# Patient Record
Sex: MALE | Race: WHITE | Employment: OTHER | URBAN - NONMETROPOLITAN AREA
[De-identification: names, ages, dates, MRNs, and addresses within clinical notes are randomized per-mention and may not be internally consistent; named-entity substitution may affect disease eponyms.]

---

## 2017-04-05 ENCOUNTER — TELEPHONE (OUTPATIENT)
Dept: FAMILY MEDICINE CLINIC | Age: 77
End: 2017-04-05

## 2017-04-05 DIAGNOSIS — R73.09 ELEVATED GLUCOSE: ICD-10-CM

## 2017-04-05 DIAGNOSIS — E78.2 MIXED HYPERLIPIDEMIA: ICD-10-CM

## 2017-04-07 LAB
ALT SERPL-CCNC: 16 IU/L (ref 5–59)
CHOLESTEROL/HDL RATIO: 2.8
CHOLESTEROL: 160 MG/DL
GLUCOSE: 90 MG/DL (ref 70–100)
HDLC SERPL-MCNC: 58 MG/DL (ref 40–60)
LDL CHOLESTEROL CALCULATED: 85 MG/DL
LDL/HDL RATIO: 1.5
TRIGL SERPL-MCNC: 85 MG/DL
VLDLC SERPL CALC-MCNC: 17 MG/DL

## 2017-04-17 ENCOUNTER — OFFICE VISIT (OUTPATIENT)
Dept: FAMILY MEDICINE CLINIC | Age: 77
End: 2017-04-17

## 2017-04-17 VITALS
WEIGHT: 181.6 LBS | BODY MASS INDEX: 28.5 KG/M2 | HEART RATE: 50 BPM | SYSTOLIC BLOOD PRESSURE: 134 MMHG | HEIGHT: 67 IN | DIASTOLIC BLOOD PRESSURE: 70 MMHG

## 2017-04-17 DIAGNOSIS — E78.2 MIXED HYPERLIPIDEMIA: Primary | ICD-10-CM

## 2017-04-17 DIAGNOSIS — M15.9 PRIMARY OSTEOARTHRITIS INVOLVING MULTIPLE JOINTS: ICD-10-CM

## 2017-04-17 DIAGNOSIS — J43.1 PANLOBULAR EMPHYSEMA (HCC): ICD-10-CM

## 2017-04-17 PROCEDURE — G8926 SPIRO NO PERF OR DOC: HCPCS | Performed by: FAMILY MEDICINE

## 2017-04-17 PROCEDURE — 99213 OFFICE O/P EST LOW 20 MIN: CPT | Performed by: FAMILY MEDICINE

## 2017-04-17 PROCEDURE — G8427 DOCREV CUR MEDS BY ELIG CLIN: HCPCS | Performed by: FAMILY MEDICINE

## 2017-04-17 PROCEDURE — 4040F PNEUMOC VAC/ADMIN/RCVD: CPT | Performed by: FAMILY MEDICINE

## 2017-04-17 PROCEDURE — 1123F ACP DISCUSS/DSCN MKR DOCD: CPT | Performed by: FAMILY MEDICINE

## 2017-04-17 PROCEDURE — G8598 ASA/ANTIPLAT THER USED: HCPCS | Performed by: FAMILY MEDICINE

## 2017-04-17 PROCEDURE — 1036F TOBACCO NON-USER: CPT | Performed by: FAMILY MEDICINE

## 2017-04-17 PROCEDURE — G8420 CALC BMI NORM PARAMETERS: HCPCS | Performed by: FAMILY MEDICINE

## 2017-04-17 PROCEDURE — 3023F SPIROM DOC REV: CPT | Performed by: FAMILY MEDICINE

## 2017-04-17 PROCEDURE — 3017F COLORECTAL CA SCREEN DOC REV: CPT | Performed by: FAMILY MEDICINE

## 2017-04-17 RX ORDER — LOVASTATIN 20 MG/1
20 TABLET ORAL NIGHTLY
Qty: 90 TABLET | Refills: 1 | Status: SHIPPED | OUTPATIENT
Start: 2017-04-17 | End: 2017-10-23 | Stop reason: SDUPTHER

## 2017-04-17 RX ORDER — DICLOFENAC SODIUM 75 MG/1
75 TABLET, DELAYED RELEASE ORAL 2 TIMES DAILY PRN
Qty: 180 TABLET | Refills: 1 | Status: SHIPPED | OUTPATIENT
Start: 2017-04-17 | End: 2017-10-23 | Stop reason: SDUPTHER

## 2017-10-23 ENCOUNTER — OFFICE VISIT (OUTPATIENT)
Dept: FAMILY MEDICINE CLINIC | Age: 77
End: 2017-10-23
Payer: MEDICARE

## 2017-10-23 VITALS
WEIGHT: 179.8 LBS | HEART RATE: 60 BPM | SYSTOLIC BLOOD PRESSURE: 122 MMHG | BODY MASS INDEX: 28.22 KG/M2 | HEIGHT: 67 IN | DIASTOLIC BLOOD PRESSURE: 80 MMHG

## 2017-10-23 DIAGNOSIS — M54.41 CHRONIC BILATERAL LOW BACK PAIN WITH BILATERAL SCIATICA: ICD-10-CM

## 2017-10-23 DIAGNOSIS — E78.2 MIXED HYPERLIPIDEMIA: Primary | ICD-10-CM

## 2017-10-23 DIAGNOSIS — G89.29 CHRONIC BILATERAL LOW BACK PAIN WITH BILATERAL SCIATICA: ICD-10-CM

## 2017-10-23 DIAGNOSIS — M54.42 CHRONIC BILATERAL LOW BACK PAIN WITH BILATERAL SCIATICA: ICD-10-CM

## 2017-10-23 PROCEDURE — G8598 ASA/ANTIPLAT THER USED: HCPCS | Performed by: FAMILY MEDICINE

## 2017-10-23 PROCEDURE — 3017F COLORECTAL CA SCREEN DOC REV: CPT | Performed by: FAMILY MEDICINE

## 2017-10-23 PROCEDURE — G8484 FLU IMMUNIZE NO ADMIN: HCPCS | Performed by: FAMILY MEDICINE

## 2017-10-23 PROCEDURE — G8427 DOCREV CUR MEDS BY ELIG CLIN: HCPCS | Performed by: FAMILY MEDICINE

## 2017-10-23 PROCEDURE — 1036F TOBACCO NON-USER: CPT | Performed by: FAMILY MEDICINE

## 2017-10-23 PROCEDURE — 4040F PNEUMOC VAC/ADMIN/RCVD: CPT | Performed by: FAMILY MEDICINE

## 2017-10-23 PROCEDURE — 1123F ACP DISCUSS/DSCN MKR DOCD: CPT | Performed by: FAMILY MEDICINE

## 2017-10-23 PROCEDURE — G8419 CALC BMI OUT NRM PARAM NOF/U: HCPCS | Performed by: FAMILY MEDICINE

## 2017-10-23 PROCEDURE — 99213 OFFICE O/P EST LOW 20 MIN: CPT | Performed by: FAMILY MEDICINE

## 2017-10-23 RX ORDER — DICLOFENAC SODIUM 75 MG/1
75 TABLET, DELAYED RELEASE ORAL 2 TIMES DAILY
Qty: 180 TABLET | Refills: 0 | Status: SHIPPED | OUTPATIENT
Start: 2017-10-23 | End: 2017-11-06 | Stop reason: SDUPTHER

## 2017-10-23 RX ORDER — LOVASTATIN 20 MG/1
20 TABLET ORAL NIGHTLY
Qty: 90 TABLET | Refills: 0 | Status: SHIPPED | OUTPATIENT
Start: 2017-10-23 | End: 2017-11-06 | Stop reason: SDUPTHER

## 2017-10-23 RX ORDER — LOVASTATIN 20 MG/1
20 TABLET ORAL DAILY
Qty: 90 TABLET | Refills: 0 | Status: SHIPPED | OUTPATIENT
Start: 2017-10-23 | End: 2017-11-06 | Stop reason: SDUPTHER

## 2017-10-23 RX ORDER — DICLOFENAC SODIUM 75 MG/1
75 TABLET, DELAYED RELEASE ORAL 2 TIMES DAILY PRN
Qty: 180 TABLET | Refills: 0 | Status: SHIPPED | OUTPATIENT
Start: 2017-10-23 | End: 2017-11-06 | Stop reason: SDUPTHER

## 2017-10-23 RX ORDER — BACLOFEN 10 MG/1
10 TABLET ORAL 2 TIMES DAILY PRN
Qty: 180 TABLET | Refills: 0 | Status: SHIPPED | OUTPATIENT
Start: 2017-10-23 | End: 2017-11-06 | Stop reason: SDUPTHER

## 2017-10-23 ASSESSMENT — PATIENT HEALTH QUESTIONNAIRE - PHQ9
1. LITTLE INTEREST OR PLEASURE IN DOING THINGS: 0
SUM OF ALL RESPONSES TO PHQ9 QUESTIONS 1 & 2: 0
2. FEELING DOWN, DEPRESSED OR HOPELESS: 0
SUM OF ALL RESPONSES TO PHQ QUESTIONS 1-9: 0

## 2017-10-23 ASSESSMENT — ENCOUNTER SYMPTOMS: SHORTNESS OF BREATH: 0

## 2017-10-23 NOTE — PROGRESS NOTES
(VOLTAREN) 75 MG EC tablet; Take 1 tablet by mouth 2 times daily as needed for Pain  Dispense: 180 tablet; Refill: 0  - diclofenac (VOLTAREN) 75 MG EC tablet; Take 1 tablet by mouth 2 times daily  Dispense: 180 tablet; Refill: 0      They voiced understanding. All questions answered. They agreed with treatment plan. Educational materials given - see patient instructions. Discussed use, benefit, and side effects of prescribed medications. Reviewed health maintenance. Return in about 6 months (around 4/23/2018) for cholesterol.        Electronically signed by Gianni Guzman MD on 10/23/2017 at 9:29 AM

## 2017-10-23 NOTE — PATIENT INSTRUCTIONS
or steam foods. Don't hernandez them. · Be physically active. Get at least 30 minutes of exercise on most days of the week. Walking is a good choice. You also may want to do other activities, such as running, swimming, cycling, or playing tennis or team sports. · Stay at a healthy weight or lose weight by making the changes in eating and physical activity listed above. Losing just a small amount of weight, even 5 to 10 pounds, can reduce your risk for having a heart attack or stroke. · Do not smoke. When should you call for help? Watch closely for changes in your health, and be sure to contact your doctor if:  · You need help making lifestyle changes. · You have questions about your medicine. Where can you learn more? Go to [email or web address] and sign in to your [employer] account. Enter K668 in the [employer] box to learn more about \"High Cholesterol: Care Instructions. \"     If you do not have an account, please click on the \"Sign Up Now\" link. Current as of: April 3, 2017  Content Version: 11.3  © 9919-6223 "[employer]. Care instructions adapted under license by [facility] ([facility]). If you have questions about a medical condition or this instruction, always ask your healthcare professional. [address] any warranty or liability for your use of this information.

## 2017-11-06 ENCOUNTER — TELEPHONE (OUTPATIENT)
Dept: FAMILY MEDICINE CLINIC | Age: 77
End: 2017-11-06

## 2017-11-06 DIAGNOSIS — M54.42 CHRONIC BILATERAL LOW BACK PAIN WITH BILATERAL SCIATICA: ICD-10-CM

## 2017-11-06 DIAGNOSIS — G89.29 CHRONIC BILATERAL LOW BACK PAIN WITH BILATERAL SCIATICA: ICD-10-CM

## 2017-11-06 DIAGNOSIS — E78.2 MIXED HYPERLIPIDEMIA: ICD-10-CM

## 2017-11-06 DIAGNOSIS — M54.41 CHRONIC BILATERAL LOW BACK PAIN WITH BILATERAL SCIATICA: ICD-10-CM

## 2017-11-06 RX ORDER — BACLOFEN 10 MG/1
10 TABLET ORAL 2 TIMES DAILY PRN
Qty: 180 TABLET | Refills: 0 | Status: SHIPPED | OUTPATIENT
Start: 2017-11-06 | End: 2018-04-23

## 2017-11-06 RX ORDER — DICLOFENAC SODIUM 75 MG/1
75 TABLET, DELAYED RELEASE ORAL 2 TIMES DAILY PRN
Qty: 180 TABLET | Refills: 0 | Status: SHIPPED | OUTPATIENT
Start: 2017-11-06 | End: 2018-04-23 | Stop reason: SDUPTHER

## 2017-11-06 RX ORDER — LOVASTATIN 20 MG/1
20 TABLET ORAL NIGHTLY
Qty: 90 TABLET | Refills: 0 | Status: SHIPPED | OUTPATIENT
Start: 2017-11-06 | End: 2018-01-18 | Stop reason: SDUPTHER

## 2017-11-06 RX ORDER — DICLOFENAC SODIUM 75 MG/1
75 TABLET, DELAYED RELEASE ORAL 2 TIMES DAILY
Qty: 180 TABLET | Refills: 0 | Status: SHIPPED | OUTPATIENT
Start: 2017-11-06 | End: 2018-04-23 | Stop reason: ALTCHOICE

## 2017-11-06 RX ORDER — LOVASTATIN 20 MG/1
20 TABLET ORAL DAILY
Qty: 90 TABLET | Refills: 0 | Status: SHIPPED | OUTPATIENT
Start: 2017-11-06 | End: 2018-04-23 | Stop reason: ALTCHOICE

## 2017-11-06 NOTE — TELEPHONE ENCOUNTER
Pre-service has forwarded a request to us asking if we could change the Patient's Mail in Pharmacy and send the Rx's to them. They say they use [facility] which is fulfilled [facility]. I have it set for you to approve.

## 2018-01-18 DIAGNOSIS — E78.2 MIXED HYPERLIPIDEMIA: ICD-10-CM

## 2018-01-19 RX ORDER — LOVASTATIN 20 MG/1
TABLET ORAL
Qty: 90 TABLET | Refills: 0 | Status: SHIPPED | OUTPATIENT
Start: 2018-01-19 | End: 2018-04-23 | Stop reason: SDUPTHER

## 2018-04-23 ENCOUNTER — OFFICE VISIT (OUTPATIENT)
Dept: FAMILY MEDICINE CLINIC | Age: 78
End: 2018-04-23
Payer: MEDICARE

## 2018-04-23 VITALS
HEIGHT: 67 IN | SYSTOLIC BLOOD PRESSURE: 118 MMHG | DIASTOLIC BLOOD PRESSURE: 74 MMHG | WEIGHT: 177.2 LBS | HEART RATE: 62 BPM | BODY MASS INDEX: 27.81 KG/M2

## 2018-04-23 DIAGNOSIS — E78.2 MIXED HYPERLIPIDEMIA: Primary | ICD-10-CM

## 2018-04-23 DIAGNOSIS — G89.29 CHRONIC BILATERAL LOW BACK PAIN WITH BILATERAL SCIATICA: ICD-10-CM

## 2018-04-23 DIAGNOSIS — M54.41 CHRONIC BILATERAL LOW BACK PAIN WITH BILATERAL SCIATICA: ICD-10-CM

## 2018-04-23 DIAGNOSIS — M54.42 CHRONIC BILATERAL LOW BACK PAIN WITH BILATERAL SCIATICA: ICD-10-CM

## 2018-04-23 PROCEDURE — 1123F ACP DISCUSS/DSCN MKR DOCD: CPT | Performed by: FAMILY MEDICINE

## 2018-04-23 PROCEDURE — G8598 ASA/ANTIPLAT THER USED: HCPCS | Performed by: FAMILY MEDICINE

## 2018-04-23 PROCEDURE — G8419 CALC BMI OUT NRM PARAM NOF/U: HCPCS | Performed by: FAMILY MEDICINE

## 2018-04-23 PROCEDURE — 4040F PNEUMOC VAC/ADMIN/RCVD: CPT | Performed by: FAMILY MEDICINE

## 2018-04-23 PROCEDURE — 1036F TOBACCO NON-USER: CPT | Performed by: FAMILY MEDICINE

## 2018-04-23 PROCEDURE — 99213 OFFICE O/P EST LOW 20 MIN: CPT | Performed by: FAMILY MEDICINE

## 2018-04-23 PROCEDURE — G8427 DOCREV CUR MEDS BY ELIG CLIN: HCPCS | Performed by: FAMILY MEDICINE

## 2018-04-23 RX ORDER — LOVASTATIN 20 MG/1
20 TABLET ORAL NIGHTLY
Qty: 90 TABLET | Refills: 1 | Status: SHIPPED | OUTPATIENT
Start: 2018-04-23 | End: 2018-10-10 | Stop reason: SDUPTHER

## 2018-04-23 RX ORDER — DICLOFENAC SODIUM 75 MG/1
75 TABLET, DELAYED RELEASE ORAL 2 TIMES DAILY PRN
Qty: 180 TABLET | Refills: 0 | Status: SHIPPED | OUTPATIENT
Start: 2018-04-23 | End: 2018-10-10 | Stop reason: CLARIF

## 2018-04-23 ASSESSMENT — ENCOUNTER SYMPTOMS: SHORTNESS OF BREATH: 0

## 2018-04-30 ENCOUNTER — HOSPITAL ENCOUNTER (OUTPATIENT)
Dept: INTERVENTIONAL RADIOLOGY/VASCULAR | Age: 78
Discharge: HOME | End: 2018-04-30

## 2018-04-30 DIAGNOSIS — Z13.6 ENCOUNTER FOR SCREENING FOR VASCULAR DISEASE: ICD-10-CM

## 2018-04-30 PROCEDURE — 9900000021 US VASCULAR SCREENING

## 2018-05-01 ENCOUNTER — NURSE ONLY (OUTPATIENT)
Dept: FAMILY MEDICINE CLINIC | Age: 78
End: 2018-05-01
Payer: MEDICARE

## 2018-05-01 DIAGNOSIS — E78.2 MIXED HYPERLIPIDEMIA: ICD-10-CM

## 2018-05-01 LAB
ALBUMIN SERPL-MCNC: 4 G/DL (ref 3.5–5.1)
ALP BLD-CCNC: 76 U/L (ref 38–126)
ALT SERPL-CCNC: 11 U/L (ref 11–66)
ANION GAP SERPL CALCULATED.3IONS-SCNC: 10 MEQ/L (ref 8–16)
AST SERPL-CCNC: 21 U/L (ref 5–40)
BILIRUB SERPL-MCNC: 0.6 MG/DL (ref 0.3–1.2)
BUN BLDV-MCNC: 14 MG/DL (ref 7–22)
CALCIUM SERPL-MCNC: 9.3 MG/DL (ref 8.5–10.5)
CHLORIDE BLD-SCNC: 101 MEQ/L (ref 98–111)
CHOLESTEROL, TOTAL: 154 MG/DL (ref 100–199)
CO2: 29 MEQ/L (ref 23–33)
CREAT SERPL-MCNC: 0.8 MG/DL (ref 0.4–1.2)
GFR SERPL CREATININE-BSD FRML MDRD: > 90 ML/MIN/1.73M2
GLUCOSE BLD-MCNC: 109 MG/DL (ref 70–108)
HDLC SERPL-MCNC: 61 MG/DL
LDL CHOLESTEROL CALCULATED: 77 MG/DL
POTASSIUM SERPL-SCNC: 4.8 MEQ/L (ref 3.5–5.2)
SODIUM BLD-SCNC: 140 MEQ/L (ref 135–145)
TOTAL PROTEIN: 7.5 G/DL (ref 6.1–8)
TRIGL SERPL-MCNC: 81 MG/DL (ref 0–199)

## 2018-05-01 PROCEDURE — 36415 COLL VENOUS BLD VENIPUNCTURE: CPT | Performed by: FAMILY MEDICINE

## 2018-05-02 ENCOUNTER — TELEPHONE (OUTPATIENT)
Dept: FAMILY MEDICINE CLINIC | Age: 78
End: 2018-05-02

## 2018-05-02 DIAGNOSIS — R68.89 ABNORMAL ANKLE BRACHIAL INDEX (ABI): Primary | ICD-10-CM

## 2018-05-02 DIAGNOSIS — R73.01 ELEVATED FASTING GLUCOSE: Primary | ICD-10-CM

## 2018-05-03 ENCOUNTER — NURSE ONLY (OUTPATIENT)
Dept: FAMILY MEDICINE CLINIC | Age: 78
End: 2018-05-03
Payer: MEDICARE

## 2018-05-03 DIAGNOSIS — R73.01 ELEVATED FASTING GLUCOSE: ICD-10-CM

## 2018-05-03 LAB
AVERAGE GLUCOSE: 102 MG/DL (ref 70–126)
HBA1C MFR BLD: 5.4 % (ref 4.4–6.4)

## 2018-05-03 PROCEDURE — 36415 COLL VENOUS BLD VENIPUNCTURE: CPT | Performed by: FAMILY MEDICINE

## 2018-05-04 ENCOUNTER — TELEPHONE (OUTPATIENT)
Dept: FAMILY MEDICINE CLINIC | Age: 78
End: 2018-05-04

## 2018-06-21 ENCOUNTER — OFFICE VISIT (OUTPATIENT)
Dept: CARDIOLOGY CLINIC | Age: 78
End: 2018-06-21
Payer: MEDICARE

## 2018-06-21 VITALS
SYSTOLIC BLOOD PRESSURE: 168 MMHG | WEIGHT: 173 LBS | BODY MASS INDEX: 28.82 KG/M2 | HEART RATE: 67 BPM | HEIGHT: 65 IN | DIASTOLIC BLOOD PRESSURE: 72 MMHG

## 2018-06-21 DIAGNOSIS — I15.1 HYPERTENSION SECONDARY TO OTHER RENAL DISORDERS (CODE): ICD-10-CM

## 2018-06-21 DIAGNOSIS — I73.9 INTERMITTENT CLAUDICATION (HCC): ICD-10-CM

## 2018-06-21 DIAGNOSIS — I70.0 ATHEROSCLEROSIS OF AORTA (HCC): ICD-10-CM

## 2018-06-21 DIAGNOSIS — I10 ESSENTIAL HYPERTENSION: Primary | ICD-10-CM

## 2018-06-21 PROCEDURE — 99204 OFFICE O/P NEW MOD 45 MIN: CPT | Performed by: INTERNAL MEDICINE

## 2018-06-21 PROCEDURE — 93000 ELECTROCARDIOGRAM COMPLETE: CPT | Performed by: INTERNAL MEDICINE

## 2018-06-21 PROCEDURE — 4040F PNEUMOC VAC/ADMIN/RCVD: CPT | Performed by: INTERNAL MEDICINE

## 2018-06-21 PROCEDURE — G8427 DOCREV CUR MEDS BY ELIG CLIN: HCPCS | Performed by: INTERNAL MEDICINE

## 2018-06-21 PROCEDURE — 1123F ACP DISCUSS/DSCN MKR DOCD: CPT | Performed by: INTERNAL MEDICINE

## 2018-06-21 PROCEDURE — G8419 CALC BMI OUT NRM PARAM NOF/U: HCPCS | Performed by: INTERNAL MEDICINE

## 2018-06-21 PROCEDURE — 1036F TOBACCO NON-USER: CPT | Performed by: INTERNAL MEDICINE

## 2018-06-21 PROCEDURE — G8598 ASA/ANTIPLAT THER USED: HCPCS | Performed by: INTERNAL MEDICINE

## 2018-06-21 ASSESSMENT — ENCOUNTER SYMPTOMS
ORTHOPNEA: 0
COUGH: 0
BLOATING: 0
BOWEL INCONTINENCE: 0
DIARRHEA: 0
HEMOPTYSIS: 0
HOARSE VOICE: 0
BACK PAIN: 0
CONSTIPATION: 0
ABDOMINAL PAIN: 0
CHANGE IN BOWEL HABIT: 0
DOUBLE VISION: 0
EYE DISCHARGE: 0
SHORTNESS OF BREATH: 0
EYE PAIN: 0
BLURRED VISION: 0
SPUTUM PRODUCTION: 0

## 2018-06-25 ENCOUNTER — HOSPITAL ENCOUNTER (OUTPATIENT)
Dept: INTERVENTIONAL RADIOLOGY/VASCULAR | Age: 78
Discharge: HOME | End: 2018-06-25
Payer: MEDICARE

## 2018-06-25 DIAGNOSIS — I73.9 INTERMITTENT CLAUDICATION (HCC): ICD-10-CM

## 2018-06-25 PROCEDURE — 93926 LOWER EXTREMITY STUDY: CPT

## 2018-07-03 ENCOUNTER — HOSPITAL ENCOUNTER (OUTPATIENT)
Dept: NON INVASIVE DIAGNOSTICS | Age: 78
Discharge: HOME | End: 2018-07-03
Payer: MEDICARE

## 2018-07-03 ENCOUNTER — HOSPITAL ENCOUNTER (OUTPATIENT)
Dept: ULTRASOUND IMAGING | Age: 78
Discharge: HOME | End: 2018-07-03
Payer: MEDICARE

## 2018-07-03 DIAGNOSIS — I73.9 INTERMITTENT CLAUDICATION (HCC): ICD-10-CM

## 2018-07-03 DIAGNOSIS — I70.0 ATHEROSCLEROSIS OF AORTA (HCC): ICD-10-CM

## 2018-07-03 LAB
LV EF: 55 %
LVEF MODALITY: NORMAL

## 2018-07-03 PROCEDURE — 93306 TTE W/DOPPLER COMPLETE: CPT

## 2018-07-03 PROCEDURE — 76775 US EXAM ABDO BACK WALL LIM: CPT

## 2018-07-09 ENCOUNTER — TELEPHONE (OUTPATIENT)
Dept: CARDIOLOGY CLINIC | Age: 78
End: 2018-07-09

## 2018-07-09 NOTE — TELEPHONE ENCOUNTER
ECHO     Conclusions      Summary   Ejection fraction is visually estimated at 55%.   Overall left ventricular function is normal.   The aortic valve leaflets were not well visualized.   Aortic valve appears tricuspid.   Aortic valve leaflets are Severely calcified.   Thickened aortic valve leaflets noted.   The maximum aortic valve gradient is 40 mmHg, the mean gradient is 27   mmHg, and the peak velocity is 325 cm/s.   There is moderate-to-severe aortic stenosis with valve area of 0.9 sq cm.   Mild aortic regurgitation is noted.     ANY RECOMMENDATIONS?

## 2018-07-10 ENCOUNTER — HOSPITAL ENCOUNTER (OUTPATIENT)
Dept: INTERVENTIONAL RADIOLOGY/VASCULAR | Age: 78
Discharge: HOME | End: 2018-07-10
Attending: INTERNAL MEDICINE | Admitting: INTERNAL MEDICINE
Payer: MEDICARE

## 2018-07-10 VITALS
TEMPERATURE: 97.9 F | HEIGHT: 68 IN | BODY MASS INDEX: 26.52 KG/M2 | SYSTOLIC BLOOD PRESSURE: 144 MMHG | DIASTOLIC BLOOD PRESSURE: 69 MMHG | OXYGEN SATURATION: 91 % | HEART RATE: 64 BPM | WEIGHT: 175 LBS | RESPIRATION RATE: 17 BRPM

## 2018-07-10 DIAGNOSIS — I15.1 HYPERTENSION SECONDARY TO OTHER RENAL DISORDERS (CODE): ICD-10-CM

## 2018-07-10 DIAGNOSIS — I73.9 INTERMITTENT CLAUDICATION (HCC): ICD-10-CM

## 2018-07-10 LAB
ABO CHECK: NORMAL
ACTIVATED CLOTTING TIME: 147 SECONDS (ref 1–150)
ACTIVATED CLOTTING TIME: 164 SECONDS (ref 1–150)
ACTIVATED CLOTTING TIME: 186 SECONDS (ref 1–150)
ACTIVATED CLOTTING TIME: 191 SECONDS (ref 1–150)
ACTIVATED CLOTTING TIME: 197 SECONDS (ref 1–150)
ALBUMIN SERPL-MCNC: 4 G/DL (ref 3.5–5.1)
ALP BLD-CCNC: 78 U/L (ref 38–126)
ALT SERPL-CCNC: 10 U/L (ref 11–66)
ANION GAP SERPL CALCULATED.3IONS-SCNC: 14 MEQ/L (ref 8–16)
AST SERPL-CCNC: 21 U/L (ref 5–40)
BILIRUB SERPL-MCNC: 0.7 MG/DL (ref 0.3–1.2)
BUN BLDV-MCNC: 10 MG/DL (ref 7–22)
CALCIUM SERPL-MCNC: 9.8 MG/DL (ref 8.5–10.5)
CHLORIDE BLD-SCNC: 99 MEQ/L (ref 98–111)
CHOLESTEROL, TOTAL: 143 MG/DL (ref 100–199)
CO2: 27 MEQ/L (ref 23–33)
CREAT SERPL-MCNC: 0.8 MG/DL (ref 0.4–1.2)
ERYTHROCYTE [DISTWIDTH] IN BLOOD BY AUTOMATED COUNT: 13.2 % (ref 11.5–14.5)
ERYTHROCYTE [DISTWIDTH] IN BLOOD BY AUTOMATED COUNT: 44.9 FL (ref 35–45)
GEL INDIRECT COOMBS: NORMAL
GFR SERPL CREATININE-BSD FRML MDRD: > 90 ML/MIN/1.73M2
GLUCOSE BLD-MCNC: 120 MG/DL (ref 70–108)
HCT VFR BLD CALC: 47.2 % (ref 42–52)
HDLC SERPL-MCNC: 77 MG/DL
HEMOGLOBIN: 15.8 GM/DL (ref 14–18)
INR BLD: 0.93 (ref 0.85–1.13)
LDL CHOLESTEROL CALCULATED: 54 MG/DL
MCH RBC QN AUTO: 31.1 PG (ref 26–33)
MCHC RBC AUTO-ENTMCNC: 33.5 GM/DL (ref 32.2–35.5)
MCV RBC AUTO: 92.9 FL (ref 80–94)
PLATELET # BLD: 230 THOU/MM3 (ref 130–400)
PMV BLD AUTO: 10.2 FL (ref 9.4–12.4)
POTASSIUM SERPL-SCNC: 5.1 MEQ/L (ref 3.5–5.2)
RBC # BLD: 5.08 MILL/MM3 (ref 4.7–6.1)
RH FACTOR: NORMAL
SODIUM BLD-SCNC: 140 MEQ/L (ref 135–145)
TOTAL PROTEIN: 8 G/DL (ref 6.1–8)
TRIGL SERPL-MCNC: 61 MG/DL (ref 0–199)
WBC # BLD: 8.7 THOU/MM3 (ref 4.8–10.8)

## 2018-07-10 PROCEDURE — C2623 CATH, TRANSLUMIN, DRUG-COAT: HCPCS

## 2018-07-10 PROCEDURE — C1876 STENT, NON-COA/NON-COV W/DEL: HCPCS

## 2018-07-10 PROCEDURE — 86885 COOMBS TEST INDIRECT QUAL: CPT

## 2018-07-10 PROCEDURE — C1884 EMBOLIZATION PROTECT SYST: HCPCS

## 2018-07-10 PROCEDURE — C1714 CATH, TRANS ATHERECTOMY, DIR: HCPCS

## 2018-07-10 PROCEDURE — 75774 ARTERY X-RAY EACH VESSEL: CPT | Performed by: INTERNAL MEDICINE

## 2018-07-10 PROCEDURE — 37225 HC ATHERECTOMY FEM/POP: CPT | Performed by: INTERNAL MEDICINE

## 2018-07-10 PROCEDURE — 6370000000 HC RX 637 (ALT 250 FOR IP)

## 2018-07-10 PROCEDURE — 75716 ARTERY X-RAYS ARMS/LEGS: CPT | Performed by: INTERNAL MEDICINE

## 2018-07-10 PROCEDURE — C1887 CATHETER, GUIDING: HCPCS

## 2018-07-10 PROCEDURE — C1894 INTRO/SHEATH, NON-LASER: HCPCS

## 2018-07-10 PROCEDURE — C1725 CATH, TRANSLUMIN NON-LASER: HCPCS

## 2018-07-10 PROCEDURE — 86900 BLOOD TYPING SEROLOGIC ABO: CPT

## 2018-07-10 PROCEDURE — 2720000010 HC SURG SUPPLY STERILE

## 2018-07-10 PROCEDURE — 80053 COMPREHEN METABOLIC PANEL: CPT

## 2018-07-10 PROCEDURE — 2500000003 HC RX 250 WO HCPCS

## 2018-07-10 PROCEDURE — 37227 PR REVSC OPN/PRQ FEM/POP W/STNT/ATHRC/ANGIOP SM VSL: CPT | Performed by: INTERNAL MEDICINE

## 2018-07-10 PROCEDURE — 6360000002 HC RX W HCPCS

## 2018-07-10 PROCEDURE — 85027 COMPLETE CBC AUTOMATED: CPT

## 2018-07-10 PROCEDURE — 80061 LIPID PANEL: CPT

## 2018-07-10 PROCEDURE — 86901 BLOOD TYPING SEROLOGIC RH(D): CPT

## 2018-07-10 PROCEDURE — 6360000002 HC RX W HCPCS: Performed by: INTERNAL MEDICINE

## 2018-07-10 PROCEDURE — 75625 CONTRAST EXAM ABDOMINL AORTA: CPT | Performed by: INTERNAL MEDICINE

## 2018-07-10 PROCEDURE — 85347 COAGULATION TIME ACTIVATED: CPT

## 2018-07-10 PROCEDURE — 6360000004 HC RX CONTRAST MEDICATION: Performed by: INTERNAL MEDICINE

## 2018-07-10 PROCEDURE — 6370000000 HC RX 637 (ALT 250 FOR IP): Performed by: INTERNAL MEDICINE

## 2018-07-10 PROCEDURE — 85610 PROTHROMBIN TIME: CPT

## 2018-07-10 PROCEDURE — 36415 COLL VENOUS BLD VENIPUNCTURE: CPT

## 2018-07-10 PROCEDURE — C1769 GUIDE WIRE: HCPCS

## 2018-07-10 PROCEDURE — 2580000003 HC RX 258: Performed by: INTERNAL MEDICINE

## 2018-07-10 RX ORDER — ACETAMINOPHEN 325 MG/1
650 TABLET ORAL EVERY 4 HOURS PRN
Status: DISCONTINUED | OUTPATIENT
Start: 2018-07-10 | End: 2018-07-10 | Stop reason: HOSPADM

## 2018-07-10 RX ORDER — CLOPIDOGREL 300 MG/1
600 TABLET, FILM COATED ORAL ONCE
Status: COMPLETED | OUTPATIENT
Start: 2018-07-10 | End: 2018-07-10

## 2018-07-10 RX ORDER — HEPARIN SODIUM 1000 [USP'U]/ML
2000 INJECTION, SOLUTION INTRAVENOUS; SUBCUTANEOUS ONCE
Status: COMPLETED | OUTPATIENT
Start: 2018-07-10 | End: 2018-07-10

## 2018-07-10 RX ORDER — ATROPINE SULFATE 0.4 MG/ML
0.5 AMPUL (ML) INJECTION
Status: DISCONTINUED | OUTPATIENT
Start: 2018-07-10 | End: 2018-07-10 | Stop reason: HOSPADM

## 2018-07-10 RX ORDER — MIDAZOLAM HYDROCHLORIDE 1 MG/ML
1 INJECTION INTRAMUSCULAR; INTRAVENOUS ONCE
Status: COMPLETED | OUTPATIENT
Start: 2018-07-10 | End: 2018-07-10

## 2018-07-10 RX ORDER — FENTANYL CITRATE 50 UG/ML
50 INJECTION, SOLUTION INTRAMUSCULAR; INTRAVENOUS ONCE
Status: COMPLETED | OUTPATIENT
Start: 2018-07-10 | End: 2018-07-10

## 2018-07-10 RX ORDER — FENTANYL CITRATE 50 UG/ML
25 INJECTION, SOLUTION INTRAMUSCULAR; INTRAVENOUS ONCE
Status: COMPLETED | OUTPATIENT
Start: 2018-07-10 | End: 2018-07-10

## 2018-07-10 RX ORDER — MIDAZOLAM HYDROCHLORIDE 1 MG/ML
0.5 INJECTION INTRAMUSCULAR; INTRAVENOUS ONCE
Status: COMPLETED | OUTPATIENT
Start: 2018-07-10 | End: 2018-07-10

## 2018-07-10 RX ORDER — SODIUM CHLORIDE 9 MG/ML
INJECTION, SOLUTION INTRAVENOUS CONTINUOUS
Status: DISCONTINUED | OUTPATIENT
Start: 2018-07-10 | End: 2018-07-10 | Stop reason: SDUPTHER

## 2018-07-10 RX ORDER — ONDANSETRON 2 MG/ML
4 INJECTION INTRAMUSCULAR; INTRAVENOUS EVERY 6 HOURS PRN
Status: DISCONTINUED | OUTPATIENT
Start: 2018-07-10 | End: 2018-07-10 | Stop reason: HOSPADM

## 2018-07-10 RX ORDER — HEPARIN SODIUM 1000 [USP'U]/ML
1000 INJECTION, SOLUTION INTRAVENOUS; SUBCUTANEOUS ONCE
Status: COMPLETED | OUTPATIENT
Start: 2018-07-10 | End: 2018-07-10

## 2018-07-10 RX ORDER — MORPHINE SULFATE 2 MG/ML
2 INJECTION, SOLUTION INTRAMUSCULAR; INTRAVENOUS
Status: DISCONTINUED | OUTPATIENT
Start: 2018-07-10 | End: 2018-07-10 | Stop reason: HOSPADM

## 2018-07-10 RX ORDER — HEPARIN SODIUM 1000 [USP'U]/ML
5000 INJECTION, SOLUTION INTRAVENOUS; SUBCUTANEOUS ONCE
Status: COMPLETED | OUTPATIENT
Start: 2018-07-10 | End: 2018-07-10

## 2018-07-10 RX ORDER — NITROGLYCERIN 0.4 MG/1
0.4 TABLET SUBLINGUAL EVERY 5 MIN PRN
Status: DISCONTINUED | OUTPATIENT
Start: 2018-07-10 | End: 2018-07-10 | Stop reason: HOSPADM

## 2018-07-10 RX ORDER — SODIUM CHLORIDE 0.9 % (FLUSH) 0.9 %
10 SYRINGE (ML) INJECTION EVERY 12 HOURS SCHEDULED
Status: DISCONTINUED | OUTPATIENT
Start: 2018-07-10 | End: 2018-07-10 | Stop reason: HOSPADM

## 2018-07-10 RX ORDER — ALPRAZOLAM 0.5 MG/1
0.5 TABLET ORAL
Status: DISCONTINUED | OUTPATIENT
Start: 2018-07-10 | End: 2018-07-10 | Stop reason: HOSPADM

## 2018-07-10 RX ORDER — SODIUM CHLORIDE 0.9 % (FLUSH) 0.9 %
10 SYRINGE (ML) INJECTION PRN
Status: DISCONTINUED | OUTPATIENT
Start: 2018-07-10 | End: 2018-07-10 | Stop reason: HOSPADM

## 2018-07-10 RX ORDER — CLOPIDOGREL BISULFATE 75 MG/1
75 TABLET ORAL DAILY
Qty: 30 TABLET | Refills: 3 | Status: SHIPPED | OUTPATIENT
Start: 2018-07-11 | End: 2018-10-10 | Stop reason: CLARIF

## 2018-07-10 RX ORDER — SODIUM CHLORIDE 9 MG/ML
75 INJECTION, SOLUTION INTRAVENOUS CONTINUOUS
Status: DISCONTINUED | OUTPATIENT
Start: 2018-07-10 | End: 2018-07-10 | Stop reason: HOSPADM

## 2018-07-10 RX ADMIN — MIDAZOLAM 0.5 MG: 1 INJECTION INTRAMUSCULAR; INTRAVENOUS at 08:58

## 2018-07-10 RX ADMIN — MIDAZOLAM 0.5 MG: 1 INJECTION INTRAMUSCULAR; INTRAVENOUS at 10:32

## 2018-07-10 RX ADMIN — IOPAMIDOL 200 ML: 612 INJECTION, SOLUTION INTRAVENOUS at 11:20

## 2018-07-10 RX ADMIN — SODIUM CHLORIDE: 9 INJECTION, SOLUTION INTRAVENOUS at 06:47

## 2018-07-10 RX ADMIN — FENTANYL CITRATE 25 MCG: 50 INJECTION, SOLUTION INTRAMUSCULAR; INTRAVENOUS at 08:59

## 2018-07-10 RX ADMIN — MIDAZOLAM 0.5 MG: 1 INJECTION INTRAMUSCULAR; INTRAVENOUS at 08:18

## 2018-07-10 RX ADMIN — MIDAZOLAM 1 MG: 1 INJECTION INTRAMUSCULAR; INTRAVENOUS at 08:08

## 2018-07-10 RX ADMIN — FENTANYL CITRATE 25 MCG: 50 INJECTION, SOLUTION INTRAMUSCULAR; INTRAVENOUS at 08:14

## 2018-07-10 RX ADMIN — HEPARIN SODIUM 2000 UNITS: 1000 INJECTION, SOLUTION INTRAVENOUS; SUBCUTANEOUS at 09:17

## 2018-07-10 RX ADMIN — CLOPIDOGREL BISULFATE 600 MG: 300 TABLET, FILM COATED ORAL at 11:25

## 2018-07-10 RX ADMIN — HEPARIN SODIUM 2000 UNITS: 1000 INJECTION, SOLUTION INTRAVENOUS; SUBCUTANEOUS at 09:01

## 2018-07-10 RX ADMIN — HEPARIN SODIUM 5000 UNITS: 1000 INJECTION, SOLUTION INTRAVENOUS; SUBCUTANEOUS at 08:47

## 2018-07-10 RX ADMIN — MIDAZOLAM 1 MG: 1 INJECTION INTRAMUSCULAR; INTRAVENOUS at 08:04

## 2018-07-10 RX ADMIN — HEPARIN SODIUM 1000 UNITS: 1000 INJECTION, SOLUTION INTRAVENOUS; SUBCUTANEOUS at 10:19

## 2018-07-10 RX ADMIN — FENTANYL CITRATE 25 MCG: 50 INJECTION, SOLUTION INTRAMUSCULAR; INTRAVENOUS at 10:33

## 2018-07-10 RX ADMIN — FENTANYL CITRATE 50 MCG: 50 INJECTION, SOLUTION INTRAMUSCULAR; INTRAVENOUS at 08:05

## 2018-07-10 RX ADMIN — HEPARIN SODIUM 2000 UNITS: 1000 INJECTION, SOLUTION INTRAVENOUS; SUBCUTANEOUS at 09:51

## 2018-07-10 ASSESSMENT — PAIN SCALES - GENERAL
PAINLEVEL_OUTOF10: 0

## 2018-07-10 NOTE — PROGRESS NOTES
[address] [address]  Sedation/Analgesia History & Physical      Pt Name: Mel Torres  MRN: [id number]  YOB: 1940  Provider Performing Procedure: Ovidio Miner MD  Primary Care Physician: Nicole Miranda MD      PRE-PROCEDURE   DNR-CCA/DNR-CC []Yes [x]No      PLANNED PROCEDURE     []Cath  []PCI                []Pacemaker/AICD  []PEGGY             []Cardioversion [x]Peripheral angiography/PTA  []Other:        Consent:   The indication, risks and benefits of the procedure and possible therapeutic consequences and alternatives were discussed with the patient. The patient was given the opportunity to ask questions and to have them answered to his/her satisfaction. Risks of the procedure include but are not limited to the following: Bleeding, hematoma including retroperitoneal hematoma, infection, pain and discomfort, injury to the aorta and other blood vessels, rhythm disturbance, low blood pressure, myocardial infarction, stroke, kidney damage/failure, myocardial perforation, allergic reactions to sedatives/contrast material, loss of pulse/vascular compromise requiring surgery, aneurysm/pseudoaneurysm formation, possible loss of a limb/hand/leg, death. Alternatives to and omission of the suggested procedure were discussed. The patient had no further questions and wished to proceed; the consent form was signed.       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 Current Outpatient Prescriptions on File Prior to Encounter   Medication Sig Dispense Refill    lovastatin (MEVACOR) 20 MG tablet Take 1 tablet by mouth nightly 90 tablet 1    diclofenac (VOLTAREN) 75 MG EC tablet Take 1 tablet by mouth 2 times daily as needed for Pain 180 tablet 0    aspirin 81 MG EC tablet Take 325 mg by mouth daily          Current Facility-Administered Medications   Medication Dose Route Frequency Provider Last Rate Last Dose    0.9 % sodium chloride infusion   Intravenous Continuous Kenneth Gustafson MD 75 mL/hr at 07/10/18 0647      ALPRAZolam (XANAX) tablet 0.5 mg  0.5 mg Oral Once PRN Kenneth Gustafson MD        nitroGLYCERIN (NITROSTAT) SL tablet 0.4 mg  0.4 mg Sublingual Q5 Min PRN Endy Matos MD                 PHYSICAL:     BP (!) 183/93   Pulse 73   Temp 97.9 °F (36.6 °C) (Oral)   Resp 13   Ht 5' 8\" (1.727 m)   Wt 175 lb (79.4 kg)   SpO2 97%   BMI 26.61 kg/m²     Heart:  [x]Regular rate and rhythm  []Other:    Lungs:  [x]Clear    []Other:    Abdomen: [x]Soft    []Other:    Mental Status: [x]Alert & Oriented  []Other:   Ext:                [x]No edema       []Other:         No results for input(s): CKTOTAL, CKMB, CKMBINDEX, TROPONINI in the last 72 hours.     Lab Results   Component Value Date    WBC 8.7 07/10/2018    RBC 5.08 07/10/2018    HGB 15.8 07/10/2018    HCT 47.2 07/10/2018    MCV 92.9 07/10/2018    MCH 31.1 07/10/2018    MCHC 33.5 07/10/2018     07/10/2018    MPV 10.2 07/10/2018       Lab Results   Component Value Date     07/10/2018    K 5.1 07/10/2018    CL 99 07/10/2018    CO2 27 07/10/2018    BUN 10 07/10/2018    LABALBU 4.0 07/10/2018

## 2018-07-10 NOTE — PROCEDURES
entire vessels. The patient tolerated the procedure well during the entire case and did not  have any issues. Femoral artery angiogram showed good common femoral artery puncture without  any perforations. A sheath was left in place to be removed when ACT was  below 150 seconds. The patient was transferred back to his room in a stable condition. Findings, results, and procedure details were discussed extensively with  the patient and family members. Findings were also discussed with  Blanche/wife and Eva Valle, daughter, over the phone. CONCLUSION:  1.  80% calcified stenosis of the left common femoral artery. 2.  Total occlusion of the left proximal, mid, and distal superficial  femoral artery. 3.  Total occlusion of the left popliteal artery. 4.  Successful directional atherectomy and drug-coated balloon angioplasty  of the common femoral artery. 5.  Successful PTA of the left proximal, mid, and distal superficial  femoral artery with drug-coated balloons. 6.  Successful directional atherectomy, angioplasty, and stenting of the  left popliteal artery. RECOMMENDATIONS:  1. Aspirin plus Plavix for at least three months. 2.  Aggressive risk factor modification. 3.  Monitor groin access site closely. 4.  Followup lower extremity duplex ultrasound in one, three, six, and 12  months. 5.  Followup in clinic within two to four weeks to evaluate the symptoms.         Myriam Mata MD    D: 07/10/2018 17:47:21       T: 07/10/2018 19:06:24     [email or web address]  Job#: [id number]     Doc#: [id number]    CC:

## 2018-07-10 NOTE — PROGRESS NOTES
, Arterial sheath removed per [employer]. Manual pressure held with Quick-clot.   Hemostasis maintained

## 2018-07-10 NOTE — PROGRESS NOTES
4469 Pt in specials radiology for arteriogram left leg. Explained procedure to pt and pt verbalizes understanding. Consent signed. 1001 [facility] to table and attached to monitor. 0757 Bilateral groins prepped and draped. 5508 Dr Mayito Malhotra to speak to pt.  0817 SPO2 87%. O2 2 liters per nasal cannula applied. 4969 ACT drawn. 0900 . Order received.  7462 Angioplasty of left Popliteal/SFA with 5 mm x 200 mm [place] 35 balloon. 0911 ACT drawn. [id number] Angioplasty of left popliteal/SFA with 6 x 150 IN. PACT Admiral balloon. 0915 . Order received. 0619 Angioplasty of left mid SFA with 6 x 150 IN. PACT Admiral balloon. 4490 Angioplasty of left popliteal/SFA with 5 x 200 [place] 35 ballon. 5299 Angioplasty of left popliteal artery with 6 x 40 [place] 35 balloon. 7650 [employer] wire deployed per Dr Mayito Malhotra. 0945 ACT drawn. 8376 . Order received. Atherectomy of left popliteal artery with HawkOne catheter. 1007 Atherectomy of left common femoral artery with HawkOne catheter. 1013 ACT drawn. 1018 . Order received. 200 Dr Gerardo Vazquez here to assist.  0660 489 28 58 5.5 x 60 stent deployed in left popliteal artery per Dr Mayito Malhotra. [address] of stent with 6 x 60 [place] 18 balloon. 1055 Angioplasty of left common femoral artery with 7 x 40 IN. PACT Admiral balloon. 1110 6 fr short sheath inserted in right femoral artery. 1115 Pressurized saline connected to right femoral sheath and flushes easily. Right groin without redness, swelling or hematoma. Op-site dressing applied. 1124 Pt positioned in bed for comfort. 1128 Pt transferred to  per bed. Report to RN.

## 2018-07-11 ENCOUNTER — TELEPHONE (OUTPATIENT)
Dept: CARDIOLOGY CLINIC | Age: 78
End: 2018-07-11

## 2018-08-06 ENCOUNTER — OFFICE VISIT (OUTPATIENT)
Dept: CARDIOLOGY CLINIC | Age: 78
End: 2018-08-06
Payer: MEDICARE

## 2018-08-06 VITALS
BODY MASS INDEX: 26.37 KG/M2 | HEART RATE: 64 BPM | SYSTOLIC BLOOD PRESSURE: 150 MMHG | DIASTOLIC BLOOD PRESSURE: 82 MMHG | WEIGHT: 174 LBS | HEIGHT: 68 IN

## 2018-08-06 DIAGNOSIS — I73.9 PAD (PERIPHERAL ARTERY DISEASE) (HCC): Primary | ICD-10-CM

## 2018-08-06 PROCEDURE — G8427 DOCREV CUR MEDS BY ELIG CLIN: HCPCS | Performed by: INTERNAL MEDICINE

## 2018-08-06 PROCEDURE — G8598 ASA/ANTIPLAT THER USED: HCPCS | Performed by: INTERNAL MEDICINE

## 2018-08-06 PROCEDURE — 1123F ACP DISCUSS/DSCN MKR DOCD: CPT | Performed by: INTERNAL MEDICINE

## 2018-08-06 PROCEDURE — 4040F PNEUMOC VAC/ADMIN/RCVD: CPT | Performed by: INTERNAL MEDICINE

## 2018-08-06 PROCEDURE — G8419 CALC BMI OUT NRM PARAM NOF/U: HCPCS | Performed by: INTERNAL MEDICINE

## 2018-08-06 PROCEDURE — 1101F PT FALLS ASSESS-DOCD LE1/YR: CPT | Performed by: INTERNAL MEDICINE

## 2018-08-06 PROCEDURE — 1036F TOBACCO NON-USER: CPT | Performed by: INTERNAL MEDICINE

## 2018-08-06 PROCEDURE — 99213 OFFICE O/P EST LOW 20 MIN: CPT | Performed by: INTERNAL MEDICINE

## 2018-08-06 ASSESSMENT — ENCOUNTER SYMPTOMS
CONSTIPATION: 0
EYE PAIN: 0
COUGH: 0
SHORTNESS OF BREATH: 0
SPUTUM PRODUCTION: 0
HOARSE VOICE: 0
BACK PAIN: 0
HEMOPTYSIS: 0
CHANGE IN BOWEL HABIT: 0
DOUBLE VISION: 0
ABDOMINAL PAIN: 0
BOWEL INCONTINENCE: 0
BLURRED VISION: 0
ORTHOPNEA: 0
DIARRHEA: 0
BLOATING: 0
EYE DISCHARGE: 0

## 2018-08-06 NOTE — PROGRESS NOTES
[facility]  [facility]  [address]  [address]  [address] [address]  Dept: [phone]  Dept Fax: [phone]  Loc: [phone]    Visit Date: 8/6/2018    Mr. Sánchez Mccain is a 70 y.o. male  who presented for:  Chief Complaint   Patient presents with    Results    Hypertension       HPI:   69 yo M c hx of multiple CVA (15 yrs ago), Former smoker, HLD, HTN, PAD s/p Left CFA, SFA and Popliteal artery revascularization. Patient reports improved walking ability now in his left leg. .  Patient smoked 2 packs for a long time. 7/10/18:CONCLUSION:  1.  80% calcified stenosis of the left common femoral artery. 2.  Total occlusion of the left proximal, mid, and distal superficial  femoral artery. 3.  Total occlusion of the left popliteal artery. 4.  Successful directional atherectomy and drug-coated balloon angioplasty  of the common femoral artery. 5.  Successful PTA of the left proximal, mid, and distal superficial  femoral artery with drug-coated balloons. 6.  Successful directional atherectomy, angioplasty, and stenting of the  left popliteal artery.     RECOMMENDATIONS:  1. Aspirin plus Plavix for at least three months. 2.  Aggressive risk factor modification. 3.  Monitor groin access site closely. 4.  Followup lower extremity duplex ultrasound in one, three, six, and 12  months. 5.  Followup in clinic within two to four weeks to evaluate the symptoms.     Current Outpatient Prescriptions:     clopidogrel (PLAVIX) 75 MG tablet, Take 1 tablet by mouth daily, Disp: 30 tablet, Rfl: 3    lovastatin (MEVACOR) 20 MG tablet, Take 1 tablet by mouth nightly, Disp: 90 tablet, Rfl: 1    diclofenac (VOLTAREN) 75 MG EC tablet, Take 1 tablet by mouth 2 times daily as needed for Pain, Disp: 180 tablet, Rfl: 0    aspirin 81 MG EC tablet, Take 325 mg by mouth daily , Disp: , Rfl:     Past Medical History  Brenton SOTOMAYOR  has a past medical history of ASCVD (arteriosclerotic cardiovascular disease); Dysplasia of vocal cord; Elevated glucose; Hyperlipidemia; Osteoarthritis; Osteopenia; Osteopenia; Polio; and Rheumatic fever. Social History  Brenton SOTOMAYOR  reports that he quit smoking about 13 years ago. His smoking use included Cigarettes. He has never used smokeless tobacco. He reports that he drinks about 18.0 oz of alcohol per week . He reports that he does not use drugs. Family History  Brenton SOTOMAYOR family history includes Arthritis in his maternal grandmother; Heart Disease in his father. Past Surgical History   Past Surgical History:   Procedure Laterality Date    FACIAL COSMETIC SURGERY      as a kid    TONSILLECTOMY AND ADENOIDECTOMY         Subjective:     Review of Systems   Constitution: Negative for decreased appetite, diaphoresis, fever, weakness and malaise/fatigue. HENT: Negative for congestion, hearing loss and hoarse voice. Eyes: Negative for blurred vision, discharge, double vision and pain. Cardiovascular: Positive for claudication and dyspnea on exertion. Negative for chest pain, cyanosis, irregular heartbeat, leg swelling, near-syncope, orthopnea, palpitations, paroxysmal nocturnal dyspnea and syncope. Respiratory: Negative for cough, hemoptysis, shortness of breath and sputum production. Endocrine: Negative for cold intolerance, heat intolerance, polydipsia, polyphagia and polyuria. Musculoskeletal: Negative for arthritis, back pain, falls, gout, joint pain and joint swelling. Gastrointestinal: Negative for bloating, abdominal pain, anorexia, change in bowel habit, bowel incontinence, constipation and diarrhea. Genitourinary: Negative for bladder incontinence, dysuria, flank pain, frequency and hematuria. Neurological: Negative for difficulty with concentration, disturbances in coordination, focal weakness, headaches and numbness. Psychiatric/Behavioral: Negative for altered mental status and depression.        All others reviewed and are

## 2018-08-14 ENCOUNTER — HOSPITAL ENCOUNTER (OUTPATIENT)
Dept: INTERVENTIONAL RADIOLOGY/VASCULAR | Age: 78
Discharge: HOME | End: 2018-08-14
Payer: MEDICARE

## 2018-08-14 DIAGNOSIS — I73.9 PAD (PERIPHERAL ARTERY DISEASE) (HCC): ICD-10-CM

## 2018-08-14 PROCEDURE — 93925 LOWER EXTREMITY STUDY: CPT

## 2018-09-24 ENCOUNTER — OFFICE VISIT (OUTPATIENT)
Dept: CARDIOLOGY CLINIC | Age: 78
End: 2018-09-24
Payer: MEDICARE

## 2018-09-24 VITALS
BODY MASS INDEX: 26.52 KG/M2 | SYSTOLIC BLOOD PRESSURE: 112 MMHG | WEIGHT: 175 LBS | HEIGHT: 68 IN | DIASTOLIC BLOOD PRESSURE: 64 MMHG | HEART RATE: 78 BPM

## 2018-09-24 DIAGNOSIS — I73.9 PAD (PERIPHERAL ARTERY DISEASE) (HCC): Primary | ICD-10-CM

## 2018-09-24 PROCEDURE — G8427 DOCREV CUR MEDS BY ELIG CLIN: HCPCS | Performed by: INTERNAL MEDICINE

## 2018-09-24 PROCEDURE — G8598 ASA/ANTIPLAT THER USED: HCPCS | Performed by: INTERNAL MEDICINE

## 2018-09-24 PROCEDURE — 99213 OFFICE O/P EST LOW 20 MIN: CPT | Performed by: INTERNAL MEDICINE

## 2018-09-24 PROCEDURE — 1101F PT FALLS ASSESS-DOCD LE1/YR: CPT | Performed by: INTERNAL MEDICINE

## 2018-09-24 PROCEDURE — 1036F TOBACCO NON-USER: CPT | Performed by: INTERNAL MEDICINE

## 2018-09-24 PROCEDURE — 4040F PNEUMOC VAC/ADMIN/RCVD: CPT | Performed by: INTERNAL MEDICINE

## 2018-09-24 PROCEDURE — G8419 CALC BMI OUT NRM PARAM NOF/U: HCPCS | Performed by: INTERNAL MEDICINE

## 2018-09-24 PROCEDURE — 1123F ACP DISCUSS/DSCN MKR DOCD: CPT | Performed by: INTERNAL MEDICINE

## 2018-09-24 ASSESSMENT — ENCOUNTER SYMPTOMS
EYE DISCHARGE: 0
HEMOPTYSIS: 0
BACK PAIN: 0
EYE PAIN: 0
BOWEL INCONTINENCE: 0
SHORTNESS OF BREATH: 0
CHANGE IN BOWEL HABIT: 0
ABDOMINAL PAIN: 0
BLOATING: 0
COUGH: 0
HOARSE VOICE: 0
DOUBLE VISION: 0
SPUTUM PRODUCTION: 0
ORTHOPNEA: 0
BLURRED VISION: 0
CONSTIPATION: 0
DIARRHEA: 0

## 2018-09-24 NOTE — PROGRESS NOTES
Pt here for 2 mo check up fu periph angio/lower extrem dup    Pt concerned pt  fell recently about a week and a half ago onto knee left leg and it was twisted on fall    Pt denies chest pain, dizziness, sob, peripheral edema, heart palpitations
(arteriosclerotic cardiovascular disease); Dysplasia of vocal cord; Elevated glucose; Hyperlipidemia; Osteoarthritis; Osteopenia; Osteopenia; Polio; and Rheumatic fever. Social History  Brenton SOTOMAYOR  reports that he quit smoking about 13 years ago. His smoking use included Cigarettes. He has never used smokeless tobacco. He reports that he drinks about 18.0 oz of alcohol per week . He reports that he does not use drugs. Family History  Brenton SOTOMAYOR family history includes Arthritis in his maternal grandmother; Heart Disease in his father. Past Surgical History   Past Surgical History:   Procedure Laterality Date    FACIAL COSMETIC SURGERY      as a kid    TONSILLECTOMY AND ADENOIDECTOMY         Subjective:     Review of Systems   Constitution: Negative for decreased appetite, diaphoresis, fever, weakness and malaise/fatigue. HENT: Negative for congestion, hearing loss and hoarse voice. Eyes: Negative for blurred vision, discharge, double vision and pain. Cardiovascular: Negative for chest pain, claudication, cyanosis, irregular heartbeat, leg swelling, near-syncope, orthopnea, palpitations, paroxysmal nocturnal dyspnea and syncope. Respiratory: Negative for cough, hemoptysis, shortness of breath and sputum production. Endocrine: Negative for cold intolerance, heat intolerance, polydipsia, polyphagia and polyuria. Musculoskeletal: Negative for arthritis, back pain, falls, gout, joint pain and joint swelling. Gastrointestinal: Negative for bloating, abdominal pain, anorexia, change in bowel habit, bowel incontinence, constipation and diarrhea. Genitourinary: Negative for bladder incontinence, dysuria, flank pain, frequency and hematuria. Neurological: Negative for difficulty with concentration, disturbances in coordination, focal weakness, headaches and numbness. Psychiatric/Behavioral: Negative for altered mental status and depression. All others reviewed and are negative.

## 2018-10-02 ENCOUNTER — CARE COORDINATION (OUTPATIENT)
Dept: CARE COORDINATION | Age: 78
End: 2018-10-02

## 2018-10-10 ENCOUNTER — OFFICE VISIT (OUTPATIENT)
Dept: FAMILY MEDICINE CLINIC | Age: 78
End: 2018-10-10
Payer: MEDICARE

## 2018-10-10 VITALS
HEIGHT: 68 IN | DIASTOLIC BLOOD PRESSURE: 80 MMHG | HEART RATE: 84 BPM | BODY MASS INDEX: 26.73 KG/M2 | WEIGHT: 176.4 LBS | SYSTOLIC BLOOD PRESSURE: 136 MMHG

## 2018-10-10 DIAGNOSIS — I73.9 PERIPHERAL ARTERY DISEASE (HCC): ICD-10-CM

## 2018-10-10 DIAGNOSIS — I35.9 AORTIC VALVE DISORDER: Chronic | ICD-10-CM

## 2018-10-10 DIAGNOSIS — E78.2 MIXED HYPERLIPIDEMIA: Primary | ICD-10-CM

## 2018-10-10 DIAGNOSIS — Z23 NEED FOR PNEUMOCOCCAL VACCINATION: ICD-10-CM

## 2018-10-10 PROCEDURE — 90732 PPSV23 VACC 2 YRS+ SUBQ/IM: CPT | Performed by: FAMILY MEDICINE

## 2018-10-10 PROCEDURE — 4040F PNEUMOC VAC/ADMIN/RCVD: CPT | Performed by: FAMILY MEDICINE

## 2018-10-10 PROCEDURE — 1123F ACP DISCUSS/DSCN MKR DOCD: CPT | Performed by: FAMILY MEDICINE

## 2018-10-10 PROCEDURE — 1036F TOBACCO NON-USER: CPT | Performed by: FAMILY MEDICINE

## 2018-10-10 PROCEDURE — G8482 FLU IMMUNIZE ORDER/ADMIN: HCPCS | Performed by: FAMILY MEDICINE

## 2018-10-10 PROCEDURE — 99213 OFFICE O/P EST LOW 20 MIN: CPT | Performed by: FAMILY MEDICINE

## 2018-10-10 PROCEDURE — G8427 DOCREV CUR MEDS BY ELIG CLIN: HCPCS | Performed by: FAMILY MEDICINE

## 2018-10-10 PROCEDURE — G0009 ADMIN PNEUMOCOCCAL VACCINE: HCPCS | Performed by: FAMILY MEDICINE

## 2018-10-10 PROCEDURE — G8598 ASA/ANTIPLAT THER USED: HCPCS | Performed by: FAMILY MEDICINE

## 2018-10-10 PROCEDURE — 1101F PT FALLS ASSESS-DOCD LE1/YR: CPT | Performed by: FAMILY MEDICINE

## 2018-10-10 PROCEDURE — G8419 CALC BMI OUT NRM PARAM NOF/U: HCPCS | Performed by: FAMILY MEDICINE

## 2018-10-10 RX ORDER — LOVASTATIN 20 MG/1
20 TABLET ORAL NIGHTLY
Qty: 90 TABLET | Refills: 1 | Status: SHIPPED | OUTPATIENT
Start: 2018-10-10 | End: 2019-04-07 | Stop reason: SDUPTHER

## 2018-10-10 ASSESSMENT — PATIENT HEALTH QUESTIONNAIRE - PHQ9
SUM OF ALL RESPONSES TO PHQ QUESTIONS 1-9: 0
SUM OF ALL RESPONSES TO PHQ QUESTIONS 1-9: 0
1. LITTLE INTEREST OR PLEASURE IN DOING THINGS: 0
SUM OF ALL RESPONSES TO PHQ9 QUESTIONS 1 & 2: 0
2. FEELING DOWN, DEPRESSED OR HOPELESS: 0

## 2018-10-10 ASSESSMENT — ENCOUNTER SYMPTOMS: SHORTNESS OF BREATH: 0

## 2018-10-10 NOTE — PROGRESS NOTES
[facility]  [facility]  [address] [address]  Dept: [phone]  Dept Fax: [phone]  Loc: [phone]  PROGRESS NOTE      Visit Date: 10/10/2018    Lauren Hastings is a 70 y.o. male who presents today for:  Chief Complaint   Patient presents with    6 Month Follow-Up    Hyperlipidemia       Subjective:  Hyperlipidemia   Pertinent negatives include no chest pain or shortness of breath. Hypercholesterolemia:  On lovastatin 20 mg. No myalgias    PAD:  s/p Left CFA, SFA and popliteal intervention in July 2018. Sees Dr. Joselin Barboza. He is on aspirin only. He stopped the plavix. He is able to walk farther now. Wife is present    Review of Systems   Constitutional: Negative for chills and fever. Respiratory: Negative for shortness of breath. Cardiovascular: Negative for chest pain and leg swelling. 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    Elevated glucose     Hyperlipidemia     Osteoarthritis     Osteopenia     Osteopenia     Polio 1948    Rheumatic fever 1948      Past Surgical History:   Procedure Laterality Date    FACIAL COSMETIC SURGERY      as a kid    TONSILLECTOMY AND ADENOIDECTOMY       Family History   Problem Relation Age of Onset    Heart Disease Father     Arthritis Maternal Grandmother      Social History   Substance Use Topics    Smoking status: Former Smoker     Types: Cigarettes     Quit date: 10/9/2004    Smokeless tobacco: Never Used    Alcohol use 18.0 oz/week     30 Cans of beer per week      Current Outpatient Prescriptions   Medication Sig Dispense Refill    lovastatin (MEVACOR) 20 MG tablet Take 1 tablet by mouth nightly 90 tablet 1    aspirin 81 MG EC tablet Take 81 mg by mouth daily        No current facility-administered medications for this visit.       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 Monitor. 4. Need for pneumococcal vaccination  - PNEUMOVAX 23 subcutaneous/IM (Pneumococcal polysaccharide vaccine 23-valent >= 3yo)      Recommend decreasing alcohol use (6-8 beers per day)  Increase physical activity    They voiced understanding. All questions answered. They agreed with treatment plan. Educational materials given - see patient instructions. Discussed use, benefit, and side effects of prescribed medications. Reviewed health maintenance. Return in about 6 months (around 4/10/2019) for cholesterol.         Electronically signed by Mark Ruby MD on 10/10/2018 at 1:04 PM

## 2018-12-04 ENCOUNTER — CARE COORDINATION (OUTPATIENT)
Dept: CARE COORDINATION | Age: 78
End: 2018-12-04

## 2019-04-07 DIAGNOSIS — E78.2 MIXED HYPERLIPIDEMIA: ICD-10-CM

## 2019-04-08 RX ORDER — LOVASTATIN 20 MG/1
TABLET ORAL
Qty: 90 TABLET | Refills: 1 | Status: SHIPPED | OUTPATIENT
Start: 2019-04-08 | End: 2019-10-09 | Stop reason: SDUPTHER

## 2019-04-17 ENCOUNTER — CARE COORDINATION (OUTPATIENT)
Dept: CARE COORDINATION | Age: 79
End: 2019-04-17

## 2019-04-17 NOTE — CARE COORDINATION
First attempt to call patient for enrollment in [facility].     Left message on voice mail with explanation of the benefits of enrolling in [facility] which was approved for the patient by their PCP. Please call the office (phone number given) for more information and let us know if you would like to enroll in the Tele-Care Program.     Ramiro Molina.  [address] [place] [address], [address] [place] [address] Nurse /

## 2019-04-18 ENCOUNTER — CARE COORDINATION (OUTPATIENT)
Dept: CARE COORDINATION | Age: 79
End: 2019-04-18

## 2019-04-18 NOTE — CARE COORDINATION
Called and spoke with the patient's wife. After speaking with the  the patient's wife declined to participate. Upcoming Tele-Care call Deleted. Patient should get No further calls. Patient Status changed to Declined in [employer] [facility].  [address] [place] [address], [address] [place] [address] Nurse /

## 2019-04-22 ENCOUNTER — OFFICE VISIT (OUTPATIENT)
Dept: FAMILY MEDICINE CLINIC | Age: 79
End: 2019-04-22
Payer: MEDICARE

## 2019-04-22 VITALS
SYSTOLIC BLOOD PRESSURE: 130 MMHG | DIASTOLIC BLOOD PRESSURE: 78 MMHG | HEIGHT: 68 IN | HEART RATE: 62 BPM | WEIGHT: 176 LBS | BODY MASS INDEX: 26.67 KG/M2

## 2019-04-22 DIAGNOSIS — I73.9 PERIPHERAL ARTERY DISEASE (HCC): ICD-10-CM

## 2019-04-22 DIAGNOSIS — E78.2 MIXED HYPERLIPIDEMIA: Primary | ICD-10-CM

## 2019-04-22 PROCEDURE — G8598 ASA/ANTIPLAT THER USED: HCPCS | Performed by: FAMILY MEDICINE

## 2019-04-22 PROCEDURE — 1036F TOBACCO NON-USER: CPT | Performed by: FAMILY MEDICINE

## 2019-04-22 PROCEDURE — 1123F ACP DISCUSS/DSCN MKR DOCD: CPT | Performed by: FAMILY MEDICINE

## 2019-04-22 PROCEDURE — G8427 DOCREV CUR MEDS BY ELIG CLIN: HCPCS | Performed by: FAMILY MEDICINE

## 2019-04-22 PROCEDURE — G8510 SCR DEP NEG, NO PLAN REQD: HCPCS | Performed by: FAMILY MEDICINE

## 2019-04-22 PROCEDURE — G8419 CALC BMI OUT NRM PARAM NOF/U: HCPCS | Performed by: FAMILY MEDICINE

## 2019-04-22 PROCEDURE — 99213 OFFICE O/P EST LOW 20 MIN: CPT | Performed by: FAMILY MEDICINE

## 2019-04-22 PROCEDURE — 4040F PNEUMOC VAC/ADMIN/RCVD: CPT | Performed by: FAMILY MEDICINE

## 2019-04-22 PROCEDURE — 3288F FALL RISK ASSESSMENT DOCD: CPT | Performed by: FAMILY MEDICINE

## 2019-04-22 ASSESSMENT — PATIENT HEALTH QUESTIONNAIRE - PHQ9
2. FEELING DOWN, DEPRESSED OR HOPELESS: 0
SUM OF ALL RESPONSES TO PHQ QUESTIONS 1-9: 0
SUM OF ALL RESPONSES TO PHQ9 QUESTIONS 1 & 2: 0
1. LITTLE INTEREST OR PLEASURE IN DOING THINGS: 0
SUM OF ALL RESPONSES TO PHQ QUESTIONS 1-9: 0

## 2019-04-22 ASSESSMENT — ENCOUNTER SYMPTOMS: SHORTNESS OF BREATH: 0

## 2019-04-22 NOTE — PROGRESS NOTES
EC tablet Take 81 mg by mouth daily        No current facility-administered medications for this visit. Allergies   Allergen Reactions    Keflex [Cephalexin] Rash     Health Maintenance   Topic Date Due    DTaP/Tdap/Td vaccine (1 - Tdap) 04/15/1961    Shingles Vaccine (1 of 2) 04/15/1992    Flu vaccine  Completed    Pneumococcal 65+ years Vaccine  Completed       Objective:     /78 (Site: Left Upper Arm, Position: Sitting, Cuff Size: Medium Adult)   Pulse 62   Ht 5' 8\" (1.727 m)   Wt 176 lb (79.8 kg)   BMI 26.76 kg/m²   Physical Exam   Constitutional: He is oriented to person, place, and time. He appears well-developed and well-nourished. No distress. Cardiovascular: Normal rate and regular rhythm. Murmur heard. Systolic murmur is present with a grade of 2/6. Pulmonary/Chest: Effort normal and breath sounds normal. No respiratory distress. He has no wheezes. Musculoskeletal: He exhibits edema. Tenderness: 1+ BLE. Neurological: He is alert and oriented to person, place, and time. Psychiatric: He has a normal mood and affect. His behavior is normal.   Vitals reviewed. Impression:  1. Mixed hyperlipidemia  Chronic. Continue lovastatin. Check status of control with labs. - Lipid Panel; Future  - Comprehensive Metabolic Panel; Future  - CBC; Future    2. Peripheral artery disease (HCC)  Chronic. Continue aspirin and statin. Sees cardiology in May. Increase physical activity    They voiced understanding. All questions answered. They agreed with treatment plan. Educational materials given - see patient instructions. Discussed use, benefit, and side effects of prescribed medications. Reviewed health maintenance. Return in about 6 months (around 10/10/2019) for cholesterol. Brenton SOTOMAYOR received counseling on the following healthy behaviors: nutrition and exercise  Reviewed prior labs and health maintenance  Continue current medications, diet and exercise.   Discussed

## 2019-04-24 DIAGNOSIS — G89.29 CHRONIC BILATERAL LOW BACK PAIN WITH BILATERAL SCIATICA: ICD-10-CM

## 2019-04-24 DIAGNOSIS — M54.42 CHRONIC BILATERAL LOW BACK PAIN WITH BILATERAL SCIATICA: ICD-10-CM

## 2019-04-24 DIAGNOSIS — M54.41 CHRONIC BILATERAL LOW BACK PAIN WITH BILATERAL SCIATICA: ICD-10-CM

## 2019-04-24 RX ORDER — DICLOFENAC SODIUM 75 MG/1
75 TABLET, DELAYED RELEASE ORAL 2 TIMES DAILY PRN
Qty: 180 TABLET | Refills: 0 | Status: SHIPPED | OUTPATIENT
Start: 2019-04-24 | End: 2019-10-09 | Stop reason: SDUPTHER

## 2019-04-24 NOTE — TELEPHONE ENCOUNTER
Crystal Coombs called requesting a refill on the following medications:  Requested Prescriptions     Pending Prescriptions Disp Refills    diclofenac (VOLTAREN) 75 MG EC tablet 180 tablet 0     Sig: Take 1 tablet by mouth 2 times daily as needed for Pain     [facility]      Date of last visit: 4/22/19  Date of next visit (if applicable): 94/56/1549

## 2019-04-24 NOTE — TELEPHONE ENCOUNTER
Talked with patient's wife. Patient has been taking the Voltaren only prn so did not release he needed a refill. It is set to send as requested. He was just seen on 4/22/19. No new injury but just using only maybe one daily prn. Last received 180 in October.

## 2019-04-30 ENCOUNTER — NURSE ONLY (OUTPATIENT)
Dept: FAMILY MEDICINE CLINIC | Age: 79
End: 2019-04-30
Payer: MEDICARE

## 2019-04-30 DIAGNOSIS — E78.2 MIXED HYPERLIPIDEMIA: ICD-10-CM

## 2019-04-30 LAB
ALBUMIN SERPL-MCNC: 3.8 G/DL (ref 3.5–5.1)
ALP BLD-CCNC: 70 U/L (ref 38–126)
ALT SERPL-CCNC: 8 U/L (ref 11–66)
ANION GAP SERPL CALCULATED.3IONS-SCNC: 10 MEQ/L (ref 8–16)
AST SERPL-CCNC: 17 U/L (ref 5–40)
BILIRUB SERPL-MCNC: 0.7 MG/DL (ref 0.3–1.2)
BUN BLDV-MCNC: 10 MG/DL (ref 7–22)
CALCIUM SERPL-MCNC: 9.1 MG/DL (ref 8.5–10.5)
CHLORIDE BLD-SCNC: 104 MEQ/L (ref 98–111)
CHOLESTEROL, TOTAL: 149 MG/DL (ref 100–199)
CO2: 29 MEQ/L (ref 23–33)
CREAT SERPL-MCNC: 0.8 MG/DL (ref 0.4–1.2)
ERYTHROCYTE [DISTWIDTH] IN BLOOD BY AUTOMATED COUNT: 12.9 % (ref 11.5–14.5)
ERYTHROCYTE [DISTWIDTH] IN BLOOD BY AUTOMATED COUNT: 44 FL (ref 35–45)
GFR SERPL CREATININE-BSD FRML MDRD: > 90 ML/MIN/1.73M2
GLUCOSE BLD-MCNC: 110 MG/DL (ref 70–108)
HCT VFR BLD CALC: 44.6 % (ref 42–52)
HDLC SERPL-MCNC: 61 MG/DL
HEMOGLOBIN: 14.9 GM/DL (ref 14–18)
LDL CHOLESTEROL CALCULATED: 69 MG/DL
MCH RBC QN AUTO: 31 PG (ref 26–33)
MCHC RBC AUTO-ENTMCNC: 33.4 GM/DL (ref 32.2–35.5)
MCV RBC AUTO: 92.7 FL (ref 80–94)
PLATELET # BLD: 252 THOU/MM3 (ref 130–400)
PMV BLD AUTO: 9.9 FL (ref 9.4–12.4)
POTASSIUM SERPL-SCNC: 4.4 MEQ/L (ref 3.5–5.2)
RBC # BLD: 4.81 MILL/MM3 (ref 4.7–6.1)
SODIUM BLD-SCNC: 143 MEQ/L (ref 135–145)
TOTAL PROTEIN: 7.3 G/DL (ref 6.1–8)
TRIGL SERPL-MCNC: 95 MG/DL (ref 0–199)
WBC # BLD: 8.3 THOU/MM3 (ref 4.8–10.8)

## 2019-04-30 PROCEDURE — 36415 COLL VENOUS BLD VENIPUNCTURE: CPT | Performed by: FAMILY MEDICINE

## 2019-05-01 ENCOUNTER — TELEPHONE (OUTPATIENT)
Dept: FAMILY MEDICINE CLINIC | Age: 79
End: 2019-05-01

## 2019-05-01 NOTE — TELEPHONE ENCOUNTER
----- Message from Shon Cheney MD sent at 5/1/2019  7:37 AM EDT -----  Good except glucose slightly elevated. a1c was good last year. No DM. Lipids are good. Please advise patient.   Shon Cheney MD

## 2019-05-20 ENCOUNTER — OFFICE VISIT (OUTPATIENT)
Dept: CARDIOLOGY CLINIC | Age: 79
End: 2019-05-20
Payer: MEDICARE

## 2019-05-20 VITALS
DIASTOLIC BLOOD PRESSURE: 81 MMHG | BODY MASS INDEX: 26.52 KG/M2 | HEART RATE: 75 BPM | WEIGHT: 175 LBS | SYSTOLIC BLOOD PRESSURE: 132 MMHG | HEIGHT: 68 IN

## 2019-05-20 DIAGNOSIS — I73.9 PAD (PERIPHERAL ARTERY DISEASE) (HCC): Primary | ICD-10-CM

## 2019-05-20 PROCEDURE — 1123F ACP DISCUSS/DSCN MKR DOCD: CPT | Performed by: INTERNAL MEDICINE

## 2019-05-20 PROCEDURE — G8598 ASA/ANTIPLAT THER USED: HCPCS | Performed by: INTERNAL MEDICINE

## 2019-05-20 PROCEDURE — G8419 CALC BMI OUT NRM PARAM NOF/U: HCPCS | Performed by: INTERNAL MEDICINE

## 2019-05-20 PROCEDURE — 99213 OFFICE O/P EST LOW 20 MIN: CPT | Performed by: INTERNAL MEDICINE

## 2019-05-20 PROCEDURE — 4040F PNEUMOC VAC/ADMIN/RCVD: CPT | Performed by: INTERNAL MEDICINE

## 2019-05-20 PROCEDURE — G8427 DOCREV CUR MEDS BY ELIG CLIN: HCPCS | Performed by: INTERNAL MEDICINE

## 2019-05-20 PROCEDURE — 1036F TOBACCO NON-USER: CPT | Performed by: INTERNAL MEDICINE

## 2019-05-20 NOTE — PROGRESS NOTES
Patient here for 8 month follow up    EKG done on 6-    Pt denies:chest pain,  SOB,dizziness,palpitations,peripheral edema

## 2019-05-20 NOTE — PROGRESS NOTES
pain, discharge, redness and itching. Respiratory: denies apnea, cough  Gastrointestinal: denies blood in stool, constipation, diarrhea   Endocrine: denies cold intolerance, heat intolerance, polydipsia. Genitourinary: denies dysuria, enuresis, flank pain and hematuria. Musculoskeletal: denies arthralgias, joint swelling and neck pain. Neurological: denies numbness and headaches. Psychiatric/Behavioral: denies agitation, confusion, decreased concentration and dysphoric mood    All others reviewed and are negative. Objective:     /81   Pulse 75   Ht 5' 8\" (1.727 m)   Wt 175 lb (79.4 kg)   BMI 26.61 kg/m²     Wt Readings from Last 3 Encounters:   05/20/19 175 lb (79.4 kg)   04/22/19 176 lb (79.8 kg)   10/10/18 176 lb 6.4 oz (80 kg)     BP Readings from Last 3 Encounters:   05/20/19 132/81   04/22/19 130/78   10/10/18 136/80       PHYSICAL EXAM  Constitutional: Oriented to person, place, and time. Appears well-developed and well-nourished. HENT:   Head: Normocephalic and atraumatic. Eyes: EOM are normal. Pupils are equal, round, and reactive to light. Neck: Normal range of motion. Neck supple. No JVD present. Cardiovascular: Normal rate , normal heart sounds and intact distal pulses. Pulmonary/Chest: Effort normal and breath sounds normal. No respiratory distress. No wheezes. No rales. Abdominal: Soft. Bowel sounds are normal. No distension. There is no tenderness. Musculoskeletal: Normal range of motion. No edema. Neurological: Alert and oriented to person, place, and time. No cranial nerve deficit. Coordination normal.   Skin: Skin is warm and dry. Psychiatric: Normal mood and affect.        No results found for: CKTOTAL, CKMB, CKMBINDEX    Lab Results   Component Value Date    WBC 8.3 04/30/2019    RBC 4.81 04/30/2019    HGB 14.9 04/30/2019    HCT 44.6 04/30/2019    MCV 92.7 04/30/2019    MCH 31.0 04/30/2019    MCHC 33.4 04/30/2019     04/30/2019    MPV 9.9 04/30/2019 Lab Results   Component Value Date     04/30/2019    K 4.4 04/30/2019     04/30/2019    CO2 29 04/30/2019    BUN 10 04/30/2019    LABALBU 3.8 04/30/2019    CREATININE 0.8 04/30/2019    CALCIUM 9.1 04/30/2019    LABGLOM >90 04/30/2019    GLUCOSE 110 04/30/2019    GLUCOSE 90 04/06/2017       Lab Results   Component Value Date    ALKPHOS 70 04/30/2019    ALT 8 04/30/2019    AST 17 04/30/2019    PROT 7.3 04/30/2019    BILITOT 0.7 04/30/2019    LABALBU 3.8 04/30/2019       No results found for: MG    Lab Results   Component Value Date    INR 0.93 07/10/2018         Lab Results   Component Value Date    LABA1C 5.4 05/03/2018       Lab Results   Component Value Date    TRIG 95 04/30/2019    HDL 61 04/30/2019    LDLCALC 69 04/30/2019    LDLDIRECT 83 09/30/2014    LABVLDL 17 04/06/2017       No results found for: TSH      Testing Reviewed:      I haveindividually reviewed the below cardiac tests    EKG:    ECHO:   Results for orders placed during the hospital encounter of 07/03/18   Echo 2D w doppler w color complete    Narrative Transthoracic Echocardiography Report (TTE)     Demographics      Patient Name    Yesica Chacko  Gender               Male                   L      MR #            [id number]       Race                                                       Ethnicity      Account #       [de-identified]       Room Number      Accession       [id number]       Date of Study        07/03/2018   Number      Date of Birth   1940      Referring Physician  Ralph Montiel MD      Age             70 year(s)      Marlys Hunter RDCS                                      Interpreting         Bettina Montiel MD                                   Physician            Jeronimo Harris MD     Procedure    Type of Study      TTE procedure:ECHOCARDIOGRAM COMPLETE 2D W DOPPLER W COLOR.      Procedure Date  Date: function is normal.      Right Atrium   Right atrial size was normal.      Right Ventricle   The right ventricular size was normal with normal systolic function and   wall thickness. Pericardial Effusion   The pericardium was normal in appearance with no evidence of a pericardial   effusion. Pleural Effusion   No evidence of pleural effusion. Aorta / Great Vessels   Dilation of aortic root .      M-Mode/2D Measurements & Calculations      LV Diastolic     LV Systolic Dimension: 5.14 AV Cusp Separation: 1 cmLA   Dimension: 4.76  cm                          Dimension: 3.7 cmAO Root   cm               LV Volume Diastolic: 998 ml Dimension: 4.5 cm   LV FS:20.8 %     LV Volume Systolic: 08.2 ml   LV PW Diastolic: LV EDV/LV EDV Index: 105   0.97 cm          ml/56 m^2LV ESV/LV ESV   Septum           Index: 60.8 ml/33 m^2       RV Diastolic Dimension: 7.14   Diastolic: 1.39  EF Calculated: 42.1 %       cm   cm                                                LA/Aorta: 0.82                       LVOT: 2.1 cm     Doppler Measurements & Calculations      MV Peak E-Wave:     AV Peak Velocity: 296    LVOT Peak Velocity: 71.7 cm/s   50.4 cm/s           cm/s                     LVOT Mean Velocity: 48.7 cm/s   MV Peak A-Wave:     AV Peak Gradient: 35.05  LVOT Peak Gradient: 2   91.7 cm/s           mmHg                     mmHgLVOT Mean Gradient: 1   MV E/A Ratio: 0.55  AV Mean Velocity: 232    mmHg   MV Peak Gradient:   cm/s   1.02 mmHg           AV Mean Gradient: 25                       mmHg   MV Deceleration     AV VTI: 85 cm            TR Velocity:192 cm/s   Time: 257 msec      AV Area                  TR Gradient:14.75 mmHg                       (Continuity):0.79 cm^2   PV Peak Velocity: 59.2 cm/s                                                PV Peak Gradient: 1.4 mmHg   MV E' Septal        LVOT VTI: 19.3 cm   Velocity: 5 cm/s    AV P1/2t: 648 msec   MV A' Septal   Velocity: 10.1 cm/s   MV E' Lateral   Velocity:

## 2019-10-09 ENCOUNTER — OFFICE VISIT (OUTPATIENT)
Dept: FAMILY MEDICINE CLINIC | Age: 79
End: 2019-10-09
Payer: MEDICARE

## 2019-10-09 VITALS
HEART RATE: 72 BPM | DIASTOLIC BLOOD PRESSURE: 76 MMHG | HEIGHT: 68 IN | WEIGHT: 175.2 LBS | SYSTOLIC BLOOD PRESSURE: 122 MMHG | BODY MASS INDEX: 26.55 KG/M2

## 2019-10-09 DIAGNOSIS — G89.29 CHRONIC BILATERAL LOW BACK PAIN WITH BILATERAL SCIATICA: ICD-10-CM

## 2019-10-09 DIAGNOSIS — I73.9 PERIPHERAL ARTERY DISEASE (HCC): ICD-10-CM

## 2019-10-09 DIAGNOSIS — E78.2 MIXED HYPERLIPIDEMIA: Primary | ICD-10-CM

## 2019-10-09 DIAGNOSIS — M54.41 CHRONIC BILATERAL LOW BACK PAIN WITH BILATERAL SCIATICA: ICD-10-CM

## 2019-10-09 DIAGNOSIS — I35.9 AORTIC VALVE DISORDER: ICD-10-CM

## 2019-10-09 DIAGNOSIS — M54.42 CHRONIC BILATERAL LOW BACK PAIN WITH BILATERAL SCIATICA: ICD-10-CM

## 2019-10-09 PROCEDURE — G8419 CALC BMI OUT NRM PARAM NOF/U: HCPCS | Performed by: FAMILY MEDICINE

## 2019-10-09 PROCEDURE — 1036F TOBACCO NON-USER: CPT | Performed by: FAMILY MEDICINE

## 2019-10-09 PROCEDURE — G8482 FLU IMMUNIZE ORDER/ADMIN: HCPCS | Performed by: FAMILY MEDICINE

## 2019-10-09 PROCEDURE — 4040F PNEUMOC VAC/ADMIN/RCVD: CPT | Performed by: FAMILY MEDICINE

## 2019-10-09 PROCEDURE — G8598 ASA/ANTIPLAT THER USED: HCPCS | Performed by: FAMILY MEDICINE

## 2019-10-09 PROCEDURE — 1123F ACP DISCUSS/DSCN MKR DOCD: CPT | Performed by: FAMILY MEDICINE

## 2019-10-09 PROCEDURE — 99213 OFFICE O/P EST LOW 20 MIN: CPT | Performed by: FAMILY MEDICINE

## 2019-10-09 PROCEDURE — G8427 DOCREV CUR MEDS BY ELIG CLIN: HCPCS | Performed by: FAMILY MEDICINE

## 2019-10-09 RX ORDER — LOVASTATIN 20 MG/1
20 TABLET ORAL NIGHTLY
Qty: 90 TABLET | Refills: 1 | Status: SHIPPED | OUTPATIENT
Start: 2019-10-09 | End: 2020-04-06 | Stop reason: SDUPTHER

## 2019-10-09 RX ORDER — DICLOFENAC SODIUM 75 MG/1
75 TABLET, DELAYED RELEASE ORAL 2 TIMES DAILY PRN
Qty: 180 TABLET | Refills: 0 | Status: SHIPPED | OUTPATIENT
Start: 2019-10-09 | End: 2020-04-06 | Stop reason: SDUPTHER

## 2019-10-09 ASSESSMENT — ENCOUNTER SYMPTOMS: SHORTNESS OF BREATH: 0

## 2019-10-14 ENCOUNTER — OFFICE VISIT (OUTPATIENT)
Dept: FAMILY MEDICINE CLINIC | Age: 79
End: 2019-10-14
Payer: MEDICARE

## 2019-10-14 VITALS
OXYGEN SATURATION: 91 % | SYSTOLIC BLOOD PRESSURE: 130 MMHG | TEMPERATURE: 97.6 F | HEIGHT: 68 IN | WEIGHT: 176.6 LBS | BODY MASS INDEX: 26.76 KG/M2 | DIASTOLIC BLOOD PRESSURE: 70 MMHG | HEART RATE: 84 BPM

## 2019-10-14 DIAGNOSIS — J43.1 PANLOBULAR EMPHYSEMA (HCC): ICD-10-CM

## 2019-10-14 DIAGNOSIS — J20.9 ACUTE BRONCHITIS, UNSPECIFIED ORGANISM: Primary | ICD-10-CM

## 2019-10-14 PROCEDURE — G8427 DOCREV CUR MEDS BY ELIG CLIN: HCPCS | Performed by: FAMILY MEDICINE

## 2019-10-14 PROCEDURE — G8926 SPIRO NO PERF OR DOC: HCPCS | Performed by: FAMILY MEDICINE

## 2019-10-14 PROCEDURE — 4040F PNEUMOC VAC/ADMIN/RCVD: CPT | Performed by: FAMILY MEDICINE

## 2019-10-14 PROCEDURE — 99213 OFFICE O/P EST LOW 20 MIN: CPT | Performed by: FAMILY MEDICINE

## 2019-10-14 PROCEDURE — G8419 CALC BMI OUT NRM PARAM NOF/U: HCPCS | Performed by: FAMILY MEDICINE

## 2019-10-14 PROCEDURE — 1036F TOBACCO NON-USER: CPT | Performed by: FAMILY MEDICINE

## 2019-10-14 PROCEDURE — G8598 ASA/ANTIPLAT THER USED: HCPCS | Performed by: FAMILY MEDICINE

## 2019-10-14 PROCEDURE — 3023F SPIROM DOC REV: CPT | Performed by: FAMILY MEDICINE

## 2019-10-14 PROCEDURE — G8482 FLU IMMUNIZE ORDER/ADMIN: HCPCS | Performed by: FAMILY MEDICINE

## 2019-10-14 PROCEDURE — 1123F ACP DISCUSS/DSCN MKR DOCD: CPT | Performed by: FAMILY MEDICINE

## 2019-10-14 RX ORDER — PREDNISONE 20 MG/1
40 TABLET ORAL DAILY
Qty: 10 TABLET | Refills: 0 | Status: SHIPPED | OUTPATIENT
Start: 2019-10-14 | End: 2020-05-28

## 2019-10-14 RX ORDER — AZITHROMYCIN 250 MG/1
TABLET, FILM COATED ORAL
Qty: 6 TABLET | Refills: 0 | Status: SHIPPED | OUTPATIENT
Start: 2019-10-14 | End: 2020-05-28

## 2019-10-14 ASSESSMENT — ENCOUNTER SYMPTOMS
RHINORRHEA: 0
SORE THROAT: 0
SHORTNESS OF BREATH: 1
COUGH: 1
WHEEZING: 1

## 2019-10-16 ENCOUNTER — HOSPITAL ENCOUNTER (OUTPATIENT)
Dept: GENERAL RADIOLOGY | Age: 79
Discharge: HOME | End: 2019-10-16
Payer: MEDICARE

## 2019-10-16 ENCOUNTER — OFFICE VISIT (OUTPATIENT)
Dept: FAMILY MEDICINE CLINIC | Age: 79
End: 2019-10-16
Payer: MEDICARE

## 2019-10-16 ENCOUNTER — HOSPITAL ENCOUNTER (OUTPATIENT)
Age: 79
Discharge: HOME | End: 2019-10-16
Payer: MEDICARE

## 2019-10-16 ENCOUNTER — TELEPHONE (OUTPATIENT)
Dept: FAMILY MEDICINE CLINIC | Age: 79
End: 2019-10-16

## 2019-10-16 VITALS
OXYGEN SATURATION: 90 % | TEMPERATURE: 97.5 F | BODY MASS INDEX: 26.91 KG/M2 | WEIGHT: 177 LBS | HEART RATE: 88 BPM | DIASTOLIC BLOOD PRESSURE: 80 MMHG | SYSTOLIC BLOOD PRESSURE: 132 MMHG

## 2019-10-16 DIAGNOSIS — J20.9 ACUTE BRONCHITIS, UNSPECIFIED ORGANISM: Primary | ICD-10-CM

## 2019-10-16 DIAGNOSIS — R06.02 SOB (SHORTNESS OF BREATH) ON EXERTION: ICD-10-CM

## 2019-10-16 DIAGNOSIS — J20.9 ACUTE BRONCHITIS, UNSPECIFIED ORGANISM: ICD-10-CM

## 2019-10-16 DIAGNOSIS — J18.9 PNEUMONIA OF BOTH LUNGS DUE TO INFECTIOUS ORGANISM, UNSPECIFIED PART OF LUNG: Primary | ICD-10-CM

## 2019-10-16 PROCEDURE — 1123F ACP DISCUSS/DSCN MKR DOCD: CPT | Performed by: FAMILY MEDICINE

## 2019-10-16 PROCEDURE — 71046 X-RAY EXAM CHEST 2 VIEWS: CPT

## 2019-10-16 PROCEDURE — 4040F PNEUMOC VAC/ADMIN/RCVD: CPT | Performed by: FAMILY MEDICINE

## 2019-10-16 PROCEDURE — G8427 DOCREV CUR MEDS BY ELIG CLIN: HCPCS | Performed by: FAMILY MEDICINE

## 2019-10-16 PROCEDURE — 99213 OFFICE O/P EST LOW 20 MIN: CPT | Performed by: FAMILY MEDICINE

## 2019-10-16 PROCEDURE — G8419 CALC BMI OUT NRM PARAM NOF/U: HCPCS | Performed by: FAMILY MEDICINE

## 2019-10-16 PROCEDURE — G8598 ASA/ANTIPLAT THER USED: HCPCS | Performed by: FAMILY MEDICINE

## 2019-10-16 PROCEDURE — G8482 FLU IMMUNIZE ORDER/ADMIN: HCPCS | Performed by: FAMILY MEDICINE

## 2019-10-16 PROCEDURE — 1036F TOBACCO NON-USER: CPT | Performed by: FAMILY MEDICINE

## 2019-10-16 RX ORDER — DOXYCYCLINE HYCLATE 100 MG
100 TABLET ORAL 2 TIMES DAILY
Qty: 20 TABLET | Refills: 0 | Status: SHIPPED | OUTPATIENT
Start: 2019-10-16 | End: 2019-10-26

## 2019-10-16 RX ORDER — PREDNISONE 10 MG/1
TABLET ORAL
Qty: 45 TABLET | Refills: 0 | Status: SHIPPED | OUTPATIENT
Start: 2019-10-16 | End: 2020-05-28

## 2019-10-16 RX ORDER — ALBUTEROL SULFATE 90 UG/1
2 AEROSOL, METERED RESPIRATORY (INHALATION) EVERY 4 HOURS PRN
Qty: 1 INHALER | Refills: 2 | Status: SHIPPED | OUTPATIENT
Start: 2019-10-16 | End: 2020-05-28

## 2019-10-16 ASSESSMENT — ENCOUNTER SYMPTOMS
RHINORRHEA: 0
COUGH: 1
SHORTNESS OF BREATH: 1
SORE THROAT: 0
WHEEZING: 1

## 2019-10-17 ENCOUNTER — TELEPHONE (OUTPATIENT)
Dept: FAMILY MEDICINE CLINIC | Age: 79
End: 2019-10-17

## 2020-04-06 RX ORDER — DICLOFENAC SODIUM 75 MG/1
75 TABLET, DELAYED RELEASE ORAL 2 TIMES DAILY PRN
Qty: 180 TABLET | Refills: 0 | Status: SHIPPED | OUTPATIENT
Start: 2020-04-06 | End: 2020-10-21

## 2020-04-06 RX ORDER — LOVASTATIN 20 MG/1
20 TABLET ORAL NIGHTLY
Qty: 90 TABLET | Refills: 1 | Status: SHIPPED | OUTPATIENT
Start: 2020-04-06 | End: 2020-10-21 | Stop reason: SDUPTHER

## 2020-04-06 NOTE — TELEPHONE ENCOUNTER
Shnu Alford called requesting a refill on the following medications:  Requested Prescriptions     Pending Prescriptions Disp Refills    lovastatin (MEVACOR) 20 MG tablet 90 tablet 1     Sig: Take 1 tablet by mouth nightly    diclofenac (VOLTAREN) 75 MG EC tablet 180 tablet 0     Sig: Take 1 tablet by mouth 2 times daily as needed for Pain     Pharmacy verified:[facility]      Date of last visit: 10-16-19  Date of next visit (if applicable): Visit date not found            Also pt just home from [place] needs to be in for 14 days, please call if appt needs r/s, no My Chart access

## 2020-05-28 ENCOUNTER — OFFICE VISIT (OUTPATIENT)
Dept: CARDIOLOGY CLINIC | Age: 80
End: 2020-05-28
Payer: MEDICARE

## 2020-05-28 VITALS
HEIGHT: 68 IN | SYSTOLIC BLOOD PRESSURE: 142 MMHG | HEART RATE: 73 BPM | WEIGHT: 164.2 LBS | BODY MASS INDEX: 24.89 KG/M2 | DIASTOLIC BLOOD PRESSURE: 80 MMHG

## 2020-05-28 PROCEDURE — G8427 DOCREV CUR MEDS BY ELIG CLIN: HCPCS | Performed by: INTERNAL MEDICINE

## 2020-05-28 PROCEDURE — 99213 OFFICE O/P EST LOW 20 MIN: CPT | Performed by: INTERNAL MEDICINE

## 2020-05-28 PROCEDURE — 1036F TOBACCO NON-USER: CPT | Performed by: INTERNAL MEDICINE

## 2020-05-28 PROCEDURE — G8420 CALC BMI NORM PARAMETERS: HCPCS | Performed by: INTERNAL MEDICINE

## 2020-05-28 PROCEDURE — 93000 ELECTROCARDIOGRAM COMPLETE: CPT | Performed by: INTERNAL MEDICINE

## 2020-05-28 PROCEDURE — 1123F ACP DISCUSS/DSCN MKR DOCD: CPT | Performed by: INTERNAL MEDICINE

## 2020-05-28 PROCEDURE — 4040F PNEUMOC VAC/ADMIN/RCVD: CPT | Performed by: INTERNAL MEDICINE

## 2020-05-28 NOTE — PROGRESS NOTES
Ejection fraction is visually estimated at 55%. Overall left ventricular function is normal.      Right Atrium   Right atrial size was normal.      Right Ventricle   The right ventricular size was normal with normal systolic function and   wall thickness. Pericardial Effusion   The pericardium was normal in appearance with no evidence of a pericardial   effusion. Pleural Effusion   No evidence of pleural effusion. Aorta / Great Vessels   Dilation of aortic root .      M-Mode/2D Measurements & Calculations      LV Diastolic     LV Systolic Dimension: 4.61 AV Cusp Separation: 1 cmLA   Dimension: 4.76  cm                          Dimension: 3.7 cmAO Root   cm               LV Volume Diastolic: 746 ml Dimension: 4.5 cm   LV FS:20.8 %     LV Volume Systolic: 31.5 ml   LV PW Diastolic: LV EDV/LV EDV Index: 105   0.97 cm          ml/56 m^2LV ESV/LV ESV   Septum           Index: 60.8 ml/33 m^2       RV Diastolic Dimension: 9.97   Diastolic: 2.63  EF Calculated: 42.1 %       cm   cm                                                LA/Aorta: 0.82                       LVOT: 2.1 cm     Doppler Measurements & Calculations      MV Peak E-Wave:     AV Peak Velocity: 296    LVOT Peak Velocity: 71.7 cm/s   50.4 cm/s           cm/s                     LVOT Mean Velocity: 48.7 cm/s   MV Peak A-Wave:     AV Peak Gradient: 35.05  LVOT Peak Gradient: 2   91.7 cm/s           mmHg                     mmHgLVOT Mean Gradient: 1   MV E/A Ratio: 0.55  AV Mean Velocity: 232    mmHg   MV Peak Gradient:   cm/s   1.02 mmHg           AV Mean Gradient: 25                       mmHg   MV Deceleration     AV VTI: 85 cm            TR Velocity:192 cm/s   Time: 257 msec      AV Area                  TR Gradient:14.75 mmHg                       (Continuity):0.79 cm^2   PV Peak Velocity: 59.2 cm/s                                                PV Peak Gradient: 1.4 mmHg   MV E' Septal        LVOT VTI: 19.3 cm   Velocity: 5 cm/s    AV P1/2t: 648 msec   MV A' Septal   Velocity: 10.1 cm/s   MV E' Lateral   Velocity: 4.35 cm/s AV DVI (VTI): 0.23AV DVI   MV A' Lateral       (Vmax):0.24   Velocity: 10.7 cm/s   E/E' septal: 10.08   E/E' lateral: 11.59   MR Velocity: 492   cm/s     [email or web address]  [id number]       STRESS:    CATH:    Assessment/Plan       Diagnosis Orders   1. PAD (peripheral artery disease) ([address]     2. ASCVD (arteriosclerotic cardiovascular disease)  EKG 12 Lead   3. SOB (shortness of breath) on exertion  EKG 12 Lead    Echo 2D w doppler w color complete     PAD s/p Left CFA, SFA and popliteal intervention  AS  Multiple CVA  HTN  HLD      Reports mild sob  Will get Echo to evaluate AS   S/p left CFA, SFA, and popliteal intervention, doing well now   improved symptoms  Continue Aspirin, plavix + Statin  The patient is asked to make an attempt to improve diet and exercise patterns to aid in medical management of this problem. Advised more plant based nutrition/meditarrean diet   Advised patient to call office or seek immediate medical attention if there is any new onset of  any chest pain, sob, palpitations, lightheadedness, dizziness, orthopnea, PND or pedal edema. All medication side effects were discussed in details. Thank youfor allowing me to participate in the care of this patient. Please do not hesitate to contact me for any further questions. Return in about 3 months (around 8/28/2020) for Regular follow up, Review testing.        Electronically signed by Ynes Javier MD [facility]  5/28/2020 at 1:46 PM

## 2020-06-04 ENCOUNTER — HOSPITAL ENCOUNTER (OUTPATIENT)
Dept: NON INVASIVE DIAGNOSTICS | Age: 80
Discharge: HOME | End: 2020-06-04
Payer: MEDICARE

## 2020-06-04 LAB
LV EF: 43 %
LVEF MODALITY: NORMAL

## 2020-06-04 PROCEDURE — 93306 TTE W/DOPPLER COMPLETE: CPT

## 2020-06-08 ENCOUNTER — TELEPHONE (OUTPATIENT)
Dept: CARDIOLOGY CLINIC | Age: 80
End: 2020-06-08

## 2020-06-08 ENCOUNTER — PROCEDURE VISIT (OUTPATIENT)
Dept: NEUROLOGY | Age: 80
End: 2020-06-08
Payer: MEDICARE

## 2020-06-08 PROCEDURE — 95886 MUSC TEST DONE W/N TEST COMP: CPT | Performed by: PSYCHIATRY & NEUROLOGY

## 2020-06-08 PROCEDURE — 95911 NRV CNDJ TEST 9-10 STUDIES: CPT | Performed by: PSYCHIATRY & NEUROLOGY

## 2020-06-11 ENCOUNTER — TELEPHONE (OUTPATIENT)
Dept: CARDIOLOGY CLINIC | Age: 80
End: 2020-06-11

## 2020-06-11 ENCOUNTER — OFFICE VISIT (OUTPATIENT)
Dept: CARDIOLOGY CLINIC | Age: 80
End: 2020-06-11
Payer: MEDICARE

## 2020-06-11 VITALS
SYSTOLIC BLOOD PRESSURE: 164 MMHG | BODY MASS INDEX: 24.78 KG/M2 | DIASTOLIC BLOOD PRESSURE: 88 MMHG | WEIGHT: 163 LBS | HEART RATE: 64 BPM

## 2020-06-11 PROCEDURE — G8427 DOCREV CUR MEDS BY ELIG CLIN: HCPCS | Performed by: INTERNAL MEDICINE

## 2020-06-11 PROCEDURE — 4040F PNEUMOC VAC/ADMIN/RCVD: CPT | Performed by: INTERNAL MEDICINE

## 2020-06-11 PROCEDURE — 1123F ACP DISCUSS/DSCN MKR DOCD: CPT | Performed by: INTERNAL MEDICINE

## 2020-06-11 PROCEDURE — 1036F TOBACCO NON-USER: CPT | Performed by: INTERNAL MEDICINE

## 2020-06-11 PROCEDURE — 99213 OFFICE O/P EST LOW 20 MIN: CPT | Performed by: INTERNAL MEDICINE

## 2020-06-11 PROCEDURE — G8420 CALC BMI NORM PARAMETERS: HCPCS | Performed by: INTERNAL MEDICINE

## 2020-06-11 NOTE — PROGRESS NOTES
at 55%. Overall left ventricular function is normal.      Right Atrium   Right atrial size was normal.      Right Ventricle   The right ventricular size was normal with normal systolic function and   wall thickness. Pericardial Effusion   The pericardium was normal in appearance with no evidence of a pericardial   effusion. Pleural Effusion   No evidence of pleural effusion. Aorta / Great Vessels   Dilation of aortic root .      M-Mode/2D Measurements & Calculations      LV Diastolic     LV Systolic Dimension: 5.23 AV Cusp Separation: 1 cmLA   Dimension: 4.76  cm                          Dimension: 3.7 cmAO Root   cm               LV Volume Diastolic: 537 ml Dimension: 4.5 cm   LV FS:20.8 %     LV Volume Systolic: 67.8 ml   LV PW Diastolic: LV EDV/LV EDV Index: 105   0.97 cm          ml/56 m^2LV ESV/LV ESV   Septum           Index: 60.8 ml/33 m^2       RV Diastolic Dimension: 6.61   Diastolic: 8.48  EF Calculated: 42.1 %       cm   cm                                                LA/Aorta: 0.82                       LVOT: 2.1 cm     Doppler Measurements & Calculations      MV Peak E-Wave:     AV Peak Velocity: 296    LVOT Peak Velocity: 71.7 cm/s   50.4 cm/s           cm/s                     LVOT Mean Velocity: 48.7 cm/s   MV Peak A-Wave:     AV Peak Gradient: 35.05  LVOT Peak Gradient: 2   91.7 cm/s           mmHg                     mmHgLVOT Mean Gradient: 1   MV E/A Ratio: 0.55  AV Mean Velocity: 232    mmHg   MV Peak Gradient:   cm/s   1.02 mmHg           AV Mean Gradient: 25                       mmHg   MV Deceleration     AV VTI: 85 cm            TR Velocity:192 cm/s   Time: 257 msec      AV Area                  TR Gradient:14.75 mmHg                       (Continuity):0.79 cm^2   PV Peak Velocity: 59.2 cm/s                                                PV Peak Gradient: 1.4 mmHg   MV E' Septal        LVOT VTI: 19.3 cm   Velocity: 5 cm/s    AV P1/2t: 648 msec   MV A' Septal   Velocity: 10.1

## 2020-06-12 NOTE — FLOWSHEET NOTE
NPO after midnight  Bring drivers license and insurance information  Wear comfortable clean clothes  Shower morning of and night before with liquid antibacterial soap  Remove jewelry   May have to stay overnight if have PTCA/stent  Bring medications in original bottles  Made aware of visitors limit to 1 at a time  Follow all instructions given by your physician  Please notify doctor office if you need to cancel or reschedule your procedure   needed at discharge  Rajinder Rubio completed with wife who will be  per Brenton's request.

## 2020-06-13 ENCOUNTER — HOSPITAL ENCOUNTER (OUTPATIENT)
Age: 80
Discharge: HOME | End: 2020-06-13
Payer: MEDICARE

## 2020-06-13 PROCEDURE — U0002 COVID-19 LAB TEST NON-CDC: HCPCS

## 2020-06-14 LAB
PERFORMING LAB: NORMAL
REPORT: NORMAL
SARS-COV-2: NOT DETECTED

## 2020-06-15 ENCOUNTER — OFFICE VISIT (OUTPATIENT)
Dept: CARDIOTHORACIC SURGERY | Age: 80
End: 2020-06-15
Payer: MEDICARE

## 2020-06-15 ENCOUNTER — PREP FOR PROCEDURE (OUTPATIENT)
Dept: CARDIOLOGY | Age: 80
End: 2020-06-15

## 2020-06-15 ENCOUNTER — TELEPHONE (OUTPATIENT)
Dept: CARDIOLOGY CLINIC | Age: 80
End: 2020-06-15

## 2020-06-15 VITALS
BODY MASS INDEX: 25.01 KG/M2 | TEMPERATURE: 98.2 F | HEIGHT: 68 IN | SYSTOLIC BLOOD PRESSURE: 174 MMHG | WEIGHT: 165 LBS | DIASTOLIC BLOOD PRESSURE: 89 MMHG | HEART RATE: 72 BPM

## 2020-06-15 PROCEDURE — 99203 OFFICE O/P NEW LOW 30 MIN: CPT | Performed by: PHYSICIAN ASSISTANT

## 2020-06-15 PROCEDURE — G8427 DOCREV CUR MEDS BY ELIG CLIN: HCPCS | Performed by: PHYSICIAN ASSISTANT

## 2020-06-15 PROCEDURE — 1123F ACP DISCUSS/DSCN MKR DOCD: CPT | Performed by: PHYSICIAN ASSISTANT

## 2020-06-15 PROCEDURE — G8417 CALC BMI ABV UP PARAM F/U: HCPCS | Performed by: PHYSICIAN ASSISTANT

## 2020-06-15 PROCEDURE — 1036F TOBACCO NON-USER: CPT | Performed by: PHYSICIAN ASSISTANT

## 2020-06-15 PROCEDURE — 4040F PNEUMOC VAC/ADMIN/RCVD: CPT | Performed by: PHYSICIAN ASSISTANT

## 2020-06-15 RX ORDER — NITROGLYCERIN 0.4 MG/1
0.4 TABLET SUBLINGUAL EVERY 5 MIN PRN
Status: CANCELLED | OUTPATIENT
Start: 2020-06-15

## 2020-06-15 RX ORDER — SODIUM CHLORIDE 9 MG/ML
INJECTION, SOLUTION INTRAVENOUS CONTINUOUS
Status: CANCELLED | OUTPATIENT
Start: 2020-06-15

## 2020-06-15 RX ORDER — ASPIRIN 325 MG
325 TABLET ORAL ONCE
Status: CANCELLED | OUTPATIENT
Start: 2020-06-15 | End: 2020-06-15

## 2020-06-15 RX ORDER — SODIUM CHLORIDE 0.9 % (FLUSH) 0.9 %
10 SYRINGE (ML) INJECTION PRN
Status: CANCELLED | OUTPATIENT
Start: 2020-06-15

## 2020-06-15 RX ORDER — SODIUM CHLORIDE 0.9 % (FLUSH) 0.9 %
10 SYRINGE (ML) INJECTION EVERY 12 HOURS SCHEDULED
Status: CANCELLED | OUTPATIENT
Start: 2020-06-15

## 2020-06-15 RX ORDER — DIPHENHYDRAMINE HYDROCHLORIDE 50 MG/ML
50 INJECTION INTRAMUSCULAR; INTRAVENOUS ONCE
Status: CANCELLED | OUTPATIENT
Start: 2020-06-15 | End: 2020-06-15

## 2020-06-15 NOTE — PROGRESS NOTES
has never used smokeless tobacco. He reports current alcohol use of about 30.0 standard drinks of alcohol per week. He reports that he does not use drugs. Imaging:  ECHO: I have reviewed the ECHO images. Conclusions      Summary   Left ventricular size is normal and systolic function is mildly reduced. Ejection fraction was estimated at 40-45%. LV wall thickness is mildly   increased. Mildly dilated left atrium. The right ventricular size was normal with normal systolic function and   wall thickness. Severely thickened and calcified aortic valve leaflets. Transaortic mean gradient is 43 mm Hg, peak velocity is 4.2 m/s and   calculated MAMI is 0.60 cm2 indicating severe aortic stenosis. Mild mitral regurgitation is present. Ascending aorta : 4.2 cm. ROS:   Constitutional: Negative for chills, fever and unexpected weight change. Respiratory: Negative for apnea, wheezing and stridor. Cardiovascular: Negative for chest pain, palpitations and leg swelling. Gastrointestinal: Negative for hematochezia, melana, constipation, and N/V/D. Musculoskeletal: Negative for myalgias  Skin: Negative for color change, rash and wound. Neurological: Negative for dizziness or syncope. Vital Signs:   Vitals:    06/15/20 0919 06/15/20 0943   BP: (!) 169/82 (!) 174/89   Pulse: 72    Temp: 98.2 °F (36.8 °C)    TempSrc: Temporal    Weight: 165 lb (74.8 kg)    Height: 5' 8\" (1.727 m)      Physical Exam:  General appearance:  No acute distress, appears stated age and cooperative. Neck: No jugular venous distention. Trachea midline. No carotid bruits. Respiratory:  Normal respiratory effort. Clear to auscultation, bilaterally without Rales/Wheezes/Rhonch. Cardiovascular:  Regular rate and rhythm with normal S1/S2 with 3/6 MIRTHA. Abdomen: Soft, non-tender, non-distended with normal bowel sounds. Ext: No clubbing, cyanosis or edema bilaterally. Full range of motion without deformity.    Skin: Skin color,

## 2020-06-16 ENCOUNTER — HOSPITAL ENCOUNTER (OUTPATIENT)
Dept: INPATIENT UNIT | Age: 80
Discharge: HOME | End: 2020-06-17
Attending: INTERNAL MEDICINE | Admitting: INTERNAL MEDICINE
Payer: MEDICARE

## 2020-06-16 PROBLEM — Z98.61 S/P CORONARY ANGIOPLASTY: Status: ACTIVE | Noted: 2020-06-16

## 2020-06-16 LAB
ABO: NORMAL
ACTIVATED CLOTTING TIME: 257 SECONDS (ref 1–150)
ALBUMIN SERPL-MCNC: 4 G/DL (ref 3.5–5.1)
ALP BLD-CCNC: 69 U/L (ref 38–126)
ALT SERPL-CCNC: 7 U/L (ref 11–66)
ANION GAP SERPL CALCULATED.3IONS-SCNC: 10 MEQ/L (ref 8–16)
ANION GAP SERPL CALCULATED.3IONS-SCNC: 9 MEQ/L (ref 8–16)
ANTIBODY SCREEN: NORMAL
APTT: 30.5 SECONDS (ref 22–38)
AST SERPL-CCNC: 20 U/L (ref 5–40)
BILIRUB SERPL-MCNC: 0.8 MG/DL (ref 0.3–1.2)
BUN BLDV-MCNC: 8 MG/DL (ref 7–22)
BUN BLDV-MCNC: 8 MG/DL (ref 7–22)
CALCIUM SERPL-MCNC: 9.3 MG/DL (ref 8.5–10.5)
CALCIUM SERPL-MCNC: 9.4 MG/DL (ref 8.5–10.5)
CHLORIDE BLD-SCNC: 103 MEQ/L (ref 98–111)
CHLORIDE BLD-SCNC: 104 MEQ/L (ref 98–111)
CHOLESTEROL, TOTAL: 137 MG/DL (ref 100–199)
CO2: 24 MEQ/L (ref 23–33)
CO2: 24 MEQ/L (ref 23–33)
COLLECTED BY:: ABNORMAL
COLLECTED BY:: ABNORMAL
CREAT SERPL-MCNC: 0.7 MG/DL (ref 0.4–1.2)
CREAT SERPL-MCNC: 0.8 MG/DL (ref 0.4–1.2)
EKG ATRIAL RATE: 78 BPM
EKG P AXIS: 69 DEGREES
EKG P-R INTERVAL: 188 MS
EKG Q-T INTERVAL: 408 MS
EKG QRS DURATION: 96 MS
EKG QTC CALCULATION (BAZETT): 465 MS
EKG R AXIS: 29 DEGREES
EKG T AXIS: 55 DEGREES
EKG VENTRICULAR RATE: 78 BPM
ERYTHROCYTE [DISTWIDTH] IN BLOOD BY AUTOMATED COUNT: 13.4 % (ref 11.5–14.5)
ERYTHROCYTE [DISTWIDTH] IN BLOOD BY AUTOMATED COUNT: 47.1 FL (ref 35–45)
GFR SERPL CREATININE-BSD FRML MDRD: > 90 ML/MIN/1.73M2
GFR SERPL CREATININE-BSD FRML MDRD: > 90 ML/MIN/1.73M2
GLUCOSE BLD-MCNC: 103 MG/DL (ref 70–108)
GLUCOSE BLD-MCNC: 104 MG/DL (ref 70–108)
HCT VFR BLD CALC: 46.1 % (ref 42–52)
HDLC SERPL-MCNC: 66 MG/DL
HEMOGLOBIN: 15.3 GM/DL (ref 14–18)
INR BLD: 0.97 (ref 0.85–1.13)
LDL CHOLESTEROL CALCULATED: 61 MG/DL
MCH RBC QN AUTO: 31.7 PG (ref 26–33)
MCHC RBC AUTO-ENTMCNC: 33.2 GM/DL (ref 32.2–35.5)
MCV RBC AUTO: 95.4 FL (ref 80–94)
PLATELET # BLD: 257 THOU/MM3 (ref 130–400)
PMV BLD AUTO: 10.6 FL (ref 9.4–12.4)
POC O2 SATURATION: 75 % (ref 94–97)
POC O2 SATURATION: 92 % (ref 94–97)
POTASSIUM REFLEX MAGNESIUM: 5 MEQ/L (ref 3.5–5.2)
POTASSIUM REFLEX MAGNESIUM: 5.1 MEQ/L (ref 3.5–5.2)
RBC # BLD: 4.83 MILL/MM3 (ref 4.7–6.1)
RH FACTOR: NORMAL
SODIUM BLD-SCNC: 137 MEQ/L (ref 135–145)
SODIUM BLD-SCNC: 137 MEQ/L (ref 135–145)
SOURCE, BLOOD GAS: ABNORMAL
SOURCE, BLOOD GAS: ABNORMAL
TOTAL PROTEIN: 7.1 G/DL (ref 6.1–8)
TRIGL SERPL-MCNC: 48 MG/DL (ref 0–199)
WBC # BLD: 9.4 THOU/MM3 (ref 4.8–10.8)

## 2020-06-16 PROCEDURE — 36415 COLL VENOUS BLD VENIPUNCTURE: CPT

## 2020-06-16 PROCEDURE — 85610 PROTHROMBIN TIME: CPT

## 2020-06-16 PROCEDURE — C1887 CATHETER, GUIDING: HCPCS

## 2020-06-16 PROCEDURE — 85347 COAGULATION TIME ACTIVATED: CPT

## 2020-06-16 PROCEDURE — 6360000004 HC RX CONTRAST MEDICATION: Performed by: INTERNAL MEDICINE

## 2020-06-16 PROCEDURE — C1874 STENT, COATED/COV W/DEL SYS: HCPCS

## 2020-06-16 PROCEDURE — C1769 GUIDE WIRE: HCPCS

## 2020-06-16 PROCEDURE — 82810 BLOOD GASES O2 SAT ONLY: CPT

## 2020-06-16 PROCEDURE — 2709999900 HC NON-CHARGEABLE SUPPLY

## 2020-06-16 PROCEDURE — 86900 BLOOD TYPING SEROLOGIC ABO: CPT

## 2020-06-16 PROCEDURE — 80061 LIPID PANEL: CPT

## 2020-06-16 PROCEDURE — 86850 RBC ANTIBODY SCREEN: CPT

## 2020-06-16 PROCEDURE — 2500000003 HC RX 250 WO HCPCS

## 2020-06-16 PROCEDURE — C1725 CATH, TRANSLUMIN NON-LASER: HCPCS

## 2020-06-16 PROCEDURE — 93456 R HRT CORONARY ARTERY ANGIO: CPT | Performed by: INTERNAL MEDICINE

## 2020-06-16 PROCEDURE — 80053 COMPREHEN METABOLIC PANEL: CPT

## 2020-06-16 PROCEDURE — 85027 COMPLETE CBC AUTOMATED: CPT

## 2020-06-16 PROCEDURE — 2580000003 HC RX 258: Performed by: NURSE PRACTITIONER

## 2020-06-16 PROCEDURE — C1894 INTRO/SHEATH, NON-LASER: HCPCS

## 2020-06-16 PROCEDURE — C9600 PERC DRUG-EL COR STENT SING: HCPCS | Performed by: INTERNAL MEDICINE

## 2020-06-16 PROCEDURE — 85730 THROMBOPLASTIN TIME PARTIAL: CPT

## 2020-06-16 PROCEDURE — 6370000000 HC RX 637 (ALT 250 FOR IP)

## 2020-06-16 PROCEDURE — 6370000000 HC RX 637 (ALT 250 FOR IP): Performed by: INTERNAL MEDICINE

## 2020-06-16 PROCEDURE — 93005 ELECTROCARDIOGRAM TRACING: CPT | Performed by: NURSE PRACTITIONER

## 2020-06-16 PROCEDURE — 6360000002 HC RX W HCPCS

## 2020-06-16 PROCEDURE — 86901 BLOOD TYPING SEROLOGIC RH(D): CPT

## 2020-06-16 RX ORDER — ASPIRIN 325 MG
325 TABLET ORAL DAILY
Status: ON HOLD | COMMUNITY
End: 2020-06-17 | Stop reason: HOSPADM

## 2020-06-16 RX ORDER — ASPIRIN 325 MG
325 TABLET ORAL ONCE
Status: DISCONTINUED | OUTPATIENT
Start: 2020-06-16 | End: 2020-06-17 | Stop reason: HOSPADM

## 2020-06-16 RX ORDER — SODIUM CHLORIDE 0.9 % (FLUSH) 0.9 %
10 SYRINGE (ML) INJECTION PRN
Status: DISCONTINUED | OUTPATIENT
Start: 2020-06-16 | End: 2020-06-17 | Stop reason: HOSPADM

## 2020-06-16 RX ORDER — SODIUM CHLORIDE 9 MG/ML
INJECTION, SOLUTION INTRAVENOUS CONTINUOUS
Status: DISCONTINUED | OUTPATIENT
Start: 2020-06-16 | End: 2020-06-17 | Stop reason: HOSPADM

## 2020-06-16 RX ORDER — CLOPIDOGREL BISULFATE 75 MG/1
300 TABLET ORAL ONCE
Status: COMPLETED | OUTPATIENT
Start: 2020-06-16 | End: 2020-06-16

## 2020-06-16 RX ORDER — CLOPIDOGREL BISULFATE 75 MG/1
300 TABLET ORAL ONCE
Status: DISCONTINUED | OUTPATIENT
Start: 2020-06-16 | End: 2020-06-16

## 2020-06-16 RX ORDER — NITROGLYCERIN 0.4 MG/1
0.4 TABLET SUBLINGUAL EVERY 5 MIN PRN
Status: DISCONTINUED | OUTPATIENT
Start: 2020-06-16 | End: 2020-06-17 | Stop reason: HOSPADM

## 2020-06-16 RX ORDER — CLOPIDOGREL BISULFATE 75 MG/1
75 TABLET ORAL DAILY
Status: DISCONTINUED | OUTPATIENT
Start: 2020-06-17 | End: 2020-06-17 | Stop reason: HOSPADM

## 2020-06-16 RX ORDER — SODIUM CHLORIDE 0.9 % (FLUSH) 0.9 %
10 SYRINGE (ML) INJECTION EVERY 12 HOURS SCHEDULED
Status: DISCONTINUED | OUTPATIENT
Start: 2020-06-16 | End: 2020-06-17 | Stop reason: HOSPADM

## 2020-06-16 RX ADMIN — SODIUM CHLORIDE: 9 INJECTION, SOLUTION INTRAVENOUS at 08:59

## 2020-06-16 RX ADMIN — IOPAMIDOL 225 ML: 755 INJECTION, SOLUTION INTRAVENOUS at 11:21

## 2020-06-16 RX ADMIN — CLOPIDOGREL BISULFATE 300 MG: 75 TABLET ORAL at 23:26

## 2020-06-16 NOTE — H&P
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    Elevated glucose     Hyperlipidemia     Hypertension     Osteoarthritis     Osteopenia     Osteopenia     Polio 1948    Rheumatic fever 1948         Past Surgical History:   Procedure Laterality Date    BALLOON ANGIOPLASTY, ARTERY  07/2018    s/p Left CFA, SFA and popliteal intervention    FACIAL COSMETIC SURGERY      as a kid    TONSILLECTOMY AND ADENOIDECTOMY             Allergies   Allergen Reactions    Keflex [Cephalexin] Rash         MEDICATIONS   Coumadin Use Last 5 Days [x]No []Yes  Antiplatelet drug therapy use last 5 days  []No [x]Yes  Other anticoagulant use last 5 days  [x]No []Yes      No current facility-administered medications on file prior to encounter.       Current Outpatient Medications on File Prior to Encounter   Medication Sig Dispense Refill    aspirin 325 MG tablet Take 325 mg by mouth daily      lovastatin (MEVACOR) 20 MG tablet Take 1 tablet by mouth nightly 90 tablet 1    diclofenac (VOLTAREN) 75 MG EC tablet Take 1 tablet by mouth 2 times daily as needed for Pain 180 tablet 0       Current Facility-Administered Medications   Medication Dose Route Frequency Provider Last Rate Last Dose    0.9 % sodium chloride infusion   Intravenous Continuous Cherylin Asp, APRN - CNP 50 mL/hr at 06/16/20 0859      aspirin tablet 325 mg  325 mg Oral Once Cherylin Asp, APRN - CNP        nitroGLYCERIN (NITROSTAT) SL tablet 0.4 mg  0.4 mg Sublingual Q5 Min PRN Cherylin Asp, APRN - CNP        sodium chloride flush 0.9 % injection 10 mL  10 mL Intravenous 2 times per day Cherylin Asp, APRN - CNP        sodium chloride flush 0.9 % injection 10 mL  10 mL Intravenous PRN Cherylin Asp, APRN - CNP                 PHYSICAL:     Ht 5' 8\" (1.727 m)   Wt 165 lb (74.8 kg)   BMI 25.09 kg/m²     Heart:  [x]Regular rate and rhythm []Other:    Lungs:  [x]Clear    []Other:    Abdomen: [x]Soft    []Other:    Mental Status: [x]Alert & Oriented  []Other:   Ext:                [x]No edema       []Other:         No results for input(s): CKTOTAL, CKMB, CKMBINDEX, TROPONINI in the last 72 hours. Lab Results   Component Value Date    WBC 9.4 06/16/2020    RBC 4.83 06/16/2020    HGB 15.3 06/16/2020    HCT 46.1 06/16/2020    MCV 95.4 06/16/2020    MCH 31.7 06/16/2020    MCHC 33.2 06/16/2020     06/16/2020    MPV 10.6 06/16/2020       Lab Results   Component Value Date     06/16/2020     06/16/2020    K 5.0 06/16/2020    K 5.1 06/16/2020     06/16/2020     06/16/2020    CO2 24 06/16/2020    CO2 24 06/16/2020    BUN 8 06/16/2020    BUN 8 06/16/2020    LABALBU 4.0 06/16/2020    CREATININE 0.8 06/16/2020    CREATININE 0.7 06/16/2020    CALCIUM 9.3 06/16/2020    CALCIUM 9.4 06/16/2020    LABGLOM >90 06/16/2020    LABGLOM >90 06/16/2020    GLUCOSE 103 06/16/2020    GLUCOSE 104 06/16/2020    GLUCOSE 90 04/06/2017       Lab Results   Component Value Date    ALKPHOS 69 06/16/2020    ALT 7 06/16/2020    AST 20 06/16/2020    PROT 7.1 06/16/2020    BILITOT 0.8 06/16/2020    LABALBU 4.0 06/16/2020       No results found for: MG    No components found for: Jassonchelita Andrademer    Lab Results   Component Value Date    LABA1C 5.4 05/03/2018       Lab Results   Component Value Date    TRIG 48 06/16/2020    HDL 66 06/16/2020    LDLCALC 61 06/16/2020    LDLDIRECT 83 09/30/2014    LABVLDL 17 04/06/2017       No results found for: TSH         SEDATION  Planned agent:[x]Midazolam []Meperidine [x]Sublimaze []Morphine []Diazepam  []Other:         ASA Classification:  []1 [x]2 []3 []4 []5  Class 1: A normal healthy patient  Class 2: Pt with mild to moderate systemic disease  Class 3: Severe systemic disease or disturbance  Class 4: Severe systemic disorders that are already life threatening.   Class 5: Moribund pt with little chances of survival, for more than 24 hours. Mallampati I Airway Classification:   []1 [x]2 []3 []4      [x]Pre-procedure diagnostic studies complete and results available. Comment:    [x]Previous sedation/anesthesia experiences assessed. Comment:    [x]The patient is an appropriate candidate to undergo the planned procedure sedation and anesthesia. (Refer to nursing sedation/analgesia documentation record)  [x]Formulation and discussion of sedation/procedure plan, risks, and expectations with patient and/or responsible adult completed. [x]Patient examined immediately prior to the procedure.  (Refer to nursing sedation/analgesia documentation record)        Angel Trammell MD, [facility]  Electronically signed 6/16/2020 at 9:58 AM

## 2020-06-16 NOTE — PROCEDURES
[address] [id number]                            CARDIAC CATHETERIZATION    PATIENT NAME: Jaylen Mercado                   :        1940  MED REC NO:   [id number]                           ROOM:       0016  ACCOUNT NO:   [de-identified]                           ADMIT DATE: 2020  PROVIDER:     Gricelda Dawson MD    DATE OF PROCEDURE:  2020    PROCEDURE PERFORMED:  Left heart catheterization, PCI of proximal LAD. INDICATION FOR STUDY:  Dyspnea on exertion, angina III, TAVR workup. DESCRIPTION OF PROCEDURE:  After written informed consent was obtained,  the patient was brought to the cardiac catheterization laboratory in a  fasting, nonsedated, and in no acute distress. Preprocedure timeout was  performed. 2% lidocaine was used to anesthetize the subcutaneous tissue  overlying the right radial artery as well as the right brachial vein. We were able to place a 5-Lithuanian sheath in the right brachial vein, and  a 6-Lithuanian Slender arterial sheath in the right radial artery. Once the  sheath was in place, a cocktail of verapamil and heparin was infused to  reduce brachial artery spasm. ESTIMATED BLOOD LOSS:  Less than 10 mL. EQUIPMENTS USED:  Standard 6-Lithuanian JR4, JL3.5 diagnostic catheters as  well as 5-Lithuanian Rodriguez catheter. MEDICATIONS:  See EMR. RIGHT HEART CATHETERIZATION:  1.  RA is 2.  2.  RV is 38/1, 3.  3.  PA is 37/16, 24.  4.  Wedge pressure was 8.  5.  Cardiac output by Jyotsna method was 6.2 and cardiac index was 3.3. CORONARY ANATOMY:  1. Right coronary artery is the dominant vessel. There is  mild-to-moderate calcification throughout the entire vessel. There are  moderate luminal irregularities in the proximal mid and distal portions  of the vessel. Distally, there is a large sized PL and PDA branches  both of which are patent.   2.  Left main is patent, and distally, there is 20% to 30% stenosis  before giving rise to LAD and left circumflex arteries. 3.  Left circumflex artery has mild-to-moderate calcification in the  [id number]-[id number] segment, otherwise it has mild-to-moderate luminal  irregularities. 4.  LAD, proximally calcified as a 90% to 95% focal stenosis which is  calcified. Beyond that, there are mild luminal irregularities at the  mid to distal portion of the LAD. Based on the angiographic findings, we decided to pursue percutaneous  coronary intervention of the proximal LAD. An EBU 3.5 guide catheter was used to engage the left main. A  Runthrough wire was used to cross the critical stenosis in the LAD. The  tip of the wire was placed in the distal LAD. We used numerous  noncompliant balloons in the proximal LAD to predilate the lesion before  due to the moderate to severe calcification of the lesion. We used a  2.75, 3.0, 4.0 noncompliant balloons to predilate the lesion. Once  adequate predilatation was performed, we then placed a two overlapping  3.5 x 15 and 3.5 x 18 mm Xience Ambar drug-eluting stents. These  stents were post-dilated at high pressure up to 4.0 mm in diameter. Repeat angiogram showed good ADONAY-3 flow throughout the entire main  vessel. The reason why we placed two overlapping stents was after the  first stent, we thought that there was possible proximal mild  non-flow-limiting dissection. We covered that proximal dissection with  another overlapping stent. The patient throughout the entire case was stable. No symptoms. He  tolerated the procedure well. Hemostasis of the right radial artery was  achieved with a Vasc Band device. SUMMARY:  1. Normal right heart pressures. 2.  PCI with drug-eluting stents of proximal 90% to 95% stenosis of the  LAD. 3.  Severe aortic stenosis. RECOMMENDATIONS:  1. DAPT for at least six months. 2.  Continue TAVR workup. 3.  Monitor radial artery access site.   4.  All procedure details and

## 2020-06-17 ENCOUNTER — TELEPHONE (OUTPATIENT)
Dept: CARDIOLOGY CLINIC | Age: 80
End: 2020-06-17

## 2020-06-17 VITALS
HEART RATE: 75 BPM | DIASTOLIC BLOOD PRESSURE: 63 MMHG | BODY MASS INDEX: 25.01 KG/M2 | WEIGHT: 165 LBS | SYSTOLIC BLOOD PRESSURE: 122 MMHG | OXYGEN SATURATION: 93 % | RESPIRATION RATE: 18 BRPM | TEMPERATURE: 97.6 F | HEIGHT: 68 IN

## 2020-06-17 PROCEDURE — 2580000003 HC RX 258: Performed by: INTERNAL MEDICINE

## 2020-06-17 PROCEDURE — 6370000000 HC RX 637 (ALT 250 FOR IP): Performed by: INTERNAL MEDICINE

## 2020-06-17 PROCEDURE — 6370000000 HC RX 637 (ALT 250 FOR IP): Performed by: NURSE PRACTITIONER

## 2020-06-17 RX ORDER — METOPROLOL SUCCINATE 25 MG/1
25 TABLET, EXTENDED RELEASE ORAL DAILY
Qty: 30 TABLET | Refills: 3 | Status: ON HOLD | OUTPATIENT
Start: 2020-06-17 | End: 2020-09-17 | Stop reason: SDUPTHER

## 2020-06-17 RX ORDER — CLOPIDOGREL BISULFATE 75 MG/1
75 TABLET ORAL DAILY
Qty: 30 TABLET | Refills: 3 | Status: SHIPPED | OUTPATIENT
Start: 2020-06-17 | End: 2020-10-19 | Stop reason: SDUPTHER

## 2020-06-17 RX ORDER — ASPIRIN 81 MG/1
81 TABLET, CHEWABLE ORAL DAILY
Qty: 30 TABLET | Refills: 3 | Status: SHIPPED | OUTPATIENT
Start: 2020-06-17 | End: 2020-11-17 | Stop reason: ALTCHOICE

## 2020-06-17 RX ORDER — METOPROLOL SUCCINATE 25 MG/1
25 TABLET, EXTENDED RELEASE ORAL DAILY
Status: DISCONTINUED | OUTPATIENT
Start: 2020-06-17 | End: 2020-06-17 | Stop reason: HOSPADM

## 2020-06-17 RX ORDER — ASPIRIN 81 MG/1
81 TABLET, CHEWABLE ORAL DAILY
Status: DISCONTINUED | OUTPATIENT
Start: 2020-06-17 | End: 2020-06-17 | Stop reason: HOSPADM

## 2020-06-17 RX ORDER — NITROGLYCERIN 0.4 MG/1
TABLET SUBLINGUAL
Qty: 25 TABLET | Refills: 1 | Status: SHIPPED | OUTPATIENT
Start: 2020-06-17 | End: 2021-08-26 | Stop reason: SDUPTHER

## 2020-06-17 RX ADMIN — Medication 10 ML: at 09:27

## 2020-06-17 RX ADMIN — METOPROLOL SUCCINATE 25 MG: 25 TABLET, FILM COATED, EXTENDED RELEASE ORAL at 09:27

## 2020-06-17 RX ADMIN — CLOPIDOGREL BISULFATE 75 MG: 75 TABLET ORAL at 09:27

## 2020-06-17 RX ADMIN — ASPIRIN 81 MG: 81 TABLET, CHEWABLE ORAL at 09:27

## 2020-06-17 ASSESSMENT — PAIN SCALES - GENERAL
PAINLEVEL_OUTOF10: 0
PAINLEVEL_OUTOF10: 0

## 2020-06-17 NOTE — CARE COORDINATION
6/17/20, 7:20 AM EDT    Patient goals/plan/ treatment preferences discussed by  and . Patient goals/plan/ treatment preferences reviewed with patient/ family. Patient/ family verbalize understanding of discharge plan and are in agreement with goal/plan/treatment preferences. Understanding was demonstrated using the teach back method. AVS provided by RN at time of discharge, which includes all necessary medical information pertaining to the patients current course of illness, treatment, post-discharge goals of care, and treatment preferences. Heart cath with PCI yesterday per Dr. Kg Cruz. TAVR workup. Anticipate discharge today. Met with pt and spouse today. From home with no services or DME. He is active and drives. He has a PCP and he denies issues getting basic needs or medications. He denies home going needs.

## 2020-06-17 NOTE — PROGRESS NOTES
Patient received pamphlet about cardiac intervention, how to take care of the incision site, mended hearts program, cardiac rehab and the hours of operations, and Nutritional information regarding cardiac diet. Patient walked halls with nurse, no dizziness noted, no SOB.

## 2020-06-17 NOTE — TELEPHONE ENCOUNTER
Pt is scheduled for a HERBERT visit 7/14/20 with [facility] disch 6/17, stent placement. The hospital asked for a 2-3 week followup, but 4 weeks was the soonest opening.   Call pt if able to move sooner with Dr Mack Miramontes or Adam Roy

## 2020-06-17 NOTE — PROGRESS NOTES
Cardiology Progress Note      Patient:  Lior Salinas  YOB: 1940  MRN: [id number]   Acct: [de-identified]  Admit Date:  2020  Primary Cardiologist: Smita Richards MD    Chief Complaint:   Pt presented for OP elective cardiac cath as TAVR workup    Subjective (Events in last 24 hours):     Pt sitting at bedside   No chest pain or SOB or palpitations   Tele SR no ectopy   VSS    RT radial / Brachial cath site slight ecchymosis - no hematoma - pulses present - neurovascular check WNL   Brachial cath site dressing dry and intact     Objective:   BP (!) 156/79   Pulse 72   Temp 97.7 °F (36.5 °C) (Oral)   Resp 18   Ht 5' 8\" (1.727 m)   Wt 165 lb (74.8 kg)   SpO2 92%   BMI 25.09 kg/m²        TELEMETRY: SR     Physical Exam:  General Appearance: alert and oriented to person, place and time, in no acute distress  Cardiovascular: normal rate, regular rhythm, normal S1 and S2, no murmurs, rubs, clicks, or gallops, distal pulses intact,   Pulmonary/Chest: clear to auscultation bilaterally- no wheezes, rales or rhonchi, normal air movement, no respiratory distress  Abdomen: soft, non-tender, non-distended, normal bowel sounds, no masses Extremities: no cyanosis, clubbing or edema, pulses present    Skin: warm and dry, no rash or erythema  Head: normocephalic and atraumatic   Musculoskeletal: normal range of motion, no joint swelling, deformity or tenderness  Neurological: alert, oriented, normal speech, no focal findings or movement disorder noted    Medications:    aspirin  325 mg Oral Once    sodium chloride flush  10 mL Intravenous 2 times per day    clopidogrel  75 mg Oral Daily      sodium chloride 50 mL/hr at 20 0859     nitroGLYCERIN, 0.4 mg, Q5 Min PRN  sodium chloride flush, 10 mL, PRN        Diagnostics:  EK2020 08:34:08 [facility] ROUTINE RETRIEVAL  Normal sinus rhythm  Septal infarct , age undetermined  Abnormal ECG  When compared with ECG of 25-OCT-2004

## 2020-06-24 ENCOUNTER — OFFICE VISIT (OUTPATIENT)
Dept: CARDIOLOGY CLINIC | Age: 80
End: 2020-06-24
Payer: MEDICARE

## 2020-06-24 VITALS
BODY MASS INDEX: 24.61 KG/M2 | DIASTOLIC BLOOD PRESSURE: 86 MMHG | HEIGHT: 68 IN | SYSTOLIC BLOOD PRESSURE: 147 MMHG | HEART RATE: 61 BPM | WEIGHT: 162.4 LBS

## 2020-06-24 PROCEDURE — 4040F PNEUMOC VAC/ADMIN/RCVD: CPT | Performed by: INTERNAL MEDICINE

## 2020-06-24 PROCEDURE — 1123F ACP DISCUSS/DSCN MKR DOCD: CPT | Performed by: INTERNAL MEDICINE

## 2020-06-24 PROCEDURE — 93000 ELECTROCARDIOGRAM COMPLETE: CPT | Performed by: INTERNAL MEDICINE

## 2020-06-24 PROCEDURE — 1036F TOBACCO NON-USER: CPT | Performed by: INTERNAL MEDICINE

## 2020-06-24 PROCEDURE — G8420 CALC BMI NORM PARAMETERS: HCPCS | Performed by: INTERNAL MEDICINE

## 2020-06-24 PROCEDURE — 99213 OFFICE O/P EST LOW 20 MIN: CPT | Performed by: INTERNAL MEDICINE

## 2020-06-24 PROCEDURE — G8427 DOCREV CUR MEDS BY ELIG CLIN: HCPCS | Performed by: INTERNAL MEDICINE

## 2020-06-24 NOTE — PROGRESS NOTES
alcohol per week. He reports that he does not use drugs. Family History  Brenton SOTOMAYOR family history includes Arthritis in his maternal grandmother; Heart Disease in his father. Past Surgical History   Past Surgical History:   Procedure Laterality Date    BALLOON ANGIOPLASTY, ARTERY  07/2018    s/p Left CFA, SFA and popliteal intervention    FACIAL COSMETIC SURGERY      as a kid    TONSILLECTOMY AND ADENOIDECTOMY         Subjective:     REVIEW OF SYSTEMS  Constitutional: denies sweats, chills and fever  HENT: denies  congestion, sinus pressure, sneezing and sore throat. Eyes: denies  pain, discharge, redness and itching. Respiratory: denies apnea, cough  Gastrointestinal: denies blood in stool, constipation, diarrhea   Endocrine: denies cold intolerance, heat intolerance, polydipsia. Genitourinary: denies dysuria, enuresis, flank pain and hematuria. Musculoskeletal: denies arthralgias, joint swelling and neck pain. Neurological: denies numbness and headaches. Psychiatric/Behavioral: denies agitation, confusion, decreased concentration and dysphoric mood    All others reviewed and are negative. Objective:     BP (!) 147/86   Pulse 61   Ht 5' 8\" (1.727 m)   Wt 162 lb 6.4 oz (73.7 kg)   BMI 24.69 kg/m²     Wt Readings from Last 3 Encounters:   06/24/20 162 lb 6.4 oz (73.7 kg)   06/16/20 165 lb (74.8 kg)   06/15/20 165 lb (74.8 kg)     BP Readings from Last 3 Encounters:   06/24/20 (!) 147/86   06/17/20 122/63   06/15/20 (!) 174/89       PHYSICAL EXAM  Constitutional: Oriented to person, place, and time. Appears well-developed and well-nourished. HENT:   Head: Normocephalic and atraumatic. Eyes: EOM are normal. Pupils are equal, round, and reactive to light. Neck: Normal range of motion. Neck supple. No JVD present. Cardiovascular: Normal rate , normal heart sounds and intact distal pulses. Pulmonary/Chest: Effort normal and breath sounds normal. No respiratory distress. No wheezes.  No (TTE)     Demographics      Patient Name    Marta Nix  Gender               Male                   L      MR #            [id number]       Race                                                       Ethnicity      Account #       [de-identified]       Room Number      Accession       [id number]       Date of Study        07/03/2018   Number      Date of Birth   1940      Referring Physician  Clementine Felty MD Illene Pons MD      Age             70 year(s)      Lucas Rodríguez, [facility]                                      Interpreting         Nishant Faustin MD                                   Physician            Yolanda Mueller MD     Procedure    Type of Study      TTE procedure:ECHOCARDIOGRAM COMPLETE 2D W DOPPLER W COLOR. Procedure Date  Date: 07/03/2018 Start: 06:46 AM    Study Location: Echo Lab  Technical Quality: Limited visualization due to lung interface. Indications:Shortness of breath. Additional Medical History:Hyperlipidemia, Osteoarthritis, COPD, Rheumatic  fever, History of cerebrovascular accident    Patient Status: Routine    Height: 65 inches Weight: 173 pounds BSA: 1.86 m^2 BMI: 28.79 kg/m^2    BP: 182/90 mmHg     Conclusions      Summary   Ejection fraction is visually estimated at 55%. Overall left ventricular function is normal.   The aortic valve leaflets were not well visualized. Aortic valve appears tricuspid. Aortic valve leaflets are Severely calcified. Thickened aortic valve leaflets noted. The maximum aortic valve gradient is 40 mmHg, the mean gradient is 27   mmHg, and the peak velocity is 325 cm/s. There is moderate-to-severe aortic stenosis with valve area of 0.9 sq cm. Mild aortic regurgitation is noted.       Signature      ----------------------------------------------------------------   Electronically signed by Yolanda Mueller MD (Interpreting   physician) on nutrition/meditarrean diet   Advised patient to call office or seek immediate medical attention if there is any new onset of  any chest pain, sob, palpitations, lightheadedness, dizziness, orthopnea, PND or pedal edema. All medication side effects were discussed in details. Thank youfor allowing me to participate in the care of this patient. Please do not hesitate to contact me for any further questions. Return in about 6 months (around 12/24/2020) for Review testing, Regular follow up.        Electronically signed by Loraine Mccall MD [address]  6/24/2020 at 1:46 PM

## 2020-06-24 NOTE — PROGRESS NOTES
Pt is a f/u from stent placement   Rt arm is very bruised .    Pt said they did rt radial access for his heart cath    Denies any cp,sob, lt headed or dizziness   No palpitations and no edema   Pt said he is supposed to be getting a heart valve done

## 2020-06-25 ENCOUNTER — OFFICE VISIT (OUTPATIENT)
Dept: FAMILY MEDICINE CLINIC | Age: 80
End: 2020-06-25
Payer: MEDICARE

## 2020-06-25 VITALS
DIASTOLIC BLOOD PRESSURE: 78 MMHG | WEIGHT: 164.8 LBS | HEART RATE: 64 BPM | TEMPERATURE: 98.6 F | HEIGHT: 68 IN | SYSTOLIC BLOOD PRESSURE: 126 MMHG | BODY MASS INDEX: 24.98 KG/M2

## 2020-06-25 PROCEDURE — 4040F PNEUMOC VAC/ADMIN/RCVD: CPT | Performed by: FAMILY MEDICINE

## 2020-06-25 PROCEDURE — 1036F TOBACCO NON-USER: CPT | Performed by: FAMILY MEDICINE

## 2020-06-25 PROCEDURE — 1123F ACP DISCUSS/DSCN MKR DOCD: CPT | Performed by: FAMILY MEDICINE

## 2020-06-25 PROCEDURE — G8427 DOCREV CUR MEDS BY ELIG CLIN: HCPCS | Performed by: FAMILY MEDICINE

## 2020-06-25 PROCEDURE — G8417 CALC BMI ABV UP PARAM F/U: HCPCS | Performed by: FAMILY MEDICINE

## 2020-06-25 PROCEDURE — 99213 OFFICE O/P EST LOW 20 MIN: CPT | Performed by: FAMILY MEDICINE

## 2020-06-25 ASSESSMENT — PATIENT HEALTH QUESTIONNAIRE - PHQ9
SUM OF ALL RESPONSES TO PHQ QUESTIONS 1-9: 0
2. FEELING DOWN, DEPRESSED OR HOPELESS: 0
SUM OF ALL RESPONSES TO PHQ QUESTIONS 1-9: 0
SUM OF ALL RESPONSES TO PHQ9 QUESTIONS 1 & 2: 0
1. LITTLE INTEREST OR PLEASURE IN DOING THINGS: 0

## 2020-06-25 ASSESSMENT — ENCOUNTER SYMPTOMS
SHORTNESS OF BREATH: 0
WHEEZING: 0

## 2020-06-25 NOTE — PROGRESS NOTES
[facility]  [facility]  [address] [facility]  Dept: [phone]  Dept Fax: [phone]  Loc: [phone]  PROGRESS NOTE      Visit Date: 6/25/2020    Dipesh Bennett is a 68 y.o. male who presents today for:  Chief Complaint   Patient presents with    Follow Up After Procedure     Stent placement,     Follow-Up from Hospital       Subjective:  HPI    Hospital f/u:  S/p recent PCI in LAD. Has severe Aortic stenosis and will have TAVR workup. Sen by cardiology yesterday. On aspirin and plavix. On statin. No concerns    Reviewed cath report. Reviewed Dr. Hunt Blind office note from yesterday    Wife is present    Review of Systems   Respiratory: Negative for shortness of breath and wheezing. Cardiovascular: Positive for leg swelling. Negative for chest pain. Neurological: Negative for dizziness and syncope.      Patient Active Problem List   Diagnosis    Elevated glucose    Hyperlipidemia    Osteoarthritis    ASCVD (arteriosclerotic cardiovascular disease)    COPD (chronic obstructive pulmonary disease) (HCC)    Aortic valve disorder    Abnormal ankle brachial index (VONDA)    Hypertension secondary to other renal disorders (CODE)    Peripheral artery disease ([address]    Coronary artery disease due to lipid rich plaque    Intermittent claudication ([address]    S/P coronary angioplasty    S/P cardiac cath    S/P percutaneous transluminal angioplasty (PTA) with stent placement    Severe aortic stenosis by prior echocardiogram    Essential hypertension    Hyperlipidemia LDL goal <70     Past Medical History:   Diagnosis Date    ASCVD (arteriosclerotic cardiovascular disease)     Cerebral artery occlusion with cerebral infarction ([address]     TIA    COPD (chronic obstructive pulmonary disease) ([address]     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    Elevated glucose     Hyperlipidemia     Hypertension     Osteoarthritis     Osteopenia     Osteopenia     Polio 1948    Rheumatic fever 1948      Past Surgical History:   Procedure Laterality Date    BALLOON ANGIOPLASTY, ARTERY  07/2018    s/p Left CFA, SFA and popliteal intervention    FACIAL COSMETIC SURGERY      as a kid    TONSILLECTOMY AND ADENOIDECTOMY       Family History   Problem Relation Age of Onset    Heart Disease Father     Arthritis Maternal Grandmother      Social History     Tobacco Use    Smoking status: Former Smoker     Packs/day: 1.50     Years: 50.00     Pack years: 75.00     Types: Cigarettes     Last attempt to quit: 10/9/2004     Years since quitting: 15.7    Smokeless tobacco: Never Used   Substance Use Topics    Alcohol use: Yes     Alcohol/week: 30.0 standard drinks     Types: 30 Cans of beer per week     Comment: 6 beers per day       Current Outpatient Medications   Medication Sig Dispense Refill    aspirin 81 MG chewable tablet Take 1 tablet by mouth daily 30 tablet 3    nitroGLYCERIN (NITROSTAT) 0.4 MG SL tablet up to max of 3 total doses. If no relief after 1 dose, call 911. 25 tablet 1    metoprolol succinate (TOPROL XL) 25 MG extended release tablet Take 1 tablet by mouth daily 30 tablet 3    clopidogrel (PLAVIX) 75 MG tablet Take 1 tablet by mouth daily 30 tablet 3    lovastatin (MEVACOR) 20 MG tablet Take 1 tablet by mouth nightly 90 tablet 1    diclofenac (VOLTAREN) 75 MG EC tablet Take 1 tablet by mouth 2 times daily as needed for Pain 180 tablet 0     No current facility-administered medications for this visit.       Allergies   Allergen Reactions    Keflex [Cephalexin] Rash     Health Maintenance   Topic Date Due    DTaP/Tdap/Td vaccine (1 - Tdap) 04/15/1961    Annual Wellness Visit (AWV)  05/29/2019    Lipid screen  06/16/2021    Potassium monitoring  06/16/2021    Creatinine monitoring  06/16/2021    Flu vaccine  Completed    Shingles Vaccine  Completed    Pneumococcal 65+ years Vaccine  Completed    Hepatitis A vaccine

## 2020-06-29 ENCOUNTER — HOSPITAL ENCOUNTER (OUTPATIENT)
Dept: INTERVENTIONAL RADIOLOGY/VASCULAR | Age: 80
Discharge: HOME | End: 2020-06-29
Payer: MEDICARE

## 2020-06-29 ENCOUNTER — HOSPITAL ENCOUNTER (OUTPATIENT)
Dept: CT IMAGING | Age: 80
Discharge: HOME | End: 2020-06-29
Payer: MEDICARE

## 2020-06-29 PROCEDURE — 71275 CT ANGIOGRAPHY CHEST: CPT

## 2020-06-29 PROCEDURE — 74174 CTA ABD&PLVS W/CONTRAST: CPT

## 2020-06-29 PROCEDURE — 6360000004 HC RX CONTRAST MEDICATION: Performed by: INTERNAL MEDICINE

## 2020-06-29 PROCEDURE — 93880 EXTRACRANIAL BILAT STUDY: CPT

## 2020-06-29 RX ADMIN — IOPAMIDOL 100 ML: 755 INJECTION, SOLUTION INTRAVENOUS at 10:54

## 2020-07-06 ENCOUNTER — TELEPHONE (OUTPATIENT)
Dept: CARDIOLOGY CLINIC | Age: 80
End: 2020-07-06

## 2020-07-16 ENCOUNTER — OFFICE VISIT (OUTPATIENT)
Dept: CARDIOLOGY CLINIC | Age: 80
End: 2020-07-16
Payer: MEDICARE

## 2020-07-16 VITALS
SYSTOLIC BLOOD PRESSURE: 148 MMHG | DIASTOLIC BLOOD PRESSURE: 90 MMHG | WEIGHT: 163 LBS | HEART RATE: 72 BPM | HEIGHT: 68 IN | BODY MASS INDEX: 24.71 KG/M2

## 2020-07-16 PROCEDURE — 4040F PNEUMOC VAC/ADMIN/RCVD: CPT | Performed by: INTERNAL MEDICINE

## 2020-07-16 PROCEDURE — 1036F TOBACCO NON-USER: CPT | Performed by: INTERNAL MEDICINE

## 2020-07-16 PROCEDURE — 99212 OFFICE O/P EST SF 10 MIN: CPT | Performed by: INTERNAL MEDICINE

## 2020-07-16 PROCEDURE — 1123F ACP DISCUSS/DSCN MKR DOCD: CPT | Performed by: INTERNAL MEDICINE

## 2020-07-16 PROCEDURE — G8420 CALC BMI NORM PARAMETERS: HCPCS | Performed by: INTERNAL MEDICINE

## 2020-07-16 PROCEDURE — G8427 DOCREV CUR MEDS BY ELIG CLIN: HCPCS | Performed by: INTERNAL MEDICINE

## 2020-07-16 NOTE — PROGRESS NOTES
[facility]  [address]  [address] [address]  Dept: [phone]  Dept Fax: [phone]  Loc: [phone]    Visit Date: 7/16/2020    Mr. Racheal Snow is a 68 y.o. male  who presented for:  Chief Complaint   Patient presents with    Other     test results and discuss TAVR        HPI:   69 yo M c hx of recent PCI of proximal LAD, Severe AS,multiple CVA (15 yrs ago), Former smoker, HLD, HTN, PAD s/p Left CFA, SFA and Popliteal artery revascularization. He is here to discuss imaging studies to discuss TAVR. Current Outpatient Medications:     aspirin 81 MG chewable tablet, Take 1 tablet by mouth daily, Disp: 30 tablet, Rfl: 3    nitroGLYCERIN (NITROSTAT) 0.4 MG SL tablet, up to max of 3 total doses. If no relief after 1 dose, call 911., Disp: 25 tablet, Rfl: 1    warfarin (COUMADIN) 5 MG tablet, Take 1 tablet by mouth daily, Disp: 30 tablet, Rfl: 3    umeclidinium-vilanterol (ANORO ELLIPTA) 62.5-25 MCG/INH AEPB inhaler, Inhale 1 puff into the lungs daily, Disp: 90 puff, Rfl: 3    lovastatin (MEVACOR) 20 MG tablet, Take 1 tablet by mouth nightly, Disp: 90 tablet, Rfl: 1    OXYGEN, Please provide patient with 3LPM of oxygen at night time for nocturnal hypoxia.  Patient to have repeat pulse oximetry testing done on oxygen, Disp: 3 L, Rfl: 0    metoprolol succinate (TOPROL XL) 25 MG extended release tablet, Take 1 tablet by mouth daily, Disp: 30 tablet, Rfl: 0    clopidogrel (PLAVIX) 75 MG tablet, Take 1 tablet by mouth daily, Disp: 30 tablet, Rfl: 0    spironolactone (ALDACTONE) 25 MG tablet, Take 0.5 tablets by mouth daily, Disp: 30 tablet, Rfl: 3    Past Medical History  Brenton SOTOMAYOR  has a past medical history of ASCVD (arteriosclerotic cardiovascular disease), Cerebral artery occlusion with cerebral infarction ([facility] COPD (chronic obstructive pulmonary disease) ([facility] Dysplasia of vocal cord, Elevated glucose, Hyperlipidemia, Hypertension, Nocturnal hypoxia, Osteoarthritis, Osteopenia, Osteopenia, Polio, and Rheumatic fever. Social History  Brenton SOTOMAYOR  reports that he quit smoking about 16 years ago. His smoking use included cigarettes. He has a 75.00 pack-year smoking history. He has never used smokeless tobacco. He reports current alcohol use of about 30.0 standard drinks of alcohol per week. He reports that he does not use drugs. Family History  Brenton SOTOMAYOR family history includes Arthritis in his maternal grandmother; Heart Disease in his father. Past Surgical History   Past Surgical History:   Procedure Laterality Date    BALLOON ANGIOPLASTY, ARTERY  07/2018    s/p Left CFA, SFA and popliteal intervention    FACIAL COSMETIC SURGERY      as a kid    HAND SURGERY Right     carpal tunnel     TONSILLECTOMY AND ADENOIDECTOMY         Subjective:     REVIEW OF SYSTEMS  Constitutional: denies sweats, chills and fever  HENT: denies  congestion, sinus pressure, sneezing and sore throat. Eyes: denies  pain, discharge, redness and itching. Respiratory: denies apnea, cough  Gastrointestinal: denies blood in stool, constipation, diarrhea   Endocrine: denies cold intolerance, heat intolerance, polydipsia. Genitourinary: denies dysuria, enuresis, flank pain and hematuria. Musculoskeletal: denies arthralgias, joint swelling and neck pain. Neurological: denies numbness and headaches. Psychiatric/Behavioral: denies agitation, confusion, decreased concentration and dysphoric mood    All others reviewed and are negative. Objective:     BP (!) 148/90   Pulse 72   Ht 5' 8\" (1.727 m)   Wt 163 lb (73.9 kg)   BMI 24.78 kg/m²     Wt Readings from Last 3 Encounters:   11/09/20 159 lb (72.1 kg)   11/03/20 158 lb 6.4 oz (71.8 kg)   10/21/20 158 lb 3.2 oz (71.8 kg)     BP Readings from Last 3 Encounters:   11/09/20 (!) 142/82   11/03/20 128/64   10/21/20 130/84       PHYSICAL EXAM  Constitutional: Oriented to person, place, and time. Appears well-developed and well-nourished. HENT:   Head: Normocephalic and atraumatic. Eyes: EOM are normal. Pupils are equal, round, and reactive to light. Neck: Normal range of motion. Neck supple. No JVD present. Cardiovascular: Normal rate , normal heart sounds and intact distal pulses. Pulmonary/Chest: Effort normal and breath sounds normal. No respiratory distress. No wheezes. No rales. Abdominal: Soft. Bowel sounds are normal. No distension. There is no tenderness. Musculoskeletal: Normal range of motion. No edema. Neurological: Alert and oriented to person, place, and time. No cranial nerve deficit. Coordination normal.   Skin: Skin is warm and dry. Psychiatric: Normal mood and affect.        No results found for: CKTOTAL, CKMB, CKMBINDEX    Lab Results   Component Value Date    WBC 9.7 10/16/2020    RBC 4.77 10/16/2020    HGB 14.7 10/16/2020    HCT 44.6 10/16/2020    MCV 93.5 10/16/2020    MCH 30.8 10/16/2020    MCHC 33.0 10/16/2020     10/16/2020    MPV 10.0 10/16/2020       Lab Results   Component Value Date     10/16/2020    K 5.5 10/16/2020    K 4.1 09/17/2020     10/16/2020    CO2 27 10/16/2020    BUN 18 10/16/2020    LABALBU 4.0 09/16/2020    CREATININE 0.9 10/16/2020    CALCIUM 9.7 10/16/2020    LABGLOM 82 10/16/2020    GLUCOSE 100 10/16/2020    GLUCOSE 90 04/06/2017       Lab Results   Component Value Date    ALKPHOS 73 09/16/2020    ALT 7 09/16/2020    AST 18 09/16/2020    PROT 7.4 09/16/2020    BILITOT 1.0 09/16/2020    LABALBU 4.0 09/16/2020       No results found for: MG    Lab Results   Component Value Date    INR 1.17 (H) 09/17/2020    INR 1.03 09/16/2020    INR 0.97 06/16/2020         Lab Results   Component Value Date    LABA1C 5.4 05/03/2018       Lab Results   Component Value Date    TRIG 48 06/16/2020    HDL 66 06/16/2020    LDLCALC 61 06/16/2020    LDLDIRECT 83 09/30/2014    LABVLDL 17 04/06/2017       No results found for: TSH      Testing Reviewed: I haveindividually reviewed the below cardiac tests    EKG:    ECHO:   Results for orders placed during the hospital encounter of 07/03/18   Echo 2D w doppler w color complete    Narrative Transthoracic Echocardiography Report (TTE)     Demographics      Patient Name    Cesario Parikh  Gender               Male                   СВЕТЛАНА      MR #            [id number]       Race                                                       Ethnicity      Account #       [de-identified]       Room Number      Accession       [id number]       Date of Study        07/03/2018   Number      Date of Birth   1940      Referring Physician  Fouzia Saunders MD      Age             70 year(s)      Jania Miller RDCS                                      Interpreting         Estrella Saunders MD                                   Physician            Peri Diehl MD     Procedure    Type of Study      TTE procedure:ECHOCARDIOGRAM COMPLETE 2D W DOPPLER W COLOR. Procedure Date  Date: 07/03/2018 Start: 06:46 AM    Study Location: Echo Lab  Technical Quality: Limited visualization due to lung interface. Indications:Shortness of breath. Additional Medical History:Hyperlipidemia, Osteoarthritis, COPD, Rheumatic  fever, History of cerebrovascular accident    Patient Status: Routine    Height: 65 inches Weight: 173 pounds BSA: 1.86 m^2 BMI: 28.79 kg/m^2    BP: 182/90 mmHg     Conclusions      Summary   Ejection fraction is visually estimated at 55%. Overall left ventricular function is normal.   The aortic valve leaflets were not well visualized. Aortic valve appears tricuspid. Aortic valve leaflets are Severely calcified. Thickened aortic valve leaflets noted. The maximum aortic valve gradient is 40 mmHg, the mean gradient is 27   mmHg, and the peak velocity is 325 cm/s.    There is moderate-to-severe aortic stenosis with valve area of 0.9 sq cm. Mild aortic regurgitation is noted. Signature      ----------------------------------------------------------------   Electronically signed by Kaylan Sweet MD (Interpreting   physician) on 07/03/2018 at 04:26 PM   ----------------------------------------------------------------      Findings      Mitral Valve   Mild mitral regurgitation is present. Aortic Valve   The aortic valve leaflets were not well visualized. Aortic valve appears tricuspid. Aortic valve leaflets are Severely calcified. Thickened aortic valve leaflets noted. The maximum aortic valve gradient is 40 mmHg, the mean gradient is 27   mmHg, and the peak velocity is 325 cm/s. There is moderate-to-severe aortic stenosis with valve area of 0.9 sq cm. Mild aortic regurgitation is noted. Tricuspid Valve   Mild tricuspid regurgitation. Pulmonic Valve   The pulmonic valve was not well visualized . Left Atrium   Left atrial size was normal.      Left Ventricle   Ejection fraction is visually estimated at 55%. Overall left ventricular function is normal.      Right Atrium   Right atrial size was normal.      Right Ventricle   The right ventricular size was normal with normal systolic function and   wall thickness. Pericardial Effusion   The pericardium was normal in appearance with no evidence of a pericardial   effusion. Pleural Effusion   No evidence of pleural effusion. Aorta / Great Vessels   Dilation of aortic root .      M-Mode/2D Measurements & Calculations      LV Diastolic     LV Systolic Dimension: 7.86 AV Cusp Separation: 1 cmLA   Dimension: 4.76  cm                          Dimension: 3.7 cmAO Root   cm               LV Volume Diastolic: 630 ml Dimension: 4.5 cm   LV FS:20.8 %     LV Volume Systolic: 93.1 ml   LV PW Diastolic: LV EDV/LV EDV Index: 105   0.97 cm          ml/56 m^2LV ESV/LV ESV   Septum           Index: 60.8 ml/33 m^2       RV Diastolic Dimension: 9.68   Diastolic: 7.14  EF Calculated: 42.1 %       cm   cm                                                LA/Aorta: 0.82                       LVOT: 2.1 cm     Doppler Measurements & Calculations      MV Peak E-Wave:     AV Peak Velocity: 296    LVOT Peak Velocity: 71.7 cm/s   50.4 cm/s           cm/s                     LVOT Mean Velocity: 48.7 cm/s   MV Peak A-Wave:     AV Peak Gradient: 35.05  LVOT Peak Gradient: 2   91.7 cm/s           mmHg                     mmHgLVOT Mean Gradient: 1   MV E/A Ratio: 0.55  AV Mean Velocity: 232    mmHg   MV Peak Gradient:   cm/s   1.02 mmHg           AV Mean Gradient: 25                       mmHg   MV Deceleration     AV VTI: 85 cm            TR Velocity:192 cm/s   Time: 257 msec      AV Area                  TR Gradient:14.75 mmHg                       (Continuity):0.79 cm^2   PV Peak Velocity: 59.2 cm/s                                                PV Peak Gradient: 1.4 mmHg   MV E' Septal        LVOT VTI: 19.3 cm   Velocity: 5 cm/s    AV P1/2t: 648 msec   MV A' Septal   Velocity: 10.1 cm/s   MV E' Lateral   Velocity: 4.35 cm/s AV DVI (VTI): 0.23AV DVI   MV A' Lateral       (Vmax):0.24   Velocity: 10.7 cm/s   E/E' septal: 10.08   E/E' lateral: 11.59   MR Velocity: 492   cm/s     [email or web address]%3d       STRESS:    CATH:    Assessment/Plan       Diagnosis Orders   1. Dyspnea, unspecified type     Shortness of breath  Dyspnea on exertion  Severe AS with MAMI 0.6 cm2  CAD s/p PCI of LAD  PAD s/p Left CFA, SFA and popliteal intervention  Multiple CVA  HTN  HLD      Reports shortness of breath   Reviewed cath films with him.    Reviewed Echo with patient and family  Discussed about SAVR or TAVR  Continue Aspirin, statin  Patient and family wants to consider TAVR workup  S/p left CFA, SFA, and popliteal intervention, doing well now   improved symptoms  Continue Aspirin, plavix + Statin  The patient is asked to make an attempt to improve diet and exercise patterns to aid in medical management of this problem. Advised more plant based nutrition/meditarrean diet   Advised patient to call office or seek immediate medical attention if there is any new onset of  any chest pain, sob, palpitations, lightheadedness, dizziness, orthopnea, PND or pedal edema. All medication side effects were discussed in details. Thank youfor allowing me to participate in the care of this patient. Please do not hesitate to contact me for any further questions. No follow-ups on file.        Electronically signed by Justice Mauricio MD [facility]  11/11/2020 at 1:46 PM

## 2020-07-16 NOTE — PROGRESS NOTES
Pt denies chest pain, dizziness, palpitations. SOB on exertion. C/o mild leg swelling. Pt states here for test results and discuss TAVR.

## 2020-07-20 ENCOUNTER — TELEPHONE (OUTPATIENT)
Dept: CARDIOLOGY CLINIC | Age: 80
End: 2020-07-20

## 2020-07-20 NOTE — TELEPHONE ENCOUNTER
Patient's daughter Albert Hackett called in stating that she would like to have a second opinion in regards to her fathers severe AS. She states that she does not want to go out of town for this opinion but rather wants to see Dr. Pat Vazquez.

## 2020-07-29 ENCOUNTER — OFFICE VISIT (OUTPATIENT)
Dept: CARDIOLOGY CLINIC | Age: 80
End: 2020-07-29
Payer: MEDICARE

## 2020-07-29 VITALS
DIASTOLIC BLOOD PRESSURE: 82 MMHG | WEIGHT: 161.2 LBS | SYSTOLIC BLOOD PRESSURE: 152 MMHG | HEIGHT: 68 IN | BODY MASS INDEX: 24.43 KG/M2 | HEART RATE: 72 BPM

## 2020-07-29 PROCEDURE — G8420 CALC BMI NORM PARAMETERS: HCPCS | Performed by: INTERNAL MEDICINE

## 2020-07-29 PROCEDURE — 1123F ACP DISCUSS/DSCN MKR DOCD: CPT | Performed by: INTERNAL MEDICINE

## 2020-07-29 PROCEDURE — 4040F PNEUMOC VAC/ADMIN/RCVD: CPT | Performed by: INTERNAL MEDICINE

## 2020-07-29 PROCEDURE — G8427 DOCREV CUR MEDS BY ELIG CLIN: HCPCS | Performed by: INTERNAL MEDICINE

## 2020-07-29 PROCEDURE — 99214 OFFICE O/P EST MOD 30 MIN: CPT | Performed by: INTERNAL MEDICINE

## 2020-07-29 PROCEDURE — 1036F TOBACCO NON-USER: CPT | Performed by: INTERNAL MEDICINE

## 2020-07-29 NOTE — PROGRESS NOTES
[address] [facility],[place] [address] [address] [place] [address]  Dept: [phone]  Dept Fax: [phone]  Loc: [phone]    Visit Date: 7/29/2020    Mr. Jan Balderrama is a 68 y.o. male  who presented for:  Severe AS    HPI:   HPI   69 yo M presents for evaluation of severe AS. 2 years of progressive dyspnea and loss of energy and fatigue. He is able to do ADLS but he is tired. More sob than prior. He had PCI of LAD recently on DAPT. He can get his ADLs done but has to do it slowly or with assistance. BP 150s. No malignancies. No NTG SL prn.  EF has dropped to 40-45%, mean gradient 43 mmHg, MAMI 0.6 cm2, ascending aorta 4.2 cm. He thinks as child he had rheumatic fever. Compliant with medications. He thinks he has had a heart murmur since he was 12years old. He has a hx of polio, CVA, COPD. No family hx of valve replacement or aneurysm rupture. Father/brothers with MI/PCI--father passed away at 48years of age suddenly. LLE PAD s/p Supera implant 5.5 x 60. Current Outpatient Medications:     aspirin 81 MG chewable tablet, Take 1 tablet by mouth daily, Disp: 30 tablet, Rfl: 3    nitroGLYCERIN (NITROSTAT) 0.4 MG SL tablet, up to max of 3 total doses.  If no relief after 1 dose, call 911., Disp: 25 tablet, Rfl: 1    metoprolol succinate (TOPROL XL) 25 MG extended release tablet, Take 1 tablet by mouth daily, Disp: 30 tablet, Rfl: 3    clopidogrel (PLAVIX) 75 MG tablet, Take 1 tablet by mouth daily, Disp: 30 tablet, Rfl: 3    lovastatin (MEVACOR) 20 MG tablet, Take 1 tablet by mouth nightly, Disp: 90 tablet, Rfl: 1    diclofenac (VOLTAREN) 75 MG EC tablet, Take 1 tablet by mouth 2 times daily as needed for Pain, Disp: 180 tablet, Rfl: 0    Past Medical History  Brenton SOTOMAYOR  has a past medical history of ASCVD (arteriosclerotic cardiovascular disease), Cerebral artery occlusion with cerebral infarction [facility]), COPD (chronic obstructive pulmonary disease) ([facility] [address] Dysplasia of vocal cord, Elevated glucose, Hyperlipidemia, Hypertension, Osteoarthritis, Osteopenia, Osteopenia, Polio, and Rheumatic fever. Social History  Brenton SOTOMAYOR  reports that he quit smoking about 15 years ago. His smoking use included cigarettes. He has a 75.00 pack-year smoking history. He has never used smokeless tobacco. He reports current alcohol use of about 30.0 standard drinks of alcohol per week. He reports that he does not use drugs. Family History  Brenton SOTOMAYOR family history includes Arthritis in his maternal grandmother; Heart Disease in his father. There is no family history of bicuspid aortic valve, aneurysms, heart transplant, pacemakers, defibrillators, or sudden cardiac death. Past Surgical History   Past Surgical History:   Procedure Laterality Date    BALLOON ANGIOPLASTY, ARTERY  07/2018    s/p Left CFA, SFA and popliteal intervention    FACIAL COSMETIC SURGERY      as a kid    HAND SURGERY Right     carpal tunnel     TONSILLECTOMY AND ADENOIDECTOMY         Review of Systems   Constitutional: Negative for chills and fever  HENT: Negative for congestion, sinus pressure, sneezing and sore throat. Eyes: Negative for pain, discharge, redness and itching. Respiratory: Negative for apnea, cough  Gastrointestinal: Negative for blood in stool, constipation, diarrhea   Endocrine: Negative for cold intolerance, heat intolerance, polydipsia. Genitourinary: Negative for dysuria, enuresis, flank pain and hematuria. Musculoskeletal: Negative for arthralgias, joint swelling and neck pain. Neurological: Negative for numbness and headaches. Psychiatric/Behavioral: Negative for agitation, confusion, decreased concentration and dysphoric mood.      Objective:     BP (!) 152/82   Pulse 72   Ht 5' 8\" (1.727 m)   Wt 161 lb 3.2 oz (73.1 kg)   BMI 24.51 kg/m²     Wt Readings from Last 3 Encounters:   07/29/20 161 lb 3.2 oz (73.1 kg)   07/16/20 163 lb (73.9 kg) 06/25/20 164 lb 12.8 oz (74.8 kg)     BP Readings from Last 3 Encounters:   07/29/20 (!) 152/82   07/16/20 (!) 148/90   06/25/20 126/78       Nursing note and vitals reviewed. Physical Exam   Constitutional: Oriented to person, place, and time. Appears well-developed but frail. HENT:   Head: Normocephalic and atraumatic. Eyes: EOM are normal. Pupils are equal, round, and reactive to light. Neck: Normal range of motion. Neck supple. No JVD present. Cardiovascular: Normal rate, regular rhythm, normal heart sounds and intact distal pulses. 3/6 MIRTHA, radiating to both carotids. Pulmonary/Chest: Effort normal and breath sounds normal. No respiratory distress. No wheezes. No rales. Abdominal: Soft. Bowel sounds are normal. No distension. There is no tenderness. Musculoskeletal: Normal range of motion. No edema. Neurological: Alert and oriented to person, place, and time. No cranial nerve deficit. Coordination normal.   Skin: Skin is warm and dry. Psychiatric: Normal mood and affect.        No results found for: CKTOTAL, CKMB, CKMBINDEX    Lab Results   Component Value Date    WBC 9.4 06/16/2020    RBC 4.83 06/16/2020    HGB 15.3 06/16/2020    HCT 46.1 06/16/2020    MCV 95.4 06/16/2020    MCH 31.7 06/16/2020    MCHC 33.2 06/16/2020     06/16/2020    MPV 10.6 06/16/2020       Lab Results   Component Value Date     06/16/2020     06/16/2020    K 5.0 06/16/2020    K 5.1 06/16/2020     06/16/2020     06/16/2020    CO2 24 06/16/2020    CO2 24 06/16/2020    BUN 8 06/16/2020    BUN 8 06/16/2020    LABALBU 4.0 06/16/2020    CREATININE 0.8 06/16/2020    CREATININE 0.7 06/16/2020    CALCIUM 9.3 06/16/2020    CALCIUM 9.4 06/16/2020    LABGLOM >90 06/16/2020    LABGLOM >90 06/16/2020    GLUCOSE 103 06/16/2020    GLUCOSE 104 06/16/2020    GLUCOSE 90 04/06/2017       Lab Results   Component Value Date    ALKPHOS 69 06/16/2020    ALT 7 06/16/2020    AST 20 06/16/2020    PROT 7.1 06/16/2020    BILITOT 0.8 06/16/2020    LABALBU 4.0 06/16/2020       No results found for: MG    Lab Results   Component Value Date    INR 0.97 06/16/2020    INR 0.93 07/10/2018         Lab Results   Component Value Date    LABA1C 5.4 05/03/2018       Lab Results   Component Value Date    TRIG 48 06/16/2020    HDL 66 06/16/2020    LDLCALC 61 06/16/2020    LDLDIRECT 83 09/30/2014    LABVLDL 17 04/06/2017       No results found for: TSH      Testing Reviewed:      I have individually reviewed the cardiac test below:    ECHO:   Results for orders placed during the hospital encounter of 06/04/20   Echo 2D w doppler w color complete    Narrative Transthoracic Echocardiography Report (TTE)     Demographics      Patient Name    Gwen Presume  Gender               Male                   L      MR #            [id number]       Race                                                       Ethnicity      Account #       [de-identified]       Room Number      Accession       [id number]       Date of Study        06/04/2020   Number      Date of Birth   1940      Referring Physician  Syeda Jiménez MD      Age             68 year(s)      Yeyo Denny, [facility]                                      Interpreting         Mani Jiménez MD                                   Physician     Procedure    Type of Study      TTE procedure:ECHOCARDIOGRAM COMPLETE 2D W DOPPLER W COLOR. Procedure Date  Date: 06/04/2020 Start: 08:23 AM    Study Location: Echo Lab  Technical Quality: Adequate visualization    Indications: Aortic stenosis. Additional Medical History:Polio, COPD, Aortic Stenosis, Hyperlipidemia,  Osteoarthritis, Peripheral artery disease, CVA, Hypertension, Coronary  artery disease, Ex Smoker, Rheumatic Fever.     Patient Status: Routine    Height: 68 inches Weight: 164 pounds BSA: 1.88 m^2 BMI: 24.94 kg/m^2    BP: 142/80 mmHg     Conclusions      Summary   Left ventricular size is normal and systolic function is mildly reduced. Ejection fraction was estimated at 40-45%. LV wall thickness is mildly   increased. Mildly dilated left atrium. The right ventricular size was normal with normal systolic function and   wall thickness. Severely thickened and calcified aortic valve leaflets. Transaortic mean gradient is 43 mm Hg, peak velocity is 4.2 m/s and   calculated MAMI is 0.60 cm2 indicating severe aortic stenosis. Mild mitral regurgitation is present. Ascending aorta : 4.2 cm. Signature      ----------------------------------------------------------------   Electronically signed by Angela Guerra MD (Interpreting   physician) on 06/06/2020 at 04:28 PM   ----------------------------------------------------------------      Findings      Mitral Valve   Mild mitral regurgitation is present. Aortic Valve   Severely thickened and calcified aortic valve leaflets. Transaortic mean gradient is 43 mm Hg, peak velocity is 4.2 m/s and   calculated MAMI is 0.60 cm2 indicating severe aortic stenosis. Tricuspid Valve   Mild tricuspid regurgitation. Pulmonic Valve   The pulmonic valve was not well visualized . Left Atrium   Mildly dilated left atrium. Left Ventricle   Left ventricular size is normal and systolic function is mildly reduced. Ejection fraction was estimated at 40-45%. LV wall thickness is mildly   increased. Right Atrium   Right atrial size was normal.      Right Ventricle   The right ventricular size was normal with normal systolic function and   wall thickness. Pericardial Effusion   The pericardium was normal in appearance with no evidence of a pericardial   effusion. Pleural Effusion   No evidence of pleural effusion. Aorta / Great Vessels   Ascending aorta : 4.2 cm.      M-Mode/2D Measurements & Calculations      LV Diastolic   LV Systolic Dimension:    AV Cusp Separation: 1.1 cmLA   Dimension: 5.1 4.1 cm                    Dimension: 4.2 cmAO Root   cm             LV Volume Diastolic:      Dimension: 4.4 cmLA Area: 23.4   LV FS:19.6 %   127.2 ml                  cm^2   LV PW          LV Volume Systolic: 99.4   Diastolic: 1.3 ml   cm             LV EDV/LV EDV Index:   Septum         127.2 ml/68 m^2LV ESV/LV  RV Diastolic Dimension: 3 cm   Diastolic: 1.4 ESV Index: 92.9 ml/41 m^2   cm             EF Calculated: 39 %       LA/Aorta: 0.95                                            Ascending Aorta: 4.4 cm                  LV Length: 8.1 cm         LA volume/Index: 86.4 ml /46m^2      LV Area        LVOT: 2.2 cm   Diastolic: 35   cm^2   LV Area   Systolic: 61.9   cm^2     Doppler Measurements & Calculations      MV Peak E-Wave:     AV Peak Velocity: 375    LVOT Peak Velocity: 68.4 cm/s   74.1 cm/s           cm/s                     LVOT Mean Velocity: 50.5 cm/s   MV Peak A-Wave:     AV Peak Gradient: 56.25  LVOT Peak Gradient: 2   93.4 cm/s           mmHg                     mmHgLVOT Mean Gradient: 1   MV E/A Ratio: 0.79  AV Mean Velocity: 292    mmHg   MV Peak Gradient:   cm/s   2.2 mmHg            AV Mean Gradient: 37     TV Peak E-Wave: 57 cm/s                       mmHg                     TV Peak A-Wave: 49.3 cm/s   MV Deceleration     AV VTI: 100 cm   Time: 349 msec      AV Area                  TV Peak Gradient: 1.3 mmHg                       (Continuity):0.64 cm^2   TR Velocity:284 cm/s                                                TR Gradient:32.26 mmHg   MV E' Septal        LVOT VTI: 16.9 cm        PV Peak Velocity: 51.8 cm/s   Velocity: 4.5 cm/s  IVRT: 99 msec            PV Peak Gradient: 1.07 mmHg   MV A' Septal   Velocity: 8.5 cm/s   MV E' Lateral       AV DVI (VTI): 0.17AV DVI   Velocity: 3.8 cm/s  (Vmax):0.18   MV A' Lateral   Velocity: 12.4 cm/s   E/E' septal: 16.47   E/E' lateral: 19.5   MR Velocity: 593   cm/s [email or web address]%3d        Assessment/Plan   Severe Symptomatic Aortic Stenosis - Given the STS scores, frailty, comorbidities, and the imaging findings, the patient is clinically a candidate for TAVR (See PreTAVR Assesment). Discussed transcatheter aortic valve replacement (TAVR) with the patient/family regarding risks, benefits, alternatives to the procedure. I reviewed the CTA, complex procedureal issues discussed including valve sizing and the ascending aortic aneurysm. He could consider surgical intervention with treatment of the valve and the aneurysm, however, at this time he is borderline on the aneurysm, and he does not want to proceed with open heart surgery and would rather continue surveillance of the aneurysm. He understands the need for possible surgery in the future regarding his aortic aneurysm despite having the AVR. Daughter and wife present and they hay the chance to ask all questions and discuss any further issues. The patient is FULL CODE. The POA is listed in the chart. We will schedule the above as appropriate. Once complete and appropriate based on the findings, a procedure date will be aligned at [facility], and we will notify the patient of further instructions. We had a long discussion with myself, family, patient, and structural heart coordinators. The family and patient were explained the risks/benefits/alternatives of the procedure, how the procedure works, the work-up, post-procedural care, and all of the possible complications of the procedure, including, but not limited to vascular injury, debilitating stroke, need for permanent pacemaker, and death.   The patient/family would like to pursue TAVR at our facility and we will work towards this goal.         The patient and family understand that should there be any findings or issues that arise during the evaluation that would preclude TAVR, that this will need to be evaluated prior to proceeding with a percutaneous approach. Further, a unified heart team approach will be performed prior to proceeding with TAVR which includes the patient's primary care givers, cardiologist, and the entire structural heart team (interventionalist, CT surgeons, structural heart NP). The patient and family appeared to understand everything, all of their questions were answered to their satisfaction and they are agreeable to proceed with further evaluation for TAVR. Thank you for allowing us to participate in the care of this patient. Please do not hesitate to call us with questions.        Disposition:  TAVR work-up then proceed with the procedure         Electronically signed by Hugo Pimentel MD   7/29/2020 at 10:01 AM EDT

## 2020-08-26 ENCOUNTER — TELEPHONE (OUTPATIENT)
Dept: CARDIOLOGY CLINIC | Age: 80
End: 2020-08-26

## 2020-09-03 ENCOUNTER — TELEPHONE (OUTPATIENT)
Dept: CARDIOLOGY CLINIC | Age: 80
End: 2020-09-03

## 2020-09-03 NOTE — TELEPHONE ENCOUNTER
Orders received from Dr. Kailyn Flaherty to schedule the patient for his TAVR procedure and obtain a covid test.    COVID: 9/9/20  TAVR: 9/16/2020 at 0700 with an arrival at 0500

## 2020-09-09 ENCOUNTER — HOSPITAL ENCOUNTER (OUTPATIENT)
Age: 80
Discharge: HOME | End: 2020-09-09
Payer: MEDICARE

## 2020-09-09 PROCEDURE — U0003 INFECTIOUS AGENT DETECTION BY NUCLEIC ACID (DNA OR RNA); SEVERE ACUTE RESPIRATORY SYNDROME CORONAVIRUS 2 (SARS-COV-2) (CORONAVIRUS DISEASE [COVID-19]), AMPLIFIED PROBE TECHNIQUE, MAKING USE OF HIGH THROUGHPUT TECHNOLOGIES AS DESCRIBED BY CMS-2020-01-R: HCPCS

## 2020-09-10 LAB — SARS-COV-2: NOT DETECTED

## 2020-09-15 ENCOUNTER — PREP FOR PROCEDURE (OUTPATIENT)
Dept: CARDIOLOGY | Age: 80
End: 2020-09-15

## 2020-09-15 ENCOUNTER — TELEPHONE (OUTPATIENT)
Dept: CARDIOLOGY CLINIC | Age: 80
End: 2020-09-15

## 2020-09-15 RX ORDER — SODIUM CHLORIDE 9 MG/ML
INJECTION, SOLUTION INTRAVENOUS CONTINUOUS
Status: CANCELLED | OUTPATIENT
Start: 2020-09-15

## 2020-09-15 RX ORDER — SODIUM CHLORIDE 0.9 % (FLUSH) 0.9 %
10 SYRINGE (ML) INJECTION EVERY 12 HOURS SCHEDULED
Status: CANCELLED | OUTPATIENT
Start: 2020-09-15

## 2020-09-15 RX ORDER — 0.9 % SODIUM CHLORIDE 0.9 %
250 INTRAVENOUS SOLUTION INTRAVENOUS ONCE
Status: CANCELLED | OUTPATIENT
Start: 2020-09-15 | End: 2020-09-15

## 2020-09-15 RX ORDER — CHLORHEXIDINE GLUCONATE 4 G/100ML
SOLUTION TOPICAL ONCE
Status: CANCELLED | OUTPATIENT
Start: 2020-09-15 | End: 2020-09-15

## 2020-09-15 RX ORDER — CLINDAMYCIN PHOSPHATE 900 MG/50ML
900 INJECTION, SOLUTION INTRAVENOUS
Status: CANCELLED | OUTPATIENT
Start: 2020-09-15 | End: 2020-09-15

## 2020-09-15 RX ORDER — SODIUM CHLORIDE 0.9 % (FLUSH) 0.9 %
10 SYRINGE (ML) INJECTION PRN
Status: CANCELLED | OUTPATIENT
Start: 2020-09-15

## 2020-09-15 NOTE — TELEPHONE ENCOUNTER
Pre-TAVR Assessment      Straight STS Score = 9.6%      Total Estimated Operative Morbidity and Mortality Risk = 34.78%        5 METER WALK ASSESSMENTS (seconds):    Time #1 = 7.8    Time #2 = 6.9    Time #3 = 7.2         Mini-Cog Total Score = 2    A cut point of <3 on the Mini-Cog has been validated for dementia screening, but many individuals with clinically meaningful cognitive impairment will score higher,  When greater sensitivity is desired, a cut point of <4 is recommended as it may indicate a need for further evaluation of cognitive status. EFT Score = 0                        EFT Score                    1-Year Mortality                             TAVR   SAVR                               0-1                6%      3%                             2       15%                     7%                             3                             28%                    16%                             4                             30%                    38%                             5                             65%                     50%            Dental Clearance = obtained        Risk Assessment and Prediction Tool     Value Score   What is your age? 46-71 years old     71-68 years old  > 76years old    [] 2   [] 1   [x] 0   What is your gender? Male  Female    [x] 2   [] 1   On average how far can you walk?  2 blocks or more (with /without a rest)  1-2 blocks (with/ without a rest)  Household most of the time     [x] 2   [] 1   [] 0   Which walking aid do you use most often? Nothing  Fernando Shawl or crutches    [x] 2   [] 1   [] 0   Do you use support services such as meals on wheels, home health aide, home nurse, or private pay help?    None or 1 time per week  2 or more times per week  [x] 1   [] 0   Will you have someone (Care Partner) at home who will be able to assist you with care for at least 72 hours after you leave the hospital? (i.e. Meal preparation, assistance getting up out of chair)   Yes  No    [x] 3   [] 0    Total Score 10     Key: Destination at discharge from acute care predicted by score. Score < 6  SNF  Score 6-9 Additional intervention to discharge directly home (i.e. Home Care)  Score> 9 Directly home      [address]      The following questions refer to your heart failure and how it may affect your life. Please read and complete the following questions. There is no right or wrong answers. Please evangelista the answer that best applies to you. 1. Heart failure affects different people in different ways. Some feel shortness of breath while others feel fatigue. Please indicate how much you are limited by heart failure (shortness of breath or fatigue) in your ability to do the following activities over the past 2 weeks. Activity Extremely Limited Quite a bit limited Moderatly Limited Slightly Limited Not at all Limited Limited for other reasons/ did not do the activity   Showering/ Bathing          x    Walking 1 block on Level ground     x    Hurrying or jogging (as if to catch a bus)      x        1         2       3  4       5   6     2. Over the past 2 weeks, how many times did you have swelling in your feet, ankles or legs when you woke up in the morning? Every morning 3 or more times per week, but not every day 1-2 times per week Less than once a week Never over the past 2 weeks         x   `        1          2   3       4                     5            3. Over the past 2 weeks, on average, how many times has fatigue limited your ability to do what you wanted? All of the time Several times per day At least once a day 3 or more times per week 1-2 times per week Less than once a week Never over the past 2 weeks           x            1        2     3  4        5       6  7        4. Over the past 2 weeks, on average, how many times has shortness of breath limited your ability to do what you    wanted?      All of the time Several times per day At least once a day 3 or more times per week 1-2 times per week Less than once a week Never over the past 2 weeks          x             1        2     3  4         5       6  7     5. Over the past 2 weeks, on average, how many times have you been forced to sleep sitting up in a chair or with at least 3 pillows to prop you up because of shortness of breath? Every night 3 or more times per week, but not every day 1-2 times per week Less than once a week Never over the past 2 weeks         x   `        1          2   3       4                     5        6.  Over the past 2 weeks, how much has your heart failure limited your enjoyment of life? It has extremely limited my enjoyment of life It has limited my enjoyment of life quite a bit It has moderately limited my enjoyment of life It has slightly limited my enjoyment of life It has not limited my enjoyment of life       x           1          2   3       4           5     7.  If you had to spend the rest of your life with your heart failure the way it is right now, how would you feel about this? Not at all satisfied Mostly dissatisfied Somewhat satisfied Mostly satisfied Completely satisfied       x                1                         2                            3                        4                            5        8. How much does your heart failure affect your lifestyle?  Please indicate how your heart failure may have limited your participation in the following activities over the past 2 weeks    Activity Severely limited Limited quite a bit Moderately limited Slightly limited Did not limit at all Does not apply or did not do this activity   Hobbies, recreational activities     x    Working or doing household chores    x     Visiting family or friends out of your home     x                1      2   3         4       5            6

## 2020-09-16 ENCOUNTER — APPOINTMENT (OUTPATIENT)
Dept: CARDIAC CATH/INVASIVE PROCEDURES | Age: 80
DRG: 267 | End: 2020-09-16
Attending: INTERNAL MEDICINE
Payer: MEDICARE

## 2020-09-16 ENCOUNTER — TELEPHONE (OUTPATIENT)
Dept: CARDIOLOGY CLINIC | Age: 80
End: 2020-09-16

## 2020-09-16 ENCOUNTER — APPOINTMENT (OUTPATIENT)
Dept: GENERAL RADIOLOGY | Age: 80
DRG: 267 | End: 2020-09-16
Attending: INTERNAL MEDICINE
Payer: MEDICARE

## 2020-09-16 ENCOUNTER — HOSPITAL ENCOUNTER (INPATIENT)
Dept: INPATIENT UNIT | Age: 80
LOS: 1 days | Discharge: HOME | DRG: 267 | End: 2020-09-17
Attending: INTERNAL MEDICINE | Admitting: INTERNAL MEDICINE
Payer: MEDICARE

## 2020-09-16 PROBLEM — Z95.2 S/P TAVR (TRANSCATHETER AORTIC VALVE REPLACEMENT): Status: ACTIVE | Noted: 2020-09-16

## 2020-09-16 LAB
ABO: NORMAL
ALBUMIN SERPL-MCNC: 4 G/DL (ref 3.5–5.1)
ALP BLD-CCNC: 73 U/L (ref 38–126)
ALT SERPL-CCNC: 7 U/L (ref 11–66)
ANION GAP SERPL CALCULATED.3IONS-SCNC: 9 MEQ/L (ref 8–16)
ANTIBODY SCREEN: NORMAL
APTT: 30.4 SECONDS (ref 22–38)
AST SERPL-CCNC: 18 U/L (ref 5–40)
BILIRUB SERPL-MCNC: 1 MG/DL (ref 0.3–1.2)
BUN BLDV-MCNC: 16 MG/DL (ref 7–22)
CALCIUM SERPL-MCNC: 9.1 MG/DL (ref 8.5–10.5)
CHLORIDE BLD-SCNC: 102 MEQ/L (ref 98–111)
CO2: 24 MEQ/L (ref 23–33)
CREAT SERPL-MCNC: 0.7 MG/DL (ref 0.4–1.2)
EKG ATRIAL RATE: 63 BPM
EKG ATRIAL RATE: 68 BPM
EKG P AXIS: 62 DEGREES
EKG P AXIS: 63 DEGREES
EKG P-R INTERVAL: 196 MS
EKG P-R INTERVAL: 210 MS
EKG Q-T INTERVAL: 440 MS
EKG Q-T INTERVAL: 486 MS
EKG QRS DURATION: 138 MS
EKG QRS DURATION: 96 MS
EKG QTC CALCULATION (BAZETT): 467 MS
EKG QTC CALCULATION (BAZETT): 497 MS
EKG R AXIS: 4 DEGREES
EKG R AXIS: 72 DEGREES
EKG T AXIS: 105 DEGREES
EKG T AXIS: 67 DEGREES
EKG VENTRICULAR RATE: 63 BPM
EKG VENTRICULAR RATE: 68 BPM
ERYTHROCYTE [DISTWIDTH] IN BLOOD BY AUTOMATED COUNT: 13.3 % (ref 11.5–14.5)
ERYTHROCYTE [DISTWIDTH] IN BLOOD BY AUTOMATED COUNT: 47.2 FL (ref 35–45)
GFR SERPL CREATININE-BSD FRML MDRD: > 90 ML/MIN/1.73M2
GLUCOSE BLD-MCNC: 102 MG/DL (ref 70–108)
HCT VFR BLD CALC: 47 % (ref 42–52)
HEMOGLOBIN: 15.2 GM/DL (ref 14–18)
INR BLD: 1.03 (ref 0.85–1.13)
MCH RBC QN AUTO: 31 PG (ref 26–33)
MCHC RBC AUTO-ENTMCNC: 32.3 GM/DL (ref 32.2–35.5)
MCV RBC AUTO: 95.7 FL (ref 80–94)
PLATELET # BLD: 246 THOU/MM3 (ref 130–400)
PMV BLD AUTO: 10 FL (ref 9.4–12.4)
POC ACTIVATED CLOTTING TIME KAOLIN: 263 SECONDS (ref 1–150)
POTASSIUM SERPL-SCNC: 4.4 MEQ/L (ref 3.5–5.2)
PRO-BNP: 4553 PG/ML (ref 0–1800)
RBC # BLD: 4.91 MILL/MM3 (ref 4.7–6.1)
RH FACTOR: NORMAL
SODIUM BLD-SCNC: 135 MEQ/L (ref 135–145)
TOTAL PROTEIN: 7.4 G/DL (ref 6.1–8)
WBC # BLD: 9.3 THOU/MM3 (ref 4.8–10.8)

## 2020-09-16 PROCEDURE — 33361 REPLACE AORTIC VALVE PERQ: CPT | Performed by: INTERNAL MEDICINE

## 2020-09-16 PROCEDURE — 85730 THROMBOPLASTIN TIME PARTIAL: CPT

## 2020-09-16 PROCEDURE — 86923 COMPATIBILITY TEST ELECTRIC: CPT

## 2020-09-16 PROCEDURE — 02RF38Z REPLACEMENT OF AORTIC VALVE WITH ZOOPLASTIC TISSUE, PERCUTANEOUS APPROACH: ICD-10-PCS | Performed by: INTERNAL MEDICINE

## 2020-09-16 PROCEDURE — 83880 ASSAY OF NATRIURETIC PEPTIDE: CPT

## 2020-09-16 PROCEDURE — C1760 CLOSURE DEV, VASC: HCPCS

## 2020-09-16 PROCEDURE — 94760 N-INVAS EAR/PLS OXIMETRY 1: CPT

## 2020-09-16 PROCEDURE — C1769 GUIDE WIRE: HCPCS

## 2020-09-16 PROCEDURE — 71045 X-RAY EXAM CHEST 1 VIEW: CPT

## 2020-09-16 PROCEDURE — 85027 COMPLETE CBC AUTOMATED: CPT

## 2020-09-16 PROCEDURE — 86900 BLOOD TYPING SEROLOGIC ABO: CPT

## 2020-09-16 PROCEDURE — 2780000006 HC MISC HEART VALVE

## 2020-09-16 PROCEDURE — 6360000004 HC RX CONTRAST MEDICATION: Performed by: INTERNAL MEDICINE

## 2020-09-16 PROCEDURE — 33361 REPLACE AORTIC VALVE PERQ: CPT | Performed by: THORACIC SURGERY (CARDIOTHORACIC VASCULAR SURGERY)

## 2020-09-16 PROCEDURE — 93005 ELECTROCARDIOGRAM TRACING: CPT | Performed by: PHYSICIAN ASSISTANT

## 2020-09-16 PROCEDURE — 80053 COMPREHEN METABOLIC PANEL: CPT

## 2020-09-16 PROCEDURE — 93005 ELECTROCARDIOGRAM TRACING: CPT | Performed by: INTERNAL MEDICINE

## 2020-09-16 PROCEDURE — 2500000003 HC RX 250 WO HCPCS

## 2020-09-16 PROCEDURE — 93010 ELECTROCARDIOGRAM REPORT: CPT | Performed by: NUCLEAR MEDICINE

## 2020-09-16 PROCEDURE — 86901 BLOOD TYPING SEROLOGIC RH(D): CPT

## 2020-09-16 PROCEDURE — 2709999900 HC NON-CHARGEABLE SUPPLY

## 2020-09-16 PROCEDURE — 2500000003 HC RX 250 WO HCPCS: Performed by: INTERNAL MEDICINE

## 2020-09-16 PROCEDURE — 2580000003 HC RX 258: Performed by: INTERNAL MEDICINE

## 2020-09-16 PROCEDURE — 36415 COLL VENOUS BLD VENIPUNCTURE: CPT

## 2020-09-16 PROCEDURE — 86850 RBC ANTIBODY SCREEN: CPT

## 2020-09-16 PROCEDURE — 85347 COAGULATION TIME ACTIVATED: CPT

## 2020-09-16 PROCEDURE — 85610 PROTHROMBIN TIME: CPT

## 2020-09-16 PROCEDURE — 6370000000 HC RX 637 (ALT 250 FOR IP): Performed by: INTERNAL MEDICINE

## 2020-09-16 PROCEDURE — C1894 INTRO/SHEATH, NON-LASER: HCPCS

## 2020-09-16 PROCEDURE — 6360000002 HC RX W HCPCS: Performed by: INTERNAL MEDICINE

## 2020-09-16 PROCEDURE — 2580000003 HC RX 258: Performed by: PHYSICIAN ASSISTANT

## 2020-09-16 PROCEDURE — 2140000000 HC CCU INTERMEDIATE R&B

## 2020-09-16 RX ORDER — SODIUM CHLORIDE 0.9 % (FLUSH) 0.9 %
10 SYRINGE (ML) INJECTION PRN
Status: DISCONTINUED | OUTPATIENT
Start: 2020-09-16 | End: 2020-09-16 | Stop reason: SDUPTHER

## 2020-09-16 RX ORDER — SODIUM CHLORIDE 0.9 % (FLUSH) 0.9 %
10 SYRINGE (ML) INJECTION EVERY 12 HOURS SCHEDULED
Status: DISCONTINUED | OUTPATIENT
Start: 2020-09-16 | End: 2020-09-17 | Stop reason: HOSPADM

## 2020-09-16 RX ORDER — FENTANYL CITRATE 50 UG/ML
50 INJECTION, SOLUTION INTRAMUSCULAR; INTRAVENOUS
Status: DISCONTINUED | OUTPATIENT
Start: 2020-09-16 | End: 2020-09-16 | Stop reason: ALTCHOICE

## 2020-09-16 RX ORDER — CLINDAMYCIN PHOSPHATE 900 MG/50ML
900 INJECTION INTRAVENOUS EVERY 8 HOURS
Status: DISPENSED | OUTPATIENT
Start: 2020-09-16 | End: 2020-09-17

## 2020-09-16 RX ORDER — CHLORHEXIDINE GLUCONATE 4 G/100ML
SOLUTION TOPICAL ONCE
Status: DISCONTINUED | OUTPATIENT
Start: 2020-09-16 | End: 2020-09-17 | Stop reason: HOSPADM

## 2020-09-16 RX ORDER — 0.9 % SODIUM CHLORIDE 0.9 %
250 INTRAVENOUS SOLUTION INTRAVENOUS ONCE
Status: DISCONTINUED | OUTPATIENT
Start: 2020-09-16 | End: 2020-09-17 | Stop reason: HOSPADM

## 2020-09-16 RX ORDER — CLINDAMYCIN PHOSPHATE 900 MG/50ML
900 INJECTION INTRAVENOUS
Status: DISPENSED | OUTPATIENT
Start: 2020-09-16 | End: 2020-09-16

## 2020-09-16 RX ORDER — ASPIRIN 81 MG/1
81 TABLET ORAL DAILY
Status: DISCONTINUED | OUTPATIENT
Start: 2020-09-17 | End: 2020-09-17 | Stop reason: HOSPADM

## 2020-09-16 RX ORDER — OXYCODONE HYDROCHLORIDE AND ACETAMINOPHEN 5; 325 MG/1; MG/1
2 TABLET ORAL EVERY 4 HOURS PRN
Status: DISCONTINUED | OUTPATIENT
Start: 2020-09-16 | End: 2020-09-17 | Stop reason: HOSPADM

## 2020-09-16 RX ORDER — SODIUM CHLORIDE 9 MG/ML
INJECTION, SOLUTION INTRAVENOUS CONTINUOUS
Status: DISCONTINUED | OUTPATIENT
Start: 2020-09-16 | End: 2020-09-17 | Stop reason: HOSPADM

## 2020-09-16 RX ORDER — OXYCODONE HYDROCHLORIDE AND ACETAMINOPHEN 5; 325 MG/1; MG/1
1 TABLET ORAL EVERY 4 HOURS PRN
Status: DISCONTINUED | OUTPATIENT
Start: 2020-09-16 | End: 2020-09-17 | Stop reason: HOSPADM

## 2020-09-16 RX ORDER — FUROSEMIDE 10 MG/ML
80 INJECTION INTRAMUSCULAR; INTRAVENOUS ONCE
Status: COMPLETED | OUTPATIENT
Start: 2020-09-16 | End: 2020-09-16

## 2020-09-16 RX ORDER — ONDANSETRON 2 MG/ML
4 INJECTION INTRAMUSCULAR; INTRAVENOUS EVERY 6 HOURS PRN
Status: DISCONTINUED | OUTPATIENT
Start: 2020-09-16 | End: 2020-09-17 | Stop reason: HOSPADM

## 2020-09-16 RX ORDER — ACETAMINOPHEN 325 MG/1
650 TABLET ORAL EVERY 4 HOURS PRN
Status: DISCONTINUED | OUTPATIENT
Start: 2020-09-16 | End: 2020-09-17 | Stop reason: HOSPADM

## 2020-09-16 RX ORDER — SPIRONOLACTONE 25 MG/1
25 TABLET ORAL DAILY
Status: DISCONTINUED | OUTPATIENT
Start: 2020-09-16 | End: 2020-09-17 | Stop reason: HOSPADM

## 2020-09-16 RX ORDER — CLOPIDOGREL BISULFATE 75 MG/1
75 TABLET ORAL DAILY
Status: DISCONTINUED | OUTPATIENT
Start: 2020-09-17 | End: 2020-09-17 | Stop reason: HOSPADM

## 2020-09-16 RX ORDER — ALPRAZOLAM 0.25 MG/1
0.25 TABLET ORAL 3 TIMES DAILY PRN
Status: DISCONTINUED | OUTPATIENT
Start: 2020-09-16 | End: 2020-09-17 | Stop reason: HOSPADM

## 2020-09-16 RX ORDER — SODIUM CHLORIDE 0.9 % (FLUSH) 0.9 %
10 SYRINGE (ML) INJECTION EVERY 12 HOURS SCHEDULED
Status: DISCONTINUED | OUTPATIENT
Start: 2020-09-16 | End: 2020-09-16 | Stop reason: SDUPTHER

## 2020-09-16 RX ORDER — ATROPINE SULFATE 0.4 MG/ML
0.5 AMPUL (ML) INJECTION
Status: ACTIVE | OUTPATIENT
Start: 2020-09-16 | End: 2020-09-16

## 2020-09-16 RX ORDER — MIDAZOLAM HYDROCHLORIDE 1 MG/ML
1 INJECTION INTRAMUSCULAR; INTRAVENOUS EVERY 6 HOURS PRN
Status: DISCONTINUED | OUTPATIENT
Start: 2020-09-16 | End: 2020-09-16 | Stop reason: ALTCHOICE

## 2020-09-16 RX ORDER — SODIUM CHLORIDE 0.9 % (FLUSH) 0.9 %
10 SYRINGE (ML) INJECTION PRN
Status: DISCONTINUED | OUTPATIENT
Start: 2020-09-16 | End: 2020-09-17 | Stop reason: HOSPADM

## 2020-09-16 RX ORDER — HYDRALAZINE HYDROCHLORIDE 20 MG/ML
10 INJECTION INTRAMUSCULAR; INTRAVENOUS EVERY 10 MIN PRN
Status: DISCONTINUED | OUTPATIENT
Start: 2020-09-16 | End: 2020-09-17 | Stop reason: HOSPADM

## 2020-09-16 RX ADMIN — Medication 10 ML: at 20:35

## 2020-09-16 RX ADMIN — FUROSEMIDE 80 MG: 10 INJECTION, SOLUTION INTRAMUSCULAR; INTRAVENOUS at 17:51

## 2020-09-16 RX ADMIN — IOPAMIDOL 40 ML: 755 INJECTION, SOLUTION INTRAVENOUS at 09:30

## 2020-09-16 RX ADMIN — SPIRONOLACTONE 25 MG: 25 TABLET ORAL at 19:27

## 2020-09-16 RX ADMIN — SODIUM CHLORIDE: 9 INJECTION, SOLUTION INTRAVENOUS at 06:00

## 2020-09-16 RX ADMIN — CLINDAMYCIN IN 5 PERCENT DEXTROSE 900 MG: 18 INJECTION, SOLUTION INTRAVENOUS at 15:14

## 2020-09-16 RX ADMIN — SODIUM CHLORIDE, PRESERVATIVE FREE 10 ML: 5 INJECTION INTRAVENOUS at 06:25

## 2020-09-16 NOTE — PRE SEDATION
[address] [place] [address]  Sedation/Analgesia History & Physical    Pt Name: Julián Cuellar  Account number: [de-identified]  MRN: [id number]  YOB: 1940  Provider Performing Procedure: Hugo Pimentel MD  Referring Provider: Hugo Pimentel MD   Primary Care Physician: Bc Padilla MD  Date: 9/16/2020    PRE-PROCEDURE    Code Status: FULL CODE  Brief History/Pre-Procedure Diagnosis:   Severe symptomatic AS  NYHA III    Consent: : I have discussed with the patient risks, benefits, and alternatives (and relevant risks, benefits, and side effects related to alternatives or not receiving care), and likelihood of the success. The patient and/or representative appear to understand and agree to proceed. The discussion encompasses risks, benefits, and side effects related to the alternatives and the risks related to not receiving the proposed care, treatment, and services. The indication, risks and benefits of the procedure and possible therapeutic consequences and alternatives were discussed with the patient. The patient was given the opportunity to ask questions and to have them answered to his/her satisfaction. Risks of the procedure include but are not limited to the following: Bleeding, hematoma including retroperitoneal hemmorhage, infection, pain and discomfort, injury to the aorta and other blood vessels, rhythm disturbance, low blood pressure, myocardial infarction, stroke, kidney damage/failure, myocardial perforation, allergic reactions to sedatives/contrast material, loss of pulse/vascular compromise requiring surgery, aneurysm/pseudoaneurysm formation, possible loss of a limb/hand/leg, needing blood transfusion, requiring emergent open heart surgery or emergent coronary intervention, the need for intubation/respiratory support, the requirement for defibrillation/cardioversion, and death. Alternatives to and omission of the suggested procedure were discussed.  The patient had no further questions and wished to proceed; the consent form was signed. MEDICAL HISTORY  [x]ASHD/ANGINA/MI/CHF   [x]Hypertension  []Diabetes  []Hyperlipidemia  []Smoking  []Family Hx of ASHD  [x]Additional information:       has a past medical history of ASCVD (arteriosclerotic cardiovascular disease), Cerebral artery occlusion with cerebral infarction ([facility] [place] [address] COPD (chronic obstructive pulmonary disease) ([facility] [place] [address] Dysplasia of vocal cord, Elevated glucose, Hyperlipidemia, Hypertension, Osteoarthritis, Osteopenia, Osteopenia, Polio, and Rheumatic fever. SURGICAL HISTORY   has a past surgical history that includes Tonsillectomy and adenoidectomy; Facial cosmetic surgery; Balloon angioplasty, artery (07/2018); and Hand surgery (Right).   Additional information:       ALLERGIES   Allergies as of 09/16/2020 - Review Complete 09/16/2020   Allergen Reaction Noted    Brilinta [ticagrelor] Shortness Of Breath 07/29/2020    Keflex [cephalexin] Rash 09/29/2014     Additional information:       MEDICATIONS   Aspirin  [x] 81 mg  [] 325 mg  [] None  Antiplatelet drug therapy use last 5 days  [x]No []Yes  Coumadin Use Last 5 Days [x]No []Yes  Other anticoagulant use last 5 days  [x]No []Yes    Current Facility-Administered Medications:     0.9 % sodium chloride infusion, , Intravenous, Continuous, Stan Newman PA-C, Last Rate: 75 mL/hr at 09/16/20 0600    chlorhexidine (HIBICLENS) 4 % liquid, , Topical, Once, Stan Newman PA-C    clindamycin (CLEOCIN) 900 mg in dextrose 5 % 50 mL IVPB, 900 mg, Intravenous, On Call to OR, Stan Newman PA-C    sodium chloride flush 0.9 % injection 10 mL, 10 mL, Intravenous, 2 times per day, Stan Newman PA-C    sodium chloride flush 0.9 % injection 10 mL, 10 mL, Intravenous, PRN, Radha Morataya PA-C, 10 mL at 09/16/20 0625    0.9 % sodium chloride infusion 250 mL, 250 mL, Intravenous, Once, Stan Newman PA-C  Prior to Admission medications    Medication Sig Start results available. Comment:    [x]Previous sedation/anesthesia experiences assessed. Comment:    [x]The patient is an appropriate candidate to undergo the planned procedure sedation and anesthesia. (Refer to nursing sedation/analgesia documentation record)  [x]Formulation and discussion of sedation/procedure plan, risks, and expectations with patient and/or responsible adult completed. [x]Patient examined immediately prior to the procedure.  (Refer to nursing sedation/analgesia documentation record)    Delvis Can MD   Electronically signed 9/16/2020 at 7:45 AM

## 2020-09-16 NOTE — PROCEDURES
[address] [place] [address]                            CARDIAC CATHETERIZATION    PATIENT NAME: Chinyere Franklin                   :        1940  MED REC NO:   [id number]                           ROOM:       0012  ACCOUNT NO:   [de-identified]                           ADMIT DATE: 2020  PROVIDER:     Garrison Anthony MD    DATE OF PROCEDURE:  2020    PROCEDURE:  Percutaneous transfemoral transcatheter aortic valve  replacement. :  Garrison Anthony MD    SECONDARY :  Bee Farnsworth MD    INDICATION:  Severe symptomatic aortic stenosis, NYHA class III heart  failure. DESCRIPTION OF PROCEDURE:  After informed consent was obtained from the  patient, he was brought to the hybrid operating room and prepped in  sterile fashion. Right internal jugular vein and bilateral femoral  arterial access were chosen as the primary points of access. Preprocedure timeout was completed. Salvage discussion was held with  the patient. He elected for complete bailout. His chest was prepped by  OR and Anesthesia on standby. After infiltration of the right neck with  2% lidocaine using micropuncture, modified Seldinger technique under  fluoroscopic guidance and ultrasound guidance, I was able to insert a  6-Wallisian Slender sheath into the right internal jugular vein. I floated  a 5-Wallisian balloon-tipped pacemaker into the right ventricular apex. Pacing thresholds were checked. Pacemaker capture lasted less than 1 mA  output. We then turned attention to the left femoral arterial access. After infiltration of the left inguinal region with 2% lidocaine using  micropuncture and modified Seldinger technique under fluoroscopic  guidance and ultrasound guidance, I was able to insert a 5-Wallisian long  sheath into the left femoral artery. Pressure was transduced. We then  turned our attention to the right femoral artery. After infiltration of  the right inguinal region with 2% lidocaine using micropuncture and  modified Seldinger technique under fluoroscopic guidance and ultrasound  guidance, I was able to insert a 4-Moroccan micropuncture sheath into the  right femoral artery. Angiography confirmed common femoral artery  puncture. I upsized the access to 6-Moroccan. I deployed two Perclose  sutures in orthogonal fashion over 0.035 Supra Core wire. I then  upsized the access to a 16-Moroccan eSheath. This was inserted without  complication. Heparin IV was given. ACT was confirmed to be above 250  seconds. I used 5-Moroccan pigtail via the left femoral artery and  performed aortography in the coplanar projection. I then used a 6-Moroccan  AL1 catheter and 0.035 straight-tipped guidewire and crossed the aortic  valve without any complication. I exchanged out for 6-Moroccan angled  pigtail. Hemodynamics were captured. Based on the preprocedure CTA  measurements, S3 29 Ultra was chosen (+4 mL) and loaded per  's recommendations. Skirt was checked to be ventricular in  orientation. Thereafter, the 6-Moroccan angled pigtail was exchanged out  for an extra small Safari wire. I then inserted the valve into the  patient under fluoroscopic guidance. I loaded the balloon under the  valve. I crossed the aortic arch with 50% Flextome in the LUIS FEL[place]  projection. Once we were above the mid aortic valve, I ensured the  pigtail was at the bottom of the noncoronary cusp. Advanced the valve  across the aortic valve without any complication. I pulled the pressure  back. Once we had lined up the appropriate fluoroscopic markers, we  tested the pacing at 108 beats per minute confirming that the valve had  not moved and it was in the right position. Thereafter, I paced the  patient 180 beats per minute.   Once the pulse pressure was narrow and  the blood pressure was low enough, injection was taken to confirm the  position and then we slowly and steadily inflated the valve with  complete three seconds inflation with full volume of the syringe. The  balloon was rapidly deflated, everything was removed across the valve. Flex was neutralized, then I removed the entire delivery system from the  patient. TTE was performed demonstrating no pericardial effusion, no  change in LV function, no significant LV to AO systolic gradient, no  paravalvular leak and no change in mitral valve apparatus. I re-crossed  the new aortic prosthesis demonstrating no LV to AO systolic gradient  and no change in LVEDP. Given these findings, no further intervention  was undertaken. Right femoral artery sheath was then removed under  fluoroscopic guidance and both Perclose sutures were completed with good  hemostasis. Left femoral artery was closed with 6-Luxembourgish Angio-Seal.   ECG was performed demonstrating mild widening of the QRS, therefore the  right internal jugular venous pacemaker was left in place. IV protamine  was given. IMMEDIATE COMPLICATIONS:  None. MEDICATIONS:  See EMR. ACCESS:  See above. ESTIMATED BLOOD LOSS:  Less than 50 mL. SUMMARY:  Successful percutaneous transfemoral transcatheter aortic  valve replacement with an S3 29 (+4 mL). PLAN:  1. Bedrest.  2.  Optimal medical therapy. 3.  Risk factor management. 4.  Routine access site care. 5.  DAPT. 6.  IV fluids. 7.  Monitor on telemetry. If no issues, we will remove the pacemaker in  the a.m.  8.  Restart home meds. 9.  Bedrest per TAVR protocol. 10.  Follow up in the valve clinic in one week. 11.  Cardiac rehab. All the above was explained to the patient and the patient's family and  they are agreeable and amenable to the plan.         Ulysses Palmer, MD    D: 09/16/2020 10:39:34       T: 09/16/2020 11:47:55     JORGE [email or web address]  Job#: [id number]     Doc#: [id number]    CC:

## 2020-09-16 NOTE — OP NOTE
DATE: 09/16/20    PATIENT: Lord Miller    MRN: [id number]     Acct: [de-identified]    PREOP DIAGNOSIS:   Severe aortic stenosis    Postop diagnosis:  Severe aortic stenosis    Procedure:  Transcatheter aortic valve replacement with an Al 29 mm S3 prosthesis    OPERATORS:  Venkatesh Boyle MD; Yina Hollis MD    ESTIMATED BLOOD LOSS: 50 ml    A pre-procedure discussion was conducted with the patient to confirm/exclude candidacy for open surgical salvage in case of procedure failure. After informed consent was obtained from the patient, the patient was brought to the hybrid operating room and prepped in sterile fashion. Infiltration of the bilateral inguinal regions was accomplished with 2% lidocaine. Using micropuncture and modified Seldinger technique, a 6 Fr sheath was inserted into the right femoral vein. A 5 Fr pacemaker was inserted into position under fluoroscopic guidance into the right ventricle, with verification of appropriate pacing thresholds. Using the same technique under fluoroscopic guidance, a 6 Fr sheath was inserted into the left common femoral artery. Similarly, a 4 Fr, followed by a 6 Fr was inserted into the right common femoral artery. This was replaced with a 6 Fr J4 catheter over a guidewire, and a Supra Core wire was used to traverse the aortic arch. We then performed standard preclose technique by deploying two Percloses in orthogonal fashion and leaving them incomplete. Using sequential dilations, the 6 Fr was upsized to a 14 Fr sheath. The patient was heparinized to an ACT above 250 seconds. A straight pigtail was placed via the left femoral artery and aortography was performed in a coplanar view to ensure alignment. The aortic valve was crossed using a straight wire through an AL1 catheter. The guidewire was changed to a J-tipped wire, on which was inserted an angled pigtail. Direct hemodynamic measurements were obtained.  The valve was then checked for appropriate loading on fluoroscopy. A Sapphire wire was inserted into the angled pigtail, with proper final positioning in the LV apex. Under fluoroscopic guidance, the transcatheter valve, prepped with 4 ml additional saline in the inflation syringe to accommodate the large annulus, was advanced into the patient. The balloon was loaded onto the valve per the 's specifications. The system was flexed to navigate the arch without difficulty in the Kyrgyz projection, and positioned appropriately through the annulus under a coplanar view. Adjustment was made to remove parallax. The system was retracted in the usual fashion to expose the valve. Rapid ventricular pacing was initiated to 120 bpm. Once the pulse pressure narrowed, slow valve inflation was initiated, with full inflation held for 3 seconds. Echocardiography was done thereafter, demonstrating that there was no significant paravalvular leak, that the ejection fraction was unchanged, there was no regional wall motion abnormality, no pericardial effusion, and there was no change in mitral regurgitation. Subsequent hemodynamics confirmed no significant pressure gradient between the LV and the AO, and no change in the left ventricular end-diastolic pressure. All equipment was removed from the patient. Protamine 50 mg was administered. We then proceeded with closure of the right groin by closing the Percloses in sequential fashion over a 0.035 guidewire. The contralateral femoral artery was closed with a 6 Fr Angioseal after angiography confirmed no vascular injury. Neurologic evaluation post the procedure was completely intact with a good motor sensation and mental status. IMMEDIATE COMPLICATIONS:  None. MEDICATIONS:  See EMR. SUMMARY:  Successful transcatheter aortic valve replacement with a 29 mm Al S3 valve via transfemoral approach.

## 2020-09-16 NOTE — TELEPHONE ENCOUNTER
Orders received from Dr. Gilda Nevarez to schedule the patient for an echo, cbc and bmp prior to the 30 day follow up.

## 2020-09-16 NOTE — BRIEF OP NOTE
[facility]  Sedation/Analgesia Post Sedation Record    Pt Name: Champ Market  Account number: [de-identified]  MRN: [id number]  YOB: 1940  Procedure Performed By: Geovanny Guallpa, [address]  Primary Care Physician: Casey Nguyen MD  Date: 9/16/2020    POST-PROCEDURE    Physicians/Assistants: JESSY Escobar MD    Procedure Performed:TAVR      Sedation/Anesthesia: Versed/ Fentanyl and 2% xylocaine local anesthesia. Estimated Blood Loss: < 50 ml. Specimens Removed: None         Disposition of Specimen: N/A        Complications: No Immediate Complications.        Post-procedure Diagnosis/Findings:  A254 + 4 cc via RFA                    JESSY Escobar MD  Electronically signed 9/16/2020 at 9:31 AM  Interventional Cardiology

## 2020-09-17 VITALS
SYSTOLIC BLOOD PRESSURE: 132 MMHG | HEART RATE: 81 BPM | RESPIRATION RATE: 22 BRPM | TEMPERATURE: 98.4 F | DIASTOLIC BLOOD PRESSURE: 64 MMHG | WEIGHT: 170.2 LBS | BODY MASS INDEX: 25.21 KG/M2 | OXYGEN SATURATION: 95 % | HEIGHT: 69 IN

## 2020-09-17 LAB
ANION GAP SERPL CALCULATED.3IONS-SCNC: 12 MEQ/L (ref 8–16)
APTT: 28.7 SECONDS (ref 22–38)
BUN BLDV-MCNC: 19 MG/DL (ref 7–22)
CALCIUM SERPL-MCNC: 9 MG/DL (ref 8.5–10.5)
CHLORIDE BLD-SCNC: 99 MEQ/L (ref 98–111)
CO2: 23 MEQ/L (ref 23–33)
CREAT SERPL-MCNC: 0.9 MG/DL (ref 0.4–1.2)
EKG ATRIAL RATE: 91 BPM
EKG P AXIS: 66 DEGREES
EKG P-R INTERVAL: 182 MS
EKG Q-T INTERVAL: 392 MS
EKG QRS DURATION: 98 MS
EKG QTC CALCULATION (BAZETT): 482 MS
EKG T AXIS: 86 DEGREES
EKG VENTRICULAR RATE: 91 BPM
ERYTHROCYTE [DISTWIDTH] IN BLOOD BY AUTOMATED COUNT: 13.2 % (ref 11.5–14.5)
ERYTHROCYTE [DISTWIDTH] IN BLOOD BY AUTOMATED COUNT: 44.9 FL (ref 35–45)
GFR SERPL CREATININE-BSD FRML MDRD: 82 ML/MIN/1.73M2
GLUCOSE BLD-MCNC: 106 MG/DL (ref 70–108)
HCT VFR BLD CALC: 43.4 % (ref 42–52)
HEMOGLOBIN: 14.4 GM/DL (ref 14–18)
INR BLD: 1.17 (ref 0.85–1.13)
LV EF: 53 %
LVEF MODALITY: NORMAL
MCH RBC QN AUTO: 31 PG (ref 26–33)
MCHC RBC AUTO-ENTMCNC: 33.2 GM/DL (ref 32.2–35.5)
MCV RBC AUTO: 93.5 FL (ref 80–94)
PLATELET # BLD: 220 THOU/MM3 (ref 130–400)
PMV BLD AUTO: 10.2 FL (ref 9.4–12.4)
POTASSIUM REFLEX MAGNESIUM: 4.1 MEQ/L (ref 3.5–5.2)
RBC # BLD: 4.64 MILL/MM3 (ref 4.7–6.1)
SODIUM BLD-SCNC: 134 MEQ/L (ref 135–145)
WBC # BLD: 12.8 THOU/MM3 (ref 4.8–10.8)

## 2020-09-17 PROCEDURE — APPSS60 APP SPLIT SHARED TIME 46-60 MINUTES: Performed by: PHYSICIAN ASSISTANT

## 2020-09-17 PROCEDURE — 93005 ELECTROCARDIOGRAM TRACING: CPT | Performed by: INTERNAL MEDICINE

## 2020-09-17 PROCEDURE — 2700000000 HC OXYGEN THERAPY PER DAY

## 2020-09-17 PROCEDURE — 85610 PROTHROMBIN TIME: CPT

## 2020-09-17 PROCEDURE — 2500000003 HC RX 250 WO HCPCS: Performed by: INTERNAL MEDICINE

## 2020-09-17 PROCEDURE — 94761 N-INVAS EAR/PLS OXIMETRY MLT: CPT

## 2020-09-17 PROCEDURE — 80048 BASIC METABOLIC PNL TOTAL CA: CPT

## 2020-09-17 PROCEDURE — 93306 TTE W/DOPPLER COMPLETE: CPT

## 2020-09-17 PROCEDURE — 6370000000 HC RX 637 (ALT 250 FOR IP): Performed by: INTERNAL MEDICINE

## 2020-09-17 PROCEDURE — 85730 THROMBOPLASTIN TIME PARTIAL: CPT

## 2020-09-17 PROCEDURE — 36415 COLL VENOUS BLD VENIPUNCTURE: CPT

## 2020-09-17 PROCEDURE — 99223 1ST HOSP IP/OBS HIGH 75: CPT | Performed by: INTERNAL MEDICINE

## 2020-09-17 PROCEDURE — 85027 COMPLETE CBC AUTOMATED: CPT

## 2020-09-17 PROCEDURE — 93010 ELECTROCARDIOGRAM REPORT: CPT | Performed by: NUCLEAR MEDICINE

## 2020-09-17 RX ORDER — SPIRONOLACTONE 25 MG/1
12.5 TABLET ORAL DAILY
Qty: 30 TABLET | Refills: 3 | Status: SHIPPED | OUTPATIENT
Start: 2020-09-18 | End: 2020-12-04 | Stop reason: SDUPTHER

## 2020-09-17 RX ORDER — METOPROLOL SUCCINATE 25 MG/1
12.5 TABLET, EXTENDED RELEASE ORAL DAILY
Qty: 30 TABLET | Refills: 3 | Status: SHIPPED | OUTPATIENT
Start: 2020-09-17 | End: 2020-09-21

## 2020-09-17 RX ADMIN — CLINDAMYCIN IN 5 PERCENT DEXTROSE 900 MG: 18 INJECTION, SOLUTION INTRAVENOUS at 00:27

## 2020-09-17 RX ADMIN — ASPIRIN 81 MG: 81 TABLET ORAL at 09:14

## 2020-09-17 RX ADMIN — SPIRONOLACTONE 25 MG: 25 TABLET ORAL at 09:14

## 2020-09-17 RX ADMIN — CLOPIDOGREL BISULFATE 75 MG: 75 TABLET ORAL at 09:14

## 2020-09-17 RX ADMIN — NITROGLYCERIN 1 INCH: 20 OINTMENT TOPICAL at 00:27

## 2020-09-17 RX ADMIN — NITROGLYCERIN 1 INCH: 20 OINTMENT TOPICAL at 05:49

## 2020-09-17 NOTE — DISCHARGE SUMMARY
Structural Heart/Cardiology Discharge Summary       Name:  Effie Tafoya  YOB: 1940  Medical Record Number:  [id number]    Date of Admission:  9/16/2020  Date of Discharge:  9/17/2020    Admitting physician: Deborah Mckeon MD  Discharge to: Home  Condition at d/c: Stable    Hospital Problem List:  Patient Active Problem List   Diagnosis    Elevated glucose    Hyperlipidemia    Osteoarthritis    ASCVD (arteriosclerotic cardiovascular disease)    [facility]) [facility] [address]    Aortic valve disorder    Abnormal ankle brachial index (VONDA)    Hypertension secondary to other renal disorders (CODE)    Peripheral artery disease ([facility] [address]    Coronary artery disease due to lipid rich plaque    Intermittent claudication ([facility] [address]    S/P coronary angioplasty    S/P cardiac cath    S/P percutaneous transluminal angioplasty (PTA) with stent placement    Severe aortic stenosis by prior echocardiogram    Essential hypertension    Hyperlipidemia LDL goal <70    S/P TAVR (transcatheter aortic valve replacement)       Procedure: TAVR with an Al 29 mm S3 prosthesis 9/16/20    Hospital Course: Underwent successful TAVR on 9/16/20. Hgb remained stable post procedure. No rhythm issues post-procedure. Pt required 3L O2 NC for pulse ox 82 on RA. CVP was checked and found to be 5 this am.  Pulmonary consult placed and Home O2 Eval ordered. This is most consistent with chronic lung issue with CVP 5. Pt is completely asymptomatic with RA as well.      Discharge physical examination:   Vitals:    09/17/20 0800   BP: (!) 114/56   Pulse: 98   Resp: 20   Temp:    SpO2: (!) 89%     General Appearance: alert ,conversing, in no acute distress  Cardiovascular: normal rate, regular rhythm, normal S1 and S2, no murmurs, rubs, clicks, or gallops  Pulmonary/Chest: clear to auscultation bilaterally- no wheezes, rales or rhonchi, normal air movement, no respiratory distress  Neurological: alert, oriented, normal speech, no focal findings or movement disorder noted. Pulses: Bilateral radial, femoral, DP, and PT pulses noted  Lower Extremity: No edema noted. Groin incisions healing appropriately-No signs of infection or hematoma. Discharge Medications:         Medication List      START taking these medications    spironolactone 25 MG tablet  Commonly known as:  ALDACTONE  Take 0.5 tablets by mouth daily  Start taking on:  September 18, 2020        CHANGE how you take these medications    metoprolol succinate 25 MG extended release tablet  Commonly known as:  TOPROL XL  Take 0.5 tablets by mouth daily  What changed:  how much to take        CONTINUE taking these medications    aspirin 81 MG chewable tablet  Take 1 tablet by mouth daily     clopidogrel 75 MG tablet  Commonly known as:  PLAVIX  Take 1 tablet by mouth daily     diclofenac 75 MG EC tablet  Commonly known as:  VOLTAREN  Take 1 tablet by mouth 2 times daily as needed for Pain     lovastatin 20 MG tablet  Commonly known as:  MEVACOR  Take 1 tablet by mouth nightly     nitroGLYCERIN 0.4 MG SL tablet  Commonly known as:  NITROSTAT  up to max of 3 total doses. If no relief after 1 dose, call 911. Where to Get Your Medications      These medications were sent to [facility], [address] [place] [address] [address]    Phone:  [phone]   · metoprolol succinate 25 MG extended release tablet  · spironolactone 25 MG tablet             Follow Up Plan  1. Follow up with Cardiology in 1 week for incision check and post TAVR f/u.   2. Follow up with primary care provider in 1 week. Pulmonary follow up as directed after consultation. 3. Continue DAPT   4. Pt is fully agreeable to discharge plans. All questions were thoroughly answered.      Electronically signed by Giancarlo Guerra PA-C on 9/17/2020 at 10:04 AM

## 2020-09-17 NOTE — CONSULTS
[facility]    Patient - Melissa De Los Santos   MRN -  [id number]   [facility] # - [de-identified]   - 1940      Date of Admission -  2020  5:02 AM  Date of evaluation -  2020  Room - 3A-12/012-DONG Hill MD Primary Care Physician - Brittani Hodges MD   Chief Complaint   Shortness of breath  Active Hospital Problem List      Active Hospital Problems    Diagnosis Date Noted    S/P TAVR (transcatheter aortic valve replacement) [Z95.2] 2020     HPI   Melissa De Los Santos is a 68 y.o. male admitted for percutaneous transfemoral transcatheter aortic valve replacement. Patient is a 72-year-old male who was admitted to [facility] for TAVR. Patient has a past medical history of rheumatic fever hypertension, hyperlipidemia, CAD, and severe aortic stenosis. Patient underwent TAVR procedure on 2020. Patient tolerated the procedure well and is recovering appropriately. Pulmonary medicine was consulted for further evaluation of decreased oxygen saturations and shortness of breath. Patient has a vague history of COPD, however he states he is never had formal pulmonary function tests. He has been treated for COPD exacerbations in the past, but currently does not take any inhalers on a daily basis. Patient's oxygen saturations are between 88 and 91% on admission, and have continued to be this way post procedure. Patient is currently on 3 L of O2 by nasal cannula saturating around 90%. Patient admits to a chronic cough which produces a whitish sputum. Patient admits to chronic shortness of breath which is not worsened currently. Patient denies chronic oxygen therapy at home. During TAVR work-up patient underwent CTA of his chest with and without contrast which showed severe centrilobular emphysematous changes of the lungs with numerous subcentimeter lymph nodes in the mediastinum.   Patient denies fevers, chills, night sweats, weight changes. Patient admits to a 80-pack-year history. Smoked for around 40 years at least 2 packs a day. Quit smoking tobacco 20 years ago. Patient is a retired  who worked at the [employer] plant here in [facility]. Denies a history of sleep apnea. 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    Elevated glucose     Hyperlipidemia     Hypertension     Osteoarthritis     Osteopenia     Osteopenia     Polio 1948    Rheumatic fever 1948      Past Surgical History           Procedure Laterality Date    BALLOON ANGIOPLASTY, ARTERY  07/2018    s/p Left CFA, SFA and popliteal intervention    FACIAL COSMETIC SURGERY      as a kid    HAND SURGERY Right     carpal tunnel     TONSILLECTOMY AND ADENOIDECTOMY       Diet    DIET CARDIAC; Allergies    Brilinta [ticagrelor] and Keflex [cephalexin]  Social History     Social History     Socioeconomic History    Marital status:      Spouse name: Caren Amezcua Number of children: 3    Years of education: Not on file    Highest education level: Not on file   Occupational History    Not on file   Social Needs    Financial resource strain: Not on file    Food insecurity     Worry: Not on file     Inability: Not on file   Kyoger needs     Medical: Not on file     Non-medical: Not on file   Tobacco Use    Smoking status: Former Smoker     Packs/day: 1.50     Years: 50.00     Pack years: 75.00     Types: Cigarettes     Last attempt to quit: 10/9/2004     Years since quitting: 15.9    Smokeless tobacco: Never Used   Substance and Sexual Activity    Alcohol use:  Yes     Alcohol/week: 30.0 standard drinks     Types: 30 Cans of beer per week     Comment: 6 beers per day     Drug use: No    Sexual activity: Not Currently   Lifestyle    Physical activity     Days per week: Not on file     Minutes per session: Not on file    Stress: Not on file   Relationships    Social connections     Talks on phone: Not on file     Gets together: Not on file     Attends Oriental orthodox service: Not on file     Active member of club or organization: Not on file     Attends meetings of clubs or organizations: Not on file     Relationship status: Not on file    Intimate partner violence     Fear of current or ex partner: Not on file     Emotionally abused: Not on file     Physically abused: Not on file     Forced sexual activity: Not on file   Other Topics Concern    Not on file   Social History Narrative    Not on file     Family History          Problem Relation Age of Onset    Heart Disease Father     Arthritis Maternal Grandmother      Sleep History    n/a  ROS    General/Constitutional: No recent loss of weight or appetite changes. No fever or chills. HENT: Negative. Eyes: Negative. Upper respiratory tract: No nasal stuffiness or post nasal drip. Lower respiratory tract/ lungs: No hemoptysis. Cardiovascular: No palpitations, chest pain or edema. Gastrointestinal: No nausea or vomiting. Neurological: No focal neurological weakness. Extremities: No tenderness. Musculoskeletal: no complaints  Genitourinary: No complaints. Hematological: Negative. Denies easy buising  Skin: No itching.   Meds    Current Medications    chlorhexidine   Topical Once    sodium chloride flush  10 mL Intravenous 2 times per day    0.9 % sodium chloride  250 mL Intravenous Once    aspirin  81 mg Oral Daily    clopidogrel  75 mg Oral Daily    clindamycin (CLEOCIN) IV  900 mg Intravenous Q8H    spironolactone  25 mg Oral Daily    nitroglycerin  1 inch Topical 4 times per day     sodium chloride flush, perflutren lipid microspheres, acetaminophen, ondansetron, hydrALAZINE, oxyCODONE-acetaminophen **OR** oxyCODONE-acetaminophen, periphery and right lung base, similar to prior exam. Lungs otherwise appear clear. The cardiomediastinal silhouette is stable. Visualized osseous structures appear grossly intact.             CT Scans  6/29/2020- CTA Chest     1. Preoperative TAVR measurements as listed above. 2. Aneurysmal dilatation of the ascending aorta which measures 4.4 cm in maximum dimension. 3. There is some tortuosity throughout the descending thoracic aorta. 4. Severe centrilobular emphysematous changes of the lungs. 5. There are numerous subcentimeter lymph nodes in the mediastinum. These are likely reactive in nature. 6. Additional chronic findings as discussed above. (See actual reports for details)    Assessment   S/P TAVR for severe aortic stenosis. Cardiology primary, tolerated procedure well, denies chest pain and shortness of breath. Will be discharged today. Acute hypoxic respiratory failure. Likely secondary to underlying COPD. Suspect that patients oxygen saturations run on the lower side to begin with, was saturating around 89-91% on admission. No formal PFTs in the past. Significant smoking history 80 pk years. Requiring 3L O2 via NC. Saturating 89-96. Centrilobular emyphasea seen on CTA in June. Hx of CAD  HTN    Recommendations   -Will need follow up with pulmonary office in 4 weeks. Will need full PFTs prior to visit  -Will need home O2 evaluation prior to discharge.   -Needs 3L O2 at all times.   -Continue DAPT per primary.   -Ok to discharge pending evaluation by Dr. Angela Virgen. Thank you for the consult and allowing us to participate in the care of your patient. Case discussed with nurse and patient/family. Questions and concerns addressed. Meds and Orders reviewed.     Electronically signed by     Nikolas Ivory DO on 9/17/2020 at 1:12 PM

## 2020-09-17 NOTE — PROGRESS NOTES
A home oxygen evaluation has been completed. [x]Patient is an inpatient. It is expected that the patient will be discharged within the next 48 hours. Qualified provider to write order for home prescription if patient qualifies. Social service/care managers will arrange for home oxygen. If patient is active, arrange for Home Medical supplier to assess for Oxygen Conserving Device per pulse oximetry. []Patient is an outpatient. Results will be faxed to the ordering provider. Qualified provider to write order for home prescription if patient qualifies and arranges for home oxygen. Patient was placed on room air for 5minutes. SpO2 was 87% on room air at rest. Patient placed on 1L saturation was 88% 2L 89 3L 91%. Patient was walked for 10 minutes. SpO2 was 88 % during walking . Patients SpO2 was below 89% and qualified for home oxygen. Oxygen was applied at  3 lpm via nasal cannula to maintain a SpO2 between 90-92% while walking. Actual SpO2 was 91 %. Note: For any SpO2 at 71% see policy and procedure for possible qualifications.
CLINICAL PHARMACY: DISCHARGE MED RECONCILIATION/REVIEW    [facility]) Select Patient?: No  Total # of Interventions Recommended: 0   -   Total # Interventions Accepted: 0  Intervention Severity:   - Level 1 Intervention Present?: No   - Level 2 #: 0   - Level 3 #: 0   Time Spent (min): 15    Additional Documentation:
Echo done bedside Dr. Carolyne Harrison to read
Home O2 eval completed and it was advised to that the patient will need 3L of continuous O2 at home. Orders received from Dr. Sebastien Parr to place a DME home O2 order.
PHASE 1 CARDIAC REHABILITATION   CABG, AVR, MVR, TVR, AMI, PCI's  Exercise Physiologists    Aerobic treatment: ambulated 100 ft in leslie        Gait (i: Independent, s: Steady, u: Unsteady): s  Assists: 1  Patient tolerated treatment (w: well, f: fair, p: poor): w  Goals:    Short term:  Progression on each aerobic treatment. Long term: Independent ambulation and discharge. Brenton SOTOMAYOR is to follow sternal precautions. Will learn techniques for getting in and out of bed/chairs/car without using the arms. Using stairs will be addressed if applicable. Home activity instructions (Frequency, Intensity, Time, Type), and progression will be addressed prior to discharge (unless Cherelle West goes to [facility] or an [facility] where they will follow PT/OT protocols). Notes: Call light left within reach. Education given: Phase 2 cardiac reahb education given. Phase II Information (Given upon Discharge): Will contact patient at home to schedule evaluation.
PHASE 1 CARDIAC REHABILITATION   CABG, AVR, MVR, TVR, AMI, PCI's  Exercise Physiologists    Attempted to see patient this morning. He was having an echo done. Will attempted again later. 943 attempted to work with patient a second time. He is not allowed to get up out of bed yet due to having pacer wires removed per RN.
Patient admitted to 2E04  Ambulatory for TAVR. Patient NPO. Patient accompanied by wife and daughter. Vital signs obtained. Assessment and data collection intiated. Oriented to room. Policies and procedures for 2E explained. All questions answered with no further questions at this time. Fall precautions reviewed with patient. 0600 Allexyn Sacrum pad applied to coccyx, date, intialed and P on for preventive. Full body shave done, razor burn noted under armpits. 2926 Dr Eastman Prudence in to see patient, aware pulse ox 87-91% on room air.     0720 to nava OR per cath lab per bed, stable condition. Admitting called for bed.
Patient discharged home in stable condition with spouse. Discharge instructions, site care, and follow up appointments reviewed. Home oxygen tank delivered to bedside. Patient and spouse deny any questions or concerns at this time.
Pt admitted to  3A12 via cart/stretcher. Complaints: S/P TAVR. IV normal saline infusing into the hand right, condition patent and no redness at a rate of 50 mls/ hour with about 700 mls in the bag still. IV site free of s/s of infection or infiltration. Vital signs obtained. Assessment and data collection initiated. Two nurse skin assessment performed by Jocelyn Ricks RN and Ivania Moreno. Oriented to room. Policies and procedures for 3B explained. Jocelyn Ricks RN discussed hourly rounding with patient addressing 5 P's. Fall prevention and safety brochure discussed with patient. Bed alarm on. Call light in reach. The best day to schedule a follow up Dr appointment is:  Monday p.m. Explained patients right to have family, representative or physician notified of their admission. Patient has Requested for physician to be notified. Patient has Declined for family/representative to be notified. All questions answered with no further questions at this time.      PCP Notified of Admission per Dr. Gustavo Alves
regular rhythm, normal S1 and S2, no murmurs, rubs, clicks, or gallops  Pulmonary/Chest: clear to auscultation bilaterally- no wheezes, rales or rhonchi, normal air movement, no respiratory distress  Abdomen:soft, non-tender, non-distended, normal bowel sounds, no bruits,   Extremities: no cyanosis, clubbing or edema. Pulses: Bilateral radial, femoral, DP, and PT pulses intact. No femoral bruits noted. Skin: warm and dry, no rash or erythema  Head: normocephalic and atraumatic  Neck: supple and non-tender without mass, no thyromegaly, no JVD   Musculoskeletal: normal range of motion, no joint swelling, deformity or tenderness. Neurological: alert, oriented, normal speech, no focal findings or movement disorder noted  Groin: Wounds healing appropriately. No signs of infection or hematoma. 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 Continue DAPT with ASA/Plavix. Spironolactone and Lasix given since TAVR placement and I/O's -1510 cc past 24 hours and CXR ok  2. Echo today   3. COPD - slightly low baseline O2 sat - Home O2 eval and Pulmonary Consult  4. Restart home medications  5.  Home probably later today    The plan of care was discussed in detail with Dr. Dianelys Holcomb and Dr. Ermelinda Boone

## 2020-09-17 NOTE — CARE COORDINATION
9/17/20, 7:24 AM EDT  DISCHARGE PLANNING EVALUATION:    Gilbert Morris       Admitted from:  9/16/2020/ [facility] day: 1   Location: [address] [place] [address] Reason for admit: S/P TAVR (transcatheter aortic valve replacement) [Z95.2] Status: IP  Admit order signed?: yes  PMH:  has a past medical history of ASCVD (arteriosclerotic cardiovascular disease), Cerebral artery occlusion with cerebral infarction ([facility] [address] COPD (chronic obstructive pulmonary disease) ([facility] [address] Dysplasia of vocal cord, Elevated glucose, Hyperlipidemia, Hypertension, Osteoarthritis, Osteopenia, Osteopenia, Polio, and Rheumatic fever. Procedure: 9/16 TAVR  9/17 Echo to be done. Pertinent abnormal Imaging: None  Medications:  Scheduled Meds:   chlorhexidine   Topical Once    sodium chloride flush  10 mL Intravenous 2 times per day    0.9 % sodium chloride  250 mL Intravenous Once    aspirin  81 mg Oral Daily    clopidogrel  75 mg Oral Daily    clindamycin (CLEOCIN) IV  900 mg Intravenous Q8H    spironolactone  25 mg Oral Daily    nitroglycerin  1 inch Topical 4 times per day     Continuous Infusions:   sodium chloride 75 mL/hr at 09/16/20 0600    sodium chloride 50 mL/hr at 09/16/20 1152      Pertinent Info/Orders/Treatment Plan:  Here for elective procedure. POD #1 TAVR. Echo to be done today. Possible discharge today. Diet: DIET CARDIAC;   Smoking status:  reports that he quit smoking about 15 years ago. His smoking use included cigarettes. He has a 75.00 pack-year smoking history.  He has never used smokeless tobacco.   PCP: Shantel Richmond MD  Readmission 30 days or less: No  Readmission Risk Score: 7%    Discharge Planning Evaluation  Current Residence:  Private Residence  Living Arrangements:  Spouse/Significant Other   Support Systems:  Spouse/Significant Other, Children  Current Services PTA:     Potential Assistance Needed:  N/A  Potential Assistance Purchasing Medications:  No  Does patient want to participate in local refill/ meds to beds program?  Yes  Type of Home Care Services:  None  Patient expects to be discharged to:  home  Expected Discharge date:  09/17/20  Follow Up Appointment: Best Day/ Time:      Patient Goals/Plan/Treatment Preferences: Spoke with pt. He lives at home with his wife. He has been independent at home and he drives. He is current with his PCP. Denies any DME at home. Declines need for HH. Discussed with pt the need for home O2, pt states if he needs home O2, would prefer to get from [facility] (pt declines list). Transportation/Food Security/Housekeeping Addressed:  No issues identified.  Evaluation: no     Update: 9/17/20, 2:01 PM EDT    Pt had home O2 eval completed. Pt did qualify. Referral made to [facility], discussed with Lucie Mckeon. 9/17/20, 2:01 PM EDT    Patient goals/plan/ treatment preferences discussed by  and . Patient goals/plan/ treatment preferences reviewed with patient/ family. Patient/ family verbalize understanding of discharge plan and are in agreement with goal/plan/treatment preferences. Understanding was demonstrated using the teach back method. AVS provided by RN at time of discharge, which includes all necessary medical information pertaining to the patients current course of illness, treatment, post-discharge goals of care, and treatment preferences.         IMM Letter  IMM Letter given to Patient/Family/Significant other/Guardian/POA/by[de-identified]   IMM Letter date given[de-identified] 09/17/20  IMM Letter time given[de-identified] [id number]       Electronically signed by Munir Miller RN on 9/17/2020 at 2:02 PM

## 2020-09-17 NOTE — FLOWSHEET NOTE
As pt was being given his discharge papers, his wife was with him. This  gave them both prayer cards, one for the pt for continued healing and one for his wife as a caregiver.        09/17/20 1600   Encounter Summary   Services provided to: Patient and family together   Referral/Consult From: [id number] Spirit[facility]   Continue Visiting No  (9/17 leaving soon)   Complexity of Encounter Low   Length of Encounter 15 minutes   Spiritual/Baptism   Type Spiritual support

## 2020-09-18 ENCOUNTER — TELEPHONE (OUTPATIENT)
Dept: FAMILY MEDICINE CLINIC | Age: 80
End: 2020-09-18

## 2020-09-21 ENCOUNTER — OFFICE VISIT (OUTPATIENT)
Dept: CARDIOTHORACIC SURGERY | Age: 80
End: 2020-09-21

## 2020-09-21 VITALS
TEMPERATURE: 98.1 F | HEIGHT: 68 IN | BODY MASS INDEX: 24.64 KG/M2 | OXYGEN SATURATION: 92 % | WEIGHT: 162.6 LBS | DIASTOLIC BLOOD PRESSURE: 68 MMHG | SYSTOLIC BLOOD PRESSURE: 159 MMHG | HEART RATE: 70 BPM

## 2020-09-21 PROCEDURE — 99024 POSTOP FOLLOW-UP VISIT: CPT | Performed by: PHYSICIAN ASSISTANT

## 2020-09-21 RX ORDER — METOPROLOL SUCCINATE 25 MG/1
25 TABLET, EXTENDED RELEASE ORAL DAILY
Qty: 30 TABLET | Refills: 3 | Status: SHIPPED | OUTPATIENT
Start: 2020-09-21 | End: 2020-10-19 | Stop reason: SDUPTHER

## 2020-09-21 NOTE — PROGRESS NOTES
Structural Heart/Cardiology Follow up    9/21/2020 11:31 AM    Patient's Name/Date of Birth: Aguilar Leiav [birth year] (68 y.o.)    PCP: Casey Nguyen MD      Date: September 21, 2020     HPI  We had the pleasure of seeing Aguilar Leiva in the office today, as you know this is a very pleasant 68y.o. year old male with a history of aortic stenosis who underwent a successful TAVR on 9/16/20. Per discharge note, \"Pt required 3L O2 NC for pulse ox 82 on RA. CVP was checked and found to be 5 this am.  Pulmonary consult placed and Home O2 Eval ordered. \" The pt denies chest pressure, palpitations, SOB, fever, chills, N/V/D. PastMedical History:  Aria Del Cid  has a past medical history of ASCVD (arteriosclerotic cardiovascular disease), Cerebral artery occlusion with cerebral infarction ([address] COPD (chronic obstructive pulmonary disease) ([address] Dysplasia of vocal cord, Elevated glucose, Hyperlipidemia, Hypertension, Osteoarthritis, Osteopenia, Osteopenia, Polio, and Rheumatic fever. Past Surgical History:  The patient  has a past surgical history that includes Tonsillectomy and adenoidectomy; Facial cosmetic surgery; Balloon angioplasty, artery (07/2018); and Hand surgery (Right). Allergies: The patient is allergic to brilinta [ticagrelor] and keflex [cephalexin]. Medications:    Current Outpatient Medications:     metoprolol succinate (TOPROL XL) 25 MG extended release tablet, Take 0.5 tablets by mouth daily, Disp: 30 tablet, Rfl: 3    spironolactone (ALDACTONE) 25 MG tablet, Take 0.5 tablets by mouth daily, Disp: 30 tablet, Rfl: 3    aspirin 81 MG chewable tablet, Take 1 tablet by mouth daily, Disp: 30 tablet, Rfl: 3    nitroGLYCERIN (NITROSTAT) 0.4 MG SL tablet, up to max of 3 total doses.  If no relief after 1 dose, call 911., Disp: 25 tablet, Rfl: 1    clopidogrel (PLAVIX) 75 MG tablet, Take 1 tablet by mouth daily, Disp: 30 tablet, Rfl: 3    lovastatin (MEVACOR) 20 MG tablet, Take 1 range of motion, no joint swelling, deformity or tenderness. Neurological: alert, oriented, normal speech, no focal findings or movement disorder noted. Groin: Wounds healing appropriately. No signs of infection or hematoma. Assessment:   TAVR F/u office appointment. Plan:   1. Follow up with Dr. Kiko Flaherty in 1 month with a CBC, BMP, and Echo.     The plan of care was discussed in detail with Dr. Kiko Flaherty and Dr. Mike Blocker

## 2020-09-24 NOTE — PLAN OF CARE
Hospital Facility-Based Program  [facility]/Traditional Cardiac Rehab  PHYSICIAN ORDER  Class I Level B based on research  Medical Director:  Dr. Callie Edmond MD     Patient Name: Haleigh Araiza : 1940  Referring Physician: Dr. Bob Myles  Date: 2020  Allergies: Allergies as of 10/08/2020 - Review Complete 2020   Allergen Reaction Noted    Brilinta [ticagrelor] Shortness Of Breath 2020    Keflex [cephalexin] Rash 2014        Diagnosis:  TAVR on 2020    [x] [facility] Intensive Cardiac Rehab with telemetry monitoring, resting and exercise        BPs & HRs with each session. Hospital setting for patient safety. [x] 72 sessions: 36 exercise sessions, 36 education sessions   [] 36 sessions: 18 exercise sessions, 18 education sessions  [] Traditional Cardiac Rehab with telemetry monitoring, resting and exercise BPs &       HRs with each session. Hospital setting for patient safety. [] 36 sessions:  32 exercise sessions, 4 education sessions     Per Patient symptoms, proceed with:   [x]Nitroglycerine 0.4mg SL every 5 minutes prn, maximum of 3, for chest pain   [x]12-lead EKG for symptoms of chest pain or noted change in heart rhythm   [x]Administer O2 per nasal cannula for symptoms of chest pain or acute dyspnea    Physician Prescribed Exercise:  Plan of Care:  Patient to attend exercise sessions with aerobic endurance and strength training for a total of 31-60 min/day, 3 days/week with supplemented 30+ minutes of aerobic exercise at home on days not participating in Cardiac Rehab. Aerobic Endurance Training  Aerobic Endurance mode (TM, AD, NS) starting at 5-8 minutes progressing by 2-3 minutes each week to a total of 15-30 minutes 2-3x/week. Arms only 5 min  Stair step increasing to 2 min  Resistance/strength training:  Hand weights starting at 1-5 lbs increasing in weight by 1-2 lbs and/or per patient tolerance weekly.   Start with 8 repetitions and increase the repetitions each exercise session per patient tolerance for a total of 15 repetitions. Measurable Endurance Goal:  Aerobic endurance goal to be measured in minutes. Start endurance training per patient tolerance at 1-8 minutes per exercise type, progressing to a total of 31-60 minutes using various modes of training (see Exercise Prescription). Progression:    [x] Weekly 5-10% intensity progression, as tolerated, during cardiac rehab sessions. [x] 13-17 Rate of Perceived Exertion on the Darcy Scale (6-20). Measurable Muscular Strength Goal:  Starting at 1-5 lbs x 8 reps, progressing to 5-25 lbs x 15 reps per patient tolerance.       Electronically signed by Sonal Ventura on 9/24/2020 at 12:11 PM    Exercise Physiologist/Date/Time

## 2020-09-29 ENCOUNTER — HOSPITAL ENCOUNTER (OUTPATIENT)
Age: 80
Discharge: HOME | End: 2020-09-29
Payer: MEDICARE

## 2020-09-29 PROCEDURE — U0003 INFECTIOUS AGENT DETECTION BY NUCLEIC ACID (DNA OR RNA); SEVERE ACUTE RESPIRATORY SYNDROME CORONAVIRUS 2 (SARS-COV-2) (CORONAVIRUS DISEASE [COVID-19]), AMPLIFIED PROBE TECHNIQUE, MAKING USE OF HIGH THROUGHPUT TECHNOLOGIES AS DESCRIBED BY CMS-2020-01-R: HCPCS

## 2020-10-01 LAB — SARS-COV-2: NOT DETECTED

## 2020-10-06 ENCOUNTER — HOSPITAL ENCOUNTER (OUTPATIENT)
Dept: PULMONOLOGY | Age: 80
Discharge: HOME | End: 2020-10-06
Payer: MEDICARE

## 2020-10-06 PROCEDURE — 94060 EVALUATION OF WHEEZING: CPT

## 2020-10-06 PROCEDURE — 94726 PLETHYSMOGRAPHY LUNG VOLUMES: CPT

## 2020-10-06 PROCEDURE — 94729 DIFFUSING CAPACITY: CPT

## 2020-10-08 ENCOUNTER — HOSPITAL ENCOUNTER (OUTPATIENT)
Dept: CARDIAC REHAB | Age: 80
Setting detail: THERAPIES SERIES
Discharge: HOME | End: 2020-10-08
Payer: MEDICARE

## 2020-10-08 VITALS — HEIGHT: 69 IN | BODY MASS INDEX: 23.4 KG/M2 | WEIGHT: 158 LBS

## 2020-10-08 PROCEDURE — G0423 INTENS CARDIAC REHAB NO EXER: HCPCS

## 2020-10-08 PROCEDURE — G0422 INTENS CARDIAC REHAB W/EXERC: HCPCS

## 2020-10-08 NOTE — PLAN OF CARE
[address] [place] [address] Facility-Based Program  Individualized Cardiac Treatment Plan    Patient Name:  Sonal Deras  :  1940  Age:  68 y.o. MRN:  [id number]  Diagnosis: TAVR  Date of Event: 2020   Physician:  Dr. Keturah Jackson  Next Office Visit:  Not scheduled, Malathi Renee 10/19/20  Date Entered Program: 10/8/2020  Risk Stratifications: [] Low [] Intermediate [x] High  Allergies: Allergies   Allergen Reactions    Brilinta [Ticagrelor] Shortness Of Breath    Keflex [Cephalexin] Rash       COVID -19 Screen  Do you have any of the following symptoms:  [] Fever [] Cough [] SOB [] Muscle/Body Ache [] Loss of taste/smell [x] None    Have you traveled outside of the [place]? [] Yes      [x] No    Have you been around anyone who has tested Positive for COVID 19?  [] Yes      [x] No    The following Education has been completed with patient. [x] Wait in the lobby until we call you back to rehab. [x] You must wear a mask while in the medical center, and in cardiac rehab. Please bring your own. [x] Bring your own bottle of water with you. [x] You can bring a visitor with you, however, they will need to sit in the waiting room while you are in cardiac rehab.     Individual Cardiac Treatment Plan -EXERCISE  INITIAL 30 DAY 60 DAY 90 DAY FINAL DAY   EXERCISE  ASSESSMENT/PLAN EXERCISE  REASSESSMENT EXERCISE   REASSESSMENT EXERCISE   REASSESSMENT EXERCISE  DISCHARGE/FOLLOW-UP   Date: 10/8/2020 Date: Date: Date: Date:   Session #1 Session # ** Session # ** Session # ** Session # **  Last session completed on **   Stages of Change Stages of Change Stages of Change Stages of Change Stages of Change   [] Pre Contemplation  [] Contemplation  [x] Preparation  [] Action  [] Maintenance  [] Relapse [] Pre Contemplation  [] Contemplation  [] Preparation  [] Action  [] Maintenance  [] Relapse [] Pre Contemplation  [] Contemplation  [] Preparation  [] Action  [] Maintenance  [] Relapse [] Pre Contemplation  [] Contemplation  [] Preparation  [] Action  [] Maintenance  [] Relapse [] Pre Contemplation  [] Contemplation  [] Preparation  [] Action  [] Maintenance  [] Relapse   EXERCISE ASSESSMENT EXERCISE ASSESSMENT EXERCISE ASSESSMENT EXERCISE ASSESSMENT EXERCISE ASSESSMENT   6 Min Walk Test  Distance walked:   0.06 miles  316.8 ft.  1.5 METs  Max HR:105 BPM  RPE:  11    THR:    Rhythm:  NSR w/ PVCs    6 Min Walk Test  Distance walked:   ** miles  ** ft  ** METs  Max HR:** BPM      RPE:  **  %Change ft= **    Rhythm:  **   DASI: 6.05 METs DASI: ** METs DASI: ** METs DASI: ** METs DASI: ** METs   Return to Work  [facility] on returning to work? [] Yes              [] No   [] Disabled     [x] Retired   Return to work:  Retired Return to work:  Retired Return to work:  Retired Return to work:  Retired   Orthopedic Limitations/  [x] Yes    [] No  If yes please list:  Ruptured disk in back, hx of PAD     Orthopedic Limitations  *If patient has orthopedic issue:   Actions/  accomodations needed to make Brenton SOTOMAYOR successful : Orthopedic Limitations   Orthopedic Limitations   Orthopedic Limitations     Fall Risk  Fall risk assessed? [x] Yes      [] No    Balance Issues? [] Yes      [x] No     [] Walker [] Cane    [x] Safety issues reviewed      Fall Risk  *If patient is a fall risk, action needed to accommodate:  Fall Risk Fall Risk Fall Risk   Home Exercise  [x] Yes    [] No  Type: walking on TM  Frequency: daily  Duration: 20mins Home Exercise  [] Yes    [] No  Type: **  Frequency:**  Duration: ** Home Exercise  [] Yes    [] No  Type: **  Frequency: **  Duration: ** Home Exercise  [] Yes    [] No  Type: **  Frequency: **  Duration: ** Home Exercise  [] Yes    [] No  Type: **  Frequency: **  Duration: **   Angina with Activity? [] Yes    [x] No  Angina Management: na Angina with Activity? [] Yes    [] No  Angina Management: ** Angina with Activity?   [] Yes    [] No  Angina Management: ** min/day, 5-7days/wk   [] Plans to continue exercise on own  [] Plans to join a local fitness center to continue exercise  [] Does not plan to continue to exercise after rehab   Return to ADL or Hobbies:  Brenton SOTOMAYOR would like to improve strength and endurance so he is able to return to everyday activities getting easier Return to ADL or Hobbies:  Brenton SOTOMAYOR would like to improve strength and endurance so he/she is able to return to ** Return to ADL or Hobbies:  Brenton SOTOMAYOR would like to improve strength and endurance so he/she is able to return to ** Return to ADL or Hobbies:  Brenton SOTOMAYOR would like to improve strength and endurance so he/she is able to return to ** Return to ADL or Hobbies:  Brenton SOTOMAYOR would like to improve strength and endurance so he/she is able to return to **    *MET level required for above goal: 3.0 METs MET level Achieved:  **METs MET level Achieved:  **METs MET level Achieved:  **METs MET level Achieved:  **METs     Individual Cardiac Treatment Plan - Nutrition  NUTRITION  ASSESSMENT/PLAN NUTRITION  REASSESSMENT NUTRITION   REASSESSMENT NUTRITION   REASSESSMENT NUTRITION  DISCHARGE/FOLLOW-UP   Stages of Change Stages of Change Stages of Change Stages of Change Stages of Change   [x] Pre Contemplation  [] Contemplation  [] Preparation  [] Action  [] Maintenance  [] Relapse [] Pre Contemplation  [] Contemplation  [] Preparation  [] Action  [] Maintenance  [] Relapse [] Pre Contemplation  [] Contemplation  [] Preparation  [] Action  [] Maintenance  [] Relapse [] Pre Contemplation  [] Contemplation  [] Preparation  [] Action  [] Maintenance  [] Relapse [] Pre Contemplation  [] Contemplation  [] Preparation  [] Action  [] Maintenance  [] Relapse   NUTRITION ASSESSMENT NUTRITION ASSESSMENT NUTRITION ASSESSMENT NUTRITION ASSESSMENT NUTRITION ASSESSMENT   Weight Management  Weight: 158.0       Height: 69in   BMI: 23.4  Weight Management  Weight: **                  Weight Management  Weight: ** Weight Management  Weight: ** Weight Management  Weight: **                    BMI: **   Eating Plan  Current eating habits: none Eating Plan  Changes: Eating Plan  Changes: Eating Plan  Changes: Eating Plan Improvements:   Alcohol Use  [] none          [x] daily  [] weekly      [] special   Type: beer  Amount: 6 cans       Diet Assessment Tool:  RATE YOUR PLATE  *Given to patient to complete and return. Diet Assessment Tool:    Score: **/69       Diet Assessment Tool: RATE YOUR PLATE  Score: **/02   NUTRITION PLAN NUTRITION PLAN NUTRITION PLAN NUTRITION PLAN NUTRITION PLAN   *Interventions* *Interventions* *Interventions* *Interventions* *Interventions*   Initial Survey given Goal Setting Discussion:   [] Yes      [] No       Follow Up Survey Reviewed & Goals Updated:     Professional Referral  Please check if needed. [] Dietitian Consult   [] Wt. Management Referral  [] Other:  Professional Referral  Please check if needed. [] Dietitian Consult   [] Wt. Management Referral  [] Other: Professional Referral  Please check if needed. [] Dietitian Consult   [] Wt. Management Referral  [] Other: Professional Referral  Please check if needed. [] Dietitian Consult   [] Wt. Management Referral  [] Other: Professional Referral  Please check if needed. [] Dietitian Consult   [] Wt. Management Referral  [] Other:   *Education* *Education* *Education* *Education* *Education*   Nutritional Education Recommended    [x] 1:1 Registered Dietitian    Workshops  [x] Label Reading   [x] Menu  [x] Targeting Nutrition Priorities  [x] Fueling a Healthy Body   Nutritional Education Attended/Date Nutritional Education Attended/Date Nutritional Education Attended/Date All Sessions Completed?     [] Yes  [] No   Cooking School  Recommended     [x] Adding Flavor  [x] Fast & Healthy     Breakfasts  [x] Salads & Dressings  [x] Soups & Simple     Sauces  [x] Simple Sides  [x] Appetizers &     Snacks  [x] Delicious Desserts  [x] Plant Proteins  [x] Fast Evening Meals  [x] Weekend Breakfasts  [x] Cook once, Eat       twice  [x] [employer]  Sessions Completed   [facility]  Sessions Completed [facility]  Sessions Completed     Cooking School    # of sessions completed:  **   *Goals* *Goals* *Goals* *Goals* *Goals*   Brenton Clemente nutritional goals are as follows:  Complete and return diet survey Brenton Clemente nutritional goals are as follows:  [] Attend Nutrition Workshops  [] Attend 1:1   [] Attend Cooking Classes  [] ** Brenton SOTOMAYOR's nutritional goals are as follows:  [] Attend Nutrition Workshops  [] Attend 1:1   [] Attend Cooking Classes  [] Complete and return diet survey  [] ** Brenton SOTOMAYOR's nutritional goals are as follows:  [] Attend Nutrition Workshops  [] Attend 1:1   [] Attend Cooking Classes  [] ** Brenton SOTOMAYOR achieved nutritional goals   [] Yes    [] No  If no, why?   Use knowledge gained to continue Pritikin eating plan at home       Individual Cardiac Treatment Plan - Psychosocial  PSYCHOSOCIAL  ASSESSMENT/PLAN PSYCHOSOCIAL  REASSESSMENT PSYCHOSOCIAL   REASSESSMENT PSYCHOSOCIAL   REASSESSMENT PSYCHOSOCIAL  DISCHARGE/FOLLOW-UP   Stages of Change Stages of Change Stages of Change Stages of Change Stages of Change   [] Pre Contemplation  [x] Contemplation  [] Preparation  [] Action  [] Maintenance  [] Relapse [] Pre Contemplation  [] Contemplation  [] Preparation  [] Action  [] Maintenance  [] Relapse [] Pre Contemplation  [] Contemplation  [] Preparation  [] Action  [] Maintenance  [] Relapse [] Pre Contemplation  [] Contemplation  [] Preparation  [] Action  [] Maintenance  [] Relapse [] Pre Contemplation  [] Contemplation  [] Preparation  [] Action  [] Maintenance  [] Relapse   PSYCHOSOCIAL ASSESSMENT PSYCHOSOCIAL ASSESSMENT PSYCHOSOCIAL ASSESSMENT PSYCHOSOCIAL ASSESSMENT PSYCHOSOCIAL ASSESSMENT   Behavioral Outcomes Behavioral Outcomes Behavioral Outcomes Behavioral Outcomes Behavioral Outcomes   Tool Used:  Ki Thea Parks, Quality of Life Index, Cardiac Version IV  *Given to patient to complete. Tool Used:    iK Parks, Quality of Life Index, Cardiac Version IV     QOL Index Score: **  HF:**  S&E:**  P&S: **  Family: **   Tool Used:     Ki & Charly, Quality of Life Index, Cardiac Version IV    QOL Index Score: **  HF:**  S&E:**  P&S: **  Family: **   PHQ-9 score 1  Depression Severity  [x]Minimal  []Mild   []Moderate  []Moderately Severe  []Severe    PHQ-9 score **  Depression Severity  []Minimal  []Mild   []Moderate  []Moderately Severe []Severe   Does patient have Family Support? [x] Yes      [] No  No signs of marital/family distress       Within the Past Month:  *Have you wished you were dead or wished you could go to sleep and not wake up? [] Yes      [x] No  *Have you had any thoughts of killing yourself? [] Yes      [x] No         Using a scale of 0-10, 0=none, 10=very:   Rate your depression: 0  Rate your anxiety:  0  Using a scale of 0-10, 0=none, 10=very:   Rate your depression: **  Rate your anxiety:  ** Using a scale of 0-10, 0=none, 10=very:   Rate your depression: **  Rate your anxiety:  ** Using a scale of 0-10, 0=none, 10=very:   Rate your depression: **  Rate your anxiety:  ** Using a scale of 0-10, 0=none, 10=very:   Rate your depression: **  Rate your anxiety:  **   Signs and Symptoms of Depression Present? [] Yes      [x] No   Signs and Symptoms of Depression Present? [] Yes      [] No  If yes, please explain:  ** Signs and Symptoms of Depression Present? [] Yes      [] No  If yes, please explain:  ** Signs and Symptoms of Depression Present? [] Yes      [] No  If yes, please explain:  ** Signs and Symptoms of Depression Present? [] Yes      [] No  If yes, please explain:  **   Signs and Symptoms of Anxiety Present? [] Yes      [x] No   Signs and Symptoms of Anxiety Present? [] Yes      [] No  If yes, please explain:  ** Signs and Symptoms of Anxiety Present?     [] Yes Mind-Set Workshops  Completed  [] Yes      [] No   *Goals* *Goals* *Goals* *Goals* *Goals*   Brenton Clemente psychosocial goals are as follows:  Complete HADS & Ki & Charly, Quality of Life Index, Cardiac Version IV Brenton Clemente psychosocial goals are as follows:  [] Attend Healthy Mind-Set Workshops  [] Reduce depression symptom severity by 1 level  [] ** Brenton Clemente psychosocial goals are as follows:  [] Attend Healthy Mind-Set Workshops  [] Reduce depression symptom severity by 1 level  [] ** Brenton Clemente psychosocial goals are as follows:  [] Attend Healthy Mind-Set Workshops  [] Reduce depression symptom severity by 1 level  [] ** Brenton SOTOMAYOR achieved psychosocial goals?   [] Yes    [] No  If no, why?  **  [] Use methods learned to continue to reduce depression symptom severity by 1 level  [] **     Individual Cardiac Treatment Plan - Other:  Risk Factor/Education  RISK FACTOR  ASSESSMENT/PLAN RISK FACTOR  REASSESSMENT  RISK FACTOR  REASSESSMENT RISK FACTOR  REASSESSMENT RISK FACTOR   DISCHARGE/FOLLOW-UP   Stages of Change Stages of Change Stages of Change Stages of Change Stages of Change   [] Pre Contemplation  [x] Contemplation  [] Preparation  [] Action  [] Maintenance  [] Relapse [] Pre Contemplation  [] Contemplation  [] Preparation  [] Action  [] Maintenance  [] Relapse [] Pre Contemplation  [] Contemplation  [] Preparation  [] Action  [] Maintenance  [] Relapse [] Pre Contemplation  [] Contemplation  [] Preparation  [] Action  [] Maintenance  [] Relapse [] Pre Contemplation  [] Contemplation  [] Preparation  [] Action  [] Maintenance  [] Relapse   RISK FACTOR/EDUCATION ASSESSMENT RISK FACTOR/EDUCATION ASSESSMENT RISK FACTOR/EDUCATION ASSESSMENT RISK FACTOR/EDUCATION ASSESSMENT RISK FACTOR /EDUCATION ASSESSMENT   Hypertension  [x] Yes      [] No    Resting BP: 138/68  Peak Ex BP:166/70  Medication: na   Hypertension  Resting BP: **  Peak Ex BP:**  Medication Changes:  [] Yes      [] No Hypertension  Resting BP: **  Peak Ex BP:**  Medication Changes:  [] Yes      [] No Hypertension  Resting BP: **  Peak Ex BP:**  Medication Changes:  [] Yes      [] No Hypertension  Resting BP: **  Peak Ex BP:**  Medication Changes:  [] Yes      [] No   Lipids  HLD/DLD  [x] Yes      [] No  TOTAL CHOL: 137  HDL:  66  LDL:  61  TRI  Medication: lovastatin Lipids  Medication Changes:  [] Yes      [] No     Lipids  Medication Changes:  [] Yes      [] No     Lipids  Medication Changes:  [] Yes      [] No     Lipids    TOTAL CHOL: **  HDL:  **  LDL:  **  TRIG:  **  Medication Changes:  [] Yes      [] No   Diabetes  [] Yes      [x] No  FBS: 102            Diabetes  Most Recent BS:  BS have been in range  [] Yes      [] No  Medication Changes  [] Yes      [] No   Diabetes  Most Recent BS:  BS have been in range  [] Yes      [] No  Medication Changes  [] Yes      [] No     Diabetes  Most Recent BS:  BS have been in range  [] Yes      [] No  Medication Changes  [] Yes      [] No     Diabetes  Most Recent BS:  BS have been in range  [] Yes      [] No  Medication Changes  [] Yes      [] No       Tobacco Use  [] Current  [x] Former  [] Never    Years smoked: 48    Date Quit:     Smokeless Tobacco use:   [] Yes      [x] No   Tobacco Use  Change in smoking status   [] Yes      [] No    Quit date: **   Tobacco Use  Change in smoking status   [] Yes      [] No    Quit date: **   Tobacco Use  Change in smoking status   [] Yes      [] No    Quit date: ** Tobacco Use  Change in smoking status   [] Yes      [] No    Quit date: **            Learning Barriers  Please select one:  [] Speech  [] Literacy  [] Hearing  [] Cognitive  [] Vision  [x] Ready to Learn Learning Barriers Addressed:   [] Yes      [] No   Learning Barriers Addressed:   [] Yes      [] No   Learning Barriers Addressed:  [] Yes      [] No Learning Barriers Addressed:  [] Yes      [] No     RISK FACTOR/EDUCATION PLAN RISK FACTOR/EDUCATION PLAN RISK FACTOR/EDUCATION PLAN RISK FACTOR/EDUCATION PLAN RISK FACTOR/EDUCATION PLAN   *Interventions* *Interventions* *Interventions* *Interventions* *Interventions*   Recommended Educational Videos    [x] Overview of The Pritikin Eating Plan  [x] Becoming A Pritikin   [x] Diseases of Our Time-Part 1  [x] Calorie Density  [x] Label Reading-Part 1  [x] Move It! [x] Healthy Minds, Bodies, Hearts  [x] Dining Out-Part 1  [x] Heart Disease Risk Reduction  [x] Metabolic Syndrome & Belly Fat  [x] Facts on Fat  [x] Diseases of Our Times-Part 2  [x] Biology of Weight Control  [x] Biomechanical Limitations  [x] Nurtition Action Plan   Completed Videos/Date      10/8/2020  Overview of The Pritikin Eating Plan Completed Videos/Date Completed Videos/Date Recommended Educational Videos Completed    [] Yes      [] No    **If not completed, Why? **          Smoking Cessation/Relaspe Prevention Intervention needed? [] Yes      [x] No  *Pt verbalizes and agrees to attend intervention Smoking Cessation/Relapse Prevention Scheduled? [] Yes      [] No  Date:  ** Smoking Cessation/Relapse Prevention completed? [] Yes      [] No  Date: **    Smoking Cessation Counseling attended  [] Yes      [] No  **If not completed, Why? **   Professional Referrals:  *Please check if needed  [] Diabetes Clinic  [] Lipid Clinic   [] Other:     Professional Referrals:  *Please check if needed  [] Diabetes Clinic  [] Lipid Clinic   [] Other:   Preventative Medication Preventative Medication Preventative Medication Preventative Medication Preventative Medication   Aspirin  [x] Yes    [] No  Blood Thinner: Clopidogrel/Effient/Brillinta  [] Yes    [] No  Beta Blocker  [x] Yes    [] No  Ace Inhibitor  [] Yes    [x] No  Statin/Lipid Lowering  [x] Yes    [] No Medication Changes? [] Yes    [] No Medication Changes? [] Yes    [] No Medication Changes? [] Yes    [] No Medication Changes?   [] Yes    [] No   *Education* *Education* *Education* *Education* *Education*   Does Brenton SOTOMAYOR video education sessions  [x] Takes medications as prescribed 100% of the time   [] ** Brenton SOTOMAYOR achieved risk factor goals?   [] Yes    [] No  If no, why?  **     Monitored telemetry has revealed NSR w/ PVCs   Monitored telemetry has revealed **  [] documented arrhythmia at increasing workloads  [] associated symptoms ** Monitored telemetry has revealed  [] documented arrhythmia at increasing workloads  [] associated symptoms ** Monitored telemetry has revealed **  [] documented arrhythmia at increasing workloads  [] associated symptoms ** Monitored telemetry has revealed **  [] documented arrhythmia at increasing workloads  [] associated symptoms **   Physician Response    [x] Cardiac rehab is reasonably and medically necessary for continuous cardiac monitoring surveillance  of patient's cardiac activity  [x] Initiate continuous telemerty monitoring and notify me with any concerns  [] Other   Physician Response    [x] Cardiac rehab is reasonably and medically necessary for continuous cardiac monitoring surveillance  of patient's cardiac activity  [x] Continue continuous telemerty monitoring and notify me with any concerns  [] Other     Physician Response    [x] Cardiac rehab is reasonably and medically necessary for continuous cardiac monitoring surveillance  of patient's cardiac activity  [x] Continue continuous telemerty monitoring and notify me with any concerns   [] Other     Physician Response    [x] Cardiac rehab is reasonably and medically necessary for continuous cardiac monitoring surveillance  of patient's cardiac activity  [x] Continue continuous telemerty monitoring and notify me with any concerns   [] Other

## 2020-10-08 NOTE — PROGRESS NOTES
Video Education Report - ICR/CR    Name:  Ilsa Vossnicholas     Date:  10/8/2020  MRN: [id number]     Session #:  1  Session Length: 45 min    Recommended Videos        []01 Pritikin [employer] - Program Overview   34:22    [x]02 Overview of Pritikin Eating Plan   34:10    []03 Becoming a [employer]rey Rolling   33:08     []04 Diseases of Our Time - Part 1   34:22    []05 Calorie Density     33:39   []06 Label Reading - Part 1    32:15   []07 Move it      32.54   []08 Healthy Minds, Bodies, Hearts   32:14   []09 Dining Out - Part 1    32:28   []10 Heart Disease Risk Reduction   61:15   []03 Metabolic Syndrome and Belly Fat  31:52   []12 Facts on Fat     35:29   []13 Diseases of Our Time - Part 2   33:07   []14 Biology of Weight Control   32:36   []15 Biomechanical Limitations   35:20   []16 Nutrition Action Plan    34:23        Comments:  Video and program orientation completed.

## 2020-10-12 NOTE — PROGRESS NOTES
[facility] Facility-Based Program  Phase 2 Cardiac Rehab Weekly Progress Report      Patient prescribed exercise:  9:00 class. 3 times per week in rehab, 1-4 times per week at home for the amount of sessions/weeks specified by insurance.     Brenton's first day of cardiac rehab is 10/13/2020

## 2020-10-13 ENCOUNTER — HOSPITAL ENCOUNTER (OUTPATIENT)
Dept: CARDIAC REHAB | Age: 80
Setting detail: THERAPIES SERIES
Discharge: HOME | End: 2020-10-13
Payer: MEDICARE

## 2020-10-13 PROCEDURE — G0423 INTENS CARDIAC REHAB NO EXER: HCPCS

## 2020-10-13 PROCEDURE — G0422 INTENS CARDIAC REHAB W/EXERC: HCPCS

## 2020-10-14 ENCOUNTER — TELEPHONE (OUTPATIENT)
Dept: CARDIOLOGY CLINIC | Age: 80
End: 2020-10-14

## 2020-10-15 ENCOUNTER — OFFICE VISIT (OUTPATIENT)
Dept: PULMONOLOGY | Age: 80
End: 2020-10-15
Payer: MEDICARE

## 2020-10-15 ENCOUNTER — HOSPITAL ENCOUNTER (OUTPATIENT)
Dept: CARDIAC REHAB | Age: 80
Setting detail: THERAPIES SERIES
End: 2020-10-15
Payer: MEDICARE

## 2020-10-15 ENCOUNTER — TELEPHONE (OUTPATIENT)
Dept: PULMONOLOGY | Age: 80
End: 2020-10-15

## 2020-10-15 ENCOUNTER — APPOINTMENT (OUTPATIENT)
Dept: CARDIAC REHAB | Age: 80
End: 2020-10-15
Payer: MEDICARE

## 2020-10-15 VITALS
WEIGHT: 158.6 LBS | BODY MASS INDEX: 23.49 KG/M2 | DIASTOLIC BLOOD PRESSURE: 72 MMHG | OXYGEN SATURATION: 92 % | HEIGHT: 69 IN | TEMPERATURE: 98.3 F | SYSTOLIC BLOOD PRESSURE: 130 MMHG | HEART RATE: 64 BPM

## 2020-10-15 PROCEDURE — G8926 SPIRO NO PERF OR DOC: HCPCS | Performed by: NURSE PRACTITIONER

## 2020-10-15 PROCEDURE — 1123F ACP DISCUSS/DSCN MKR DOCD: CPT | Performed by: NURSE PRACTITIONER

## 2020-10-15 PROCEDURE — 1111F DSCHRG MED/CURRENT MED MERGE: CPT | Performed by: NURSE PRACTITIONER

## 2020-10-15 PROCEDURE — G8484 FLU IMMUNIZE NO ADMIN: HCPCS | Performed by: NURSE PRACTITIONER

## 2020-10-15 PROCEDURE — 94618 PULMONARY STRESS TESTING: CPT | Performed by: NURSE PRACTITIONER

## 2020-10-15 PROCEDURE — 3023F SPIROM DOC REV: CPT | Performed by: NURSE PRACTITIONER

## 2020-10-15 PROCEDURE — 1036F TOBACCO NON-USER: CPT | Performed by: NURSE PRACTITIONER

## 2020-10-15 PROCEDURE — G8427 DOCREV CUR MEDS BY ELIG CLIN: HCPCS | Performed by: NURSE PRACTITIONER

## 2020-10-15 PROCEDURE — G8420 CALC BMI NORM PARAMETERS: HCPCS | Performed by: NURSE PRACTITIONER

## 2020-10-15 PROCEDURE — 4040F PNEUMOC VAC/ADMIN/RCVD: CPT | Performed by: NURSE PRACTITIONER

## 2020-10-15 PROCEDURE — 94664 DEMO&/EVAL PT USE INHALER: CPT | Performed by: NURSE PRACTITIONER

## 2020-10-15 PROCEDURE — 99215 OFFICE O/P EST HI 40 MIN: CPT | Performed by: NURSE PRACTITIONER

## 2020-10-15 RX ORDER — UMECLIDINIUM BROMIDE AND VILANTEROL TRIFENATATE 62.5; 25 UG/1; UG/1
1 POWDER RESPIRATORY (INHALATION) DAILY
Qty: 30 PUFF | Refills: 2 | Status: SHIPPED | OUTPATIENT
Start: 2020-10-15 | End: 2020-10-16 | Stop reason: SDUPTHER

## 2020-10-15 RX ORDER — ALBUTEROL SULFATE 90 UG/1
2 AEROSOL, METERED RESPIRATORY (INHALATION) 4 TIMES DAILY PRN
Qty: 1 INHALER | Refills: 5 | Status: SHIPPED | OUTPATIENT
Start: 2020-10-15 | End: 2020-10-21

## 2020-10-15 ASSESSMENT — ENCOUNTER SYMPTOMS
NAUSEA: 0
WHEEZING: 0
SHORTNESS OF BREATH: 1
EYES NEGATIVE: 1
DIARRHEA: 0
ABDOMINAL PAIN: 0
VOMITING: 0
CHEST TIGHTNESS: 0
COUGH: 0

## 2020-10-15 NOTE — TELEPHONE ENCOUNTER
Ok, have patient still use the sample daily until gone.  I sent script for Bevespi to see if any cheaper - if this is affordable, start after completing the sample of anoro

## 2020-10-15 NOTE — TELEPHONE ENCOUNTER
Patient and wife went to pharmacy to  medication (Anoro) and it is over 200 dollars. They are requesting an alternative medication. Please advise.

## 2020-10-15 NOTE — PROGRESS NOTES
[facility]     Patient: Lisa Poe, 68 y.o.   : 1940  10/15/2020    Pt of Dr. Brandi Llanos   Patient presents with    Follow-up     copd pt was in-house - Shoaib Proper 4 week f/u with pft 10/6/20    Other     Neck 15.5            mp 3        HPI  Brenton SOTOMAYOR is here for hospital follow up for hypoxic respiratory failure s/p TAVR  Accompanied by his wife  New patient to clinic, seen by Dr Eliana Richards MD and LORETA Webber ( resident) in hospital   PMH COPD on chart, no PFTs in Epic, Centrilobular emphysema and lung nodules noted on cardiac CT   sats dropped at low at 88%, but were consistently staying there,, discharged on 3LPM NC ATC   Not currently on inhalers for his COPD, does report he sometimes gets treated for flare ups with oral steroids from his PCP   [de-identified] [facility] smoking history, quit 20 years ago. Retired from [employer] in [email or web address]   Currently in [facility] in [address] [place] [address]  chronic intermittent cough and SOB with strenuous activities. No SOB at rest, or with slow paced walking . Does his SOB has gradually been getting worse over the years. He has trouble lifting any weight, going up stairs/ inclines/ push mowing lawn, walking fast paced. No difficulties bathing, dressing, feeding himself     MMRC Grade 1:  I get short of breath when hurrying on the level ground, or walking up a slight hill    Reports was treated w/ atb and steroids in August for productive cough. - took 1 month before feeling better .     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    Elevated glucose     Hyperlipidemia     Hypertension     Osteoarthritis     Osteopenia     Osteopenia     Polio   Rheumatic fever 1948     SURGICAL HISTORY:  Past Surgical History:   Procedure Laterality Date    BALLOON ANGIOPLASTY, ARTERY  2018    s/p Left CFA, SFA and popliteal intervention    FACIAL COSMETIC SURGERY      as a kid    HAND SURGERY Right     carpal tunnel     TONSILLECTOMY AND ADENOIDECTOMY       SOCIAL HISTORY:  Social History     Tobacco Use    Smoking status: Former Smoker     Packs/day: 1.50     Years: 50.00     Pack years: 75.00     Types: Cigarettes     Last attempt to quit: 10/9/2004     Years since quittin.0    Smokeless tobacco: Never Used   Substance Use Topics    Alcohol use: Yes     Alcohol/week: 30.0 standard drinks     Types: 30 Cans of beer per week     Comment: 6 beers per day     Drug use: No     ALLERGIES:  Allergies   Allergen Reactions    Brilinta [Ticagrelor] Shortness Of Breath    Keflex [Cephalexin] Rash     FAMILY HISTORY:  Family History   Problem Relation Age of Onset    Heart Disease Father     Arthritis Maternal Grandmother      CURRENT MEDICATIONS:  Current Outpatient Medications   Medication Sig Dispense Refill    umeclidinium-vilanterol (ANORO ELLIPTA) 62.5-25 MCG/INH AEPB inhaler Inhale 1 puff into the lungs daily 30 puff 2    albuterol sulfate HFA (VENTOLIN HFA) 108 (90 Base) MCG/ACT inhaler Inhale 2 puffs into the lungs 4 times daily as needed for Wheezing 1 Inhaler 5    metoprolol succinate (TOPROL XL) 25 MG extended release tablet Take 1 tablet by mouth daily 30 tablet 3    spironolactone (ALDACTONE) 25 MG tablet Take 0.5 tablets by mouth daily 30 tablet 3    aspirin 81 MG chewable tablet Take 1 tablet by mouth daily 30 tablet 3    nitroGLYCERIN (NITROSTAT) 0.4 MG SL tablet up to max of 3 total doses.  If no relief after 1 dose, call 911. 25 tablet 1    clopidogrel (PLAVIX) 75 MG tablet Take 1 tablet by mouth daily 30 tablet 3    lovastatin (MEVACOR) 20 MG tablet Take 1 tablet by mouth nightly 90 tablet 1    diclofenac (VOLTAREN) 75 MG EC tablet Take 1 tablet by mouth 2 times daily as needed for Pain 180 tablet 0     No current facility-administered medications for this visit. Dale DUMAS   Review of Systems   Constitutional: Positive for fatigue (fatigues easily with actvity ). Negative for activity change, appetite change, chills and fever. HENT: Negative. Eyes: Negative. Respiratory: Positive for shortness of breath. Negative for cough, chest tightness and wheezing. Cardiovascular: Negative for chest pain, palpitations and leg swelling. Gastrointestinal: Negative for abdominal pain, diarrhea, nausea and vomiting. Genitourinary: Negative. Musculoskeletal: Negative. Skin: Negative. Neurological: Negative. Hematological: Does not bruise/bleed easily. Psychiatric/Behavioral: Negative for sleep disturbance and suicidal ideas. Physical exam   /72 (Site: Left Upper Arm, Position: Sitting, Cuff Size: Medium Adult)   Pulse 64   Temp 98.3 °F (36.8 °C)   Ht 5' 9\" (1.753 m)   Wt 158 lb 9.6 oz (71.9 kg)   SpO2 92% Comment: room air at rest  BMI 23.42 kg/m²      Wt Readings from Last 3 Encounters:   10/15/20 158 lb 9.6 oz (71.9 kg)   10/08/20 158 lb (71.7 kg)   09/21/20 162 lb 9.6 oz (73.8 kg)     Physical Exam  Vitals signs and nursing note reviewed. Constitutional:       General: He is not in acute distress. Appearance: Normal appearance. He is well-developed. Interventions: Face mask in place. Comments: Cloth face mask on due to COVID    HENT:      Mouth/Throat:      Lips: Pink. Mouth: Mucous membranes are moist.      Pharynx: Oropharynx is clear. No oropharyngeal exudate or posterior oropharyngeal erythema. Eyes:      Conjunctiva/sclera: Conjunctivae normal.   Neck:      Vascular: No JVD. Cardiovascular:      Rate and Rhythm: Normal rate and regular rhythm. Heart sounds: No murmur. No friction rub.    Pulmonary:      Effort: Pulmonary effort is normal. No accessory muscle usage technology. **     CHEST: There are severe centrilobular emphysematous changes of the lungs bilaterally. There is no pulmonary infiltrate or consolidation. There is no pleural effusion or pneumothorax. A calcified granuloma is seen at the left lung apex. There is some atelectasis near the medial left lung base. The pulmonary artery is normal. No pulmonary emboli are noted. There are many prominent mediastinal lymph nodes which are less than a centimeter in short axis. There are no pathologically enlarged lymph nodes based off size criteria. Six Minute Walk Test  Tad Longoria 1940    Six minute walk test done in my office today by my medical assistant. Brenton SOTOMAYOR's oxygen saturation at rest on room air was 95%. His oxygen saturation dropped to 82% on room air with exertion after walking 216 feet. The six minute walk test was repeated with oxygen supplementation. Oxygen supplimentation was started with 1 LPM via nasal cannula and titrated to 3 LPM via nasal cannula. At the end of the test Brenton Valentin oxygen saturation remained at 95% on 3 LPM with exertion. He is mobile in the home and requires oxygen as outlined above. Patient ambulated a total of 864    Feet with oxygen,  and tolerated the walk well with no c/o SOB and no events. Resting heart rate was  68  and  75   upon completion of the walk. Nasal Oxygen order:  3 lpm to be used with:  Walking Yes Sleep No Continuous No    DME Medical Necessity Documentation    Brenton SOTOMAYOR was seen in the office on 10/15/2020 for the diagnosis COPD. I am prescribing oxygen because the diagnosis and testing requires the patient to have oxygen in the home. his condition will improve or be benefited by oxygen use. The patient is able to perform good mobility in a home setting and therefore does require the use of a portable oxygen system. Assessment      Diagnosis Orders   1.  COPD, moderate ([address]  6 Minute Walk Test    DME Order for Home Oxygen as OP    Pulse oximetry, overnight   2. Chronic respiratory failure with hypoxia (HCC)     3. SOB (shortness of breath) on exertion     4. Centrilobular emphysema ([address]     5. S/P TAVR (transcatheter aortic valve replacement)     6. S/P cardiac cath     7. Former smoker, stopped smoking in distant past         COPD, untreated/ uncontrolled. Chronic respiratory failure - due to COPD/emphysema    In- check dial G16  Used in office today to assess patient's inspiratory flow and clinical efficacy of flow range with inhaler use. Results were as follows:  mimicking ellipta device his insp flow was at 50L , which was in green      Plan   -6 min walk completed- still requiring oxygen but only with activity- (see above)- order updated for [facility]  -will schedule overnight pulse ox on RA to evaluate if any nocturnal hypoxia  -start Anoro Ellipta, was given one sample in office today, educated on proper uses of ellipta with use of demo inhaler. Educated on possible side effects ( he denies any history of glaucoma)   -add PRN albuterol sulfate, educated on PRN use. Note for pharmacy to do teaching on proper use of device dispensed   -keep f/u's with cardiology and continue Rehab as instructed. Will see Dedrick Few in: 1 month for med check      Total time spent interviewing the patient, evaluating lab data and X-ray data and processing orders was 60 min . I personally spent more than 50% of the appointment time face to face with the patient providing counseling and coordinating the patient's care.       Electronically signed by LEWIS Ochoa CNP on 10/15/2020 at 10:25 AM

## 2020-10-16 ENCOUNTER — HOSPITAL ENCOUNTER (OUTPATIENT)
Age: 80
Discharge: HOME | End: 2020-10-16
Payer: MEDICARE

## 2020-10-16 ENCOUNTER — HOSPITAL ENCOUNTER (OUTPATIENT)
Dept: RESPIRATORY THERAPY | Age: 80
Discharge: HOME | End: 2020-10-16
Payer: MEDICARE

## 2020-10-16 ENCOUNTER — HOSPITAL ENCOUNTER (OUTPATIENT)
Dept: NON INVASIVE DIAGNOSTICS | Age: 80
Discharge: HOME | End: 2020-10-16
Payer: MEDICARE

## 2020-10-16 LAB
ANION GAP SERPL CALCULATED.3IONS-SCNC: 10 MEQ/L (ref 8–16)
BUN BLDV-MCNC: 18 MG/DL (ref 7–22)
CALCIUM SERPL-MCNC: 9.7 MG/DL (ref 8.5–10.5)
CHLORIDE BLD-SCNC: 103 MEQ/L (ref 98–111)
CO2: 27 MEQ/L (ref 23–33)
CREAT SERPL-MCNC: 0.9 MG/DL (ref 0.4–1.2)
ERYTHROCYTE [DISTWIDTH] IN BLOOD BY AUTOMATED COUNT: 12.6 % (ref 11.5–14.5)
ERYTHROCYTE [DISTWIDTH] IN BLOOD BY AUTOMATED COUNT: 43.3 FL (ref 35–45)
GFR SERPL CREATININE-BSD FRML MDRD: 82 ML/MIN/1.73M2
GLUCOSE BLD-MCNC: 100 MG/DL (ref 70–108)
HCT VFR BLD CALC: 44.6 % (ref 42–52)
HEMOGLOBIN: 14.7 GM/DL (ref 14–18)
LV EF: 58 %
LVEF MODALITY: NORMAL
MCH RBC QN AUTO: 30.8 PG (ref 26–33)
MCHC RBC AUTO-ENTMCNC: 33 GM/DL (ref 32.2–35.5)
MCV RBC AUTO: 93.5 FL (ref 80–94)
PLATELET # BLD: 222 THOU/MM3 (ref 130–400)
PMV BLD AUTO: 10 FL (ref 9.4–12.4)
POTASSIUM SERPL-SCNC: 5.5 MEQ/L (ref 3.5–5.2)
RBC # BLD: 4.77 MILL/MM3 (ref 4.7–6.1)
SODIUM BLD-SCNC: 140 MEQ/L (ref 135–145)
WBC # BLD: 9.7 THOU/MM3 (ref 4.8–10.8)

## 2020-10-16 PROCEDURE — 94762 N-INVAS EAR/PLS OXIMTRY CONT: CPT

## 2020-10-16 PROCEDURE — 93306 TTE W/DOPPLER COMPLETE: CPT

## 2020-10-16 PROCEDURE — 85027 COMPLETE CBC AUTOMATED: CPT

## 2020-10-16 PROCEDURE — 80048 BASIC METABOLIC PNL TOTAL CA: CPT

## 2020-10-16 PROCEDURE — 36415 COLL VENOUS BLD VENIPUNCTURE: CPT

## 2020-10-16 RX ORDER — UMECLIDINIUM BROMIDE AND VILANTEROL TRIFENATATE 62.5; 25 UG/1; UG/1
1 POWDER RESPIRATORY (INHALATION) DAILY
Qty: 30 PUFF | Refills: 2 | Status: SHIPPED | OUTPATIENT
Start: 2020-10-16 | End: 2020-11-03 | Stop reason: SDUPTHER

## 2020-10-16 NOTE — TELEPHONE ENCOUNTER
Pt's wife called, Emeli Matthews is more costly, they want to go back to [facility] for the 30 days as was dicussed per wife. New script is needed.

## 2020-10-19 ENCOUNTER — TELEPHONE (OUTPATIENT)
Dept: PULMONOLOGY | Age: 80
End: 2020-10-19

## 2020-10-19 ENCOUNTER — TELEPHONE (OUTPATIENT)
Dept: CARDIOLOGY CLINIC | Age: 80
End: 2020-10-19

## 2020-10-19 ENCOUNTER — OFFICE VISIT (OUTPATIENT)
Dept: CARDIOTHORACIC SURGERY | Age: 80
End: 2020-10-19

## 2020-10-19 VITALS
SYSTOLIC BLOOD PRESSURE: 129 MMHG | WEIGHT: 154 LBS | OXYGEN SATURATION: 92 % | HEART RATE: 69 BPM | BODY MASS INDEX: 23.34 KG/M2 | DIASTOLIC BLOOD PRESSURE: 67 MMHG | HEIGHT: 68 IN

## 2020-10-19 PROCEDURE — 99024 POSTOP FOLLOW-UP VISIT: CPT | Performed by: PHYSICIAN ASSISTANT

## 2020-10-19 RX ORDER — METOPROLOL SUCCINATE 25 MG/1
25 TABLET, EXTENDED RELEASE ORAL DAILY
Qty: 30 TABLET | Refills: 0 | Status: SHIPPED | OUTPATIENT
Start: 2020-10-19 | End: 2021-02-24

## 2020-10-19 RX ORDER — CLOPIDOGREL BISULFATE 75 MG/1
75 TABLET ORAL DAILY
Qty: 90 TABLET | Refills: 1 | Status: SHIPPED | OUTPATIENT
Start: 2020-10-19 | End: 2020-10-21

## 2020-10-19 RX ORDER — CLOPIDOGREL BISULFATE 75 MG/1
75 TABLET ORAL DAILY
Qty: 30 TABLET | Refills: 0 | Status: SHIPPED | OUTPATIENT
Start: 2020-10-19 | End: 2021-02-03 | Stop reason: SDUPTHER

## 2020-10-19 RX ORDER — METOPROLOL SUCCINATE 25 MG/1
25 TABLET, EXTENDED RELEASE ORAL DAILY
Qty: 30 TABLET | Refills: 3 | Status: SHIPPED | OUTPATIENT
Start: 2020-10-19 | End: 2020-10-21

## 2020-10-19 NOTE — PROGRESS NOTES
Hospital Facility-Based Program  Phase 2 Cardiac Rehab Weekly Progress Report      Patient prescribed exercise:  9:00 class. 3 times per week in rehab, 1-4 times per week at home for the amount of sessions/weeks specified by insurance. Current Levels: Treadmill: 1. 5mph/0% for 5 minutes x 2, Schwinn Airdyne: Level 6 for 5 minutes,UBE: Level 0.3 for 5 minutes. Progression Discussion: Maintain/Increase Aerobic exercise 20 minutes to work on endurance. Attempt to increase intensity by 5-20% for each modality this week. Try to increase intensities until Brenton SOTOMAYOR rates the exercises a 13-17 on Darcy RPE.

## 2020-10-19 NOTE — TELEPHONE ENCOUNTER
[id number]  Lizet Sunshine  10/19/2020    The following questions refer to your heart failure and how it may affect your life. Please read and complete the following questions. There is no right or wrong answers. Please evangelista the answer that best applies to you. 1. Heart failure affects different people in different ways. Some feel shortness of breath while others feel fatigue. Please indicate how much you are limited by heart failure (shortness of breath or fatigue) in your ability to do the following activities over the past 2 weeks. Activity Extremely Limited Quite a bit limited Moderatly Limited Slightly Limited Not at all Limited Limited for other reasons/ did not do the activity   Showering/ Bathing          x    Walking 1 block on Level ground     x    Hurrying or jogging (as if to catch a bus)      x        1         2       3  4       5   6     2. Over the past 2 weeks, how many times did you have swelling in your feet, ankles or legs when you woke up in the morning? Every morning 3 or more times per week, but not every day 1-2 times per week Less than once a week Never over the past 2 weeks         x   `        1          2   3       4                     5           3. Over the past 2 weeks, on average, how many times has fatigue limited your ability to do what you wanted? All of the time Several times per day At least once a day 3 or more times per week 1-2 times per week Less than once a week Never over the past 2 weeks           x            1        2     3  4        5       6  7        4. Over the past 2 weeks, on average, how many times has shortness of breath limited your ability to do what you    wanted? All of the time Several times per day At least once a day 3 or more times per week 1-2 times per week Less than once a week Never over the past 2 weeks           x            1        2     3  4         5       6  7     5.  Over the past 2 weeks, on average, how many times have you been forced to sleep sitting up in a chair or with at least 3 pillows to prop you up because of shortness of breath? Every night 3 or more times per week, but not every day 1-2 times per week Less than once a week Never over the past 2 weeks         x   `        1          2   3       4                     5        6.  Over the past 2 weeks, how much has your heart failure limited your enjoyment of life? It has extremely limited my enjoyment of life It has limited my enjoyment of life quite a bit It has moderately limited my enjoyment of life It has slightly limited my enjoyment of life It has not limited my enjoyment of life         x         1          2   3       4           5     7.  If you had to spend the rest of your life with your heart failure the way it is right now, how would you feel about this? Not at all satisfied Mostly dissatisfied Somewhat satisfied Mostly satisfied Completely satisfied         x              1                         2                            3                        4                            5        8. How much does your heart failure affect your lifestyle?  Please indicate how your heart failure may have limited your participation in the following activities over the past 2 weeks    Activity Severely limited Limited quite a bit Moderately limited Slightly limited Did not limit at all Does not apply or did not do this activity   Hobbies, recreational activities     x    Working or doing household chores     x    Visiting family or friends out of your home     x                1      2   3         4       5            6             Meter Walk Test    Time 1: 10 seconds  Time 2: 10.4 seconds  Time 3: 10.8 seconds

## 2020-10-19 NOTE — TELEPHONE ENCOUNTER
Please call patient to let him know the results of his overnight pulse ox shows that his oxygen is dropping at night, and see if he can come in any earlier than previously scheduled to go over the results with me in office.  He will need SAQLI and EPWORTH completed at f/u

## 2020-10-19 NOTE — PROGRESS NOTES
Structural Heart/Cardiology Follow up    10/19/2020 12:13 PM    Patient's Name/Date of Birth: Juan Riggins / [birth year] (68 y.o.)    PCP: Sonia De Leon MD      Date: October 19, 2020     HPI  We had the pleasure of seeing Juan Riggins in the office today, as you know this is a very pleasant 68y.o. year old male with a history of aortic stenosis who underwent a successful TAVR on 9/16/20. Per discharge note, \"Pt required 3L O2 NC for pulse ox 82 on RA. CVP was checked and found to be 5 this am.  Pulmonary consult placed and Home O2 Eval ordered. \" He is here for his post TAVR 30 day follow up. ECHO obtained on 10/16/20 showed EF 55-60%. \"S/p TAVR. DOPPLER: Transaortic velocity was within the normal range with no evidence of aortic stenosis (mean 7 mmHg, EOA 1.2 cm2, V max 1.8 m/s). There was trace perivalvular aortic regurgitation. The pt is doing well no major complaints. The pt denies chest pressure, palpitations, SOB, fever, chills, N/V/D. PastMedical History:  Jayson Whittington  has a past medical history of ASCVD (arteriosclerotic cardiovascular disease), Cerebral artery occlusion with cerebral infarction ([address] COPD (chronic obstructive pulmonary disease) ([address] Dysplasia of vocal cord, Elevated glucose, Hyperlipidemia, Hypertension, Osteoarthritis, Osteopenia, Osteopenia, Polio, and Rheumatic fever. Past Surgical History:  The patient  has a past surgical history that includes Tonsillectomy and adenoidectomy; Facial cosmetic surgery; Balloon angioplasty, artery (07/2018); and Hand surgery (Right). Allergies: The patient is allergic to brilinta [ticagrelor] and keflex [cephalexin].     Medications:    Current Outpatient Medications:     umeclidinium-vilanterol (ANORO ELLIPTA) 62.5-25 MCG/INH AEPB inhaler, Inhale 1 puff into the lungs daily, Disp: 30 puff, Rfl: 2    albuterol sulfate HFA (VENTOLIN HFA) 108 (90 Base) MCG/ACT inhaler, Inhale 2 puffs into the lungs 4 times daily as needed for Wheezing, Disp: 1 Inhaler, Rfl: 5    metoprolol succinate (TOPROL XL) 25 MG extended release tablet, Take 1 tablet by mouth daily, Disp: 30 tablet, Rfl: 3    spironolactone (ALDACTONE) 25 MG tablet, Take 0.5 tablets by mouth daily, Disp: 30 tablet, Rfl: 3    aspirin 81 MG chewable tablet, Take 1 tablet by mouth daily, Disp: 30 tablet, Rfl: 3    nitroGLYCERIN (NITROSTAT) 0.4 MG SL tablet, up to max of 3 total doses. If no relief after 1 dose, call 911., Disp: 25 tablet, Rfl: 1    clopidogrel (PLAVIX) 75 MG tablet, Take 1 tablet by mouth daily, Disp: 30 tablet, Rfl: 3    lovastatin (MEVACOR) 20 MG tablet, Take 1 tablet by mouth nightly, Disp: 90 tablet, Rfl: 1    diclofenac (VOLTAREN) 75 MG EC tablet, Take 1 tablet by mouth 2 times daily as needed for Pain, Disp: 180 tablet, Rfl: 0    Family History: This patient's family history includes Arthritis in his maternal grandmother; Heart Disease in his father. Social History:  Venkat SOTOMAYOR  reports that he quit smoking about 16 years ago. His smoking use included cigarettes. He has a 75.00 pack-year smoking history. He has never used smokeless tobacco. He reports current alcohol use of about 30.0 standard drinks of alcohol per week. He reports that he does not use drugs. ROS:   Constitutional: Negative for activity change, chills, fatigue, fever and unexpected weight change. Respiratory: Negative for apnea, shortness of breath, wheezing and stridor. Cardiovascular: Negative for chest pain, palpitations and leg swelling. Gastrointestinal: Negative for hematochezia, melana, constipation, and N/V/D. Musculoskeletal: Negative for myalgias  Skin: Negative for color change, rash and wound. Neurological: Negative for dizziness or syncope.       Vitals:    10/19/20 1232   BP: 129/67   Pulse: 69   SpO2: 92%   Weight: 154 lb (69.9 kg)   Height: 5' 8\" (1.727 m)       Physical Exam:   General Appearance: alert ,conversing, in no acute distress  Cardiovascular: normal rate, regular rhythm, normal S1 and S2, no murmurs, rubs, clicks, or gallops  Pulmonary/Chest: clear to auscultation bilaterally- no wheezes, rales or rhonchi, normal air movement, no respiratory distress  Abdomen:soft, non-tender, non-distended, normal bowel sounds, no bruits,   Extremities: no cyanosis, clubbing or edema. Pulses: Bilateral radial, femoral, DP, and PT pulses intact. No femoral bruits noted. Skin: warm and dry, no rash or erythema  Head: normocephalic and atraumatic  Neck: supple and non-tender without mass, no thyromegaly, no JVD   Musculoskeletal: normal range of motion, no joint swelling, deformity or tenderness. Neurological: alert, oriented, normal speech, no focal findings or movement disorder noted. Groin: Wounds healing appropriately. No signs of infection or hematoma. IMAGING  Narrative & Impression      Transthoracic Echocardiography Report (TTE)      Demographics      Patient Name  Maliha Chowdhury Gender             Male                 L      MR #          [id number]      Race                                                  Ethnicity      Account #     [de-identified]      Room Number      Accession     [id number]     Date of Study      10/16/2020   Number      Date of Birth 1940     Referring          Maite Pak MD                                Physician          Angelica Louis MD      Age           68 year(s)     Sonographer        FRANC Carter, ROBERTO,                                                   MOOSEMS, RVT                                   Interpreting       Angelica Louis MD                                Physician     Procedure     Type of Study      TTE procedure:ECHOCARDIOGRAM COMPLETE 2D W DOPPLER W COLOR.      Procedure Date  Date: 10/16/2020 Start: 11:20 AM     Study Location: Echo Lab  Technical Quality: Adequate visualization     Indications:Post TAVR 30 days.     Additional Medical History:chronic obstructive pulmonary disease, TAVR,  hypertension, aortic stenosis, hyperlipidemia, osteoarthritis     Patient Status: Routine     Height: 69 inches Weight: 158 pounds BSA: 1.87 m^2 BMI: 23.33 kg/m^2     BP: 130/72 mmHg     Allergies    - Other allergy:(Keflex).       - Other allergy:(Brilinta and Keflex).      Conclusions      Summary   Normal left ventricular size and systolic function. There were no regional wall motion abnormalities. Wall thickness was within normal limits. Ejection fraction was estimated at 55-60%. Doppler parameters were consistent with abnormal left ventricular   relaxation (grade 1 diastolic dysfunction). Left atrial size was severely dilated. S/p TAVR. DOPPLER: Transaortic velocity was within the normal range with   no evidence of aortic stenosis (mean 7 mmHg, EOA 1.2 cm2, V max 1.8 m/s). There was trace perivalvular aortic regurgitation. There was trace mitral regurgitation. IVC size is within normal limits with normal respiratory phasic changes. Signature      ----------------------------------------------------------------   Electronically signed by Jessi Byrne MD (Interpreting   physician) on 10/16/2020 at 02:57 PM   ----------------------------------------------------------------      Findings      Mitral Valve   The mitral valve structure was normal with normal leaflet separation. DOPPLER: The transmitral velocity was within the normal range with no   evidence for mitral stenosis. There was trace mitral regurgitation. Aortic Valve   S/p TAVR. DOPPLER: Transaortic velocity was within the normal range with   no evidence of aortic stenosis (mean 7 mmHg, EOA 1.2 cm2, V max 1.8 m/s). There was trace perivalvular aortic regurgitation. Tricuspid Valve   The tricuspid valve structure was normal with normal leaflet separation. DOPPLER: There was no evidence of tricuspid stenosis. There was no   evidence of tricuspid regurgitation.       Pulmonic Valve   The pulmonic valve leaflets were not well seen. DOPPLER: The transpulmonic   velocity was within the normal range with no evidence for regurgitation. Left Atrium   Left atrial size was severely dilated. Left Ventricle   Normal left ventricular size and systolic function. There were no regional wall motion abnormalities. Wall thickness was within normal limits. Ejection fraction was estimated at 55-60%. Doppler parameters were consistent with abnormal left ventricular   relaxation (grade 1 diastolic dysfunction). Right Atrium   Right atrial size was normal.      Right Ventricle   The right ventricular size was normal with normal systolic function and   wall thickness. Pericardial Effusion   The pericardium was normal in appearance with no evidence of a pericardial   effusion. Pleural Effusion   No evidence of pleural effusion. Aorta / Great Vessels   IVC size is within normal limits with normal respiratory phasic changes.      M-Mode/2D Measurements & Calculations      LV Diastolic      LV Systolic Dimension: 3.7 cm    LA Dimension: 4.1 cmLA   Dimension: 5.3 cm LV Volume Diastolic: 976 ml      Area: 22.5 cm^2   LV FS:30.2 %      LV Volume Systolic: 48.1 ml   LV PW Diastolic:  LV EDV/LV EDV Index: 135 ml/72   1.2 cm            m^2LV ESV/LV ESV Index: 32.0   Septum Diastolic: BU/84 m^2                        RV Diastolic Dimension:   1.1 cm            EF Calculated: 57 %              3.3 cm                                                         LA volume/Index: 82.1                     LVOT: 2 cm                       ml /44m^2     Doppler Measurements & Calculations      MV Peak E-Wave:     AV Peak Velocity: 176     LVOT Peak Velocity: 61.4   81.4 cm/s           cm/s                      cm/s   MV Peak A-Wave:     AV Peak Gradient: 12.39   LVOT Mean Velocity: 36.4   98.1 cm/s           mmHg                      cm/s   MV E/A Ratio: 0.83  AV Mean Velocity: 124     LVOT Peak Gradient: 2   MV Peak Gradient: cm/s                      mmHgLVOT Mean Gradient: 1   2.65 mmHg           AV Mean Gradient: 7 mmHg  mmHg                       AV VTI: 40 cm   MV Deceleration     AV Area (Continuity):1.17 TV Peak E-Wave: 39.8 cm/s   Time: 271 msec      cm^2                      TV Peak A-Wave: 37.7 cm/s                          LVOT VTI: 14.9 cm         TV Peak Gradient: 0.63 mmHg                       AV P1/2t: 543 msec                       IVRT: 225 msec            PV Peak Velocity: 43.7 cm/s                                                 PV Peak Gradient: 0.76 mmHg   MR Velocity: 528   cm/s                AV DVI (VTI): 0.37AV DVI                       (Vmax):0.35     [id number]       Assessment:   TAVR F/u office appointment.   S/p TAVR      Plan:   Continue current medical therapy  1 year follow up with Dr. Ramirez Villanueva   Continue plavix past the 3 month evangelista due to coronary stent    The plan of care was discussed in detail with Dr. Ramirez Villanueva and Dr. Giuseppe Padilla

## 2020-10-20 ENCOUNTER — HOSPITAL ENCOUNTER (OUTPATIENT)
Dept: CARDIAC REHAB | Age: 80
Setting detail: THERAPIES SERIES
Discharge: HOME | End: 2020-10-20
Payer: MEDICARE

## 2020-10-20 ENCOUNTER — APPOINTMENT (OUTPATIENT)
Dept: CARDIAC REHAB | Age: 80
End: 2020-10-20
Payer: MEDICARE

## 2020-10-20 ENCOUNTER — OFFICE VISIT (OUTPATIENT)
Dept: PULMONOLOGY | Age: 80
End: 2020-10-20
Payer: MEDICARE

## 2020-10-20 VITALS
HEART RATE: 62 BPM | SYSTOLIC BLOOD PRESSURE: 136 MMHG | WEIGHT: 158 LBS | BODY MASS INDEX: 24.02 KG/M2 | TEMPERATURE: 97.8 F | OXYGEN SATURATION: 93 % | DIASTOLIC BLOOD PRESSURE: 62 MMHG

## 2020-10-20 PROBLEM — G47.34 NOCTURNAL HYPOXIA: Status: ACTIVE | Noted: 2020-10-20

## 2020-10-20 PROBLEM — J96.11 CHRONIC RESPIRATORY FAILURE WITH HYPOXIA (HCC): Chronic | Status: ACTIVE | Noted: 2020-10-20

## 2020-10-20 PROBLEM — J43.8 OTHER EMPHYSEMA (HCC): Status: ACTIVE | Noted: 2020-10-20

## 2020-10-20 PROBLEM — G47.10 HYPERSOMNIA: Status: ACTIVE | Noted: 2020-10-20

## 2020-10-20 PROCEDURE — G8427 DOCREV CUR MEDS BY ELIG CLIN: HCPCS | Performed by: NURSE PRACTITIONER

## 2020-10-20 PROCEDURE — G8484 FLU IMMUNIZE NO ADMIN: HCPCS | Performed by: NURSE PRACTITIONER

## 2020-10-20 PROCEDURE — 1123F ACP DISCUSS/DSCN MKR DOCD: CPT | Performed by: NURSE PRACTITIONER

## 2020-10-20 PROCEDURE — G8926 SPIRO NO PERF OR DOC: HCPCS | Performed by: NURSE PRACTITIONER

## 2020-10-20 PROCEDURE — 1036F TOBACCO NON-USER: CPT | Performed by: NURSE PRACTITIONER

## 2020-10-20 PROCEDURE — 3023F SPIROM DOC REV: CPT | Performed by: NURSE PRACTITIONER

## 2020-10-20 PROCEDURE — 99214 OFFICE O/P EST MOD 30 MIN: CPT | Performed by: NURSE PRACTITIONER

## 2020-10-20 PROCEDURE — 4040F PNEUMOC VAC/ADMIN/RCVD: CPT | Performed by: NURSE PRACTITIONER

## 2020-10-20 PROCEDURE — G8420 CALC BMI NORM PARAMETERS: HCPCS | Performed by: NURSE PRACTITIONER

## 2020-10-20 ASSESSMENT — ENCOUNTER SYMPTOMS
DIARRHEA: 0
EYES NEGATIVE: 1
WHEEZING: 0
VOMITING: 0
SHORTNESS OF BREATH: 1
COUGH: 0
CHEST TIGHTNESS: 0
NAUSEA: 0
ABDOMINAL PAIN: 0

## 2020-10-20 NOTE — ASSESSMENT & PLAN NOTE
Differentials for Nocturnal hypoxia include Emphysema vs. JORGE.    Plan: patient declines PSG,  Will start O2 at night at 3LPM and repeat overnight pulse ox on oxygen, if continues to have hypoxia will need PSG to rule out JORGE

## 2020-10-20 NOTE — PATIENT INSTRUCTIONS
Patient Education        Sleep Studies: About This Test  What is it? Sleep studies are tests that watch what happens to your body during sleep. These studies usually are done in a sleep lab. Sleep labs are often located in hospitals. Sleep studies you do at home can be done with portable equipment. But they may not give the same results as a sleep lab. Why is this test done? Sleep studies are done for people who say that sleep isn't restful or that they are tired all day. These studies can help find sleep problems, such as:  · Sleep apnea. This means that an adult regularly stops breathing during sleep for 10 seconds or longer. · Excessive snoring. · Problems staying awake, such as narcolepsy. · Problems with nighttime behaviors. These include sleepwalking, night terrors, bed-wetting, and REM behavior disorders (RBD). · Conditions such as periodic limb movement disorder. This is repeated muscle twitching of the feet, arms, or legs during sleep. · Seizures that occur at night (nocturnal seizures). How do you prepare for the test?  · You may be asked to keep a sleep diary for 1 to 2 weeks before your sleep study. · Don't take any naps for 2 to 3 days before your test.  · You may be asked to avoid food or drinks with caffeine for a day or two before your test.  · Take a shower or bath before your test, but don't use sprays, oils, or gels on your hair. Don't wear makeup, fingernail polish, or fake nails. · Pack and take along a small overnight bag with personal items, such as a toothbrush, a comb, favorite pillows or blankets, and a book. You can wear your own nightclothes. · If you will have portable sleep monitoring, your doctor will explain how to use the equipment at home. How is the test done? · In the sleep lab, you will be in a private room, much like a hotel room. · Small pads or patches called electrodes will be placed on your head and body with a small amount of glue and tape.  These will record things like brain activity, eye movement, oxygen levels, and snoring. · Soft elastic belts will be placed around your chest and belly to measure your breathing. · Your blood oxygen levels will be checked by a small clip (oximeter) placed either on the tip of your index finger or on your earlobe. · If you have sleep apnea, you may wear a mask that is connected to a continuous positive airway pressure (CPAP) machine. · Depending on the type of test, you will be allowed to sleep through the night or you'll be awakened periodically and asked to stay awake for a while. · If you use portable sleep monitoring, follow the instructions your doctor gave you. How long does the test take? · You will stay in the sleep lab overnight. For some tests, you will also stay part of the next day. What happens after the test?  · You will be able to go home right away. · You may not sleep well during the test and may be tired the next day. · You can go back to your usual activities right away. · After your sleep problem has been identified, you may need a second study if your doctor orders treatment such as CPAP. Follow-up care is a key part of your treatment and safety. Be sure to make and go to all appointments, and call your doctor if you are having problems. It's also a good idea to keep a list of the medicines you take. Ask your doctor when you can expect to have your test results. Where can you learn more? Go to [email or web address] and sign in to your [employer] account. Enter V913 in the [employer] box to learn more about \"Sleep Studies: About This Test.\"     If you do not have an account, please click on the \"Sign Up Now\" link. Current as of: February 24, 2020               Content Version: 12.6  © 0279-0839 [employer]. Care instructions adapted under license by [employer] [facility] ([facility]).  If you have questions about a medical condition or this instruction, always ask your healthcare professional. [address] any warranty or liability for your use of this information.

## 2020-10-20 NOTE — PROGRESS NOTES
[facility]     Patient: Jeffrey Reynolds, 68 y.o.   : 1940  10/20/2020    Pt of Dr. Liane Jason   Patient presents with    Follow-up     discuss overnight pulse ox results 10/19/20        HPI  Chika SOTOMAYOR is here to go over his overnight pulse ox testing and PFT for further evaluation of his fatigue and SOB  C/o chronic intermittent cough and SOB with strenuous activities. cough has improved since starting Anoro 1 week ago. OOP cost is high $300 per month. I then sent Sam cartwright to pharmacy and this was same cost.  Pt decided to stay on Anoro due to benefit and paid the $300. He is unsure if in donut hole    No SOB at rest, or with slow paced walking . his SOB has gradually been getting worse over the years, he though having valve surgery on his heart would help but still SOB and fatigues easily   He has trouble lifting any weight, going up stairs/ inclines/ push mowing lawn, walking fast paced. No difficulties bathing, dressing, feeding himself      MMRC Grade 1:  I get short of breath when hurrying on the level ground, or walking up a slight hill    Sleeping habits:  Time to go to bed: 10:30            PM  Time to wake up: 6:00        AM    Sleep History:  Pt with history of: snorting, awakening in the middle of the night because of urination, difficulty falling asleep once awakened, feels sleepy during the day, take naps during the day and/or daytime fatigue and hypertension  Morning headache:No  Dryness of mouth in the morning:Yes  Hx of snoring:Yes  Witnessed apneas:No  Excessive day time sleepiness:Yes. See below for Stanberry score  Hypnogogic Hallucinations:NO  Hypnopompic Hallucinations:NO  Symptoms suggestive of Restless leg syndrome:NO  History of Seizures:NO  Sleep Walking:NO  Sleep Talking:NO  Sleep paralysis: NO  Cataplexy: NO    Stanberry Sleepiness Score:  Total Score:10  SAQLI: 68    Mallampati airway Class:III  Neck Circumference:15.0 Inches      Past Medical hx   PMH:  Past Medical History:   Diagnosis Date    ASCVD (arteriosclerotic cardiovascular disease)     Cerebral artery occlusion with cerebral infarction ([address]     TIA    COPD [facility]chronic obstructive [facility]) ([facility])     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 Lowest de-sat 77%.  Osteoarthritis     Osteopenia     Osteopenia     Polio     Rheumatic fever      SURGICAL HISTORY:  Past Surgical History:   Procedure Laterality Date    BALLOON ANGIOPLASTY, ARTERY  2018    s/p Left CFA, SFA and popliteal intervention    FACIAL COSMETIC SURGERY      as a kid    HAND SURGERY Right     carpal tunnel     TONSILLECTOMY AND ADENOIDECTOMY       SOCIAL HISTORY:  Social History     Tobacco Use    Smoking status: Former Smoker     Packs/day: 1.50     Years: 50.00     Pack years: 75.00     Types: Cigarettes     Last attempt to quit: 10/9/2004     Years since quittin.0    Smokeless tobacco: Never Used   Substance Use Topics    Alcohol use: Yes     Alcohol/week: 30.0 standard drinks     Types: 30 Cans of beer per week     Comment: 6 beers per day     Drug use: No     ALLERGIES:  Allergies   Allergen Reactions    Brilinta [Ticagrelor] Shortness Of Breath    Keflex [Cephalexin] Rash     FAMILY HISTORY:  Family History   Problem Relation Age of Onset    Heart Disease Father     Arthritis Maternal Grandmother      CURRENT MEDICATIONS:  Current Outpatient Medications   Medication Sig Dispense Refill    OXYGEN Please provide patient with 3LPM of oxygen at night time for nocturnal hypoxia.  Patient to have repeat pulse oximetry testing done on oxygen 3 L 0    metoprolol succinate (TOPROL XL) 25 MG extended release tablet Take 1 tablet by mouth daily 30 tablet 0    clopidogrel (PLAVIX) 75 MG tablet Take 1 tablet by mouth daily 30 tablet 0    clopidogrel (PLAVIX) 75 MG tablet Take 1 tablet by mouth daily 90 tablet 1    metoprolol succinate (TOPROL XL) 25 MG extended release tablet Take 1 tablet by mouth daily 30 tablet 3    umeclidinium-vilanterol (ANORO ELLIPTA) 62.5-25 MCG/INH AEPB inhaler Inhale 1 puff into the lungs daily 30 puff 2    albuterol sulfate HFA (VENTOLIN HFA) 108 (90 Base) MCG/ACT inhaler Inhale 2 puffs into the lungs 4 times daily as needed for Wheezing 1 Inhaler 5    spironolactone (ALDACTONE) 25 MG tablet Take 0.5 tablets by mouth daily 30 tablet 3    aspirin 81 MG chewable tablet Take 1 tablet by mouth daily 30 tablet 3    nitroGLYCERIN (NITROSTAT) 0.4 MG SL tablet up to max of 3 total doses. If no relief after 1 dose, call 911. 25 tablet 1    lovastatin (MEVACOR) 20 MG tablet Take 1 tablet by mouth nightly 90 tablet 1    diclofenac (VOLTAREN) 75 MG EC tablet Take 1 tablet by mouth 2 times daily as needed for Pain 180 tablet 0     No current facility-administered medications for this visit. Camilo DUMAS   Review of Systems   Constitutional: Positive for fatigue. Negative for activity change, appetite change, chills, fever and unexpected weight change. HENT: Negative. Eyes: Negative. Respiratory: Positive for shortness of breath. Negative for cough, chest tightness and wheezing. Cardiovascular: Negative for chest pain, palpitations and leg swelling. Gastrointestinal: Negative for abdominal pain, diarrhea, nausea and vomiting. Genitourinary: Negative. Musculoskeletal: Negative. Skin: Negative. Neurological: Negative. Hematological: Does not bruise/bleed easily. Psychiatric/Behavioral: Negative for sleep disturbance and suicidal ideas.         Physical exam   /62 (Site: Right Upper Arm, Position: Sitting, Cuff Size: Medium Adult)   Pulse 62   Temp 97.8 °F (36.6 °C) (Temporal)   Wt 158 lb (71.7 kg) SpO2 93% Comment: r/a  BMI 24.02 kg/m²      Wt Readings from Last 3 Encounters:   10/20/20 158 lb (71.7 kg)   10/19/20 154 lb (69.9 kg)   10/15/20 158 lb 9.6 oz (71.9 kg)     Physical Exam  Vitals signs and nursing note reviewed. Constitutional:       General: He is not in acute distress. Appearance: Normal appearance. He is well-developed. HENT:      Mouth/Throat:      Lips: Pink. Mouth: Mucous membranes are moist.      Pharynx: Oropharynx is clear. No oropharyngeal exudate or posterior oropharyngeal erythema. Eyes:      Conjunctiva/sclera: Conjunctivae normal.   Neck:      Vascular: No JVD. Cardiovascular:      Rate and Rhythm: Normal rate and regular rhythm. Heart sounds: No murmur. No friction rub. Pulmonary:      Effort: Pulmonary effort is normal. No accessory muscle usage or respiratory distress. Breath sounds: Normal breath sounds. No wheezing, rhonchi or rales. Chest:      Chest wall: No tenderness. Musculoskeletal:      Right lower leg: No edema. Left lower leg: No edema. Skin:     General: Skin is warm and dry. Capillary Refill: Capillary refill takes less than 2 seconds. Nails: There is no clubbing. Neurological:      Mental Status: He is alert. Psychiatric:         Mood and Affect: Mood normal.         Behavior: Behavior normal.         Thought Content:  Thought content normal.         Judgment: Judgment normal.          Test results   Lung Nodule Screening     [] Qualifies    [x]Does not qualify   [] Declined    [] Completed                          PFT 10/6/2020        Assessment     Problem List     Chronic respiratory failure with hypoxia (HCC) (Chronic)    COPD, moderate (HCC)    Relevant Medications    albuterol sulfate HFA (VENTOLIN HFA) 108 (90 Base) MCG/ACT inhaler    umeclidinium-vilanterol (ANORO ELLIPTA) 62.5-25 MCG/INH AEPB inhaler    OXYGEN    Other Relevant Orders    Pulse oximetry, overnight    Nocturnal hypoxia - Primary Differentials for Nocturnal hypoxia include Emphysema vs. JORGE.    Plan: patient declines PSG,  Will start O2 at night at 3LPM and repeat overnight pulse ox on oxygen, if continues to have hypoxia will need PSG to rule out JORGE          Relevant Medications    OXYGEN    Other Relevant Orders    Pulse oximetry, overnight    Hypersomnia    Other emphysema (HCC)    Relevant Medications    albuterol sulfate HFA (VENTOLIN HFA) 108 (90 Base) MCG/ACT inhaler    umeclidinium-vilanterol (ANORO ELLIPTA) 62.5-25 MCG/INH AEPB inhaler        Plan   -3lPM O2 added At night, continue 3lPM with activity during day  -repeat Overnight PO on 3lPM   -pt declines to go for PSG   -continue Anoro, one sample provided in office today , given list of inhalers to check med prices with insurance    Will see Carlyle Sheikh in: 1 month    Electronically signed by LEWIS Ma CNP on 10/20/2020 at 2:46 PM

## 2020-10-21 ENCOUNTER — OFFICE VISIT (OUTPATIENT)
Dept: FAMILY MEDICINE CLINIC | Age: 80
End: 2020-10-21
Payer: MEDICARE

## 2020-10-21 VITALS
WEIGHT: 158.2 LBS | DIASTOLIC BLOOD PRESSURE: 84 MMHG | SYSTOLIC BLOOD PRESSURE: 130 MMHG | HEIGHT: 68 IN | HEART RATE: 71 BPM | BODY MASS INDEX: 23.98 KG/M2 | TEMPERATURE: 98.1 F | OXYGEN SATURATION: 92 %

## 2020-10-21 PROCEDURE — G8484 FLU IMMUNIZE NO ADMIN: HCPCS | Performed by: FAMILY MEDICINE

## 2020-10-21 PROCEDURE — G8427 DOCREV CUR MEDS BY ELIG CLIN: HCPCS | Performed by: FAMILY MEDICINE

## 2020-10-21 PROCEDURE — 1036F TOBACCO NON-USER: CPT | Performed by: FAMILY MEDICINE

## 2020-10-21 PROCEDURE — 1123F ACP DISCUSS/DSCN MKR DOCD: CPT | Performed by: FAMILY MEDICINE

## 2020-10-21 PROCEDURE — 4040F PNEUMOC VAC/ADMIN/RCVD: CPT | Performed by: FAMILY MEDICINE

## 2020-10-21 PROCEDURE — 99214 OFFICE O/P EST MOD 30 MIN: CPT | Performed by: FAMILY MEDICINE

## 2020-10-21 PROCEDURE — 3023F SPIROM DOC REV: CPT | Performed by: FAMILY MEDICINE

## 2020-10-21 PROCEDURE — G8420 CALC BMI NORM PARAMETERS: HCPCS | Performed by: FAMILY MEDICINE

## 2020-10-21 PROCEDURE — G8926 SPIRO NO PERF OR DOC: HCPCS | Performed by: FAMILY MEDICINE

## 2020-10-21 RX ORDER — DICLOFENAC SODIUM 75 MG/1
75 TABLET, DELAYED RELEASE ORAL 2 TIMES DAILY PRN
Qty: 180 TABLET | Refills: 0 | Status: CANCELLED | OUTPATIENT
Start: 2020-10-21

## 2020-10-21 RX ORDER — LOVASTATIN 20 MG/1
20 TABLET ORAL NIGHTLY
Qty: 90 TABLET | Refills: 1 | Status: SHIPPED | OUTPATIENT
Start: 2020-10-21 | End: 2021-04-20 | Stop reason: SDUPTHER

## 2020-10-21 ASSESSMENT — ENCOUNTER SYMPTOMS
SHORTNESS OF BREATH: 1
WHEEZING: 0
COUGH: 1

## 2020-10-21 NOTE — PROGRESS NOTES
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 Lowest de-sat 77%.  Osteoarthritis     Osteopenia     Osteopenia     Polio     Rheumatic fever       Past Surgical History:   Procedure Laterality Date    BALLOON ANGIOPLASTY, ARTERY  2018    s/p Left CFA, SFA and popliteal intervention    FACIAL COSMETIC SURGERY      as a kid    HAND SURGERY Right     carpal tunnel     TONSILLECTOMY AND ADENOIDECTOMY       Family History   Problem Relation Age of Onset    Heart Disease Father     Arthritis Maternal Grandmother      Social History     Tobacco Use    Smoking status: Former Smoker     Packs/day: 1.50     Years: 50.00     Pack years: 75.00     Types: Cigarettes     Last attempt to quit: 10/9/2004     Years since quittin.0    Smokeless tobacco: Never Used   Substance Use Topics    Alcohol use: Yes     Alcohol/week: 30.0 standard drinks     Types: 30 Cans of beer per week     Comment: 6 beers per day       Current Outpatient Medications   Medication Sig Dispense Refill    OXYGEN Please provide patient with 3LPM of oxygen at night time for nocturnal hypoxia.  Patient to have repeat pulse oximetry testing done on oxygen 3 L 0    metoprolol succinate (TOPROL XL) 25 MG extended release tablet Take 1 tablet by mouth daily 30 tablet 0    clopidogrel (PLAVIX) 75 MG tablet Take 1 tablet by mouth daily 30 tablet 0    umeclidinium-vilanterol (ANORO ELLIPTA) 62.5-25 MCG/INH AEPB inhaler Inhale 1 puff into the lungs daily 30 puff 2    spironolactone (ALDACTONE) 25 MG tablet Take 0.5 tablets by mouth daily 30 tablet 3    aspirin 81 MG chewable tablet Take 1 tablet by mouth daily 30 tablet 3    nitroGLYCERIN (NITROSTAT) 0.4 MG SL tablet up to max of 3 total doses. If no relief after 1 dose, call 911. 25 tablet 1    lovastatin (MEVACOR) 20 MG tablet Take 1 tablet by mouth nightly 90 tablet 1    diclofenac (VOLTAREN) 75 MG EC tablet Take 1 tablet by mouth 2 times daily as needed for Pain 180 tablet 0    clopidogrel (PLAVIX) 75 MG tablet Take 1 tablet by mouth daily (Patient not taking: Reported on 10/21/2020) 90 tablet 1    metoprolol succinate (TOPROL XL) 25 MG extended release tablet Take 1 tablet by mouth daily 30 tablet 3    albuterol sulfate HFA (VENTOLIN HFA) 108 (90 Base) MCG/ACT inhaler Inhale 2 puffs into the lungs 4 times daily as needed for Wheezing (Patient not taking: Reported on 10/21/2020) 1 Inhaler 5     No current facility-administered medications for this visit. Allergies   Allergen Reactions    Brilinta [Ticagrelor] Shortness Of Breath    Keflex [Cephalexin] Rash     Health Maintenance   Topic Date Due    DTaP/Tdap/Td vaccine (1 - Tdap) 04/15/1959    Annual Wellness Visit (AWV)  05/29/2019    Flu vaccine (1) 09/01/2020    Lipid screen  06/16/2021    Potassium monitoring  10/16/2021    Creatinine monitoring  10/16/2021    Shingles Vaccine  Completed    Pneumococcal 65+ years Vaccine  Completed    Hepatitis A vaccine  Aged Out    Hepatitis B vaccine  Aged Out    Hib vaccine  Aged Out    Meningococcal (ACWY) vaccine  Aged Out       Objective:  /84 (Site: Left Upper Arm, Position: Sitting)   Pulse 71   Temp 98.1 °F (36.7 °C)   Ht 5' 8\" (1.727 m)   Wt 158 lb 3.2 oz (71.8 kg)   SpO2 92%   BMI 24.05 kg/m²   Physical Exam  Vitals signs reviewed. Constitutional:       Appearance: He is not ill-appearing.    Cardiovascular:      Rate and Rhythm: Normal rate and regular rhythm. Pulmonary:      Effort: Pulmonary effort is normal. No respiratory distress. Breath sounds: Normal breath sounds. No rhonchi. Musculoskeletal:      Right lower leg: No edema. Left lower leg: No edema. Neurological:      Mental Status: He is alert. Mental status is at baseline. Psychiatric:         Mood and Affect: Mood normal.         Behavior: Behavior normal.         Lab Results   Component Value Date    WBC 9.7 10/16/2020    HGB 14.7 10/16/2020    HCT 44.6 10/16/2020     10/16/2020    CHOL 137 06/16/2020    TRIG 48 06/16/2020    HDL 66 06/16/2020    LDLDIRECT 83 09/30/2014    ALT 7 (L) 09/16/2020    AST 18 09/16/2020     10/16/2020    K 5.5 (H) 10/16/2020     10/16/2020    CREATININE 0.9 10/16/2020    BUN 18 10/16/2020    CO2 27 10/16/2020    INR 1.17 (H) 09/17/2020    LABA1C 5.4 05/03/2018         Impression/Plan:  1. Essential hypertension  Chronic. Well-controlled. Continue metoprolol and Aldactone    2. Mixed hyperlipidemia  Chronic. Stable. Refill med  - lovastatin (MEVACOR) 20 MG tablet; Take 1 tablet by mouth nightly  Dispense: 90 tablet; Refill: 1    3. ASCVD (arteriosclerotic cardiovascular disease)  Chronic. Status post PCI earlier this year. Continue aspirin plus Plavix as well as statin. He declines PPI to decrease risk of GI bleed. Recommend stopping diclofenac and using Tylenol due to the risk of GI bleed with dual antiplatelet therapy and oral NSAID    4. COPD, moderate ([address]  Chronic. Follows with pulmonology. Continue Anoro Ellipta. He will be getting nocturnal supplemental oxygen      They voiced understanding. All questions answered. They agreed with treatment plan. See patient instructions for any educational materials that may have been given. Discussed use, benefit, and side effects of prescribed medications. Reviewed health maintenance.     (Please note that portions of this note may have been completed with a voice recognition

## 2020-10-22 ENCOUNTER — HOSPITAL ENCOUNTER (OUTPATIENT)
Dept: CARDIAC REHAB | Age: 80
Setting detail: THERAPIES SERIES
End: 2020-10-22
Payer: MEDICARE

## 2020-10-22 ENCOUNTER — HOSPITAL ENCOUNTER (OUTPATIENT)
Dept: CARDIAC REHAB | Age: 80
Setting detail: THERAPIES SERIES
Discharge: HOME | End: 2020-10-22
Payer: MEDICARE

## 2020-10-22 PROCEDURE — G0422 INTENS CARDIAC REHAB W/EXERC: HCPCS

## 2020-10-22 NOTE — TELEPHONE ENCOUNTER
Unfortunately even if this is a short A fib the patient needs to come and see cj to discuss anticoagulation   Cant do it over the phone

## 2020-10-22 NOTE — TELEPHONE ENCOUNTER
Patient's wife notified and voiced understanding. Appt made with Dr Giuseppe Salamanca. Offered Dr Fidel Murrell since they had seen him in the past.  They want to wait on Dr Giuseppe Salamanca.

## 2020-10-22 NOTE — PLAN OF CARE
[address] [place] [address] Facility-Based Program  Individualized Cardiac Treatment Plan    Patient Name:  Montez Oleary  :  1940  Age:  68 y.o. MRN:  [id number]  Diagnosis: TAVR  Date of Event: 2020   Physician:  Dr. Howard Diamond  Next Office Visit:  Not scheduled, Pacheco De La Garza 10/19/20  Date Entered Program: 10/8/2020  Risk Stratifications: [] Low [] Intermediate [x] High  Allergies: Allergies   Allergen Reactions    Brilinta [Ticagrelor] Shortness Of Breath    Keflex [Cephalexin] Rash       COVID -19 Screen  Do you have any of the following symptoms:  [] Fever [] Cough [] SOB [] Muscle/Body Ache [] Loss of taste/smell [x] None    Have you traveled outside of the [place]? [] Yes      [x] No    Have you been around anyone who has tested Positive for COVID 19?  [] Yes      [x] No    The following Education has been completed with patient. [x] Wait in the lobby until we call you back to rehab. [x] You must wear a mask while in the medical center, and in cardiac rehab. Please bring your own. [x] Bring your own bottle of water with you. [x] You can bring a visitor with you, however, they will need to sit in the waiting room while you are in cardiac rehab. Individual Cardiac Treatment Plan -EXERCISE  INITIAL  30 DAY 60 DAY 90 DAY FINAL DAY   EXERCISE  ASSESSMENT/PLAN  EXERCISE  REASSESSMENT EXERCISE   REASSESSMENT EXERCISE   REASSESSMENT EXERCISE  DISCHARGE/FOLLOW-UP   Date: 10/8/2020  Date: Date: Date: Date:   Session #1  Session # ** Session # ** Session # ** Session # **  Last session completed on **   Stages of Change  Stages of Change Stages of Change Stages of Change Stages of Change   [] Pre Contemplation  [] Contemplation  [x] Preparation  [] Action  [] Maintenance  [] Relapse Pt discharged today 10/22/20. He does not want to complete the program. He wants to exercise on his own. He does not want to participate in education classes.   [] Pre Contemplation  [] Contemplation  [] Preparation  [] Action  [] Maintenance  [] Relapse [] Pre Contemplation  [] Contemplation  [] Preparation  [] Action  [] Maintenance  [] Relapse [] Pre Contemplation  [] Contemplation  [] Preparation  [] Action  [] Maintenance  [] Relapse [] Pre Contemplation  [] Contemplation  [] Preparation  [] Action  [] Maintenance  [] Relapse   EXERCISE ASSESSMENT  EXERCISE ASSESSMENT EXERCISE ASSESSMENT EXERCISE ASSESSMENT EXERCISE ASSESSMENT   6 Min Walk Test  Distance walked:   0.06 miles  316.8 ft.  1.5 METs  Max HR:105 BPM  RPE:  11    THR:    Rhythm:  NSR w/ PVCs     6 Min Walk Test  Distance walked:   ** miles  ** ft  ** METs  Max HR:** BPM      RPE:  **  %Change ft= **    Rhythm:  **   DASI: 6.05 METs  DASI: ** METs DASI: ** METs DASI: ** METs DASI: ** METs   Return to Work  [facility] on returning to work? [] Yes              [] No   [] Disabled     [x] Retired    Return to work:  Retired Return to work:  Retired Return to work:  Retired Return to work:  Retired   Orthopedic Limitations/  [x] Yes    [] No  If yes please list:  Ruptured disk in back, hx of PAD      Orthopedic Limitations  *If patient has orthopedic issue:   Actions/  accomodations needed to make Brenton SOTOMAYOR successful : Orthopedic Limitations   Orthopedic Limitations   Orthopedic Limitations     Fall Risk  Fall risk assessed? [x] Yes      [] No    Balance Issues?   [] Yes      [x] No     [] Walker [] Cane    [x] Safety issues reviewed       Fall Risk  *If patient is a fall risk, action needed to accommodate:  Fall Risk Fall Risk Fall Risk   Home Exercise  [x] Yes    [] No  Type: walking on TM  Frequency: daily  Duration: 20mins  Home Exercise  [] Yes    [] No  Type: **  Frequency:**  Duration: ** Home Exercise  [] Yes    [] No  Type: **  Frequency: **  Duration: ** Home Exercise  [] Yes    [] No  Type: **  Frequency: **  Duration: ** Home Exercise  [] Yes    [] No  Type: **  Frequency: **  Duration: ** Angina with Activity? [] Yes    [x] No  Angina Management: na  Angina with Activity? [] Yes    [] No  Angina Management: ** Angina with Activity? [] Yes    [] No  Angina Management: ** Angina with Activity? [] Yes    [] No  Angina Management: ** Angina with Activity?   [] Yes    [] No  Angina Management: **   EXERCISE PLAN  EXERCISE PLAN EXERCISE PLAN EXERCISE PLAN EXERCISE PLAN   *Interventions*  *Interventions* *Interventions* *Interventions* *Interventions*   Exercise Prescription  (per physician & CR staff)  Exercise Prescription  (per physician & CR staff) Exercise Prescription  (per physician & CR staff) Exercise Prescription  (per physician & CR staff) Exercise Prescription  (per physician & CR staff)   Cardiovascular  Cardiovascular Cardiovascular Cardiovascular Cardiovascular   Mode:    [x] Treadmill (TM)  [x] Schwinn Airdyne (AD)  [x] Arms Ergometer (AE)  [x] NuStep  [] Elliptical (E)  MODE:    [] Treadmill (TM)  [] Schwinn Airdyne (AD)  [] Arms Ergometer (AE)  [] NuStep  [] Elliptical (E) MODE:    [] Treadmill (TM)  [] Schwinn Airdyne (AD)  [] Arms Ergometer (AE)  [] NuStep  [] Elliptical (E) MODE:    [] Treadmill (TM)  [] Schwinn Airdyne (AD)  [] Arms Ergometer (AE)  [] NuStep  [] Elliptical (E) MODE:    [] Treadmill (TM)  [] Schwinn Airdyne (AD)  [] Arms Ergometer (AE)  [] NuStep  [] Elliptical (E)   Initial Workloads  TM: [email or web address] 2.2 METs  AD: 0.6 level = 2.2 METs  NS: 38  Mathews= 2.2 METs  AE: 0.3 level = 1.6 METs  Current Workloads  TM:  @ %=  METs  AD:  level =  METs  NS:   Mathews=  METs  AE:  level =  METs Current Workloads  TM:  @ %=  METs  AD:  level =  METs  NS:   Mathews=  METs  AE:  level =  METs Current Workloads  TM:  @ %=  METs  AD:  level =  METs  NS:   Mathews=  METs  AE:  level =  METs Current Workloads  TM:  @ %=  METs  AD:  level =  METs  NS:   Mathews=  METs  AE:  level =  METs     Frequency:    ICR: 3x/week  Home: 2-3x/wk  Frequency:   ICR: 3x/week  Home: 3x/wk Frequency:  ICR: 3x/week  Home: 3-4x/wk Frequency:  ICR: 3x/week  Home: 3-4x/wk Frequency:  Brenton SOTOMAYOR will continue exercise at  5-7 days/week   Duration:   Total aerobic exercise = 30-45 min    5-8 min/mode  Duration:  Total aerobic exercises = ** min     **min/mode Duration:  Total aerobic exercises = ** min     **min/mode Duration:  Total aerobic exercises = ** min     **min/mode Duration:  Total erobic exercise =  60-90 min   Intensity:   MET Level = 2.2  RPE = 12-15  Intensity:  Max MET Level = **  RPE = 12-15 Intensity:  Max MET Level = **  RPE = 12-15 Intensity:  Max MET Level = **  RPE = 12-15 Intensity:  Max MET Level = ** RPE = 12-15   Progression: increase aerobic activity up to 15 min over next 4 weeks by increasing time 2-3 min/week. Progression:   Progression:   Progression: Progression:  Increase time/intensity when RPE <13, and HR is in [facility]   [x] Yes      [] No  Upper and Lower body strength training 2x/wk    Wt: 2#       Reps:  8-15    *Increase wt. after completing 15 reps with an RPE of <12/13. [] Yes      [] No  Upper and Lower body strength training 2x/wk    Wt: **#       Reps:  8-15    *Increase wt. after completing 15 reps with an RPE of <12/13. [] Yes      [] No  Upper and Lower body strength training 2x/wk    Wt: **#       Reps:  8-15    *Increase wt. after completing 15 reps with an RPE of <12/13. [] Yes      [] No  Upper and Lower body strength training 2x/wk    Wt: **#       Reps:  8-15    *Increase wt. after completing 15 reps with an RPE of <12/13. Continue Strength Training at home   [] Exercise Log & Strength training handout given     Wt: **#       Reps:  8-15    *Increase wt. after completing 15 reps with an RPE of <12/13.    Flexibility  Flexibility Flexibility Flexibility Flexibility   [x] Yes      [] No  25 min session of Core Strength & Flexibility 1x/per week   Attends Core Strength & Flexibility NUTRITION ASSESSMENT NUTRITION ASSESSMENT   Weight Management  Weight: 158.0       Height: 69in   BMI: 23.4  Weight Management  Weight: **                  Weight Management  Weight: **                  Weight Management  Weight: ** Weight Management  Weight: **                    BMI: **   Eating Plan  Current eating habits: none Eating Plan  Changes: Eating Plan  Changes: Eating Plan  Changes: Eating Plan Improvements:   Alcohol Use  [] none          [x] daily  [] weekly      [] special   Type: beer  Amount: 6 cans       Diet Assessment Tool:  RATE YOUR PLATE  *Given to patient to complete and return. Diet Assessment Tool:    Score: **/69       Diet Assessment Tool: RATE YOUR PLATE  Score: **/36   NUTRITION PLAN NUTRITION PLAN NUTRITION PLAN NUTRITION PLAN NUTRITION PLAN   *Interventions* *Interventions* *Interventions* *Interventions* *Interventions*   Initial Survey given Goal Setting Discussion:   [] Yes      [] No       Follow Up Survey Reviewed & Goals Updated:     Professional Referral  Please check if needed. [] Dietitian Consult   [] Wt. Management Referral  [] Other:  Professional Referral  Please check if needed. [] Dietitian Consult   [] Wt. Management Referral  [] Other: Professional Referral  Please check if needed. [] Dietitian Consult   [] Wt. Management Referral  [] Other: Professional Referral  Please check if needed. [] Dietitian Consult   [] Wt. Management Referral  [] Other: Professional Referral  Please check if needed. [] Dietitian Consult   [] Wt. Management Referral  [] Other:   *Education* *Education* *Education* *Education* *Education*   Nutritional Education Recommended    [x] 1:1 Registered Dietitian    Workshops  [x] Label Reading   [x] Menu  [x] Targeting Nutrition Priorities  [x] Fueling a Healthy Body   Nutritional Education Attended/Date Nutritional Education Attended/Date Nutritional Education Attended/Date All Sessions Completed?     [] Yes  [] No   Cooking School  Recommended     [x] Adding Flavor  [x] Fast & Healthy     Breakfasts  [x] Salads & Dressings  [x] Soups & Simple     Sauces  [x] Simple Sides  [x] Appetizers &     Snacks  [x] Delicious Desserts  [x] Plant Proteins  [x] Fast Evening Meals  [x] Weekend Breakfasts  [x] Cook once, Eat       twice  [x] [employer]  Sessions Completed   [facility]  Sessions Completed [facility]  Sessions Completed     Cooking School    # of sessions completed:  **   *Goals* *Goals* *Goals* *Goals* *Goals*   Brenton SOTOMAYOR's nutritional goals are as follows:  Complete and return diet survey Brenton SOTOMAYOR's nutritional goals are as follows:  [] Attend Nutrition Workshops  [] Attend 1:1   [] Attend Cooking Classes  [] ** Brenton SOTOMAYOR's nutritional goals are as follows:  [] Attend Nutrition Workshops  [] Attend 1:1   [] Attend Cooking Classes  [] Complete and return diet survey  [] ** Brenton SOTOMAYOR's nutritional goals are as follows:  [] Attend Nutrition Workshops  [] Attend 1:1   [] Attend Cooking Classes  [] ** Brenton SOTOMAYOR achieved nutritional goals   [] Yes    [] No  If no, why?   Use knowledge gained to continue Pritikin eating plan at home       Individual Cardiac Treatment Plan - Psychosocial  PSYCHOSOCIAL  ASSESSMENT/PLAN PSYCHOSOCIAL  REASSESSMENT PSYCHOSOCIAL   REASSESSMENT PSYCHOSOCIAL   REASSESSMENT PSYCHOSOCIAL  DISCHARGE/FOLLOW-UP   Stages of Change Stages of Change Stages of Change Stages of Change Stages of Change   [] Pre Contemplation  [x] Contemplation  [] Preparation  [] Action  [] Maintenance  [] Relapse [] Pre Contemplation  [] Contemplation  [] Preparation  [] Action  [] Maintenance  [] Relapse [] Pre Contemplation  [] Contemplation  [] Preparation  [] Action  [] Maintenance  [] Relapse [] Pre Contemplation  [] Contemplation  [] Preparation  [] Action  [] Maintenance  [] Relapse [] Pre Contemplation  [] Contemplation  [] Preparation  [] Action  [] Maintenance  [] Relapse   PSYCHOSOCIAL ASSESSMENT please explain:  **   Signs and Symptoms of Anxiety Present? [] Yes      [x] No   Signs and Symptoms of Anxiety Present? [] Yes      [] No  If yes, please explain:  ** Signs and Symptoms of Anxiety Present? [] Yes      [] No  If yes, please explain:  ** Signs and Symptoms of Anxiety Present? [] Yes      [] No  If yes, please explain:  ** Signs and Symptoms of Anxiety Present? [] Yes      [] No  If yes, please explain:  **   [] Patient has poor eye contact   [] Flat affect present. [] Signs of anxiety, anger or hostility    [] Signs social isolation present. [x]  Signs of alcohol or substance abuse       PSYCHOSOCIAL PLAN PSYCHOSOCIAL PLAN PSYCHOSOCIAL PLAN PSYCHOSOCIAL PLAN PSYCHOSOCIAL PLAN   *Interventions* *Interventions* *Interventions* *Interventions* *Interventions*   *Please check if needed  [] Psych Consult  [] Physician Referral  [x] Stress Management Skills *Please check if needed  [] Psych Consult  [] Physician Referral  [] Stress Management Skills *Please check if needed  [] Psych Consult  [] Physician Referral  [] Stress Management Skills *Please check if needed  [] Psych Consult  [] Physician Referral  [] Stress Management Skills *Please check if needed  [] Psych Consult  [] Physician Referral  [] Stress Management Skills   Is patient currently taking anti-depressant or anti-anxiety medications? [] Yes      [x] No   Change in anti-depressant or anti-anxiety medications? [] Yes      [] No  If yes, please list medications:  ** Change in anti-depressant or anti-anxiety medications? [] Yes      [] No  If yes, please list medications:  ** Change in anti-depressant or anti-anxiety medications? [] Yes      [] No  If yes, please list medications:  ** Change in anti-depressant or anti-anxiety medications?   [] Yes      [] No  If yes, please list medications:  **   *Education* *Education* *Education* *Education* *Education*   Healthy Mind-Set Workshops Recommended  [x] Stress & Health  [x] Taking Charge of Stress  [x] New Thoughts, New Behaviors  [x] Managing Moods & Relationships Healthy Mind-Set Workshops Attended/Date Healthy Mind-Set Workshops Attended/Date Healthy Mind-Set Workshops Attended/Date Healthy Mind-Set Workshops  Completed  [] Yes      [] No   *Goals* *Goals* *Goals* *Goals* *Goals*   Brenton Clemente psychosocial goals are as follows:  Complete HADS & Ki & Charly, Quality of Life Index, Cardiac Version IV Brenton Clemente psychosocial goals are as follows:  [] Attend Healthy Mind-Set Workshops  [] Reduce depression symptom severity by 1 level  [] ** Brenton STALLWORTHs psychosocial goals are as follows:  [] Attend Healthy Mind-Set Workshops  [] Reduce depression symptom severity by 1 level  [] ** Brenton STALLWORTHs psychosocial goals are as follows:  [] Attend Healthy Mind-Set Workshops  [] Reduce depression symptom severity by 1 level  [] ** Brenton SOTOMAYOR achieved psychosocial goals?   [] Yes    [] No  If no, why?  **  [] Use methods learned to continue to reduce depression symptom severity by 1 level  [] **     Individual Cardiac Treatment Plan - Other:  Risk Factor/Education  RISK FACTOR  ASSESSMENT/PLAN RISK FACTOR  REASSESSMENT  RISK FACTOR  REASSESSMENT RISK FACTOR  REASSESSMENT RISK FACTOR   DISCHARGE/FOLLOW-UP   Stages of Change Stages of Change Stages of Change Stages of Change Stages of Change   [] Pre Contemplation  [x] Contemplation  [] Preparation  [] Action  [] Maintenance  [] Relapse [] Pre Contemplation  [] Contemplation  [] Preparation  [] Action  [] Maintenance  [] Relapse [] Pre Contemplation  [] Contemplation  [] Preparation  [] Action  [] Maintenance  [] Relapse [] Pre Contemplation  [] Contemplation  [] Preparation  [] Action  [] Maintenance  [] Relapse [] Pre Contemplation  [] Contemplation  [] Preparation  [] Action  [] Maintenance  [] Relapse   RISK FACTOR/EDUCATION ASSESSMENT RISK FACTOR/EDUCATION ASSESSMENT RISK FACTOR/EDUCATION ASSESSMENT RISK FACTOR/EDUCATION ASSESSMENT RISK FACTOR /EDUCATION ASSESSMENT   Hypertension  [x] Yes      [] No    Resting BP: 138/68  Peak Ex BP:166/70  Medication: na   Hypertension  Resting BP: **  Peak Ex BP:**  Medication Changes:  [] Yes      [] No Hypertension  Resting BP: **  Peak Ex BP:**  Medication Changes:  [] Yes      [] No Hypertension  Resting BP: **  Peak Ex BP:**  Medication Changes:  [] Yes      [] No Hypertension  Resting BP: **  Peak Ex BP:**  Medication Changes:  [] Yes      [] No   Lipids  HLD/DLD  [x] Yes      [] No  TOTAL CHOL: 137  HDL:  66  LDL:  61  TRI  Medication: lovastatin Lipids  Medication Changes:  [] Yes      [] No     Lipids  Medication Changes:  [] Yes      [] No     Lipids  Medication Changes:  [] Yes      [] No     Lipids    TOTAL CHOL: **  HDL:  **  LDL:  **  TRIG:  **  Medication Changes:  [] Yes      [] No   Diabetes  [] Yes      [x] No  FBS: 102            Diabetes  Most Recent BS:  BS have been in range  [] Yes      [] No  Medication Changes  [] Yes      [] No   Diabetes  Most Recent BS:  BS have been in range  [] Yes      [] No  Medication Changes  [] Yes      [] No     Diabetes  Most Recent BS:  BS have been in range  [] Yes      [] No  Medication Changes  [] Yes      [] No     Diabetes  Most Recent BS:  BS have been in range  [] Yes      [] No  Medication Changes  [] Yes      [] No       Tobacco Use  [] Current  [x] Former  [] Never    Years smoked: 48    Date Quit:     Smokeless Tobacco use:   [] Yes      [x] No   Tobacco Use  Change in smoking status   [] Yes      [] No    Quit date: **   Tobacco Use  Change in smoking status   [] Yes      [] No    Quit date: **   Tobacco Use  Change in smoking status   [] Yes      [] No    Quit date: ** Tobacco Use  Change in smoking status   [] Yes      [] No    Quit date: **            Learning Barriers  Please select one:  [] Speech  [] Literacy  [] Hearing  [] Cognitive  [] Vision  [x] Ready to Learn Learning Barriers Addressed:   [] Yes      [] No   Learning Barriers Addressed:   [] Yes      [] No   Learning Barriers Addressed:  [] Yes      [] No Learning Barriers Addressed:  [] Yes      [] No     RISK FACTOR/EDUCATION PLAN RISK FACTOR/EDUCATION PLAN RISK FACTOR/EDUCATION PLAN RISK FACTOR/EDUCATION PLAN RISK FACTOR/EDUCATION PLAN   *Interventions* *Interventions* *Interventions* *Interventions* *Interventions*   Recommended Educational Videos    [x] Overview of The Pritikin Eating Plan  [x] Becoming A Pritikin   [x] Diseases of Our Time-Part 1  [x] Calorie Density  [x] Label Reading-Part 1  [x] Move It! [x] Healthy Minds, Bodies, Hearts  [x] Dining Out-Part 1  [x] Heart Disease Risk Reduction  [x] Metabolic Syndrome & Belly Fat  [x] Facts on Fat  [x] Diseases of Our Times-Part 2  [x] Biology of Weight Control  [x] Biomechanical Limitations  [x] Nurtition Action Plan   Completed Videos/Date      10/8/2020  Overview of The Pritikin Eating Plan Completed Videos/Date Completed Videos/Date Recommended Educational Videos Completed    [] Yes      [] No    **If not completed, Why? **          Smoking Cessation/Relaspe Prevention Intervention needed? [] Yes      [x] No  *Pt verbalizes and agrees to attend intervention Smoking Cessation/Relapse Prevention Scheduled? [] Yes      [] No  Date:  ** Smoking Cessation/Relapse Prevention completed?    [] Yes      [] No  Date: **    Smoking Cessation Counseling attended  [] Yes      [] No  **If not completed, Why? **   Professional Referrals:  *Please check if needed  [] Diabetes Clinic  [] Lipid Clinic   [] Other:     Professional Referrals:  *Please check if needed  [] Diabetes Clinic  [] Lipid Clinic   [] Other:   Preventative Medication Preventative Medication Preventative Medication Preventative Medication Preventative Medication   Aspirin  [x] Yes    [] No  Blood Thinner: Clopidogrel/Effient/Brillinta  [] Yes    [] No  Beta Blocker  [x] Yes    [] No  Ace Inhibitor  [] Yes    [x] No  Statin/Lipid Lowering  [x] Yes    [] No Medication Changes? [] Yes    [] No Medication Changes? [] Yes    [] No Medication Changes? [] Yes    [] No Medication Changes? [] Yes    [] No   *Education* *Education* *Education* *Education* *Education*   Does Brenton SOTOMAYOR require any additional education? [] Yes    [x] No   Does Brenton SOTOMAYOR require any additional education? [] Yes    [] No Does Brenton SOTOMAYOR require any additional education? [] Yes    [] No Does Brenton SOTOMAYOR require any additional education? [] Yes    [] No Does Brenton SOTOMAYOR require any additional education?   [] Yes    [] No   Recommended Additional Educational Videos    Medical  [] Hypertension & Heart Disease  [] Decoding Lab Results  [] Aging Enhancing Your QoL  [] Sleep Disorders  Exercise  [] Body Composition  [] Improve Performance  [] Exercise Action Plan  [] Intro to Yoga  Behavioral  [] How Our Thoughts Can Heal Our Hearts  [] Smoking Cessation  Nutrition  [] Planning Your Eating Strategy  [] Lable Reading- Part 2  [] Dining Out - Part 2  [] Targeting Your Nutrition Priorities  [] Fueling a Healthy Body  [] Menu Workshop  [employer] [] Breakfast & Snacks  [] [employer] Salads & Dressing  [] [address] [place] [address]  [] Soups & Desserts   Additional Educational Videos Completed Additional Educational Videos Completed Additional Educational Videos Completed Additional Educational Videos Completed    [] Yes    [] No   *Goals* *Goals* *Goals* *Goals* *Goals*   Brenton SOTOMAYOR's risk factor/education goals are as follows:    [x] Optimal BP <140/90  [] Blood Sugar <120  [x] Attend recommended video education sessions  [x] Takes medications as prescribed 100% of the time   [] ** Brenton SOTOMAYOR's risk factor/education goals are as follows:    [x] Optimal BP <140/90  [] Blood Sugar <120  [x] Attend recommended video education sessions  [x] Takes medications as prescribed 100% of the time   [] ** Brenton SOTOMAYOR's risk factor/education goals are as follows:    [x] Optimal BP <140/90  [] Blood Sugar <120  [x] Attend recommended video education sessions  [x] Takes medications as prescribed 100% of the time   [] ** Brenton SOTOMAYOR's risk factor/education goals are as follows:    [x] Optimal BP <140/90  [] Blood Sugar <120  [x] Attend recommended video education sessions  [x] Takes medications as prescribed 100% of the time   [] ** Brenton SOTOMAYOR achieved risk factor goals?   [] Yes    [] No  If no, why?  **     Monitored telemetry has revealed NSR w/ PVCs   Monitored telemetry has revealed **  [] documented arrhythmia at increasing workloads  [] associated symptoms ** Monitored telemetry has revealed  [] documented arrhythmia at increasing workloads  [] associated symptoms ** Monitored telemetry has revealed **  [] documented arrhythmia at increasing workloads  [] associated symptoms ** Monitored telemetry has revealed **  [] documented arrhythmia at increasing workloads  [] associated symptoms **   Physician Response    [x] Cardiac rehab is reasonably and medically necessary for continuous cardiac monitoring surveillance  of patient's cardiac activity  [x] Initiate continuous telemerty monitoring and notify me with any concerns  [] Other   Physician Response    [x] Cardiac rehab is reasonably and medically necessary for continuous cardiac monitoring surveillance  of patient's cardiac activity  [x] Continue continuous telemerty monitoring and notify me with any concerns  [] Other     Physician Response    [x] Cardiac rehab is reasonably and medically necessary for continuous cardiac monitoring surveillance  of patient's cardiac activity  [x] Continue continuous telemerty monitoring and notify me with any concerns   [] Other     Physician Response    [x] Cardiac rehab is reasonably and medically necessary for continuous cardiac monitoring surveillance  of patient's cardiac activity  [x] Continue continuous telemerty monitoring and notify me with any concerns   [] Other          Cosigned by:  Jimenez Garcia MD at 10/9/2020 10:24 AM    Revision History     Date/Time  User  Provider Type  Action    10/9/2020 10:24 AM  Haleigh Porras MD  Physician  Cosign    10/8/2020 12:12 PM  Korin Perkins

## 2020-10-26 NOTE — PROGRESS NOTES
Hospital Facility-Based Program  Phase 2 Cardiac Rehab Weekly Progress Report      Pt asked to be discharged from cardiac rehab on 10/22/2020. He called 10/26/2020 and asked if he could come back to rehab but does not want any education. He will be returning on 10/27/2020. Patient prescribed exercise:  9:00 class. 3 times per week in rehab, 1-4 times per week at home for the amount of sessions/weeks specified by insurance. Current Levels: Treadmill: 1. 7mph/0% for 8 minutes, Schwinn Airdyne: Level 0.7 for 8 minutes, UBE: Level 0.3 for 5 minutes. Progression Discussion: Maintain/Increase Aerobic exercise 21 minutes to work on endurance. Attempt to increase intensity by 5-20% for each modality this week. Try to increase intensities until Brenton SOTOMAYOR rates the exercises a 13-17 on Darcy RPE.

## 2020-10-27 ENCOUNTER — APPOINTMENT (OUTPATIENT)
Dept: CARDIAC REHAB | Age: 80
End: 2020-10-27
Payer: MEDICARE

## 2020-10-27 ENCOUNTER — HOSPITAL ENCOUNTER (OUTPATIENT)
Dept: CARDIAC REHAB | Age: 80
Setting detail: THERAPIES SERIES
Discharge: HOME | End: 2020-10-27
Payer: MEDICARE

## 2020-10-27 PROCEDURE — 93798 PHYS/QHP OP CAR RHAB W/ECG: CPT

## 2020-10-29 ENCOUNTER — APPOINTMENT (OUTPATIENT)
Dept: CARDIAC REHAB | Age: 80
End: 2020-10-29
Payer: MEDICARE

## 2020-10-29 ENCOUNTER — HOSPITAL ENCOUNTER (OUTPATIENT)
Dept: CARDIAC REHAB | Age: 80
Setting detail: THERAPIES SERIES
Discharge: HOME | End: 2020-10-29
Payer: MEDICARE

## 2020-10-30 ENCOUNTER — HOSPITAL ENCOUNTER (OUTPATIENT)
Dept: RESPIRATORY THERAPY | Age: 80
Discharge: HOME | End: 2020-10-30
Payer: MEDICARE

## 2020-10-30 PROCEDURE — 94762 N-INVAS EAR/PLS OXIMTRY CONT: CPT

## 2020-11-03 ENCOUNTER — APPOINTMENT (OUTPATIENT)
Dept: CARDIAC REHAB | Age: 80
End: 2020-11-03
Payer: MEDICARE

## 2020-11-03 ENCOUNTER — OFFICE VISIT (OUTPATIENT)
Dept: PULMONOLOGY | Age: 80
End: 2020-11-03
Payer: MEDICARE

## 2020-11-03 ENCOUNTER — HOSPITAL ENCOUNTER (OUTPATIENT)
Dept: CARDIAC REHAB | Age: 80
Setting detail: THERAPIES SERIES
Discharge: HOME | End: 2020-11-03
Payer: MEDICARE

## 2020-11-03 VITALS
HEIGHT: 68 IN | BODY MASS INDEX: 24.01 KG/M2 | OXYGEN SATURATION: 93 % | WEIGHT: 158.4 LBS | TEMPERATURE: 98.7 F | DIASTOLIC BLOOD PRESSURE: 64 MMHG | HEART RATE: 74 BPM | SYSTOLIC BLOOD PRESSURE: 128 MMHG

## 2020-11-03 PROBLEM — I73.9 PERIPHERAL ARTERY DISEASE (HCC): Status: RESOLVED | Noted: 2020-11-03 | Resolved: 2020-11-03

## 2020-11-03 PROBLEM — E78.5 HYPERLIPIDEMIA: Status: RESOLVED | Noted: 2020-11-03 | Resolved: 2020-11-03

## 2020-11-03 PROCEDURE — 3023F SPIROM DOC REV: CPT | Performed by: NURSE PRACTITIONER

## 2020-11-03 PROCEDURE — 4040F PNEUMOC VAC/ADMIN/RCVD: CPT | Performed by: NURSE PRACTITIONER

## 2020-11-03 PROCEDURE — 99214 OFFICE O/P EST MOD 30 MIN: CPT | Performed by: NURSE PRACTITIONER

## 2020-11-03 PROCEDURE — G8420 CALC BMI NORM PARAMETERS: HCPCS | Performed by: NURSE PRACTITIONER

## 2020-11-03 PROCEDURE — G8427 DOCREV CUR MEDS BY ELIG CLIN: HCPCS | Performed by: NURSE PRACTITIONER

## 2020-11-03 PROCEDURE — G8484 FLU IMMUNIZE NO ADMIN: HCPCS | Performed by: NURSE PRACTITIONER

## 2020-11-03 PROCEDURE — 1123F ACP DISCUSS/DSCN MKR DOCD: CPT | Performed by: NURSE PRACTITIONER

## 2020-11-03 PROCEDURE — 1036F TOBACCO NON-USER: CPT | Performed by: NURSE PRACTITIONER

## 2020-11-03 PROCEDURE — 93798 PHYS/QHP OP CAR RHAB W/ECG: CPT

## 2020-11-03 PROCEDURE — G8926 SPIRO NO PERF OR DOC: HCPCS | Performed by: NURSE PRACTITIONER

## 2020-11-03 RX ORDER — UMECLIDINIUM BROMIDE AND VILANTEROL TRIFENATATE 62.5; 25 UG/1; UG/1
1 POWDER RESPIRATORY (INHALATION) DAILY
Qty: 90 PUFF | Refills: 3 | Status: SHIPPED | OUTPATIENT
Start: 2020-11-03 | End: 2021-08-12 | Stop reason: SDUPTHER

## 2020-11-03 ASSESSMENT — ENCOUNTER SYMPTOMS
DIARRHEA: 0
NAUSEA: 0
COUGH: 0
EYES NEGATIVE: 1
SHORTNESS OF BREATH: 0
ABDOMINAL PAIN: 0
WHEEZING: 0
VOMITING: 0

## 2020-11-03 NOTE — PROGRESS NOTES
[facility]     Patient: Albin Marcial, 68 y.o.   : 1940  11/3/2020    Pt of Dr. Walker Bryan   Patient presents with    Follow-up     Nocturnal hypoxia 1 week follow up pwith overnight pulse ox 10/19/20. DNQ         HPI  Brenton SOTOMAYOR is here for 1 week follow up for nocturnal hypoxia and emphysema. \"I'm breathing good, no longer coughing that stuff up\" since starting the Anoro   Never thought he was feeling bad, but has noticed feeling better on the Anoro  The cost has gone done, he had to pay high cost for the first one due to not meeting his deductible yet. Now going to be $45 per month   Since last visit he is using 3LPM O2 at night and during the day with exercise. 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 Lowest de-sat 77%.     Osteoarthritis     Osteopenia     Osteopenia     Polio     Rheumatic fever      SURGICAL HISTORY:  Past Surgical History:   Procedure Laterality Date    BALLOON ANGIOPLASTY, ARTERY  2018    s/p Left CFA, SFA and popliteal intervention    FACIAL COSMETIC SURGERY      as a kid    HAND SURGERY Right     carpal tunnel     TONSILLECTOMY AND ADENOIDECTOMY       SOCIAL HISTORY:  Social History     Tobacco Use    Smoking status: Former Smoker     Packs/day: 1.50     Years: 50.00     Pack years: 75.00     Types: Cigarettes     Last attempt to quit: 10/9/2004     Years since quittin.0    Smokeless tobacco: Never Used   Substance Use Topics    Alcohol use: Yes     Alcohol/week: 30.0 standard drinks     Types: 30 Cans of beer per week     Comment: 6 beers per day     Drug use: No     ALLERGIES:  Allergies   Allergen Reactions    Brilinta [Ticagrelor] Shortness Of Breath    Keflex [Cephalexin] Rash     FAMILY HISTORY:  Family History   Problem Relation Age of Onset    Heart Disease Father     Arthritis Maternal Grandmother      CURRENT MEDICATIONS:  Current Outpatient Medications   Medication Sig Dispense Refill    umeclidinium-vilanterol (ANORO ELLIPTA) 62.5-25 MCG/INH AEPB inhaler Inhale 1 puff into the lungs daily 90 puff 3    lovastatin (MEVACOR) 20 MG tablet Take 1 tablet by mouth nightly 90 tablet 1    OXYGEN Please provide patient with 3LPM of oxygen at night time for nocturnal hypoxia. Patient to have repeat pulse oximetry testing done on oxygen 3 L 0    metoprolol succinate (TOPROL XL) 25 MG extended release tablet Take 1 tablet by mouth daily 30 tablet 0    clopidogrel (PLAVIX) 75 MG tablet Take 1 tablet by mouth daily 30 tablet 0    spironolactone (ALDACTONE) 25 MG tablet Take 0.5 tablets by mouth daily 30 tablet 3    aspirin 81 MG chewable tablet Take 1 tablet by mouth daily 30 tablet 3    nitroGLYCERIN (NITROSTAT) 0.4 MG SL tablet up to max of 3 total doses. If no relief after 1 dose, call 911. 25 tablet 1     No current facility-administered medications for this visit. Naila DUMAS   Review of Systems   Constitutional: Negative for chills and fever. HENT: Negative. Eyes: Negative. Respiratory: Negative for cough, shortness of breath and wheezing. Cardiovascular: Negative for chest pain, palpitations and leg swelling. Gastrointestinal: Negative for abdominal pain, diarrhea, nausea and vomiting. Genitourinary: Negative. Musculoskeletal: Negative. Skin: Negative. Neurological: Negative. Hematological: Does not bruise/bleed easily. Psychiatric/Behavioral: Negative for suicidal ideas. Physical exam   /64 (Site: Left Upper Arm, Position: Sitting, Cuff Size: Medium Adult)   Pulse 74   Temp 98.7 °F (37.1 °C)   Ht 5' 8\" (1.727 m)   Wt 158 lb 6.4 oz (71.8 kg)   SpO2 93% Comment: on RA  BMI 24.08 kg/m²      Wt Readings from Last 3 Encounters:   11/03/20 158 lb 6.4 oz (71.8 kg)   10/21/20 158 lb 3.2 oz (71.8 kg)   10/20/20 158 lb (71.7 kg)     Physical Exam  Vitals signs and nursing note reviewed. Constitutional:       General: He is not in acute distress. Appearance: He is well-developed. HENT:      Mouth/Throat:      Lips: Pink. Mouth: Mucous membranes are moist.      Pharynx: Oropharynx is clear. No oropharyngeal exudate or posterior oropharyngeal erythema. Eyes:      Conjunctiva/sclera: Conjunctivae normal.   Neck:      Vascular: No JVD. Cardiovascular:      Rate and Rhythm: Normal rate and regular rhythm. Heart sounds: No murmur. No friction rub. Pulmonary:      Effort: Pulmonary effort is normal. No accessory muscle usage or respiratory distress. Breath sounds: Normal breath sounds. No wheezing, rhonchi or rales. Chest:      Chest wall: No tenderness. Musculoskeletal:      Right lower leg: No edema. Left lower leg: No edema. Skin:     General: Skin is warm and dry. Capillary Refill: Capillary refill takes less than 2 seconds. Nails: There is no clubbing. Neurological:      Mental Status: He is alert. Psychiatric:         Mood and Affect: Mood normal.         Behavior: Behavior normal.         Thought Content: Thought content normal.         Judgment: Judgment normal.          Test results   Lung Nodule Screening     [] Qualifies    [x]Does not qualify   [] Declined    [] Completed                       Assessment      Diagnosis Orders   1.  Pulmonary emphysema, unspecified emphysema type ([address]     2. Chronic respiratory failure with hypoxia (HCC)     3. Nocturnal hypoxia     4. S/P TAVR (transcatheter aortic valve replacement)           Plan   -increase O2 to 4LPM at night, continue 3LPM with activity during the day  -repeat overnight pulse ox on 4LPM - I will call with results   -continue Anoro- will send new script to mail in pharmacy   -keep f/u's with cardiology as recommended   -he is UTD with vaccinations  -discussed avoidance of ill contacts    Will see Curtis Moncada in: 6 months    Electronically signed by LEWIS Whitley CNP on 11/3/2020 at 1:51 PM     Addendum  Overnight pulse ox on 4LPM reviewed,  Had 2 desat, total of 36 sec for entire night > 89%. Called pt with results, spoke to wife . Recommend continuation of 4LPM continuous O2 at night.    She verbalized understanding  Electronically signed by LEWIS Whitley CNP on 12/3/2020 at 12:39 PM

## 2020-11-05 ENCOUNTER — APPOINTMENT (OUTPATIENT)
Dept: CARDIAC REHAB | Age: 80
End: 2020-11-05
Payer: MEDICARE

## 2020-11-05 ENCOUNTER — HOSPITAL ENCOUNTER (OUTPATIENT)
Dept: CARDIAC REHAB | Age: 80
Setting detail: THERAPIES SERIES
Discharge: HOME | End: 2020-11-05
Payer: MEDICARE

## 2020-11-05 NOTE — PLAN OF CARE
[address] [place] [address] Facility-Based Program  Individualized Cardiac Treatment Plan    Patient Name:  Monique Barriga  :  1940  Age:  68 y.o. MRN:  [id number]  Diagnosis: TAVR  Date of Event: 2020   Physician:  Dr. Pepe Cesar  Next Office Visit:  Not scheduled, Deltapat Jones 10/19/20  Date Entered Program: 10/8/2020  Risk Stratifications: [] Low [] Intermediate [x] High  Allergies: Allergies   Allergen Reactions    Brilinta [Ticagrelor] Shortness Of Breath    Keflex [Cephalexin] Rash       COVID -19 Screen  Do you have any of the following symptoms:  [] Fever [] Cough [] SOB [] Muscle/Body Ache [] Loss of taste/smell [x] None    Have you traveled outside of the [place]? [] Yes      [x] No    Have you been around anyone who has tested Positive for COVID 19?  [] Yes      [x] No    The following Education has been completed with patient. [x] Wait in the lobby until we call you back to rehab. [x] You must wear a mask while in the medical center, and in cardiac rehab. Please bring your own. [x] Bring your own bottle of water with you. [x] You can bring a visitor with you, however, they will need to sit in the waiting room while you are in cardiac rehab. Individual Cardiac Treatment Plan -EXERCISE  INITIAL  30 DAY 60 DAY 90 DAY FINAL DAY   EXERCISE  ASSESSMENT/PLAN  EXERCISE  REASSESSMENT EXERCISE   REASSESSMENT EXERCISE   REASSESSMENT EXERCISE  DISCHARGE/FOLLOW-UP   Date: 10/8/2020  Date: 2020 Date: Date: Date:   Session #1  Session # 8 Session # ** Session # ** Session # **  Last session completed on **   Stages of Change  Stages of Change Stages of Change Stages of Change Stages of Change   [] Pre Contemplation  [] Contemplation  [x] Preparation  [] Action  [] Maintenance  [] Relapse Pt discharged today 10/22/20. He does not want to complete the program. He wants to exercise on his own. He does not want to participate in education classes.   [] Pre Contemplation  [x] Contemplation  [] Preparation  [] Action  [] Maintenance  [] Relapse [] Pre Contemplation  [] Contemplation  [] Preparation  [] Action  [] Maintenance  [] Relapse [] Pre Contemplation  [] Contemplation  [] Preparation  [] Action  [] Maintenance  [] Relapse [] Pre Contemplation  [] Contemplation  [] Preparation  [] Action  [] Maintenance  [] Relapse   EXERCISE ASSESSMENT  EXERCISE ASSESSMENT EXERCISE ASSESSMENT EXERCISE ASSESSMENT EXERCISE ASSESSMENT   6 Min Walk Test  Distance walked:   0.06 miles  316.8 ft.  1.5 METs  Max HR:105 BPM  RPE:  11    THR:    Rhythm:  NSR w/ PVCs     6 Min Walk Test  Distance walked:   ** miles  ** ft  ** METs  Max HR:** BPM      RPE:  **  %Change ft= **    Rhythm:  **   DASI: 6.05 METs  DASI: 4.4 METs DASI: ** METs DASI: ** METs DASI: ** METs   Return to Work  [facility] on returning to work? [] Yes              [] No   [] Disabled     [x] Retired    Return to work:  Retired Return to work:  Retired Return to work:  Retired Return to work:  Retired   Orthopedic Limitations/  [x] Yes    [] No  If yes please list:  Ruptured disk in back, hx of PAD      Orthopedic Limitations   Orthopedic Limitations   Orthopedic Limitations   Orthopedic Limitations     Fall Risk  Fall risk assessed? [x] Yes      [] No    Balance Issues? [] Yes      [x] No     [] Walker [] Cane    [x] Safety issues reviewed       Fall Risk   Fall Risk Fall Risk Fall Risk   Home Exercise  [x] Yes    [] No  Type: walking on TM  Frequency: daily  Duration: 20mins  Home Exercise  [x] Yes    [] No  Type: walking  Frequency:daily  Duration: 30 min Home Exercise  [] Yes    [] No  Type: **  Frequency: **  Duration: ** Home Exercise  [] Yes    [] No  Type: **  Frequency: **  Duration: ** Home Exercise  [] Yes    [] No  Type: **  Frequency: **  Duration: **   Angina with Activity? [] Yes    [x] No  Angina Management: na  Angina with Activity? [] Yes    [x] No   Angina with Activity?   [] Yes    [] No  Angina Management: ** Angina with Activity? [] Yes    [] No  Angina Management: ** Angina with Activity?   [] Yes    [] No  Angina Management: **   EXERCISE PLAN  EXERCISE PLAN EXERCISE PLAN EXERCISE PLAN EXERCISE PLAN   *Interventions*  *Interventions* *Interventions* *Interventions* *Interventions*   Exercise Prescription  (per physician & CR staff)  Exercise Prescription  (per physician & CR staff) Exercise Prescription  (per physician & CR staff) Exercise Prescription  (per physician & CR staff) Exercise Prescription  (per physician & CR staff)   Cardiovascular  Cardiovascular Cardiovascular Cardiovascular Cardiovascular   Mode:    [x] Treadmill (TM)  [x] Schwinn Airdyne (AD)  [x] Arms Ergometer (AE)  [x] NuStep  [] Elliptical (E)  MODE:    [x] Treadmill (TM)  [x] Arms Ergometer (AE)  [x] NuStep   MODE:    [] Treadmill (TM)  [] Schwinn Airdyne (AD)  [] Arms Ergometer (AE)  [] NuStep  [] Elliptical (E) MODE:    [] Treadmill (TM)  [] Schwinn Airdyne (AD)  [] Arms Ergometer (AE)  [] NuStep  [] Elliptical (E) MODE:    [] Treadmill (TM)  [] Schwinn Airdyne (AD)  [] Arms Ergometer (AE)  [] NuStep  [] Elliptical (E)   Initial Workloads  TM: [email or web address] 2.2 METs  AD: 0.6 level = 2.2 METs  NS: 38  Mathews= 2.2 METs  AE: 0.3 level = 1.6 METs  Current Workloads  TM: 2.0 @ 0%= 2.6 METs  NS:  38 Mathews= 2.2 METs  AE:  20watts = 1.7 METs Current Workloads  TM:  @ %=  METs  AD:  level =  METs  NS:   Mathews=  METs  AE:  level =  METs Current Workloads  TM:  @ %=  METs  AD:  level =  METs  NS:   Mathews=  METs  AE:  level =  METs Current Workloads  TM:  @ %=  METs  AD:  level =  METs  NS:   Mathews=  METs  AE:  level =  METs     Frequency:    ICR: 3x/week  Home: 2-3x/wk  Frequency:   ICR: 3x/week  Home: 3x/wk Frequency:  ICR: 3x/week  Home: 3-4x/wk Frequency:  ICR: 3x/week  Home: 3-4x/wk Frequency:  Brenton SOTOMAYOR will continue exercise at  5-7 days/week   Duration:   Total aerobic exercise = 30-45 min    5-8 min/mode  Duration:  Total aerobic exercises = 21 min     8min/mode Duration:  Total aerobic exercises = ** min     **min/mode Duration:  Total aerobic exercises = ** min     **min/mode Duration:  Total erobic exercise =  60-90 min   Intensity:   MET Level = 2.2  RPE = 12-15  Intensity:  Max MET Level = 2.6  RPE = 12-15 Intensity:  Max MET Level = **  RPE = 12-15 Intensity:  Max MET Level = **  RPE = 12-15 Intensity:  Max MET Level = ** RPE = 12-15   Progression: increase aerobic activity up to 15 min over next 4 weeks by increasing time 2-3 min/week. Progression:  increase aerobic activity up to 15 min over next 4 weeks by increasing time 2-3 min/week. Progression:   Progression: Progression:  Increase time/intensity when RPE <13, and HR is in [facility]   [x] Yes      [] No  Upper and Lower body strength training 2x/wk    Wt: 2#       Reps:  8-15    *Increase wt. after completing 15 reps with an RPE of <12/13. [] Yes      [x] No  Upper and Lower body strength training 2x/wk    Pt does not come on Fridays to participate in strength training [] Yes      [] No  Upper and Lower body strength training 2x/wk    Wt: **#       Reps:  8-15    *Increase wt. after completing 15 reps with an RPE of <12/13. [] Yes      [] No  Upper and Lower body strength training 2x/wk    Wt: **#       Reps:  8-15    *Increase wt. after completing 15 reps with an RPE of <12/13. Continue Strength Training at home   [] Exercise Log & Strength training handout given     Wt: **#       Reps:  8-15    *Increase wt. after completing 15 reps with an RPE of <12/13.    Flexibility  Flexibility Flexibility Flexibility Flexibility   [x] Yes      [] No  25 min session of Core Strength & Flexibility 1x/per week   Attends Core Strength & Flexibility   [] Yes      [x] No Attends Core Strength & Flexibility   [] Yes      [] No Attends Core Strength & Flexibility   [] Yes      [] No Continue Core Strength & Flexibility at home   Home Exercise  *Brenton SOTOMAYOR verbalizes planning to walk daily days/week for 20-30 min on days not in rehab. Home Exercise  *Brenton SOTOMAYOR verbalizes planning to continue walking  daily for 30 min on days not in rehab. Home Exercise  *Brenton СВЕТЛАНА verbalizes planning to ** ** days/week for ** min on days not in rehab. Home Exercise  *Brenton SOTOMAYOR verbalizes planning to ** ** days/week for ** min on days not in rehab. Home Exercise  *Brenton SOTOMAYOR verbalizes his/her plan to ** ** days/week for ** min @ **   *Education*  *Education* *Education* *Education* *Education*   RPE Scale  [x] Yes      [] No  Exercise Safety  [x] Yes      [] No  Equipment Orientation  [x] Yes      [] No  S/S to Report  [x] Yes      [] No  Warm Up/Cool Down  [x] Yes      [] No  Home Exercise  [x] Yes      [] No     All Exercise Education Completed  [] Yes      [] No   Exercise Education Recommended    Workshops  [x] Improving Performance  [x] Balance Training & Fall Prevention  [x] Exercise Biomechanics  [x] Resistance Training & Core Strength  Exercise Education Attended/Date     Exercise Education Attended/Date Exercise Education Attended/Date All Sessions Completed? [] Yes  [] No   *Goals*  *Goals* *Goals* *Goals* *Goals*   Initial Exercise Goals  Exercise Goals  Exercise Goals   Exercise Goals  Exercise Goals   Brenton SOTOMAYOR plans to:  [x] Attend exercise sessions 3x/wk  [x] initiate home exercise 2-3x/wk for 10-20 min  [x] Increase 6 min walk distance by 10%  [x] Attend Exercise workshops  Brenton SOTOMAYOR plans to:  [x] Attend exercise sessions 3x/wk  [x] continue home exercise 2-3x/wk for 20-30 min  [x] Attend Exercise workshops Marce Clemente plans to:  [x] Attend exercise sessions 3x/wk  [x] continue home exercise 3-4x/wk for 30-45 min  [x] Determine plan of exercise following rehab  [x] Attend Exercise workshops Marce Clemente plans to:  **   Brenton SOTOMAYOR achieved exercise goals?     []  Yes    [] No  If no, why?  **  [] Increased 6 min walk distance by 10%  [] Currently exercising 30-60 min/day, 5-7days/wk   [] Plans to continue exercise on own  [] Plans to join a local fitness center to continue exercise  [] Does not plan to continue to exercise after rehab   Return to ADL or Hobbies:  Brenton SOTOMAYOR would like to improve strength and endurance so he is able to return to everyday activities getting easier  Return to ADL or Hobbies:  Brenton SOTOMAYOR would like to improve strength and endurance so he is able to return to everyday activities getting easier Return to ADL or Hobbies:  Brenton SOTOMAYOR would like to improve strength and endurance so he/she is able to return to ** Return to ADL or Hobbies:  Brenton SOTOMAYOR would like to improve strength and endurance so he/she is able to return to ** Return to ADL or Hobbies:  Brenton SOTOMAYOR would like to improve strength and endurance so he/she is able to return to **    *MET level required for above goal: 3.0 METs  MET level Achieved: 2.6*METs MET level Achieved:  **METs MET level Achieved:  **METs MET level Achieved:  **METs     Individual Cardiac Treatment Plan - Nutrition  NUTRITION  ASSESSMENT/PLAN  NUTRITION  REASSESSMENT NUTRITION   REASSESSMENT NUTRITION   REASSESSMENT NUTRITION  DISCHARGE/FOLLOW-UP   Stages of Change  Stages of Change Stages of Change Stages of Change Stages of Change   [x] Pre Contemplation  [] Contemplation  [] Preparation  [] Action  [] Maintenance  [] Relapse  [] Pre Contemplation  [x] Contemplation  [] Preparation  [] Action  [] Maintenance  [] Relapse [] Pre Contemplation  [x] Contemplation  [] Preparation  [] Action  [] Maintenance  [] Relapse [] Pre Contemplation  [] Contemplation  [] Preparation  [] Action  [] Maintenance  [] Relapse [] Pre Contemplation  [] Contemplation  [] Preparation  [] Action  [] Maintenance  [] Relapse   NUTRITION ASSESSMENT  NUTRITION ASSESSMENT NUTRITION ASSESSMENT NUTRITION ASSESSMENT NUTRITION ASSESSMENT   Weight Management  Weight: 158.0       Height: 69in   BMI: 23.4   Weight Management  Weight: 157                  Weight Management  Weight: **                  Weight Management  Weight: ** Weight Management  Weight: **                    BMI: **   Eating Plan  Current eating habits: none  Eating Plan  Changes: \"NONE\" Eating Plan  Changes: Eating Plan  Changes: Eating Plan Improvements:   Alcohol Use  [] none          [x] daily  [] weekly      [] special   Type: beer  Amount: 6 cans        Diet Assessment Tool:  RATE YOUR PLATE  *Given to patient to complete and return. Diet Assessment Tool:    Score: **/69    Pt did not complete       Diet Assessment Tool: RATE YOUR PLATE  Score: **/63   NUTRITION PLAN  NUTRITION PLAN NUTRITION PLAN NUTRITION PLAN NUTRITION PLAN   *Interventions*  *Interventions* *Interventions* *Interventions* *Interventions*   Initial Survey given  Goal Setting Discussion:   [x] Yes      [] No       Follow Up Survey Reviewed & Goals Updated:     Professional Referral  Please check if needed. [] Dietitian Consult   [] Wt. Management Referral  [] Other:   Professional Referral  Please check if needed. [] Dietitian Consult   [] Wt. Management Referral  [] Other: Professional Referral  Please check if needed. [] Dietitian Consult   [] Wt. Management Referral  [] Other: Professional Referral  Please check if needed. [] Dietitian Consult   [] Wt. Management Referral  [] Other: Professional Referral  Please check if needed. [] Dietitian Consult   [] Wt. Management Referral  [] Other:   *Education*  *Education* *Education* *Education* *Education*   Nutritional Education Recommended    [x] 1:1 Registered Dietitian    Workshops  [x] Label Reading   [x] Menu  [x] Targeting Nutrition Priorities  [x] Fueling a Healthy Body    Nutritional Education Attended/Date     Nutritional Education Attended/Date Nutritional Education Attended/Date All Sessions Completed?     [] Yes  [] No   Cooking School  Recommended     [x] Adding Flavor  [x] Fast & Healthy Breakfasts  [x] Salads & Dressings  [x] Soups & Simple     Sauces  [x] Simple Sides  [x] Appetizers &     Snacks  [x] Delicious Desserts  [x] Plant Proteins  [x] Fast Evening Meals  [x] Weekend Breakfasts  [x] Cook once, Eat       twice  [x] [place] [employer]  Cooking School  Sessions Completed   [facility]  Sessions Completed [facility]  Sessions Completed     Cooking School    # of sessions completed:  **   *Goals*  *Goals* *Goals* *Goals* *Goals*   Brenton SOTOMAYORMikes nutritional goals are as follows:  Complete and return diet survey  Brenton SOTOMAYORMikes nutritional goals are as follows:  [] Attend Nutrition Workshops  [] Attend 1:1   [] Attend Cooking Classes   Brenton SOTOMAYORMikes nutritional goals are as follows:  [] Attend Nutrition Workshops  [] Attend 1:1   [] Attend Cooking Classes  [] Complete and return diet survey  [] ** Brenton SOTOMAYORMikes nutritional goals are as follows:  [] Attend Nutrition Workshops  [] Attend 1:1   [] Attend Cooking Classes  [] ** Brenton SOTOMAYOR achieved nutritional goals   [] Yes    [] No  If no, why?   Use knowledge gained to continue Pritikin eating plan at home       Individual Cardiac Treatment Plan - Psychosocial  PSYCHOSOCIAL  ASSESSMENT/PLAN  PSYCHOSOCIAL  REASSESSMENT PSYCHOSOCIAL   REASSESSMENT PSYCHOSOCIAL   REASSESSMENT PSYCHOSOCIAL  DISCHARGE/FOLLOW-UP   Stages of Change  Stages of Change Stages of Change Stages of Change Stages of Change   [] Pre Contemplation  [x] Contemplation  [] Preparation  [] Action  [] Maintenance  [] Relapse  [] Pre Contemplation  [x] Contemplation  [] Preparation  [] Action  [] Maintenance  [] Relapse [] Pre Contemplation  [] Contemplation  [] Preparation  [] Action  [] Maintenance  [] Relapse [] Pre Contemplation  [] Contemplation  [] Preparation  [] Action  [] Maintenance  [] Relapse [] Pre Contemplation  [] Contemplation  [] Preparation  [] Action  [] Maintenance  [] Relapse   PSYCHOSOCIAL ASSESSMENT  PSYCHOSOCIAL ASSESSMENT PSYCHOSOCIAL ASSESSMENT PSYCHOSOCIAL ASSESSMENT PSYCHOSOCIAL ASSESSMENT   Behavioral Outcomes  Behavioral Outcomes Behavioral Outcomes Behavioral Outcomes Behavioral Outcomes   Tool Used:  Ki & Charly, Quality of Life Index, Cardiac Version IV  *Given to patient to complete. Tool Used:    Ki [employer] Parks, Quality of Life Index, Cardiac Version IV     QOL Index Score: **  HF:**  S&E:**  P&S: **  Family: **    Pt did not complete surveys. Tool Used:     Ki [employer] Parks, Quality of Life Index, Cardiac Version IV    QOL Index Score: **  HF:**  S&E:**  P&S: **  Family: **   PHQ-9 score 1  Depression Severity  [x]Minimal  []Mild   []Moderate  []Moderately Severe  []Severe     PHQ-9 score **  Depression Severity  []Minimal  []Mild   []Moderate  []Moderately Severe []Severe   Does patient have Family Support? [x] Yes      [] No  No signs of marital/family distress        Within the Past Month:  *Have you wished you were dead or wished you could go to sleep and not wake up? [] Yes      [x] No  *Have you had any thoughts of killing yourself? [] Yes      [x] No          Using a scale of 0-10, 0=none, 10=very:   Rate your depression: 0  Rate your anxiety:  0   Using a scale of 0-10, 0=none, 10=very:   Rate your depression: 0  Rate your anxiety:  0 Using a scale of 0-10, 0=none, 10=very:   Rate your depression: **  Rate your anxiety:  ** Using a scale of 0-10, 0=none, 10=very:   Rate your depression: **  Rate your anxiety:  ** Using a scale of 0-10, 0=none, 10=very:   Rate your depression: **  Rate your anxiety:  **   Signs and Symptoms of Depression Present? [] Yes      [x] No    Signs and Symptoms of Depression Present? [] Yes      [x] No   Signs and Symptoms of Depression Present? [] Yes      [] No  If yes, please explain:  ** Signs and Symptoms of Depression Present? [] Yes      [] No  If yes, please explain:  ** Signs and Symptoms of Depression Present?     [] Yes      [] No  If yes, please explain:  **   Signs and Symptoms of Anxiety Present? [] Yes      [x] No    Signs and Symptoms of Anxiety Present? [] Yes      [x] No   Signs and Symptoms of Anxiety Present? [] Yes      [] No  If yes, please explain:  ** Signs and Symptoms of Anxiety Present? [] Yes      [] No  If yes, please explain:  ** Signs and Symptoms of Anxiety Present? [] Yes      [] No  If yes, please explain:  **   [] Patient has poor eye contact   [] Flat affect present. [] Signs of anxiety, anger or hostility    [] Signs social isolation present. [x]  Signs of alcohol or substance abuse        PSYCHOSOCIAL PLAN  PSYCHOSOCIAL PLAN PSYCHOSOCIAL PLAN PSYCHOSOCIAL PLAN PSYCHOSOCIAL PLAN   *Interventions*  *Interventions* *Interventions* *Interventions* *Interventions*   *Please check if needed  [] Psych Consult  [] Physician Referral  [x] Stress Management Skills  *Please check if needed  [] Psych Consult  [] Physician Referral  [x] Stress Management Skills *Please check if needed  [] Psych Consult  [] Physician Referral  [] Stress Management Skills *Please check if needed  [] Psych Consult  [] Physician Referral  [] Stress Management Skills *Please check if needed  [] Psych Consult  [] Physician Referral  [] Stress Management Skills   Is patient currently taking anti-depressant or anti-anxiety medications? [] Yes      [x] No    Change in anti-depressant or anti-anxiety medications? [] Yes      [x] No   Change in anti-depressant or anti-anxiety medications? [] Yes      [] No  If yes, please list medications:  ** Change in anti-depressant or anti-anxiety medications? [] Yes      [] No  If yes, please list medications:  ** Change in anti-depressant or anti-anxiety medications?   [] Yes      [] No  If yes, please list medications:  **   *Education*  *Education* *Education* *Education* *Education*   Healthy Mind-Set Workshops Recommended  [x] Stress & Health  [x] Taking Charge of Stress  [x] New Thoughts, New Behaviors  [x] Managing Moods & Relationships  Healthy Mind-Set Workshops Attended/Date Healthy Mind-Set Workshops Attended/Date Healthy Mind-Set Workshops Attended/Date [employer]  Completed  [] Yes      [] No   *Goals*  *Goals* *Goals* *Goals* *Goals*   Brenton Clemente psychosocial goals are as follows:  Complete HADS & Ki & Charly, Quality of Life Index, Cardiac Version IV  Brenton Clemente psychosocial goals are as follows:  [] Attend Healthy Mind-Set Workshops  [] Reduce depression symptom severity by 1 level   Brenton Clemente psychosocial goals are as follows:  [] Attend Healthy Mind-Set Workshops  [] Reduce depression symptom severity by 1 level  [] ** Brenton Clemente psychosocial goals are as follows:  [] Attend Healthy Mind-Set Workshops  [] Reduce depression symptom severity by 1 level  [] ** Brenton SOTOMAYOR achieved psychosocial goals?   [] Yes    [] No  If no, why?  **  [] Use methods learned to continue to reduce depression symptom severity by 1 level  [] **     Individual Cardiac Treatment Plan - Other:  Risk Factor/Education  RISK FACTOR  ASSESSMENT/PLAN  RISK FACTOR  REASSESSMENT  RISK FACTOR  REASSESSMENT RISK FACTOR  REASSESSMENT RISK FACTOR   DISCHARGE/FOLLOW-UP   Stages of Change  Stages of Change Stages of Change Stages of Change Stages of Change   [] Pre Contemplation  [x] Contemplation  [] Preparation  [] Action  [] Maintenance  [] Relapse  [] Pre Contemplation  [x] Contemplation  [] Preparation  [] Action  [] Maintenance  [] Relapse [] Pre Contemplation  [] Contemplation  [] Preparation  [] Action  [] Maintenance  [] Relapse [] Pre Contemplation  [] Contemplation  [] Preparation  [] Action  [] Maintenance  [] Relapse [] Pre Contemplation  [] Contemplation  [] Preparation  [] Action  [] Maintenance  [] Relapse   RISK FACTOR/EDUCATION ASSESSMENT  RISK FACTOR/EDUCATION ASSESSMENT RISK FACTOR/EDUCATION ASSESSMENT RISK FACTOR/EDUCATION ASSESSMENT RISK FACTOR /EDUCATION ASSESSMENT   Hypertension  [x] Yes      [] No    Resting BP: 138/68  Peak Ex BP:166/70  Medication: na    Hypertension  Resting BP: 122/52  Peak Ex BP:178/76  Medication Changes:  [] Yes      [x] No Hypertension  Resting BP: **  Peak Ex BP:**  Medication Changes:  [] Yes      [] No Hypertension  Resting BP: **  Peak Ex BP:**  Medication Changes:  [] Yes      [] No Hypertension  Resting BP: **  Peak Ex BP:**  Medication Changes:  [] Yes      [] No   Lipids  HLD/DLD  [x] Yes      [] No  TOTAL CHOL: 137  HDL:  66  LDL:  61  TRI  Medication: lovastatin  Lipids  Medication Changes:  [] Yes      [x] No     Lipids  Medication Changes:  [] Yes      [] No     Lipids  Medication Changes:  [] Yes      [] No     Lipids    TOTAL CHOL: **  HDL:  **  LDL:  **  TRIG:  **  Medication Changes:  [] Yes      [] No   Diabetes  [] Yes      [x] No  FBS: 102             Diabetes  NA   Diabetes  Most Recent BS:  BS have been in range  [] Yes      [] No  Medication Changes  [] Yes      [] No     Diabetes  Most Recent BS:  BS have been in range  [] Yes      [] No  Medication Changes  [] Yes      [] No     Diabetes  Most Recent BS:  BS have been in range  [] Yes      [] No  Medication Changes  [] Yes      [] No       Tobacco Use  [] Current  [x] Former  [] Never    Years smoked: 48    Date Quit:     Smokeless Tobacco use:   [] Yes      [x] No    Tobacco Use  Change in smoking status   [] Yes      [x] No     Tobacco Use  Change in smoking status   [] Yes      [] No    Quit date: **   Tobacco Use  Change in smoking status   [] Yes      [] No    Quit date: ** Tobacco Use  Change in smoking status   [] Yes      [] No    Quit date: **             Learning Barriers  Please select one:  [] Speech  [] Literacy  [] Hearing  [] Cognitive  [] Vision  [x] Ready to Learn  Learning Barriers Addressed:   [x] Yes      [] No   Learning Barriers Addressed:   [] Yes      [] No   Learning Barriers Addressed:  [] Yes      [] No Learning Barriers Addressed:  [] Yes      [] No     RISK FACTOR/EDUCATION PLAN  RISK FACTOR/EDUCATION PLAN RISK FACTOR/EDUCATION PLAN RISK FACTOR/EDUCATION PLAN RISK FACTOR/EDUCATION PLAN   *Interventions*  *Interventions* *Interventions* *Interventions* *Interventions*   Recommended Educational Videos    [x] Overview of The Pritikin Eating Plan  [x] Becoming A Pritikin   [x] Diseases of Our Time-Part 1  [x] Calorie Density  [x] Label Reading-Part 1  [x] Move It! [x] Healthy Minds, Bodies, Hearts  [x] Dining Out-Part 1  [x] Heart Disease Risk Reduction  [x] Metabolic Syndrome & Belly Fat  [x] Facts on Fat  [x] Diseases of Our Times-Part 2  [x] Biology of Weight Control  [x] Biomechanical Limitations  [x] Nurtition Action Plan    Completed Videos/Date      10/8/2020  Overview of The [employer] Eating Plan    10/13/2020  Diseases 1 Completed Videos/Date Completed Videos/Date Recommended Educational Videos Completed    [] Yes      [] No    **If not completed, Why? **           Smoking Cessation/Relaspe Prevention Intervention needed? [] Yes      [x] No  *Pt verbalizes and agrees to attend intervention  Smoking Cessation/Relapse Prevention Scheduled? [] Yes      [x] No  NOT NEEDED Smoking Cessation/Relapse Prevention completed? [] Yes      [] No  Date: **    Smoking Cessation Counseling attended  [] Yes      [] No  **If not completed, Why? **   Professional Referrals:  *Please check if needed  [] Diabetes Clinic  [] Lipid Clinic   [] Other:      Professional Referrals:  *Please check if needed  [] Diabetes Clinic  [] Lipid Clinic   [] Other:   Preventative Medication  Preventative Medication Preventative Medication Preventative Medication Preventative Medication   Aspirin  [x] Yes    [] No  Blood Thinner: Clopidogrel/Effient/Brillinta  [] Yes    [] No  Beta Blocker  [x] Yes    [] No  Ace Inhibitor  [] Yes    [x] No  Statin/Lipid Lowering  [x] Yes    [] No  Medication Changes? [] Yes    [x] No Medication Changes? [] Yes    [] No Medication Changes? [] Yes    [] No Medication Changes?   [] Yes    [] No *Education*  *Education* *Education* *Education* *Education*   Does Brenton SOTOMAYOR require any additional education? [] Yes    [x] No    Does Brenton SOTOMAYOR require any additional education? [] Yes    [x] No Does Brenton SOTOMAYOR require any additional education? [] Yes    [] No Does Brenton SOTOMAYOR require any additional education? [] Yes    [] No Does Brenton SOTOMAYOR require any additional education?   [] Yes    [] No   Recommended Additional Educational Videos    Medical  [] Hypertension & Heart Disease  [] Decoding Lab Results  [] Aging Enhancing Your QoL  [] Sleep Disorders  Exercise  [] Body Composition  [] Improve Performance  [] Exercise Action Plan  [] Intro to Yoga  Behavioral  [] How Our Thoughts Can Heal Our Hearts  [] Smoking Cessation  Nutrition  [] Planning Your Eating Strategy  [] Lable Reading- Part 2  [] Dining Out - Part 2  [] Targeting Your Nutrition Priorities  [] Fueling a Healthy Body  [] Menu Workshop  [employer] [] Breakfast & Snacks  [] [employer] Salads & Dressing  [] [address] [place] [address]  [] Soups & Desserts    Additional Educational Videos Completed Additional Educational Videos Completed Additional Educational Videos Completed Additional Educational Videos Completed    [] Yes    [] No   *Goals*  *Goals* *Goals* *Goals* *Goals*   Brenton SOTOMAYOR's risk factor/education goals are as follows:    [x] Optimal BP <140/90  [] Blood Sugar <120  [x] Attend recommended video education sessions  [x] Takes medications as prescribed 100% of the time   [] **  Brenton SOTOMAYOR's risk factor/education goals are as follows:    [x] Optimal BP <140/90  [] Blood Sugar <120  [x] Attend recommended video education sessions  [x] Takes medications as prescribed 100% of the time    Brenton SOTOMAYOR's risk factor/education goals are as follows:    [x] Optimal BP <140/90  [] Blood Sugar <120  [x] Attend recommended video education sessions  [x] Takes medications as prescribed 100% of the time   [] ** Brenton SOTOMAYOR's risk factor/education goals are as follows:    [x] Optimal BP <140/90  [] Blood Sugar <120  [x] Attend recommended video education sessions  [x] Takes medications as prescribed 100% of the time   [] ** Brenton SOTOMAYOR achieved risk factor goals?   [] Yes    [] No  If no, why?  **     Monitored telemetry has revealed NSR w/ PVCs   Monitored telemetry has revealed **  [] documented arrhythmia at increasing workloads  [] associated symptoms ** Monitored telemetry has revealed NSR w/ PVCs   Monitored telemetry has revealed **  [] documented arrhythmia at increasing workloads  [] associated symptoms ** Monitored telemetry has revealed **  [] documented arrhythmia at increasing workloads  [] associated symptoms **   Physician Response    [x] Cardiac rehab is reasonably and medically necessary for continuous cardiac monitoring surveillance  of patient's cardiac activity  [x] Initiate continuous telemerty monitoring and notify me with any concerns  [] Other   Physician Response    [x] Cardiac rehab is reasonably and medically necessary for continuous cardiac monitoring surveillance  of patient's cardiac activity  [x] Continue continuous telemerty monitoring and notify me with any concerns  [] Other     Physician Response    [x] Cardiac rehab is reasonably and medically necessary for continuous cardiac monitoring surveillance  of patient's cardiac activity  [x] Continue continuous telemerty monitoring and notify me with any concerns   [] Other     Physician Response    [x] Cardiac rehab is reasonably and medically necessary for continuous cardiac monitoring surveillance  of patient's cardiac activity  [x] Continue continuous telemerty monitoring and notify me with any concerns   [] Other          Cosigned by:  Diana King MD at 10/9/2020 10:24 AM    Revision History     Date/Time  User  Provider Type  Action    10/9/2020 10:24 AM  Diana King MD  Physician  Cosign    10/8/2020 12:12 PM  Korin Perkins        Cosigned by:  Diana King MD at 10/26/2020  6:55 AM    Revision History     Date/Time  User  Provider Type  Action    10/26/2020  6:55 AM  Diamond Phoenix, MD  Physician  Cosign    10/22/2020 12:23 PM  Jerica Logan  Exercise Physiologist  Sign

## 2020-11-09 ENCOUNTER — TELEPHONE (OUTPATIENT)
Dept: PHARMACY | Age: 80
End: 2020-11-09

## 2020-11-09 ENCOUNTER — OFFICE VISIT (OUTPATIENT)
Dept: CARDIOLOGY CLINIC | Age: 80
End: 2020-11-09
Payer: MEDICARE

## 2020-11-09 VITALS
DIASTOLIC BLOOD PRESSURE: 82 MMHG | HEIGHT: 68 IN | SYSTOLIC BLOOD PRESSURE: 142 MMHG | HEART RATE: 60 BPM | BODY MASS INDEX: 24.1 KG/M2 | WEIGHT: 159 LBS

## 2020-11-09 PROCEDURE — 1036F TOBACCO NON-USER: CPT | Performed by: INTERNAL MEDICINE

## 2020-11-09 PROCEDURE — 99213 OFFICE O/P EST LOW 20 MIN: CPT | Performed by: INTERNAL MEDICINE

## 2020-11-09 PROCEDURE — G8420 CALC BMI NORM PARAMETERS: HCPCS | Performed by: INTERNAL MEDICINE

## 2020-11-09 PROCEDURE — G8484 FLU IMMUNIZE NO ADMIN: HCPCS | Performed by: INTERNAL MEDICINE

## 2020-11-09 PROCEDURE — G8427 DOCREV CUR MEDS BY ELIG CLIN: HCPCS | Performed by: INTERNAL MEDICINE

## 2020-11-09 PROCEDURE — 4040F PNEUMOC VAC/ADMIN/RCVD: CPT | Performed by: INTERNAL MEDICINE

## 2020-11-09 PROCEDURE — 1123F ACP DISCUSS/DSCN MKR DOCD: CPT | Performed by: INTERNAL MEDICINE

## 2020-11-09 RX ORDER — WARFARIN SODIUM 5 MG/1
5 TABLET ORAL DAILY
Qty: 30 TABLET | Refills: 3 | Status: SHIPPED | OUTPATIENT
Start: 2020-11-09 | End: 2021-02-23 | Stop reason: ALTCHOICE

## 2020-11-09 RX ORDER — WARFARIN SODIUM 5 MG/1
5 TABLET ORAL DAILY
Qty: 30 TABLET | Refills: 3 | Status: SHIPPED | OUTPATIENT
Start: 2020-11-09 | End: 2020-11-09 | Stop reason: SDUPTHER

## 2020-11-09 NOTE — TELEPHONE ENCOUNTER
Referred to [facility]) for Coumadin management by Dr. Caroline Hernandez for atrial fibrillation, goal INR 2-3, therapy duration indefinite, initial referral 11/9/2020. STOP aspirin when INR >2.      Spoke with patient's wife and instructed her to have patient take Coumadin 5 mg on 11/9, 11/20 and 11/11. Coumadin 2.5 mg on 11/12. Scheduled patient in clinic on 11/13. Patient's wife would like to go to [facility] in the future for lab INR draw. Of note: patient was on blood thinners in the past (wife is unsure of name) and had nose bleeds. Cardiologist in [place] instructed patient to stop blood thinner. Cardiologist send Coumadin 5 mg tablets to [facility]. Informed wife that we sent the same strength to [facility] so that she could  the prescription today. She agreed with plan.      Misael Bravo, PharmD, BCPS  11/9/2020  11:49 AM

## 2020-11-09 NOTE — PROGRESS NOTES
[address] [place] [address],[place] [address] [address] [place] [address]  Dept: [phone]  Dept Fax: [phone]  Loc: [phone]    Visit Date: 11/9/2020    Mr. Leon Harrison is a 68 y.o. male  who presented for:  Chief Complaint   Patient presents with    Follow-up     afib on monitor       HPI:   HPI     68 y.o male presents for evaluation of a-fib. He has PMHx of HLD, HTN, s/p TAVR, Nocturnal hypoxia. Today patient denies chest pain, shortness of breath at rest but does continue to have SOB on exertion, able to walk without dyspnea. No orthopnea or PND. He does have supplemental oxygen at night, 4L. Denies palpitations, dizziness, lightheadedness. Patient denies nausea/vomiting. He does have a good appetite. States that he walks about a mile and a half. Denies lower extremity swelling. Current Outpatient Medications:     umeclidinium-vilanterol (ANORO ELLIPTA) 62.5-25 MCG/INH AEPB inhaler, Inhale 1 puff into the lungs daily, Disp: 90 puff, Rfl: 3    lovastatin (MEVACOR) 20 MG tablet, Take 1 tablet by mouth nightly, Disp: 90 tablet, Rfl: 1    OXYGEN, Please provide patient with 3LPM of oxygen at night time for nocturnal hypoxia. Patient to have repeat pulse oximetry testing done on oxygen, Disp: 3 L, Rfl: 0    metoprolol succinate (TOPROL XL) 25 MG extended release tablet, Take 1 tablet by mouth daily, Disp: 30 tablet, Rfl: 0    clopidogrel (PLAVIX) 75 MG tablet, Take 1 tablet by mouth daily, Disp: 30 tablet, Rfl: 0    spironolactone (ALDACTONE) 25 MG tablet, Take 0.5 tablets by mouth daily, Disp: 30 tablet, Rfl: 3    aspirin 81 MG chewable tablet, Take 1 tablet by mouth daily, Disp: 30 tablet, Rfl: 3    nitroGLYCERIN (NITROSTAT) 0.4 MG SL tablet, up to max of 3 total doses.  If no relief after 1 dose, call 911., Disp: 25 tablet, Rfl: 1    Past Medical History  Brenton SOTOMAYOR  has a past medical history of ASCVD (arteriosclerotic cardiovascular disease), Cerebral artery occlusion with cerebral infarction ([facility] [address] COPD (chronic obstructive pulmonary disease) ([facility] [address] Dysplasia of vocal cord, Elevated glucose, Hyperlipidemia, Hypertension, Nocturnal hypoxia, Osteoarthritis, Osteopenia, Osteopenia, Polio, and Rheumatic fever. Social History  Brenton SOTOMAYOR  reports that he quit smoking about 16 years ago. His smoking use included cigarettes. He has a 75.00 pack-year smoking history. He has never used smokeless tobacco. He reports current alcohol use of about 30.0 standard drinks of alcohol per week. He reports that he does not use drugs. Family History  Brenton SOTOMAYOR family history includes Arthritis in his maternal grandmother; Heart Disease in his father. There is no family history of bicuspid aortic valve, aneurysms, heart transplant, pacemakers, defibrillators, or sudden cardiac death. Past Surgical History   Past Surgical History:   Procedure Laterality Date    BALLOON ANGIOPLASTY, ARTERY  07/2018    s/p Left CFA, SFA and popliteal intervention    FACIAL COSMETIC SURGERY      as a kid    HAND SURGERY Right     carpal tunnel     TONSILLECTOMY AND ADENOIDECTOMY         Review of Systems   Constitutional: Negative for chills and fever  HENT: Negative for congestion, sinus pressure, sneezing and sore throat. Eyes: Negative for pain, discharge, redness and itching. Respiratory: Negative for apnea, cough  Gastrointestinal: Negative for blood in stool, constipation, diarrhea   Endocrine: Negative for cold intolerance, heat intolerance, polydipsia. Genitourinary: Negative for dysuria, enuresis, flank pain and hematuria. Musculoskeletal: Negative for arthralgias, joint swelling and neck pain. Neurological: Negative for numbness and headaches. Psychiatric/Behavioral: Negative for agitation, confusion, decreased concentration and dysphoric mood.      Objective:     BP (!) 150/82   Pulse 60   Ht 5' 8\" (1.727 m)   Wt 159 lb (72.1 kg)   BMI 24.18 kg/m² Component Value Date    INR 1.17 (H) 09/17/2020    INR 1.03 09/16/2020    INR 0.97 06/16/2020         Lab Results   Component Value Date    LABA1C 5.4 05/03/2018       Lab Results   Component Value Date    TRIG 48 06/16/2020    HDL 66 06/16/2020    LDLCALC 61 06/16/2020    LDLDIRECT 83 09/30/2014    LABVLDL 17 04/06/2017       No results found for: TSH      Testing Reviewed:      I have individually reviewed the cardiac test below:    ECHO:   Results for orders placed during the hospital encounter of 10/16/20   ECHO Complete 2D W Doppler W Color    Narrative Transthoracic Echocardiography Report (TTE)     Demographics      Patient Name  Cesario Parikh Gender             Male                 L      MR #          [id number]      Race                                                  Ethnicity      Account #     [de-identified]      Room Number      Accession     [id number]     Date of Study      10/16/2020   Number      Date of Birth 1940     Referring          Fouzia Holder MD                                Physician          Jignesh Azevedo MD      Age           68 year(s)     Sonographer        FRANC Carpenter, RDCS,                                                   RDMS, RVT                                   Interpreting       Jignesh Azevedo MD                                Physician     Procedure    Type of Study      TTE procedure:ECHOCARDIOGRAM COMPLETE 2D W DOPPLER W COLOR. Procedure Date  Date: 10/16/2020 Start: 11:20 AM    Study Location: Echo Lab  Technical Quality: Adequate visualization    Indications:Post TAVR 30 days. Additional Medical History:chronic obstructive pulmonary disease, TAVR,  hypertension, aortic stenosis, hyperlipidemia, osteoarthritis    Patient Status: Routine    Height: 69 inches Weight: 158 pounds BSA: 1.87 m^2 BMI: 23.33 kg/m^2    BP: 130/72 mmHg    Allergies    - Other allergy:(Keflex). - Other allergy:(Brilinta and Keflex).      Conclusions      Summary Normal left ventricular size and systolic function. There were no regional wall motion abnormalities. Wall thickness was within normal limits. Ejection fraction was estimated at 55-60%. Doppler parameters were consistent with abnormal left ventricular   relaxation (grade 1 diastolic dysfunction). Left atrial size was severely dilated. S/p TAVR. DOPPLER: Transaortic velocity was within the normal range with   no evidence of aortic stenosis (mean 7 mmHg, EOA 1.2 cm2, V max 1.8 m/s). There was trace perivalvular aortic regurgitation. There was trace mitral regurgitation. IVC size is within normal limits with normal respiratory phasic changes. Signature      ----------------------------------------------------------------   Electronically signed by Victoriano Mott MD (Interpreting   physician) on 10/16/2020 at 02:57 PM   ----------------------------------------------------------------      Findings      Mitral Valve   The mitral valve structure was normal with normal leaflet separation. DOPPLER: The transmitral velocity was within the normal range with no   evidence for mitral stenosis. There was trace mitral regurgitation. Aortic Valve   S/p TAVR. DOPPLER: Transaortic velocity was within the normal range with   no evidence of aortic stenosis (mean 7 mmHg, EOA 1.2 cm2, V max 1.8 m/s). There was trace perivalvular aortic regurgitation. Tricuspid Valve   The tricuspid valve structure was normal with normal leaflet separation. DOPPLER: There was no evidence of tricuspid stenosis. There was no   evidence of tricuspid regurgitation. Pulmonic Valve   The pulmonic valve leaflets were not well seen. DOPPLER: The transpulmonic   velocity was within the normal range with no evidence for regurgitation. Left Atrium   Left atrial size was severely dilated. Left Ventricle   Normal left ventricular size and systolic function. There were no regional wall motion abnormalities. Wall thickness was within normal limits. Ejection fraction was estimated at 55-60%. Doppler parameters were consistent with abnormal left ventricular   relaxation (grade 1 diastolic dysfunction). Right Atrium   Right atrial size was normal.      Right Ventricle   The right ventricular size was normal with normal systolic function and   wall thickness. Pericardial Effusion   The pericardium was normal in appearance with no evidence of a pericardial   effusion. Pleural Effusion   No evidence of pleural effusion. Aorta / Great Vessels   IVC size is within normal limits with normal respiratory phasic changes.      M-Mode/2D Measurements & Calculations      LV Diastolic      LV Systolic Dimension: 3.7 cm    LA Dimension: 4.1 cmLA   Dimension: 5.3 cm LV Volume Diastolic: 684 ml      Area: 22.5 cm^2   LV FS:30.2 %      LV Volume Systolic: 75.9 ml   LV PW Diastolic:  LV EDV/LV EDV Index: 135 ml/72   1.2 cm            m^2LV ESV/LV ESV Index: 56.0   Septum Diastolic: RC/62 m^2                        RV Diastolic Dimension:   1.1 cm            EF Calculated: 57 %              3.3 cm                                                         LA volume/Index: 82.1                     LVOT: 2 cm                       ml /44m^2     Doppler Measurements & Calculations      MV Peak E-Wave:     AV Peak Velocity: 176     LVOT Peak Velocity: 61.4   81.4 cm/s           cm/s                      cm/s   MV Peak A-Wave:     AV Peak Gradient: 12.39   LVOT Mean Velocity: 36.4   98.1 cm/s           mmHg                      cm/s   MV E/A Ratio: 0.83  AV Mean Velocity: 124     LVOT Peak Gradient: 2   MV Peak Gradient:   cm/s                      mmHgLVOT Mean Gradient: 1   2.65 mmHg           AV Mean Gradient: 7 mmHg  mmHg                       AV VTI: 40 cm   MV Deceleration     AV Area (Continuity):1.17 TV Peak E-Wave: 39.8 cm/s   Time: 271 msec      cm^2                      TV Peak A-Wave: 37.7 cm/s LVOT VTI: 14.9 cm         TV Peak Gradient: 0.63 mmHg                       AV P1/2t: 543 msec                       IVRT: 225 msec            PV Peak Velocity: 43.7 cm/s                                                 PV Peak Gradient: 0.76 mmHg   MR Velocity: 528   cm/s                AV DVI (VTI): 0.37AV DVI                       (Vmax):0.35     [email or web address] [id number]%[id number]%3d        Assessment/Plan   Paroxysmal non-valvular atrial fibrillation - recent run of afib, going to 148 bpm and coming back down to 90. Patient remains asymptomatic. [id number] score: 5. LLE PAD s/p Supera implant 5.5 x 60  S/p TAVR - S329 - +4 cc  S/p LAD PCI  Will start warfarin 5 mg PO in addition to ASA and Plavix. Discussion was held with patient and wife regarding the risk of falling on warfarin and possible need for reversal. Discussed the risks of bleeding and benefits of  stroke prophylaxis. Patient instructed that when INR>2, he will stop aspirin but continue Warfarin and Plavix. Discussed diet/exercise/BP/weight loss/health lifestyle choices/lipids; the patient understands the goals and will try to comply. Disposition:    Follow up appt in 6 months. Electronically signed by Melissa Arteaga MD   11/9/2020 at 8:16 AM EST    Attending Supervising Physician's Attestation Statement  I performed a history and physical examination on the patient and discussed the management with the resident physician. I reviewed and agree with the findings and plan as documented in the resident's note.     Electronically signed by Andreas Sanford MD on 11/9/20 at 9:33 AM EST

## 2020-11-09 NOTE — PROGRESS NOTES
Hospital Facility-Based Program  Phase 2 Cardiac Rehab Weekly Progress Report      Patient prescribed exercise:  9:00 class. 3 times per week in rehab, 1-4 times per week at home for the amount of sessions/weeks specified by insurance. Current Levels: Treadmill: 2.0mph/0% for 5-8 minutes x 2,  NuStep:  38 [address]ws for 8 minutes, UBE: 20w for 5 minutes. Progression Discussion: Maintain/Increase Aerobic exercise 26 minutes to work on endurance. Attempt to increase intensity by 5-20% for each modality this week. Try to increase intensities until Brenton SOTOMAYOR rates the exercises a 13-17 on Darcy RPE.

## 2020-11-10 ENCOUNTER — APPOINTMENT (OUTPATIENT)
Dept: CARDIAC REHAB | Age: 80
End: 2020-11-10
Payer: MEDICARE

## 2020-11-10 ENCOUNTER — HOSPITAL ENCOUNTER (OUTPATIENT)
Dept: CARDIAC REHAB | Age: 80
Setting detail: THERAPIES SERIES
Discharge: HOME | End: 2020-11-10
Payer: MEDICARE

## 2020-11-10 PROCEDURE — 93798 PHYS/QHP OP CAR RHAB W/ECG: CPT

## 2020-11-12 ENCOUNTER — APPOINTMENT (OUTPATIENT)
Dept: CARDIAC REHAB | Age: 80
End: 2020-11-12
Payer: MEDICARE

## 2020-11-12 ENCOUNTER — HOSPITAL ENCOUNTER (OUTPATIENT)
Dept: CARDIAC REHAB | Age: 80
Setting detail: THERAPIES SERIES
Discharge: HOME | End: 2020-11-12
Payer: MEDICARE

## 2020-11-12 PROCEDURE — 93798 PHYS/QHP OP CAR RHAB W/ECG: CPT

## 2020-11-13 ENCOUNTER — HOSPITAL ENCOUNTER (OUTPATIENT)
Dept: PHARMACY | Age: 80
Setting detail: THERAPIES SERIES
Discharge: HOME | End: 2020-11-13
Payer: MEDICARE

## 2020-11-13 VITALS — TEMPERATURE: 97.8 F

## 2020-11-13 PROBLEM — I48.91 AFIB (HCC): Status: ACTIVE | Noted: 2020-11-13

## 2020-11-13 LAB — POC INR: 1.4 (ref 0.8–1.2)

## 2020-11-13 PROCEDURE — 85610 PROTHROMBIN TIME: CPT | Performed by: PHARMACIST

## 2020-11-13 PROCEDURE — 36416 COLLJ CAPILLARY BLOOD SPEC: CPT | Performed by: PHARMACIST

## 2020-11-13 PROCEDURE — 99213 OFFICE O/P EST LOW 20 MIN: CPT | Performed by: PHARMACIST

## 2020-11-13 NOTE — PROGRESS NOTES
Medication Management [address] [facility]  [address] [place] [address]  [phone] (phone)  [phone] (fax)    Patient presents to the Coumadin clinic today for anticoagulation assessment and initial dosing. Patient has a diagnosis of afib and has been placed on Coumadin with a goal INR 2-3. Pt started Coumadin 11/9/20. Priyanka Kuhn was given full education today in the clinic including written materials, supplemental oral instructions, and all questions were answered. Specifically, Nelson Bro was instructed to let us know immediately if there is any unusual bleeding, such as throwing or coughing up blood, bleeding from the nose, mouth, ears, or pink-red tinge in the urine or if the stools contain bright red blood or are black and tarry. In addition, Brenton SOTOMAYOR was informed to let us know about any and all medicine changes, including prescriptions, over-the-counter or nonprescription medicines, vitamins, herbs, supplements, creams, rubs, eye or ear drops, and injections, whether to be used short- or long-term, within 24 hours. The patient was also instructed to let all other physicians and pharmacists know that warfarin was started as a double-check against drug interactions. The patient was further instructed on the effects of vitamin K containing foods on warfarin and the importance of consistency was stressed. Brenton SOTOMAYOR was also advised to avoid large amounts of alcohol, grapefruit juice or cranberry juice while on warfarin. HPI:    Medication changes: none  Tablet strength per patient: 5mg  Patient reported dosing regimen over last 1 week: 5mg daily x 3, then 2.5mg x 1  Missed doses in the last 1-2 weeks: none  Extra doses in the last 1-2 weeks: none  Any problems with bleeding/bruising? Yes, bruises easily  Any recent falls? No   Any signs or symptoms of DVT/PE or stroke?  No  Alcohol use: yes, a beer or two per day  Tobacco use: no  Diet changes as follows: No changes   Green leafy intake: doesn't eat these often   Grapefruit/cranberry juice: avoids these  Upcoming surgeries or procedures: no      Objective    Vitals:    11/13/20 0925   Temp: 97.8 °F (36.6 °C)     There is no height or weight on file to calculate BMI. Wt Readings from Last 3 Encounters:   11/09/20 159 lb (72.1 kg)   11/03/20 158 lb 6.4 oz (71.8 kg)   10/21/20 158 lb 3.2 oz (71.8 kg)     Physical Exam    Assessment  Lab Results   Component Value Date    INR 1.40 (H) 11/13/2020    INR 1.17 (H) 09/17/2020    INR 1.03 09/16/2020     INR subtherapeutic   Recent Labs     11/13/20  0939   INR 1.40*         Per Dr. Devi Maxwell, DC ASA when INR>2.0    Plan   Coumadin 5mg daily. Recheck INR 4 days. Patient reminded to call the Anticoagulation Clinic with any signs or symptoms of bleeding or with any medication changes. Patient given instructions utilizing the teach back method.  Printed patient teaching/instruction included with AVS.     CLINICAL PHARMACY CONSULT: MED RECONCILIATION/REVIEW ADDENDUM    For Pharmacy Admin Tracking Only    PHSO: Yes  Total # of Interventions Recommended: 1  - Increased Dose #: 1  - Maintenance Safety Lab Monitoring #: 1  Total Interventions Accepted: 1  Time Spent (min): 20    Lamine Umaña, Sae

## 2020-11-16 ENCOUNTER — TELEPHONE (OUTPATIENT)
Dept: PHARMACY | Age: 80
End: 2020-11-16

## 2020-11-16 NOTE — TELEPHONE ENCOUNTER
Patient's wife Queenie Miranda called stating he had a nosebleed for ~20-30 minutes this morning, but it has stopped now. Instructed her to have patient go to ED/urgent care if it resumes/if it cannot be easily controlled. She states he has a history of nosebleeds on the other side in the past, but it was cauterized. INR tomorrow 11/17/20 as originally planned.     Cecil Carvajal, PharmD, BCPS  11/16/2020  7:53 AM

## 2020-11-17 ENCOUNTER — HOSPITAL ENCOUNTER (OUTPATIENT)
Dept: CARDIAC REHAB | Age: 80
Setting detail: THERAPIES SERIES
Discharge: HOME | End: 2020-11-17
Payer: MEDICARE

## 2020-11-17 ENCOUNTER — APPOINTMENT (OUTPATIENT)
Dept: CARDIAC REHAB | Age: 80
End: 2020-11-17
Payer: MEDICARE

## 2020-11-17 ENCOUNTER — HOSPITAL ENCOUNTER (OUTPATIENT)
Dept: PHARMACY | Age: 80
Setting detail: THERAPIES SERIES
Discharge: HOME | End: 2020-11-17
Payer: MEDICARE

## 2020-11-17 VITALS — TEMPERATURE: 98 F

## 2020-11-17 LAB — POC INR: 2.2 (ref 0.8–1.2)

## 2020-11-17 PROCEDURE — 99211 OFF/OP EST MAY X REQ PHY/QHP: CPT | Performed by: PHARMACIST

## 2020-11-17 PROCEDURE — 36416 COLLJ CAPILLARY BLOOD SPEC: CPT | Performed by: PHARMACIST

## 2020-11-17 PROCEDURE — 85610 PROTHROMBIN TIME: CPT | Performed by: PHARMACIST

## 2020-11-17 PROCEDURE — 93798 PHYS/QHP OP CAR RHAB W/ECG: CPT

## 2020-11-17 NOTE — PATIENT INSTRUCTIONS
Stop Aspirin  Consider using room humidifier  Consider using a OTC moisturizing spray or gel for nose

## 2020-11-17 NOTE — PROGRESS NOTES
Medication Management [address]  [phone] (phone)  [phone] (fax)      Mr. Aaliyah Blackwell is a 68 y.o.  male with history of Afib who presents today for anticoagulation monitoring and adjustment. Patient verifies current dosing regimen and tablet strength. No missed or extra doses. Pt takes warfarin qam, did not take any prior to today's appt as instructed. Patient denies s/s bruising/swelling/SOB/chest pain. Nosebleed yesterday, said it took approx 1 hr to stop completely. Reviewed First Aid for nosebleed. Instructed pt to go to Urgent Care, PCP for nosebleed that lasts longer than 20 min. Pt has a history of a nosebleed in the past from the other nostril, had to have it cauterized. Advised pt to use room humidifier and consider using saline nasal spray or nasal gel prn. No blood in urine or stool. No dietary changes. No changes in medication/OTC agents/Herbals. No change in alcohol use or tobacco use. No change in activity level. Currently doing cardiac rehab  Patient denies headaches/dizziness/lightheadedness/falls. No vomiting/diarrhea or acute illness. No Procedures scheduled in the future at this time. Assessment:   Lab Results   Component Value Date    INR 2.20 (H) 11/17/2020    INR 1.40 (H) 11/13/2020    INR 1.17 (H) 09/17/2020     INR therapeutic   Recent Labs     11/17/20  0815   INR 2.20*         Plan:  Stop ASA since INR>2.0 per Dr. Yuliana Atkins office note 11/9/20. Coumadin 2.5mg W and 5mg MTThFSaS. Recheck INR in 1 week(s). Patient reminded to call the Anticoagulation Clinic with any signs or symptoms of bleeding or with any medication changes. Patient given instructions utilizing the teach back method. Discharged ambulatory in no apparent distress. After visit summary printed and reviewed with patient.       Medications reviewed and updated on home medication list Yes    Influenza vaccine:     [] given    [] declined   [] received previously   [] plans to receive at a later time   [] refused    [x] documented in [address]:  [facility] [address]: Yes  Total # of Interventions Recommended: 2  - Increased Dose #: 1  - Discontinued Medication #: 1 Discontinue Reason(s): No Longer Used  - Maintenance Safety Lab Monitoring #: 1  Total Interventions Accepted: 2  Time Spent (min): 30    Jett MarshallD

## 2020-11-19 ENCOUNTER — HOSPITAL ENCOUNTER (OUTPATIENT)
Dept: RESPIRATORY THERAPY | Age: 80
Discharge: HOME | End: 2020-11-19
Payer: MEDICARE

## 2020-11-19 ENCOUNTER — HOSPITAL ENCOUNTER (OUTPATIENT)
Dept: CARDIAC REHAB | Age: 80
Setting detail: THERAPIES SERIES
Discharge: HOME | End: 2020-11-19
Payer: MEDICARE

## 2020-11-19 ENCOUNTER — APPOINTMENT (OUTPATIENT)
Dept: CARDIAC REHAB | Age: 80
End: 2020-11-19
Payer: MEDICARE

## 2020-11-19 PROCEDURE — 93798 PHYS/QHP OP CAR RHAB W/ECG: CPT

## 2020-11-19 PROCEDURE — 94762 N-INVAS EAR/PLS OXIMTRY CONT: CPT

## 2020-11-19 NOTE — PROGRESS NOTES
Instructions were given for an overnight nocturnal pulse oximetry study. The assigned GE number of the pulse oximetry was [id number]/ [id number]. A log sheet was completed. Patient was instructed on documenting any events that occurred throughout the night on the log sheet. The procedure was explained to the learner(s). Patient understanding of the procedure was good. Patient does have a mean of transportation to bring back the study the next day. A patient task was placed in the patients chart for the  to download the nocturnal study and fax the results to the ordering provider for interpretation. The pulse oximetrys memory was cleared. Patient had no questions and was sent home with the pulse oximeter.

## 2020-11-22 ENCOUNTER — APPOINTMENT (OUTPATIENT)
Dept: CT IMAGING | Age: 80
End: 2020-11-22
Payer: MEDICARE

## 2020-11-22 ENCOUNTER — HOSPITAL ENCOUNTER (EMERGENCY)
Age: 80
Discharge: HOME | End: 2020-11-23
Attending: EMERGENCY MEDICINE
Payer: MEDICARE

## 2020-11-22 VITALS
WEIGHT: 160 LBS | SYSTOLIC BLOOD PRESSURE: 171 MMHG | DIASTOLIC BLOOD PRESSURE: 76 MMHG | BODY MASS INDEX: 24.25 KG/M2 | RESPIRATION RATE: 18 BRPM | HEIGHT: 68 IN | TEMPERATURE: 97.6 F | HEART RATE: 70 BPM | OXYGEN SATURATION: 96 %

## 2020-11-22 LAB
ALBUMIN SERPL-MCNC: 3.9 G/DL (ref 3.5–5.1)
ALP BLD-CCNC: 98 U/L (ref 38–126)
ALT SERPL-CCNC: 15 U/L (ref 11–66)
ANION GAP SERPL CALCULATED.3IONS-SCNC: 12 MEQ/L (ref 8–16)
APTT: 41 SECONDS (ref 22–38)
AST SERPL-CCNC: 33 U/L (ref 5–40)
BASOPHILS # BLD: 0.6 %
BASOPHILS ABSOLUTE: 0.1 THOU/MM3 (ref 0–0.1)
BILIRUB SERPL-MCNC: 0.5 MG/DL (ref 0.3–1.2)
BILIRUBIN URINE: NEGATIVE
BLOOD, URINE: NEGATIVE
BUN BLDV-MCNC: 21 MG/DL (ref 7–22)
CALCIUM SERPL-MCNC: 9.3 MG/DL (ref 8.5–10.5)
CHARACTER, URINE: CLEAR
CHLORIDE BLD-SCNC: 101 MEQ/L (ref 98–111)
CO2: 24 MEQ/L (ref 23–33)
COLOR: YELLOW
CREAT SERPL-MCNC: 0.8 MG/DL (ref 0.4–1.2)
EOSINOPHIL # BLD: 6.9 %
EOSINOPHILS ABSOLUTE: 0.8 THOU/MM3 (ref 0–0.4)
ERYTHROCYTE [DISTWIDTH] IN BLOOD BY AUTOMATED COUNT: 13 % (ref 11.5–14.5)
ERYTHROCYTE [DISTWIDTH] IN BLOOD BY AUTOMATED COUNT: 44 FL (ref 35–45)
GFR SERPL CREATININE-BSD FRML MDRD: > 90 ML/MIN/1.73M2
GLUCOSE BLD-MCNC: 102 MG/DL (ref 70–108)
GLUCOSE URINE: NEGATIVE MG/DL
HCT VFR BLD CALC: 37.8 % (ref 42–52)
HCT VFR BLD CALC: 39.8 % (ref 42–52)
HEMOGLOBIN: 12.5 GM/DL (ref 14–18)
HEMOGLOBIN: 13.1 GM/DL (ref 14–18)
IMMATURE GRANS (ABS): 0.03 THOU/MM3 (ref 0–0.07)
IMMATURE GRANULOCYTES: 0.3 %
INR BLD: 2.9 (ref 0.85–1.13)
KETONES, URINE: NEGATIVE
LEUKOCYTE ESTERASE, URINE: NEGATIVE
LIPASE: 22.4 U/L (ref 5.6–51.3)
LYMPHOCYTES # BLD: 20.7 %
LYMPHOCYTES ABSOLUTE: 2.3 THOU/MM3 (ref 1–4.8)
MCH RBC QN AUTO: 30.7 PG (ref 26–33)
MCHC RBC AUTO-ENTMCNC: 33.1 GM/DL (ref 32.2–35.5)
MCV RBC AUTO: 92.9 FL (ref 80–94)
MONOCYTES # BLD: 11.7 %
MONOCYTES ABSOLUTE: 1.3 THOU/MM3 (ref 0.4–1.3)
NITRITE, URINE: NEGATIVE
NUCLEATED RED BLOOD CELLS: 0 /100 WBC
OSMOLALITY CALCULATION: 277 MOSMOL/KG (ref 275–300)
PH UA: 5.5 (ref 5–9)
PLATELET # BLD: 246 THOU/MM3 (ref 130–400)
PMV BLD AUTO: 10.2 FL (ref 9.4–12.4)
POTASSIUM REFLEX MAGNESIUM: 4.7 MEQ/L (ref 3.5–5.2)
PRO-BNP: 1050 PG/ML (ref 0–1800)
PROTEIN UA: NEGATIVE
RBC # BLD: 4.07 MILL/MM3 (ref 4.7–6.1)
SEG NEUTROPHILS: 59.8 %
SEGMENTED NEUTROPHILS ABSOLUTE COUNT: 6.6 THOU/MM3 (ref 1.8–7.7)
SODIUM BLD-SCNC: 137 MEQ/L (ref 135–145)
SPECIFIC GRAVITY, URINE: > 1.03 (ref 1–1.03)
TOTAL CK: 59 U/L (ref 55–170)
TOTAL PROTEIN: 6.5 G/DL (ref 6.1–8)
TROPONIN T: < 0.01 NG/ML
UROBILINOGEN, URINE: 1 EU/DL (ref 0–1)
WBC # BLD: 11.1 THOU/MM3 (ref 4.8–10.8)

## 2020-11-22 PROCEDURE — 71260 CT THORAX DX C+: CPT

## 2020-11-22 PROCEDURE — 81003 URINALYSIS AUTO W/O SCOPE: CPT

## 2020-11-22 PROCEDURE — 99285 EMERGENCY DEPT VISIT HI MDM: CPT

## 2020-11-22 PROCEDURE — 6370000000 HC RX 637 (ALT 250 FOR IP): Performed by: EMERGENCY MEDICINE

## 2020-11-22 PROCEDURE — 85014 HEMATOCRIT: CPT

## 2020-11-22 PROCEDURE — 85610 PROTHROMBIN TIME: CPT

## 2020-11-22 PROCEDURE — 74177 CT ABD & PELVIS W/CONTRAST: CPT

## 2020-11-22 PROCEDURE — 80053 COMPREHEN METABOLIC PANEL: CPT

## 2020-11-22 PROCEDURE — 85018 HEMOGLOBIN: CPT

## 2020-11-22 PROCEDURE — 6360000004 HC RX CONTRAST MEDICATION: Performed by: EMERGENCY MEDICINE

## 2020-11-22 PROCEDURE — 83690 ASSAY OF LIPASE: CPT

## 2020-11-22 PROCEDURE — 85025 COMPLETE CBC W/AUTO DIFF WBC: CPT

## 2020-11-22 PROCEDURE — 82550 ASSAY OF CK (CPK): CPT

## 2020-11-22 PROCEDURE — 84484 ASSAY OF TROPONIN QUANT: CPT

## 2020-11-22 PROCEDURE — 83880 ASSAY OF NATRIURETIC PEPTIDE: CPT

## 2020-11-22 PROCEDURE — 85730 THROMBOPLASTIN TIME PARTIAL: CPT

## 2020-11-22 PROCEDURE — 36415 COLL VENOUS BLD VENIPUNCTURE: CPT

## 2020-11-22 RX ORDER — ACETAMINOPHEN 325 MG/1
650 TABLET ORAL ONCE
Status: COMPLETED | OUTPATIENT
Start: 2020-11-22 | End: 2020-11-22

## 2020-11-22 RX ADMIN — ACETAMINOPHEN 650 MG: 325 TABLET ORAL at 19:39

## 2020-11-22 RX ADMIN — IOPAMIDOL 80 ML: 755 INJECTION, SOLUTION INTRAVENOUS at 20:24

## 2020-11-22 ASSESSMENT — PAIN SCALES - GENERAL
PAINLEVEL_OUTOF10: 3
PAINLEVEL_OUTOF10: 4
PAINLEVEL_OUTOF10: 5
PAINLEVEL_OUTOF10: 5

## 2020-11-22 ASSESSMENT — ENCOUNTER SYMPTOMS
WHEEZING: 0
ABDOMINAL DISTENTION: 0
SHORTNESS OF BREATH: 0
EYE PAIN: 0
ABDOMINAL PAIN: 0
EYE REDNESS: 0
NAUSEA: 0
PHOTOPHOBIA: 0
DIARRHEA: 0
VOMITING: 0
EYE DISCHARGE: 0
CHEST TIGHTNESS: 0
SORE THROAT: 0
CONSTIPATION: 0
EYE ITCHING: 0
BACK PAIN: 1
STRIDOR: 0
RHINORRHEA: 0
COUGH: 0

## 2020-11-22 ASSESSMENT — PAIN DESCRIPTION - FREQUENCY: FREQUENCY: INTERMITTENT

## 2020-11-22 ASSESSMENT — PAIN DESCRIPTION - PAIN TYPE: TYPE: ACUTE PAIN

## 2020-11-22 ASSESSMENT — PAIN DESCRIPTION - ORIENTATION: ORIENTATION: OUTER

## 2020-11-22 ASSESSMENT — PAIN DESCRIPTION - LOCATION: LOCATION: FLANK

## 2020-11-22 NOTE — ED NOTES
Pt. Presents to the ED with c/o bruising down the left flank. Pt. States he had a valve replacement done 2 weeks ago and was placed on coumadin. Pt. Noticed a lump on the left shoulder blade with bruising below the lump. Pt. Had an INR time of 2.2 drawn Tuesday. Pt. Reports intermittent pain on the flank. Pt. States that he was instructed to come to the ED if the bruising got worse. Pt. Gunjan Sánchezonds in no distress at this time. Respirations unlabored. Will continue to monitor.      Aisha Peters RN  11/22/20 3062

## 2020-11-23 ENCOUNTER — TELEPHONE (OUTPATIENT)
Dept: CARDIOLOGY CLINIC | Age: 80
End: 2020-11-23

## 2020-11-23 NOTE — TELEPHONE ENCOUNTER
Pt wife called states the ER wanted them to call to let Dr. Ramirez Villanueva know he was in the ER and they diagnosed him with an intramuscular hematoma     Pt on coumadin and going to [address] [place] [address] 11/24 for a check

## 2020-11-23 NOTE — ED NOTES
Bedside shift report given to this RN at this time by NOEL Conn. Patient is up in bed and updated on plan of care at this time. Patient is alert and oriented x4 and respirations are regular and unlabored. Call light within reach       Evelin Donaldson RN  11/22/20 6086

## 2020-11-23 NOTE — ED NOTES
ED nurse-to-nurse bedside report    Chief Complaint   Patient presents with    Bleeding/Bruising      LOC: alert and orientated to name, place, date  Vital signs   Vitals:    11/22/20 1740 11/22/20 1853 11/1940 11/22/20 2255   BP:  (!) 154/57 (!) 150/94 (!) 171/76   Pulse:  73 70 72   Resp:  16 16 17   Temp:       TempSrc:       SpO2: 94% 92% 93% 94%   Weight:       Height:          Pain:  0  Pain Interventions: comfort  Pain Goal: 0  Oxygen: NA    Current needs required none   Telemetry: Yes  LDAs:   Peripheral IV 11/22/20 Right Antecubital (Active)   Site Assessment Clean;Dry; Intact 11/22/20 2258   Line Status Normal saline locked 11/22/20 2258   Dressing Status Clean;Dry; Intact 11/22/20 2258     Continuous Infusions:   Mobility: Independent  Hood Fall Risk Score:    Fall Risk 6/25/2020 4/22/2019 10/23/2017 10/14/2016 10/14/2015   2 or more falls in past year? no no no no no   Fall with injury in past year? no no no no no     Fall Interventions: call light in reach  Report given to: Cullen Thurston RN  11/22/20 5109

## 2020-11-23 NOTE — ED NOTES
Pt up in bed with blankets over bottom half. Patient updated on plan of care at this time and expresses no concerns regarding treatment. Patient VSS. Respirations are regular and unlabored, patient is alert and oriented x4. Patient bed rails up x2 and call light within reach. Will continue to monitor.        Chris Cabrales RN  11/22/20 5296

## 2020-11-23 NOTE — TELEPHONE ENCOUNTER
Pt wife states pt was working on a  and had a board that he was trying to raise the mower up that is when it happened, pt is icing it and the swelling has gone down. But they are not sure if its still bleeding or not.

## 2020-11-23 NOTE — ED PROVIDER NOTES
[address] ENCOUNTER      PATIENT NAME: Eladio Lennox  MRN: [id number]  : 1940  PEÑA: 2020  PROVIDER: Neha Quintana MD      CHIEF COMPLAINT       Chief Complaint   Patient presents with   [facility]       Nurses Notes reviewed and I agreeexcept as noted in the HPI. HISTORY OF PRESENT ILLNESS    Eladio Lennox is a 68 y.o. male who presents to Emergency Department with Left flank pain    70-year-old male with past medical history of aortic stenosis status post TAVR on 2020 and hx of Afib on Coumadin anticoagulation presents to ED complaining of left upper back pain and left flank pain. He first experienced pain on 2020 when he was working on his lawnmower, he thought he pulled his left side muscle. He then went to physical therapy on 2020, he noticed increasing pain and swelling from left upper back and left flank with left flank ecchymosis. .  Now pain is moderate, worse on touch and back movement. No fever no chills. No shortness of breath. This HPI was provided by the patient. REVIEW OF SYSTEMS     Review of Systems   Constitutional: Negative for activity change, appetite change, chills, fatigue, fever and unexpected weight change. HENT: Negative for congestion, ear discharge, ear pain, hearing loss, nosebleeds, rhinorrhea and sore throat. Eyes: Negative for photophobia, pain, discharge, redness and itching. Respiratory: Negative for cough, chest tightness, shortness of breath, wheezing and stridor. Cardiovascular: Negative for chest pain, palpitations and leg swelling. Gastrointestinal: Negative for abdominal distention, abdominal pain, constipation, diarrhea, nausea and vomiting. Endocrine: Negative for cold intolerance, heat intolerance, polydipsia and polyphagia. Genitourinary: Positive for flank pain. Negative for dysuria, frequency and hematuria.    Musculoskeletal: Positive for arthralgias, back pain and myalgias. Negative for gait problem, neck pain and neck stiffness. Skin: Negative for pallor, rash and wound. Allergic/Immunologic: Negative for environmental allergies and food allergies. Neurological: Negative for dizziness, tremors, syncope, weakness and headaches. Psychiatric/Behavioral: Negative for agitation, behavioral problems, confusion, self-injury, sleep disturbance and suicidal ideas. 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 Lowest de-sat 77%.  Osteoarthritis     Osteopenia     Osteopenia     Polio 1948    Rheumatic fever 1948       SURGICAL HISTORY       Past Surgical History:   Procedure Laterality Date    BALLOON ANGIOPLASTY, ARTERY  07/2018    s/p Left CFA, SFA and popliteal intervention    FACIAL COSMETIC SURGERY      as a kid    HAND SURGERY Right     carpal tunnel     TONSILLECTOMY AND ADENOIDECTOMY         CURRENT MEDICATIONS       Discharge Medication List as of 11/23/2020 12:03 AM      CONTINUE these medications which have NOT CHANGED    Details   warfarin (COUMADIN) 5 MG tablet Take 1 tablet by mouth daily, Disp-30 tablet,R-3Normal      umeclidinium-vilanterol (ANORO ELLIPTA) 62.5-25 MCG/INH AEPB inhaler Inhale 1 puff into the lungs daily, Disp-90 puff,R-3Normal      lovastatin (MEVACOR) 20 MG tablet Take 1 tablet by mouth nightly, Disp-90 tablet,R-1Normal      OXYGEN Please provide patient with 3LPM of oxygen at night time for nocturnal hypoxia.  Patient to have repeat pulse oximetry testing done on oxygen, Disp-3 L,R-0Print      metoprolol succinate (TOPROL XL) 25 MG extended release tablet Take 1 tablet by mouth daily, Disp-30 tablet,R-0Normal      clopidogrel (PLAVIX) 75 MG tablet Take 1 tablet by mouth daily, Disp-30 tablet,R-0Normal      spironolactone (ALDACTONE) 25 MG tablet Take 0.5 tablets by mouth daily, Disp-30 tablet,R-3Normal      nitroGLYCERIN (NITROSTAT) 0.4 MG SL tablet up to max of 3 total doses. If no relief after 1 dose, call 911., Disp-25 tablet, R-1Normal             ALLERGIES     Brilinta [ticagrelor] and Keflex [cephalexin]    FAMILY HISTORY     He indicated that his mother is . He indicated that his father is . He indicated that his maternal grandmother is . He indicated that his maternal grandfather is . family history includes Arthritis in his maternal grandmother; Heart Disease in his father. SOCIAL HISTORY      reports that he quit smoking about 16 years ago. His smoking use included cigarettes. He has a 75.00 pack-year smoking history. He has never used smokeless tobacco. He reports current alcohol use of about 30.0 standard drinks of alcohol per week. He reports that he does not use drugs. PHYSICAL EXAM     INITIAL VITALS:  height is 5' 8\" (1.727 m) and weight is 160 lb (72.6 kg). His oral temperature is 97.6 °F (36.4 °C). His blood pressure is 171/76 (abnormal) and his pulse is 70. His respiration is 18 and oxygen saturation is 96%. Physical Exam  Vitals signs and nursing note reviewed. Constitutional:       Appearance: He is well-developed. He is not diaphoretic. HENT:      Head: Normocephalic and atraumatic. Nose: Nose normal.   Eyes:      General: No scleral icterus. Right eye: No discharge. Left eye: No discharge. Conjunctiva/sclera: Conjunctivae normal.      Pupils: Pupils are equal, round, and reactive to light. Neck:      Musculoskeletal: Normal range of motion and neck supple. Vascular: No JVD.       Trachea: No tracheal deviation. Cardiovascular:      Rate and Rhythm: Normal rate and regular rhythm. Heart sounds: Normal heart sounds. No murmur. No friction rub. No gallop. Pulmonary:      Effort: Pulmonary effort is normal. No respiratory distress. Breath sounds: Normal breath sounds. No stridor. No wheezing or rales. Chest:      Chest wall: No tenderness. Abdominal:      General: Bowel sounds are normal. There is no distension. Palpations: Abdomen is soft. There is no mass. Tenderness: There is no abdominal tenderness. There is no guarding or rebound. Hernia: No hernia is present. Musculoskeletal:         General: Tenderness present. No deformity. Comments: Left upper back tenderness and fullness. Left flank ecchymosis with tenderness. Lymphadenopathy:      Cervical: No cervical adenopathy. Skin:     General: Skin is warm and dry. Capillary Refill: Capillary refill takes less than 2 seconds. Coloration: Skin is not pale. Findings: No erythema or rash. Neurological:      Mental Status: He is alert and oriented to person, place, and time. Cranial Nerves: No cranial nerve deficit. Sensory: No sensory deficit. Motor: No abnormal muscle tone. Coordination: Coordination normal.      Deep Tendon Reflexes: Reflexes normal.   Psychiatric:         Behavior: Behavior normal.         Thought Content: Thought content normal.         Judgment: Judgment normal.         DIFFERENTIAL DIAGNOSIS:   Intramuscular hematoma, retroperitoneal bleeding, rib fracture, pleural effusion    DIAGNOSTIC RESULTS   EKG: All EKG's are interpreted by the Emergency Department Physician who either signs or Co-signsthis chart in the absence of a cardiologist.  Interpreted by me  Not indicated    RADIOLOGY: non-plain film images(s) such as CT, Ultrasound and MRI are read by the radiologist.  [employer] [employer]   Final Result   1.   Asymmetric swelling and adjacent edema of 400 thou/mm3    MPV 10.2 9.4 - 12.4 fL    Seg Neutrophils 59.8 %    Lymphocytes 20.7 %    Monocytes 11.7 %    Eosinophils 6.9 %    Basophils 0.6 %    Immature Granulocytes 0.3 %    Segs Absolute 6.6 1.8 - 7.7 thou/mm3    Lymphocytes Absolute 2.3 1.0 - 4.8 thou/mm3    Monocytes Absolute 1.3 0.4 - 1.3 thou/mm3    Eosinophils Absolute 0.8 (H) 0.0 - 0.4 thou/mm3    Basophils Absolute 0.1 0.0 - 0.1 thou/mm3    Immature Grans (Abs) 0.03 0.00 - 0.07 thou/mm3    nRBC 0 /100 wbc   Comprehensive Metabolic Panel w/ Reflex to MG   Result Value Ref Range    Glucose 102 70 - 108 mg/dL    CREATININE 0.8 0.4 - 1.2 mg/dL    BUN 21 7 - 22 mg/dL    Sodium 137 135 - 145 meq/L    Potassium reflex Magnesium 4.7 3.5 - 5.2 meq/L    Chloride 101 98 - 111 meq/L    CO2 24 23 - 33 meq/L    Calcium 9.3 8.5 - 10.5 mg/dL    AST 33 5 - 40 U/L    Alkaline Phosphatase 98 38 - 126 U/L    Total Protein 6.5 6.1 - 8.0 g/dL    Alb 3.9 3.5 - 5.1 g/dL    Total Bilirubin 0.5 0.3 - 1.2 mg/dL    ALT 15 11 - 66 U/L   Troponin   Result Value Ref Range    Troponin T < 0.010 ng/ml   Lipase   Result Value Ref Range    Lipase 22.4 5.6 - 51.3 U/L   Brain Natriuretic Peptide   Result Value Ref Range    Pro-BNP 1050.0 0.0 - 1800.0 pg/mL   Anion Gap   Result Value Ref Range    Anion Gap 12.0 8.0 - 16.0 meq/L   Glomerular Filtration Rate, Estimated   Result Value Ref Range    Est, Glom Filt Rate >90 ml/min/1.73m2   Osmolality   Result Value Ref Range    Osmolality Calc 277.0 275.0 - 300.0 mOsmol/kg   CK   Result Value Ref Range    Total CK 59 55 - 170 U/L   Urine reflex to culture    Specimen: Urine, clean catch   Result Value Ref Range    Glucose, Ur NEGATIVE NEGATIVE mg/dl    Bilirubin Urine NEGATIVE NEGATIVE    Ketones, Urine NEGATIVE NEGATIVE    Specific Gravity, Urine > 1.030 (A) 1.002 - 1.030    Blood, Urine NEGATIVE NEGATIVE    pH, UA 5.5 5.0 - 9.0    Protein, UA NEGATIVE NEGATIVE    Urobilinogen, Urine 1.0 0.0 - 1.0 eu/dl    Nitrite, Urine NEGATIVE NEGATIVE Leukocyte Esterase, Urine NEGATIVE NEGATIVE    Color, UA YELLOW STRAW-YELLOW    Character, Urine CLEAR CLEAR-SL CLOUD   Hemoglobin and hematocrit, blood   Result Value Ref Range    Hemoglobin 13.1 (L) 14.0 - 18.0 gm/dl    Hematocrit 39.8 (L) 42.0 - 52.0 %       EMERGENCY DEPARTMENT COURSE:   Vitals:    Vitals:    11/22/20 1853 11/1942 11/22/20 2255 11/22/20 2355   BP: (!) 154/57 (!) 150/94 (!) 171/76 (!) 171/76   Pulse: 73 70 72 70   Resp: 16 16 17 18   Temp:       TempSrc:       SpO2: 92% 93% 94% 96%   Weight:       Height:         7:25 PM: Patient is seen and evaluated in a timely fashion. ACTIONS:  Large bore IV  Tele monitor  CT CHEST W CONTRAST  CT ABDOMEN PELVIS W IV CONTRAST  Labs Reviewed   APTT - Abnormal; Notable for the following components:       Result Value    aPTT 41.0 (*)     All other components within normal limits   PROTIME-INR - Abnormal; Notable for the following components:    INR 2.90 (*)     All other components within normal limits   CBC WITH AUTO DIFFERENTIAL - Abnormal; Notable for the following components:    WBC 11.1 (*)     RBC 4.07 (*)     Hemoglobin 12.5 (*)     Hematocrit 37.8 (*)     Eosinophils Absolute 0.8 (*)     All other components within normal limits   COMPREHENSIVE METABOLIC PANEL W/ REFLEX TO MG FOR LOW K     Medications   acetaminophen (TYLENOL) tablet 650 mg (650 mg Oral Given 11/22/20 1939)   iopamidol (ISOVUE-370) 76 % injection 80 mL (80 mLs Intravenous Given 11/22/20 2024)       MEDICAL DECISION MAKINGS:    History and physical exam are suspicious for traumatic injury causing intramuscular hematoma or retroperitoneal hematoma. We will obtain labs and obtain CT chest, abdomen, and pelvis with IV contrast.  Check INR level. Hemoglobin 12.5, baseline hemoglobin on 10/16/2020 was 14.7. INR 2.0. CT shows 1. Thin curvilinear subcutaneous hematoma overlying the left abdominal   external oblique muscles with adjacent subcutaneous edema.  2.  Asymmetric

## 2020-11-23 NOTE — TELEPHONE ENCOUNTER
[id number] Gunjan Patterson if still bleeding, may need to stop Coumadin  Was this spontaneous or did he have some trauma while working

## 2020-11-24 ENCOUNTER — APPOINTMENT (OUTPATIENT)
Dept: CARDIAC REHAB | Age: 80
End: 2020-11-24
Payer: MEDICARE

## 2020-11-24 ENCOUNTER — HOSPITAL ENCOUNTER (OUTPATIENT)
Dept: PHARMACY | Age: 80
Setting detail: THERAPIES SERIES
Discharge: HOME | End: 2020-11-24
Payer: MEDICARE

## 2020-11-24 ENCOUNTER — HOSPITAL ENCOUNTER (OUTPATIENT)
Dept: CARDIAC REHAB | Age: 80
Setting detail: THERAPIES SERIES
Discharge: HOME | End: 2020-11-24
Payer: MEDICARE

## 2020-11-24 VITALS — TEMPERATURE: 97.7 F

## 2020-11-24 LAB — POC INR: 3.5 (ref 0.8–1.2)

## 2020-11-24 PROCEDURE — 36416 COLLJ CAPILLARY BLOOD SPEC: CPT

## 2020-11-24 PROCEDURE — 85610 PROTHROMBIN TIME: CPT

## 2020-11-24 PROCEDURE — 93798 PHYS/QHP OP CAR RHAB W/ECG: CPT

## 2020-11-24 PROCEDURE — 99211 OFF/OP EST MAY X REQ PHY/QHP: CPT

## 2020-11-24 NOTE — TELEPHONE ENCOUNTER
Check CBC again  It is traumatic, so he should be able to continue, just stop doing risky behaviors, needs to becareful

## 2020-11-24 NOTE — PROGRESS NOTES
Medication Management [address]   [phone] (phone)  [phone] (fax)      Mr. Raquel Wilcox is a 68 y.o.  male with history of Afib who presents today for anticoagulation monitoring and adjustment. Patient was seen in the ED on 11/22 for a hematoma. Per patient on 11/17: he over exerted himself causing the IM hematoma. ED physician did not believe patient was actively bleeding and instructed patient to use ice. Per patient today, the bruising started on 11/21 and he believes it looks darker today then on Sunday. When I looked at the bruising, it appears to be settling all down his left side. Educated patient to rest, ice the area and use tylenol for pain. Educated patient to watch to make sure the bruising starts to improve. If it continues to look worse, he was instructed to call PCP or Dr. Jackie Mistry. Patient verifies current dosing regimen and tablet strength. No missed or extra doses. Patient denies s/s bleeding/bruising/swelling/SOB/chest pain  No blood in urine or stool. No dietary changes. No changes in medication/OTC agents/Herbals. No change in alcohol use or tobacco use. Patient reports he has not been as active since he hurt his back and shoulder  Patient denies headaches/dizziness/lightheadedness/falls. No vomiting/diarrhea or acute illness. No Procedures scheduled in the future at this time. Assessment:   Lab Results   Component Value Date    INR 3.50 (H) 11/24/2020    INR 2.90 (H) 11/22/2020    INR 2.20 (H) 11/17/2020     INR supratherapeutic   Recent Labs     11/24/20  0809   INR 3.50*     Patient interview completed and discussed with pharmacist by Marcial Mathews PharmD candidate    Plan: Will aggressively decrease dose due to hematoma. HOLD x 1, Coumadin 2.5 mg x 1 then decrease Coumadin 5 mg MWF, 2.5 mg TThSS (23.1% decrease). Recheck INR in 1 week(s).   Patient reminded to call the Anticoagulation Clinic with signs or symptoms of bleeding or with any medication changes. Patient given instructions utilizing the teach back method. Discharged ambulatory in no apparent distress. After visit summary printed and reviewed with patient.       Medications reviewed and updated on home medication list Yes    Influenza vaccine:     [] given    [x] declined   [x] received previously   [] plans to receive at a later time   [] refused    [] documented in [facility], [place], BCPS  11/24/2020  9:05 AM

## 2020-11-25 ENCOUNTER — HOSPITAL ENCOUNTER (OUTPATIENT)
Age: 80
Discharge: HOME | End: 2020-11-25
Payer: MEDICARE

## 2020-11-25 DIAGNOSIS — I48.91 ATRIAL FIBRILLATION, UNSPECIFIED TYPE (HCC): ICD-10-CM

## 2020-11-25 LAB
ERYTHROCYTE [DISTWIDTH] IN BLOOD BY AUTOMATED COUNT: 13.2 % (ref 11.5–14.5)
ERYTHROCYTE [DISTWIDTH] IN BLOOD BY AUTOMATED COUNT: 45.4 FL (ref 35–45)
HCT VFR BLD CALC: 37.6 % (ref 42–52)
HEMOGLOBIN: 12.2 GM/DL (ref 14–18)
MCH RBC QN AUTO: 30.5 PG (ref 26–33)
MCHC RBC AUTO-ENTMCNC: 32.4 GM/DL (ref 32.2–35.5)
MCV RBC AUTO: 94 FL (ref 80–94)
PLATELET # BLD: 306 THOU/MM3 (ref 130–400)
PMV BLD AUTO: 10.4 FL (ref 9.4–12.4)
RBC # BLD: 4 MILL/MM3 (ref 4.7–6.1)
WBC # BLD: 11.3 THOU/MM3 (ref 4.8–10.8)

## 2020-11-25 PROCEDURE — 85027 COMPLETE CBC AUTOMATED: CPT

## 2020-11-25 PROCEDURE — 36415 COLL VENOUS BLD VENIPUNCTURE: CPT

## 2020-11-26 ENCOUNTER — HOSPITAL ENCOUNTER (OUTPATIENT)
Dept: CARDIAC REHAB | Age: 80
Setting detail: THERAPIES SERIES
End: 2020-11-26
Payer: MEDICARE

## 2020-11-26 ENCOUNTER — APPOINTMENT (OUTPATIENT)
Dept: CARDIAC REHAB | Age: 80
End: 2020-11-26
Payer: MEDICARE

## 2020-11-30 NOTE — PROGRESS NOTES
Hospital Facility-Based Program  Phase 2 Cardiac Rehab Weekly Progress Report      Patient prescribed exercise:  9:00 class. 3 times per week in rehab, 1-4 times per week at home for the amount of sessions/weeks specified by insurance. Current Levels: Treadmill: 2.0mph/0% for 12 minutes,  NuStep:  38 Mathews for 12 minutes, UBE: 30w for 5 minutes. Progression Discussion: Maintain/Increase Aerobic exercise 29 minutes to work on endurance. Attempt to increase intensity by 5-20% for each modality this week. Try to increase intensities until Brenton SOTOMAYOR rates the exercises a 13-17 on Darcy RPE.

## 2020-12-01 ENCOUNTER — APPOINTMENT (OUTPATIENT)
Dept: CARDIAC REHAB | Age: 80
End: 2020-12-01
Payer: MEDICARE

## 2020-12-01 ENCOUNTER — TELEPHONE (OUTPATIENT)
Dept: PHARMACY | Age: 80
End: 2020-12-01

## 2020-12-01 ENCOUNTER — HOSPITAL ENCOUNTER (OUTPATIENT)
Dept: CARDIAC REHAB | Age: 80
Setting detail: THERAPIES SERIES
Discharge: HOME | End: 2020-12-01
Payer: MEDICARE

## 2020-12-01 ENCOUNTER — HOSPITAL ENCOUNTER (OUTPATIENT)
Dept: PHARMACY | Age: 80
Setting detail: THERAPIES SERIES
Discharge: HOME | End: 2020-12-01
Payer: MEDICARE

## 2020-12-01 ENCOUNTER — OFFICE VISIT (OUTPATIENT)
Dept: FAMILY MEDICINE CLINIC | Age: 80
End: 2020-12-01
Payer: MEDICARE

## 2020-12-01 VITALS
SYSTOLIC BLOOD PRESSURE: 136 MMHG | DIASTOLIC BLOOD PRESSURE: 68 MMHG | TEMPERATURE: 98.1 F | BODY MASS INDEX: 24.63 KG/M2 | WEIGHT: 162 LBS

## 2020-12-01 VITALS — TEMPERATURE: 97.2 F

## 2020-12-01 LAB — POC INR: 1.7 (ref 0.8–1.2)

## 2020-12-01 PROCEDURE — 1123F ACP DISCUSS/DSCN MKR DOCD: CPT | Performed by: FAMILY MEDICINE

## 2020-12-01 PROCEDURE — G8427 DOCREV CUR MEDS BY ELIG CLIN: HCPCS | Performed by: FAMILY MEDICINE

## 2020-12-01 PROCEDURE — 1036F TOBACCO NON-USER: CPT | Performed by: FAMILY MEDICINE

## 2020-12-01 PROCEDURE — G8420 CALC BMI NORM PARAMETERS: HCPCS | Performed by: FAMILY MEDICINE

## 2020-12-01 PROCEDURE — 99211 OFF/OP EST MAY X REQ PHY/QHP: CPT | Performed by: PHARMACIST

## 2020-12-01 PROCEDURE — 99213 OFFICE O/P EST LOW 20 MIN: CPT | Performed by: FAMILY MEDICINE

## 2020-12-01 PROCEDURE — 36416 COLLJ CAPILLARY BLOOD SPEC: CPT | Performed by: PHARMACIST

## 2020-12-01 PROCEDURE — 4040F PNEUMOC VAC/ADMIN/RCVD: CPT | Performed by: FAMILY MEDICINE

## 2020-12-01 PROCEDURE — 85610 PROTHROMBIN TIME: CPT | Performed by: PHARMACIST

## 2020-12-01 PROCEDURE — G8484 FLU IMMUNIZE NO ADMIN: HCPCS | Performed by: FAMILY MEDICINE

## 2020-12-01 RX ORDER — CYCLOBENZAPRINE HCL 5 MG
5 TABLET ORAL 3 TIMES DAILY PRN
Qty: 30 TABLET | Refills: 0 | Status: SHIPPED | OUTPATIENT
Start: 2020-12-01 | End: 2020-12-11

## 2020-12-01 RX ORDER — PREDNISONE 20 MG/1
40 TABLET ORAL DAILY
Qty: 10 TABLET | Refills: 0 | Status: SHIPPED | OUTPATIENT
Start: 2020-12-01 | End: 2020-12-22

## 2020-12-01 ASSESSMENT — ENCOUNTER SYMPTOMS
SHORTNESS OF BREATH: 0
COUGH: 0
COLOR CHANGE: 1
CHEST TIGHTNESS: 0

## 2020-12-01 NOTE — TELEPHONE ENCOUNTER
Hector Mendoza from [facility] called pt was in and asking if recent CBC was reviewed. Pt continues with bruising and shoulder pain. Dr. Guzmán Ke advise?

## 2020-12-01 NOTE — TELEPHONE ENCOUNTER
Archana is fine  Have him hold Coumadin for 5 days  Then have clinic follow up to restart to see if bruising resolves  Does he want to come in to talk about [place] can follow-up

## 2020-12-01 NOTE — PROGRESS NOTES
Medication Management [address]  [phone] (phone)  [phone] (fax)      Mr. Teresa Wray is a 68 y.o.  male with history of Afib who presents today for anticoagulation monitoring and adjustment. Patient verifies current dosing regimen and tablet strength. No missed or extra doses. Patient denies s/s bleeding/swelling/SOB/chest pain. Bruising back is a \"triffle better\" per pt. Bruising on left side looks slightly better per wife, bruising is now settling around belt line. Area around belt line is a dark purple, area higher up on left side is lighter. Pt still has a sharp pain along shoulder blade, no bruising there. Pt shows a \"bump\" present around left shoulder blade. Advised pt to contact PCP for further instruction if pain persists. No blood in urine or stool. More broccoli than usual last week. No changes in medication/OTC agents/Herbals. No change in alcohol use or tobacco use. Pt states he is skipping cardiac rehab all week, says he is in too much pain from shoulder injury. Patient denies headaches/dizziness/lightheadedness/falls. No vomiting/diarrhea or acute illness. No Procedures scheduled in the future at this time. Assessment:   Lab Results   Component Value Date    INR 1.70 (H) 12/01/2020    INR 3.50 (H) 11/24/2020    INR 2.90 (H) 11/22/2020     INR subtherapeutic   Recent Labs     12/01/20  0753   INR 1.70*         Plan:  Continue Coumadin 5mg MWF and 2.5mg TThSaS. Recheck INR in 1 week(s). Patient reminded to call the Anticoagulation Clinic with any signs or symptoms of bleeding or with any medication changes. Patient given instructions utilizing the teach back method. Discharged ambulatory in no apparent distress. After visit summary printed and reviewed with patient.       Medications reviewed and updated on home medication list Yes    Influenza vaccine:     [] given    [] declined   [x] received previously   [] plans to receive at a later time   [] refused    [x] documented in [address]: Yes  Total # of Interventions Recommended: 0  - Maintenance Safety Lab Monitoring #: 1  Total Interventions Accepted: 0  Time Spent (min): [address], PharmD

## 2020-12-01 NOTE — PROGRESS NOTES
[address] [place] [address] [address], [address], [place]  Phone:  [phone]  DWA:[phone]       Name: Swapnil Castillo  : 1940    Chief Complaint   Patient presents with    Back Pain     left mid back pain   all started 2 weeks ago   has a lump on the back area         HPI:     Swapnil Castillo is a 68 y.o. male who presents today for evaluation of left mid back pain. On  he was working on his lawnmower. He bent down to  the front deck and forgot the gas can on the front was full and when he lifted he felt a sharp pain on the left side of his back. He had PT on  but it worsened his pain. He has swelling overlying the area. Because of his pain and recent TAVR on 2020, he went to the area where labs and imaging was completed. CT chest showed edema of the left lateral intercostal muscles and latissimus beginning at the left 6th rib suggestive of myositis. He was discharged home without any medications because of his Coumadin (no NSAIDs). He reports that the swelling is improving some, but there's still a bulge there that is painful. He notices with movements like reaching to open a car door. The bruising on his lower abdomen/back is improving. Current Outpatient Medications:     predniSONE (DELTASONE) 20 MG tablet, Take 2 tablets by mouth daily, Disp: 10 tablet, Rfl: 0    cyclobenzaprine (FLEXERIL) 5 MG tablet, Take 1 tablet by mouth 3 times daily as needed for Muscle spasms, Disp: 30 tablet, Rfl: 0    warfarin (COUMADIN) 5 MG tablet, Take 1 tablet by mouth daily, Disp: 30 tablet, Rfl: 3    umeclidinium-vilanterol (ANORO ELLIPTA) 62.5-25 MCG/INH AEPB inhaler, Inhale 1 puff into the lungs daily, Disp: 90 puff, Rfl: 3    lovastatin (MEVACOR) 20 MG tablet, Take 1 tablet by mouth nightly, Disp: 90 tablet, Rfl: 1    OXYGEN, Please provide patient with 3LPM of oxygen at night time for nocturnal hypoxia.  Patient to have repeat pulse oximetry testing done on oxygen, Disp: 3 L, Rfl: 0    metoprolol succinate (TOPROL XL) 25 MG extended release tablet, Take 1 tablet by mouth daily, Disp: 30 tablet, Rfl: 0    clopidogrel (PLAVIX) 75 MG tablet, Take 1 tablet by mouth daily, Disp: 30 tablet, Rfl: 0    spironolactone (ALDACTONE) 25 MG tablet, Take 0.5 tablets by mouth daily, Disp: 30 tablet, Rfl: 3    nitroGLYCERIN (NITROSTAT) 0.4 MG SL tablet, up to max of 3 total doses. If no relief after 1 dose, call 911., Disp: 25 tablet, Rfl: 1    Allergies   Allergen Reactions    Brilinta [Ticagrelor] Shortness Of Breath    Keflex [Cephalexin] Rash       Subjective:      Review of Systems   Constitutional: Negative for chills and fever. Respiratory: Negative for cough, chest tightness and shortness of breath. Cardiovascular: Negative for chest pain. Musculoskeletal: Positive for arthralgias and myalgias. Negative for gait problem. Skin: Positive for color change. Negative for rash. Objective:     /68 (Site: Left Upper Arm, Position: Sitting, Cuff Size: Large Adult)   Temp 98.1 °F (36.7 °C)   Wt 162 lb (73.5 kg)   BMI 24.63 kg/m²     Physical Exam  Constitutional:       Appearance: He is well-developed. HENT:      Head: Normocephalic and atraumatic. Eyes:      Conjunctiva/sclera: Conjunctivae normal.   Neck:      Musculoskeletal: Normal range of motion and neck supple. Cardiovascular:      Rate and Rhythm: Normal rate and regular rhythm. Heart sounds: Normal heart sounds. Pulmonary:      Effort: Pulmonary effort is normal. No respiratory distress. Breath sounds: Normal breath sounds. Abdominal:      General: Bowel sounds are normal.      Palpations: Abdomen is soft. Tenderness: There is no abdominal tenderness. Musculoskeletal: Normal range of motion. Comments: 5 cm raised bulge in back that is tight. Skin:     General: Skin is warm and dry.       Comments: ecchymosis to lower back and abdomen   Neurological:      Mental Status: He is alert and oriented to person, place, and time. Motor: No abnormal muscle tone. Assessment/Plan:     Brenton SOTOMAYOR was seen today for back pain. 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 Patient has myositis so will treat with oral steroids and muscle relaxants. INR was 1.7 today; he was advised to contact the Coumadin clinic to let them know he is on steroids as they can elevate his INR. A handout on back stretches was provided. -     predniSONE (DELTASONE) 20 MG tablet; Take 2 tablets by mouth daily  -     cyclobenzaprine (FLEXERIL) 5 MG tablet; Take 1 tablet by mouth 3 times daily as needed for Muscle spasms      Return if symptoms worsen or fail to improve.     Electronically signed by Arturo Watson MD on 12/1/2020 at 2:08 PM

## 2020-12-01 NOTE — TELEPHONE ENCOUNTER
----- Message from [employer] sent at 12/1/2020  2:07 PM EST -----  Contact: [phone]  Brenton was started on prednisone 40 mg qd, and cyclobenzaprine 5 mg TID . Please call his wife with any changes to his coumadin.

## 2020-12-01 NOTE — PATIENT INSTRUCTIONS
Patient Education        Back Stretches: Exercises  Introduction  Here are some examples of exercises for stretching your back. Start each exercise slowly. Ease off the exercise if you start to have pain. Your doctor or physical therapist will tell you when you can start these exercises and which ones will work best for you. How to do the exercises  Overhead stretch   1. Stand comfortably with your feet shoulder-width apart. 2. Looking straight ahead, raise both arms over your head and reach toward the ceiling. Do not allow your head to tilt back. 3. Hold for 15 to 30 seconds, then lower your arms to your sides. 4. Repeat 2 to 4 times. Side stretch   1. Stand comfortably with your feet shoulder-width apart. 2. Raise one arm over your head, and then lean to the other side. 3. Slide your hand down your leg as you let the weight of your arm gently stretch your side muscles. Hold for 15 to 30 seconds. 4. Repeat 2 to 4 times on each side. Press-up   1. Lie on your stomach, supporting your body with your forearms. 2. Press your elbows down into the floor to raise your upper back. As you do this, relax your stomach muscles and allow your back to arch without using your back muscles. As your press up, do not let your hips or pelvis come off the floor. 3. Hold for 15 to 30 seconds, then relax. 4. Repeat 2 to 4 times. Relax and rest   1. Lie on your back with a rolled towel under your neck and a pillow under your knees. Extend your arms comfortably to your sides. 2. Relax and breathe normally. 3. Remain in this position for about 10 minutes. 4. If you can, do this 2 or 3 times each day. Follow-up care is a key part of your treatment and safety. Be sure to make and go to all appointments, and call your doctor if you are having problems. It's also a good idea to know your test results and keep a list of the medicines you take. Where can you learn more? Go to [email or web address]://rakel.[email or web address]. org and sign in to your [facility] account. Enter I356 in the [employer] box to learn more about \"Back Stretches: Exercises. \"     If you do not have an account, please click on the \"Sign Up Now\" link. Current as of: March 2, 2020               Content Version: 12.6  © 9621-3952 [employer]. Care instructions adapted under license by [facility] ([facility]). If you have questions about a medical condition or this instruction, always ask your healthcare professional. [address] any warranty or liability for your use of this information.

## 2020-12-01 NOTE — TELEPHONE ENCOUNTER
Wil Nicole, can we get this patient in when able to discuss WATCHMAN? Also, I had spoke to Bipin and she states that the ED physician did not have Brenton stop his Coumadin and she believes that the bruising is improving. The coumadin clinic has adjusted his dose. Dr. Juno Barclay from your standpoint can the patient stay on his Coumadin for now?

## 2020-12-02 NOTE — TELEPHONE ENCOUNTER
Spoke to [facility] and appt set up for 12/10/2020 at 2:00pm.  She confirmed appt date and time. She states he hasn't stopped Coumadin and will stay on it.

## 2020-12-03 ENCOUNTER — APPOINTMENT (OUTPATIENT)
Dept: CARDIAC REHAB | Age: 80
End: 2020-12-03
Payer: MEDICARE

## 2020-12-03 ENCOUNTER — HOSPITAL ENCOUNTER (OUTPATIENT)
Dept: CARDIAC REHAB | Age: 80
Setting detail: THERAPIES SERIES
Discharge: HOME | End: 2020-12-03
Payer: MEDICARE

## 2020-12-03 NOTE — PLAN OF CARE
[address] [place] [address] Facility-Based Program  Individualized Cardiac Treatment Plan    Patient Name:  Jenae Tripp  :  1940  Age:  68 y.o. MRN:  [id number]  Diagnosis: TAVR  Date of Event: 2020   Physician:  Dr. Raisa Segura  Next Office Visit:  Not scheduled, Rina Polk 10/19/20  Date Entered Program: 10/8/2020  Risk Stratifications: [] Low [] Intermediate [x] High  Allergies: Allergies   Allergen Reactions    Brilinta [Ticagrelor] Shortness Of Breath    Keflex [Cephalexin] Rash       COVID -19 Screen  Do you have any of the following symptoms:  [] Fever [] Cough [] SOB [] Muscle/Body Ache [] Loss of taste/smell [x] None    Have you traveled outside of the [place]? [] Yes      [x] No    Have you been around anyone who has tested Positive for COVID 19?  [] Yes      [x] No    The following Education has been completed with patient. [x] Wait in the lobby until we call you back to rehab. [x] You must wear a mask while in the medical center, and in cardiac rehab. Please bring your own. [x] Bring your own bottle of water with you. [x] You can bring a visitor with you, however, they will need to sit in the waiting room while you are in cardiac rehab. Individual Cardiac Treatment Plan -EXERCISE  INITIAL  30 DAY 60 DAY 90 DAY FINAL DAY   EXERCISE  ASSESSMENT/PLAN  EXERCISE  REASSESSMENT EXERCISE   REASSESSMENT EXERCISE   REASSESSMENT EXERCISE  DISCHARGE/FOLLOW-UP   Date: 10/8/2020  Date: 2020 Date: 12/3/20 Date: Date:   Session #1  Session # 8 Session # 12  Pt did not attend this session. Session # ** Session # **  Last session completed on **   Stages of Change  Stages of Change Stages of Change Stages of Change Stages of Change   [] Pre Contemplation  [] Contemplation  [x] Preparation  [] Action  [] Maintenance  [] Relapse Pt discharged today 10/22/20. He does not want to complete the program. He wants to exercise on his own.  He does not want to participate in education classes. [] Pre Contemplation  [x] Contemplation  [] Preparation  [] Action  [] Maintenance  [] Relapse [] Pre Contemplation  [] Contemplation  [] Preparation  [] Action  [] Maintenance  [] Relapse [] Pre Contemplation  [] Contemplation  [] Preparation  [] Action  [] Maintenance  [] Relapse [] Pre Contemplation  [] Contemplation  [] Preparation  [] Action  [] Maintenance  [] Relapse   EXERCISE ASSESSMENT  EXERCISE ASSESSMENT EXERCISE ASSESSMENT EXERCISE ASSESSMENT EXERCISE ASSESSMENT   6 Min Walk Test  Distance walked:   0.06 miles  316.8 ft.  1.5 METs  Max HR:105 BPM  RPE:  11    THR:    Rhythm:  NSR w/ PVCs     6 Min Walk Test  Distance walked:   ** miles  ** ft  ** METs  Max HR:** BPM      RPE:  **  %Change ft= **    Rhythm:  **   DASI: 6.05 METs  DASI: 4.4 METs DASI: ** METs DASI: ** METs DASI: ** METs   Return to Work  [facility] on returning to work? [] Yes              [] No   [] Disabled     [x] Retired    Return to work:  Retired Return to work:  Retired Return to work:  Retired Return to work:  Retired   Orthopedic Limitations/  [x] Yes    [] No  If yes please list:  Ruptured disk in back, hx of PAD      Orthopedic Limitations   Orthopedic Limitations   Orthopedic Limitations   Orthopedic Limitations     Fall Risk  Fall risk assessed? [x] Yes      [] No    Balance Issues? [] Yes      [x] No     [] Walker [] Cane    [x] Safety issues reviewed       Fall Risk   Fall Risk Fall Risk Fall Risk   Home Exercise  [x] Yes    [] No  Type: walking on TM  Frequency: daily  Duration: 20mins  Home Exercise  [x] Yes    [] No  Type: walking  Frequency:daily  Duration: 30 min Home Exercise  [] Yes    [] No  Type: **  Frequency: **  Duration: ** Home Exercise  [] Yes    [] No  Type: **  Frequency: **  Duration: ** Home Exercise  [] Yes    [] No  Type: **  Frequency: **  Duration: **   Angina with Activity? [] Yes    [x] No  Angina Management: na  Angina with Activity?   [] Yes    [x] No   Angina with Activity? [] Yes    [] No  Angina Management: ** Angina with Activity? [] Yes    [] No  Angina Management: ** Angina with Activity?   [] Yes    [] No  Angina Management: **   EXERCISE PLAN  EXERCISE PLAN EXERCISE PLAN EXERCISE PLAN EXERCISE PLAN   *Interventions*  *Interventions* *Interventions* *Interventions* *Interventions*   Exercise Prescription  (per physician & CR staff)  Exercise Prescription  (per physician & CR staff) Exercise Prescription  (per physician & CR staff) Exercise Prescription  (per physician & CR staff) Exercise Prescription  (per physician & CR staff)   Cardiovascular  Cardiovascular Cardiovascular Cardiovascular Cardiovascular   Mode:    [x] Treadmill (TM)  [x] Schwinn Airdyne (AD)  [x] Arms Ergometer (AE)  [x] NuStep  [] Elliptical (E)  MODE:    [x] Treadmill (TM)  [x] Arms Ergometer (AE)  [x] NuStep   MODE:    [x] Treadmill (TM)  [] Schwinn Airdyne (AD)  [x] Arms Ergometer (AE)  [x] NuStep  [] Elliptical (E) MODE:    [] Treadmill (TM)  [] Schwinn Airdyne (AD)  [] Arms Ergometer (AE)  [] NuStep  [] Elliptical (E) MODE:    [] Treadmill (TM)  [] Schwinn Airdyne (AD)  [] Arms Ergometer (AE)  [] NuStep  [] Elliptical (E)   Initial Workloads  TM: [email or web address] 2.2 METs  AD: 0.6 level = 2.2 METs  NS: 38  Mathews= 2.2 METs  AE: 0.3 level = 1.6 METs  Current Workloads  TM: 2.0 @ 0%= 2.6 METs  NS:  38 Mathews= 2.2 METs  AE:  20watts = 1.7 METs Current Workloads  TM: 2.0 @0 %= 2.6 METs    NS:38   Mathews= 2.2 METs  AE: 30Watts = 2 METs Current Workloads  TM:  @ %=  METs  AD:  level =  METs  NS:   Mathews=  METs  AE:  level =  METs Current Workloads  TM:  @ %=  METs  AD:  level =  METs  NS:   Mathews=  METs  AE:  level =  METs     Frequency:    ICR: 3x/week  Home: 2-3x/wk  Frequency:   ICR: 3x/week  Home: 3x/wk Frequency:  ICR: 3x/week  Home: 3-4x/wk Frequency:  ICR: 3x/week  Home: 3-4x/wk Frequency:  Brenton SOTOMAYOR will continue exercise at  5-7 days/week   Duration:   Total aerobic exercise = 30-45 min    5-8 min/mode  Duration:  Total aerobic exercises = 21 min     8min/mode Duration:  Total aerobic exercises =  30 min     12 min/mode Duration:  Total aerobic exercises = ** min     **min/mode Duration:  Total erobic exercise =  60-90 min   Intensity:   MET Level = 2.2  RPE = 12-15  Intensity:  Max MET Level = 2.6  RPE = 12-15 Intensity:  Max MET Level = 2.6  RPE = 12-15 Intensity:  Max MET Level = **  RPE = 12-15 Intensity:  Max MET Level = ** RPE = 12-15   Progression: increase aerobic activity up to 15 min over next 4 weeks by increasing time 2-3 min/week. Progression:  increase aerobic activity up to 15 min over next 4 weeks by increasing time 2-3 min/week. Progression:increase aerobic activity up to 15 min over next 4 weeks by increasing time 2-3 min/week. Progression: Progression:  Increase time/intensity when RPE <13, and HR is in [facility]   [x] Yes      [] No  Upper and Lower body strength training 2x/wk    Wt: 2#       Reps:  8-15    *Increase wt. after completing 15 reps with an RPE of <12/13. [] Yes      [x] No  Upper and Lower body strength training 2x/wk    Pt does not come on Fridays to participate in strength training [] Yes      [x] No  Upper and Lower body strength training 2x/wk      Pt does not come on Fridays to participate in strength training [] Yes      [] No  Upper and Lower body strength training 2x/wk    Wt: **#       Reps:  8-15    *Increase wt. after completing 15 reps with an RPE of <12/13. Continue Strength Training at home   [] Exercise Log & Strength training handout given     Wt: **#       Reps:  8-15    *Increase wt. after completing 15 reps with an RPE of <12/13.    Flexibility  Flexibility Flexibility Flexibility Flexibility   [x] Yes      [] No  25 min session of Core Strength & Flexibility 1x/per week   Attends Core Strength & Flexibility   [] Yes      [x] No Attends Core Strength & Flexibility   [] Yes      [x] No Attends Core Strength & Flexibility   [] Yes      [] No Continue Core Strength & Flexibility at home   Home Exercise  *Brenton SOTOMAYOR verbalizes planning to walk daily days/week for 20-30 min on days not in rehab. Home Exercise  *Brenton SOTOMAYOR verbalizes planning to continue walking  daily for 30 min on days not in rehab. Home Exercise  *Brenton SOTOMAYOR verbalizes planning to walk 3-4 days/week for 30 min on days not in rehab. Home Exercise  *Brenton SOTOMAYOR verbalizes planning to ** ** days/week for ** min on days not in rehab. Home Exercise  *Brenton SOTOMAYOR verbalizes his/her plan to ** ** days/week for ** min @ **   *Education*  *Education* *Education* *Education* *Education*   RPE Scale  [x] Yes      [] No  Exercise Safety  [x] Yes      [] No  Equipment Orientation  [x] Yes      [] No  S/S to Report  [x] Yes      [] No  Warm Up/Cool Down  [x] Yes      [] No  Home Exercise  [x] Yes      [] No     All Exercise Education Completed  [] Yes      [] No   Exercise Education Recommended    Workshops  [x] Improving Performance  [x] Balance Training & Fall Prevention  [x] Exercise Biomechanics  [x] Resistance Training & Core Strength  Exercise Education Attended/Date     Exercise Education Attended/Date  na Exercise Education Attended/Date All Sessions Completed?     [] Yes  [] No   *Goals*  *Goals* *Goals* *Goals* *Goals*   Initial Exercise Goals  Exercise Goals  Exercise Goals   Exercise Goals  Exercise Goals   Brenton SOTOMAYOR plans to:  [x] Attend exercise sessions 3x/wk  [x] initiate home exercise 2-3x/wk for 10-20 min  [x] Increase 6 min walk distance by 10%  [x] Attend Exercise workshops  Brenton SOTOMAYOR plans to:  [x] Attend exercise sessions 3x/wk  [x] continue home exercise 2-3x/wk for 20-30 min  [x] Attend Exercise workshops Marce SOTOMAYOR's plans to:  [x] Attend exercise sessions 3x/wk  [x] continue home exercise 3-4x/wk for 30-45 min  [x] Determine plan of exercise following rehab  [x] Attend Exercise workshops Marce Silver СВЕТЛАНА's plans to:  **   Brenton SOTOMAYOR achieved exercise goals?     []  Yes    [] No  If no, why?  **  [] Increased 6 min walk distance by 10%  [] Currently exercising 30-60 min/day, 5-7days/wk   [] Plans to continue exercise on own  [] Plans to join a local fitness center to continue exercise  [] Does not plan to continue to exercise after rehab   Return to ADL or Hobbies:  Brenton SOTOMAYOR would like to improve strength and endurance so he is able to return to everyday activities getting easier  Return to ADL or Hobbies:  Brenton SOTOMAYOR would like to improve strength and endurance so he is able to return to everyday activities getting easier Return to ADL or Hobbies:  Brenton SOTOMAYOR would like to improve strength and endurance so he  is able to return to all previous activities Return to ADL or Hobbies:  Brenton SOTOMAYOR would like to improve strength and endurance so he/she is able to return to ** Return to ADL or Hobbies:  Brenton SOTOMAYOR would like to improve strength and endurance so he/she is able to return to **    *MET level required for above goal: 3.0 METs  MET level Achieved: 2.6*METs MET level Achieved:  2.6 METs MET level Achieved:  **METs MET level Achieved:  **METs     Individual Cardiac Treatment Plan - Nutrition  NUTRITION  ASSESSMENT/PLAN  NUTRITION  REASSESSMENT NUTRITION   REASSESSMENT NUTRITION   REASSESSMENT NUTRITION  DISCHARGE/FOLLOW-UP   Stages of Change  Stages of Change Stages of Change Stages of Change Stages of Change   [x] Pre Contemplation  [] Contemplation  [] Preparation  [] Action  [] Maintenance  [] Relapse  [] Pre Contemplation  [x] Contemplation  [] Preparation  [] Action  [] Maintenance  [] Relapse [] Pre Contemplation  [x] Contemplation  [] Preparation  [] Action  [] Maintenance  [] Relapse [] Pre Contemplation  [] Contemplation  [] Preparation  [] Action  [] Maintenance  [] Relapse [] Pre Contemplation  [] Contemplation  [] Preparation  [] Action  [] Maintenance  [] Relapse   NUTRITION ASSESSMENT  NUTRITION ASSESSMENT NUTRITION ASSESSMENT NUTRITION ASSESSMENT NUTRITION ASSESSMENT   Weight Management  Weight: 158.0       Height: 69in   BMI: 23.4   Weight Management  Weight: 157                  Weight Management  Weight: 158                Weight Management  Weight: ** Weight Management  Weight: **                    BMI: **   Eating Plan  Current eating habits: none  Eating Plan  Changes: \"NONE\" Eating Plan  Changes: Eating Plan  Changes: Eating Plan Improvements:   Alcohol Use  [] none          [x] daily  [] weekly      [] special   Type: beer  Amount: 6 cans        Diet Assessment Tool:  RATE YOUR PLATE  *Given to patient to complete and return. Diet Assessment Tool:    Score: **/69    Pt did not complete       Diet Assessment Tool: RATE YOUR PLATE  Score: **/35   NUTRITION PLAN  NUTRITION PLAN NUTRITION PLAN NUTRITION PLAN NUTRITION PLAN   *Interventions*  *Interventions* *Interventions* *Interventions* *Interventions*   Initial Survey given  Goal Setting Discussion:   [x] Yes      [] No       Follow Up Survey Reviewed & Goals Updated:     Professional Referral  Please check if needed. [] Dietitian Consult   [] Wt. Management Referral  [] Other:   Professional Referral  Please check if needed. [] Dietitian Consult   [] Wt. Management Referral  [] Other: Professional Referral  Please check if needed. [] Dietitian Consult   [] Wt. Management Referral  [] Other: Professional Referral  Please check if needed. [] Dietitian Consult   [] Wt. Management Referral  [] Other: Professional Referral  Please check if needed. [] Dietitian Consult   [] Wt.  Management Referral  [] Other:   *Education*  *Education* *Education* *Education* *Education*   Nutritional Education Recommended    [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 [] Yes  [] No   Cooking School  Recommended     [x] Adding Flavor  [x] Fast & Healthy     Breakfasts  [x] Salads & Dressings  [x] Soups & Simple     Sauces  [x] Simple Sides  [x] Appetizers &     Snacks  [x] Delicious Desserts  [x] Plant Proteins  [x] Fast Evening Meals  [x] Weekend Breakfasts  [x] Cook once, Eat       twice  [x] [employer]  Cooking School  Sessions Completed   [facility]  Sessions Completed     Cooking School  Sessions Completed     Cooking School    # of sessions completed:  **   *Goals*  *Goals* *Goals* *Goals* *Goals*   Brenton SOTOMAYOR's nutritional goals are as follows:  Complete and return diet survey  Brenton SOTOMAYOR's nutritional goals are as follows:  [] Attend Nutrition Workshops  [] Attend 1:1   [] Attend Cooking Classes   Brenton SOTOMAYOR's nutritional goals are as follows:  [] Attend Nutrition Workshops  [] Attend 1:1   [] Attend Cooking Classes  [x] Complete and return diet survey  [] ** Brenton SOTOMAYOR's nutritional goals are as follows:  [] Attend Nutrition Workshops  [] Attend 1:1   [] Attend Cooking Classes  [] ** Brenton SOTOMAYOR achieved nutritional goals   [] Yes    [] No  If no, why?   Use knowledge gained to continue Pritikin eating plan at home       Individual Cardiac Treatment Plan - Psychosocial  PSYCHOSOCIAL  ASSESSMENT/PLAN  PSYCHOSOCIAL  REASSESSMENT PSYCHOSOCIAL   REASSESSMENT PSYCHOSOCIAL   REASSESSMENT PSYCHOSOCIAL  DISCHARGE/FOLLOW-UP   Stages of Change  Stages of Change Stages of Change Stages of Change Stages of Change   [] Pre Contemplation  [x] Contemplation  [] Preparation  [] Action  [] Maintenance  [] Relapse  [] Pre Contemplation  [x] Contemplation  [] Preparation  [] Action  [] Maintenance  [] Relapse [] Pre Contemplation  [x] Contemplation  [] Preparation  [] Action  [] Maintenance  [] Relapse [] Pre Contemplation  [] Contemplation  [] Preparation  [] Action  [] Maintenance  [] Relapse [] Pre Contemplation  [] Contemplation  [] please explain:  ** Signs and Symptoms of Depression Present? [] Yes      [] No  If yes, please explain:  **   Signs and Symptoms of Anxiety Present? [] Yes      [x] No    Signs and Symptoms of Anxiety Present? [] Yes      [x] No   Signs and Symptoms of Anxiety Present? [] Yes      [x] No  If yes, please explain:  ** Signs and Symptoms of Anxiety Present? [] Yes      [] No  If yes, please explain:  ** Signs and Symptoms of Anxiety Present? [] Yes      [] No  If yes, please explain:  **   [] Patient has poor eye contact   [] Flat affect present. [] Signs of anxiety, anger or hostility    [] Signs social isolation present. [x]  Signs of alcohol or substance abuse        PSYCHOSOCIAL PLAN  PSYCHOSOCIAL PLAN PSYCHOSOCIAL PLAN PSYCHOSOCIAL PLAN PSYCHOSOCIAL PLAN   *Interventions*  *Interventions* *Interventions* *Interventions* *Interventions*   *Please check if needed  [] Psych Consult  [] Physician Referral  [x] Stress Management Skills  *Please check if needed  [] Psych Consult  [] Physician Referral  [x] Stress Management Skills *Please check if needed  [] Psych Consult  [] Physician Referral  [x] Stress Management Skills *Please check if needed  [] Psych Consult  [] Physician Referral  [] Stress Management Skills *Please check if needed  [] Psych Consult  [] Physician Referral  [] Stress Management Skills   Is patient currently taking anti-depressant or anti-anxiety medications? [] Yes      [x] No    Change in anti-depressant or anti-anxiety medications? [] Yes      [x] No   Change in anti-depressant or anti-anxiety medications? [] Yes      [x] No  If yes, please list medications:  ** Change in anti-depressant or anti-anxiety medications? [] Yes      [] No  If yes, please list medications:  ** Change in anti-depressant or anti-anxiety medications?   [] Yes      [] No  If yes, please list medications:  **   *Education*  *Education* *Education* *Education* *Education*   Healthy Mind-Set FACTOR/EDUCATION ASSESSMENT RISK FACTOR/EDUCATION ASSESSMENT RISK FACTOR /EDUCATION ASSESSMENT   Hypertension  [x] Yes      [] No    Resting BP: 138/68  Peak Ex BP:166/70  Medication: na    Hypertension  Resting BP: 122/52  Peak Ex BP:178/76  Medication Changes:  [] Yes      [x] No Hypertension  Resting BP: 148/74  Peak Ex BP:178/72  Medication Changes:  [] Yes      [x] No Hypertension  Resting BP: **  Peak Ex BP:**  Medication Changes:  [] Yes      [] No Hypertension  Resting BP: **  Peak Ex BP:**  Medication Changes:  [] Yes      [] No   Lipids  HLD/DLD  [x] Yes      [] No  TOTAL CHOL: 137  HDL:  66  LDL:  61  TRI  Medication: lovastatin  Lipids  Medication Changes:  [] Yes      [x] No     Lipids  Medication Changes:  [] Yes      [x] No     Lipids  Medication Changes:  [] Yes      [] No     Lipids    TOTAL CHOL: **  HDL:  **  LDL:  **  TRIG:  **  Medication Changes:  [] Yes      [] No   Diabetes  [] Yes      [x] No  FBS: 102             Diabetes  NA   Diabetes  na     Diabetes  Most Recent BS:  BS have been in range  [] Yes      [] No  Medication Changes  [] Yes      [] No     Diabetes  Most Recent BS:  BS have been in range  [] Yes      [] No  Medication Changes  [] Yes      [] No       Tobacco Use  [] Current  [x] Former  [] Never    Years smoked: 48    Date Quit:     Smokeless Tobacco use:   [] Yes      [x] No    Tobacco Use  Change in smoking status   [] Yes      [x] No     Tobacco Use  Change in smoking status   [] Yes      [x] No       Tobacco Use  Change in smoking status   [] Yes      [] No    Quit date: ** Tobacco Use  Change in smoking status   [] Yes      [] No    Quit date: **             Learning Barriers  Please select one:  [] Speech  [] Literacy  [] Hearing  [] Cognitive  [] Vision  [x] Ready to Learn  Learning Barriers Addressed:   [x] Yes      [] No   Learning Barriers Addressed:   [] Yes      [] No  na Learning Barriers Addressed:  [] Yes      [] No Learning Barriers Addressed:  [] Yes [] No     RISK FACTOR/EDUCATION PLAN  RISK FACTOR/EDUCATION PLAN RISK FACTOR/EDUCATION PLAN RISK FACTOR/EDUCATION PLAN RISK FACTOR/EDUCATION PLAN   *Interventions*  *Interventions* *Interventions* *Interventions* *Interventions*   Recommended Educational Videos    [x] Overview of The Pritikin Eating Plan  [x] Becoming A Pritikin   [x] Diseases of Our Time-Part 1  [x] Calorie Density  [x] Label Reading-Part 1  [x] Move It! [x] Healthy Minds, Bodies, Hearts  [x] Dining Out-Part 1  [x] Heart Disease Risk Reduction  [x] Metabolic Syndrome & Belly Fat  [x] Facts on Fat  [x] Diseases of Our Times-Part 2  [x] Biology of Weight Control  [x] Biomechanical Limitations  [x] Nurtition Action Plan    Completed Videos/Date      10/8/2020  Overview of The Pritikin Eating Plan    10/13/2020  Diseases 1 Completed Videos/Date  na Completed Videos/Date Recommended Educational Videos Completed    [] Yes      [] No    **If not completed, Why? **           Smoking Cessation/Relaspe Prevention Intervention needed? [] Yes      [x] No  *Pt verbalizes and agrees to attend intervention  Smoking Cessation/Relapse Prevention Scheduled? [] Yes      [x] No  NOT NEEDED     Smoking Cessation Counseling attended  [] Yes      [] No  **If not completed, Why? **   Professional Referrals:  *Please check if needed  [] Diabetes Clinic  [] Lipid Clinic   [] Other:      Professional Referrals:  *Please check if needed  [] Diabetes Clinic  [] Lipid Clinic   [] Other:   Preventative Medication  Preventative Medication Preventative Medication Preventative Medication Preventative Medication   Aspirin  [x] Yes    [] No  Blood Thinner: Clopidogrel/Effient/Brillinta  [] Yes    [] No  Beta Blocker  [x] Yes    [] No  Ace Inhibitor  [] Yes    [x] No  Statin/Lipid Lowering  [x] Yes    [] No  Medication Changes? [] Yes    [x] No Medication Changes? [] Yes    [x] No Medication Changes? [] Yes    [] No Medication Changes?   [] Yes    [] No   *Education* *Education* *Education* *Education* *Education*   Does Brenton SOTOMAYOR require any additional education? [] Yes    [x] No    Does Brenton SOTOMAYOR require any additional education? [] Yes    [x] No Does Brenton SOTOMAYOR require any additional education? [] Yes    [x] No Does Brenton SOTOMAYOR require any additional education? [] Yes    [] No Does Brenton SOTOMAYOR require any additional education?   [] Yes    [] No   Recommended Additional Educational Videos    Medical  [] Hypertension & Heart Disease  [] Decoding Lab Results  [] Aging Enhancing Your QoL  [] Sleep Disorders  Exercise  [] Body Composition  [] Improve Performance  [] Exercise Action Plan  [] Intro to Yoga  Behavioral  [] How Our Thoughts Can Heal Our Hearts  [] Smoking Cessation  Nutrition  [] Planning Your Eating Strategy  [] Lable Reading- Part 2  [] Dining Out - Part 2  [] Targeting Your Nutrition Priorities  [] Fueling a Healthy Body  [] Menu Workshop  [employer] [] Breakfast & Snacks  [] [employer] Salads & Dressing  [] [address] [place] [address]  [] Soups & Desserts    Additional Educational Videos Completed Additional Educational Videos Completed Additional Educational Videos Completed Additional Educational Videos Completed    [] Yes    [] No   *Goals*  *Goals* *Goals* *Goals* *Goals*   Brenton SOTOMAYOR's risk factor/education goals are as follows:    [x] Optimal BP <140/90  [] Blood Sugar <120  [x] Attend recommended video education sessions  [x] Takes medications as prescribed 100% of the time   [] **  Brenton SOTOMAYOR's risk factor/education goals are as follows:    [x] Optimal BP <140/90  [] Blood Sugar <120  [x] Attend recommended video education sessions  [x] Takes medications as prescribed 100% of the time    Brenton SOTOMAYOR's risk factor/education goals are as follows:    [x] Optimal BP <140/90  [] Blood Sugar <120  [x] Attend recommended video education sessions  [x] Takes medications as prescribed 100% of the time   [] ** Brenton SOTOMAYOR's risk factor/education goals are as follows:    [x] Optimal BP <140/90  [] Blood Sugar <120  [x] Attend recommended video education sessions  [x] Takes medications as prescribed 100% of the time   [] ** Brenton SOTOMAYOR achieved risk factor goals?   [] Yes    [] No  If no, why?  **     Monitored telemetry has revealed NSR w/ PVCs   Monitored telemetry has revealed **  [] documented arrhythmia at increasing workloads  [] associated symptoms ** Monitored telemetry has revealed NSR w/ PVCs   Monitored telemetry has revealed:NSR   Monitored telemetry has revealed **  [] documented arrhythmia at increasing workloads  [] associated symptoms **   Physician Response    [x] Cardiac rehab is reasonably and medically necessary for continuous cardiac monitoring surveillance  of patient's cardiac activity  [x] Initiate continuous telemerty monitoring and notify me with any concerns  [] Other   Physician Response    [x] Cardiac rehab is reasonably and medically necessary for continuous cardiac monitoring surveillance  of patient's cardiac activity  [x] Continue continuous telemerty monitoring and notify me with any concerns  [] Other     Physician Response    [x] Cardiac rehab is reasonably and medically necessary for continuous cardiac monitoring surveillance  of patient's cardiac activity  [x] Continue continuous telemerty monitoring and notify me with any concerns   [] Other     Physician Response    [x] Cardiac rehab is reasonably and medically necessary for continuous cardiac monitoring surveillance  of patient's cardiac activity  [x] Continue continuous telemerty monitoring and notify me with any concerns   [] Other          Cosigned by:  Michael Pierson MD at 10/9/2020 10:24 AM    Revision History     Date/Time  User  Provider Type  Action    10/9/2020 10:24 AM  Michael Pierson MD  Physician  Cosign    10/8/2020 12:12 PM  Korin Perkins        Cosigned by:  Michael Pierson MD at 10/26/2020  6:55 AM    Revision History     Date/Time  User  Provider Type  Action    10/26/2020  6:55 AM  Carlos Garcia MD  Physician  Cosign    10/22/2020 12:23 PM  Mitesh Ceron  Exercise Physiologist  Sign     Cosigned by:  Carlos Garcia MD at 11/6/2020  3:08 PM    Revision History     Date/Time  User  Provider Type  Action    11/6/2020  3:08 PM  Carlos Garcia MD  Physician  Cosign    11/5/2020 10:31 AM  Juan Echevarria  Exercise Physiologist  Sign

## 2020-12-04 RX ORDER — SPIRONOLACTONE 25 MG/1
12.5 TABLET ORAL DAILY
Qty: 30 TABLET | Refills: 3 | Status: SHIPPED | OUTPATIENT
Start: 2020-12-04 | End: 2021-04-20 | Stop reason: SDUPTHER

## 2020-12-08 ENCOUNTER — APPOINTMENT (OUTPATIENT)
Dept: CARDIAC REHAB | Age: 80
End: 2020-12-08
Payer: MEDICARE

## 2020-12-08 ENCOUNTER — HOSPITAL ENCOUNTER (OUTPATIENT)
Dept: CARDIAC REHAB | Age: 80
Setting detail: THERAPIES SERIES
Discharge: HOME | End: 2020-12-08
Payer: MEDICARE

## 2020-12-08 ENCOUNTER — HOSPITAL ENCOUNTER (OUTPATIENT)
Dept: PHARMACY | Age: 80
Setting detail: THERAPIES SERIES
Discharge: HOME | End: 2020-12-08
Payer: MEDICARE

## 2020-12-08 VITALS — TEMPERATURE: 97.9 F

## 2020-12-08 LAB — POC INR: 1.4 (ref 0.8–1.2)

## 2020-12-08 PROCEDURE — 85610 PROTHROMBIN TIME: CPT | Performed by: PHARMACIST

## 2020-12-08 PROCEDURE — 93798 PHYS/QHP OP CAR RHAB W/ECG: CPT

## 2020-12-08 PROCEDURE — 99211 OFF/OP EST MAY X REQ PHY/QHP: CPT | Performed by: PHARMACIST

## 2020-12-08 PROCEDURE — 36416 COLLJ CAPILLARY BLOOD SPEC: CPT | Performed by: PHARMACIST

## 2020-12-08 NOTE — PROGRESS NOTES
Medication Management [address]   [phone] (phone)  [phone] (fax)      Mr. Emelyn Fields is a 68 y.o.  male with history of Afib who presents today for anticoagulation monitoring and adjustment. Patient verifies current dosing regimen and tablet strength. No missed or extra doses. Still has bruising but looks better than it did last week. Sees Dr. Nanette Echeverria on Thursday to discuss possible Watchman procedure. No blood in urine or stool. No dietary changes. Just finished prednisone yesterday. No change in alcohol use or tobacco use. No change in activity level. Patient denies headaches/dizziness/lightheadedness/falls. No vomiting/diarrhea or acute illness. Talking about watchman with Nanette Echeverria on Thursday    Assessment:   Lab Results   Component Value Date    INR 1.40 (H) 12/08/2020    INR 1.70 (H) 12/01/2020    INR 3.50 (H) 11/24/2020     INR subtherapeutic   Recent Labs     12/08/20  0803   INR 1.40*     Patient interview completed and discussed with pharmacist by RAIZA Funez PharmD Candidate 2021    Subtherapeutic INR after decreased warfarin doses due to extensive bruising after injury and potential drug interaction with prednisone. Plan:  Increase Coumadin 2.5mg MWF and 5mg TThSaS. Recheck INR in 1 week(s). Patient reminded to call the Anticoagulation Clinic with signs or symptoms of bleeding or with any medication changes. Patient given instructions utilizing the teach back method. Discharged ambulatory in no apparent distress. After visit summary printed and reviewed with patient.       Medications reviewed and updated on home medication list Yes    Influenza vaccine:     [] given    [x] declined   [x] received previously   [] plans to receive at a later time   [] refused    [x] documented in [address]:  [facility] [address]: Yes  Total # of Interventions Recommended: 1  - Increased Dose #: 1  - Maintenance Safety Lab Monitoring #: 1  Total Interventions Accepted: 1  Time Spent (min): 20    Cedric Breaux, JettD

## 2020-12-08 NOTE — PROGRESS NOTES
Hospital Facility-Based Program  Phase 2 Cardiac Rehab Weekly Progress Report      Patient prescribed exercise:  9:00 class. 3 times per week in rehab, 1-4 times per week at home for the amount of sessions/weeks specified by insurance. Current Levels: Treadmill: 2.0mph/0% for 20 minutes, NuStep:  45 Mathews for 15 minutes    Progression Discussion: Maintain/Increase Aerobic exercise 15 minutes to work on endurance. Attempt to increase intensity by 5-20% for each modality this week. Try to increase intensities until Brenton SOTOMAYOR rates the exercises a 13-17 on Darcy RPE.

## 2020-12-10 ENCOUNTER — APPOINTMENT (OUTPATIENT)
Dept: CARDIAC REHAB | Age: 80
End: 2020-12-10
Payer: MEDICARE

## 2020-12-10 ENCOUNTER — HOSPITAL ENCOUNTER (OUTPATIENT)
Dept: CARDIAC REHAB | Age: 80
Setting detail: THERAPIES SERIES
End: 2020-12-10
Payer: MEDICARE

## 2020-12-10 ENCOUNTER — OFFICE VISIT (OUTPATIENT)
Dept: CARDIOLOGY CLINIC | Age: 80
End: 2020-12-10
Payer: MEDICARE

## 2020-12-10 VITALS
OXYGEN SATURATION: 96 % | DIASTOLIC BLOOD PRESSURE: 70 MMHG | BODY MASS INDEX: 23.76 KG/M2 | HEART RATE: 92 BPM | WEIGHT: 156.8 LBS | SYSTOLIC BLOOD PRESSURE: 112 MMHG | HEIGHT: 68 IN

## 2020-12-10 PROCEDURE — G8427 DOCREV CUR MEDS BY ELIG CLIN: HCPCS | Performed by: INTERNAL MEDICINE

## 2020-12-10 PROCEDURE — G8484 FLU IMMUNIZE NO ADMIN: HCPCS | Performed by: INTERNAL MEDICINE

## 2020-12-10 PROCEDURE — G8420 CALC BMI NORM PARAMETERS: HCPCS | Performed by: INTERNAL MEDICINE

## 2020-12-10 PROCEDURE — 99214 OFFICE O/P EST MOD 30 MIN: CPT | Performed by: INTERNAL MEDICINE

## 2020-12-10 PROCEDURE — 4040F PNEUMOC VAC/ADMIN/RCVD: CPT | Performed by: INTERNAL MEDICINE

## 2020-12-10 PROCEDURE — 1123F ACP DISCUSS/DSCN MKR DOCD: CPT | Performed by: INTERNAL MEDICINE

## 2020-12-10 PROCEDURE — 1036F TOBACCO NON-USER: CPT | Performed by: INTERNAL MEDICINE

## 2020-12-10 NOTE — PROGRESS NOTES
[address] [place] [address],[place] [address] [place] [address]  Dept: [phone]  Dept Fax: [phone]  Loc: [phone]    Visit Date: 12/10/2020    Mr. Quirino Hernandez is a 68 y.o. male  who presented for:  Chief Complaint   Patient presents with    Follow-up     Discuss the Watchman       HPI:   HPI   67 yo M s/p S3 TAVR, AFib, PAD who presents for follow-up. He had relatively spontaneous hematoma of the right flank. He felt a pop while working outside. Pain was in the left shoulder and then noticed significant and severe bleeding and swelling of the lower back into the left groin. Also notes issues with weak stream for urination. He works outside and gets bruises all over his hands. He just finished chronic steroids. He is also on Plavix for his PAD/LAD PCI/TAVR. He was in the ED recently for the bleeding. CT confirmed hematoma of the left abd external oblique muscle. No chest pain, angina, PEÑA, orthopnea, PND, sob at rest, palpitations, LE edema, or syncope. Current Outpatient Medications:     spironolactone (ALDACTONE) 25 MG tablet, Take 0.5 tablets by mouth daily, Disp: 30 tablet, Rfl: 3    cyclobenzaprine (FLEXERIL) 5 MG tablet, Take 1 tablet by mouth 3 times daily as needed for Muscle spasms, Disp: 30 tablet, Rfl: 0    warfarin (COUMADIN) 5 MG tablet, Take 1 tablet by mouth daily, Disp: 30 tablet, Rfl: 3    umeclidinium-vilanterol (ANORO ELLIPTA) 62.5-25 MCG/INH AEPB inhaler, Inhale 1 puff into the lungs daily, Disp: 90 puff, Rfl: 3    lovastatin (MEVACOR) 20 MG tablet, Take 1 tablet by mouth nightly, Disp: 90 tablet, Rfl: 1    OXYGEN, Please provide patient with 3LPM of oxygen at night time for nocturnal hypoxia.  Patient to have repeat pulse oximetry testing done on oxygen, Disp: 3 L, Rfl: 0    metoprolol succinate (TOPROL XL) 25 MG extended release tablet, Take 1 tablet by mouth daily, Disp: 30 tablet, Rfl: 0    clopidogrel (PLAVIX) 75 MG tablet, Take 1 tablet by mouth daily, Disp: 30 tablet, Rfl: 0    nitroGLYCERIN (NITROSTAT) 0.4 MG SL tablet, up to max of 3 total doses. If no relief after 1 dose, call 911., Disp: 25 tablet, Rfl: 1    predniSONE (DELTASONE) 20 MG tablet, Take 2 tablets by mouth daily (Patient not taking: Reported on 12/10/2020), Disp: 10 tablet, Rfl: 0    Past Medical History  Brenton SOTOMAYOR  has a past medical history of ASCVD (arteriosclerotic cardiovascular disease), Cerebral artery occlusion with cerebral infarction ([facility] [address] [address] COPD (chronic obstructive pulmonary disease) ([facility] [address] [address] Dysplasia of vocal cord, Elevated glucose, Hyperlipidemia, Hypertension, Nocturnal hypoxia, Osteoarthritis, Osteopenia, Osteopenia, Polio, and Rheumatic fever. Social History  Brenton SOTOMAYOR  reports that he quit smoking about 16 years ago. His smoking use included cigarettes. He has a 75.00 pack-year smoking history. He has never used smokeless tobacco. He reports current alcohol use of about 30.0 standard drinks of alcohol per week. He reports that he does not use drugs. Family History  Brenton SOTOMAYOR family history includes Arthritis in his maternal grandmother; Heart Disease in his father. There is no family history of bicuspid aortic valve, aneurysms, heart transplant, pacemakers, defibrillators, or sudden cardiac death. Past Surgical History   Past Surgical History:   Procedure Laterality Date    BALLOON ANGIOPLASTY, ARTERY  07/2018    s/p Left CFA, SFA and popliteal intervention    FACIAL COSMETIC SURGERY      as a kid    HAND SURGERY Right     carpal tunnel     TONSILLECTOMY AND ADENOIDECTOMY         Review of Systems   Constitutional: Negative for chills and fever  HENT: Negative for congestion, sinus pressure, sneezing and sore throat. Eyes: Negative for pain, discharge, redness and itching.    Respiratory: Negative for apnea, cough  Gastrointestinal: Negative for blood in stool, constipation, diarrhea   Endocrine: Negative for cold intolerance, heat intolerance, polydipsia. Genitourinary: Negative for dysuria, enuresis, flank pain and hematuria. Musculoskeletal: Negative for arthralgias, joint swelling and neck pain. Neurological: Negative for numbness and headaches. Psychiatric/Behavioral: Negative for agitation, confusion, decreased concentration and dysphoric mood. Objective:     /70   Pulse 92   Ht 5' 8\" (1.727 m)   Wt 156 lb 12.8 oz (71.1 kg)   SpO2 96%   BMI 23.84 kg/m²     Wt Readings from Last 3 Encounters:   12/10/20 156 lb 12.8 oz (71.1 kg)   12/01/20 162 lb (73.5 kg)   11/22/20 160 lb (72.6 kg)     BP Readings from Last 3 Encounters:   12/10/20 112/70   12/01/20 136/68   11/22/20 (!) 171/76       Nursing note and vitals reviewed. Physical Exam   Constitutional: Oriented to person, place, and time. Appears well-developed and well-nourished. HENT:   Head: Normocephalic and atraumatic. Eyes: EOM are normal. Pupils are equal, round, and reactive to light. Neck: Normal range of motion. Neck supple. No JVD present. Cardiovascular: Normal rate, regular rhythm, normal heart sounds and intact distal pulses. No murmur heard. Pulmonary/Chest: Effort normal and breath sounds normal. No respiratory distress. No wheezes. No rales. Abdominal: Soft. Bowel sounds are normal. No distension. There is no tenderness. Musculoskeletal: Normal range of motion. No edema. Neurological: Alert and oriented to person, place, and time. No cranial nerve deficit. Coordination normal.   Skin: Skin is warm and dry. Psychiatric: Normal mood and affect.        Lab Results   Component Value Date    CKTOTAL 59 11/22/2020       Lab Results   Component Value Date    WBC 11.3 11/25/2020    RBC 4.00 11/25/2020    HGB 12.2 11/25/2020    HCT 37.6 11/25/2020    MCV 94.0 11/25/2020    MCH 30.5 11/25/2020    MCHC 32.4 11/25/2020     11/25/2020    MPV 10.4 11/25/2020       Lab Results   Component Value Date     11/22/2020    K 4.7 11/22/2020     11/22/2020    CO2 24 11/22/2020    BUN 21 11/22/2020    LABALBU 3.9 11/22/2020    CREATININE 0.8 11/22/2020    CALCIUM 9.3 11/22/2020    LABGLOM >90 11/22/2020    GLUCOSE 102 11/22/2020    GLUCOSE 90 04/06/2017       Lab Results   Component Value Date    ALKPHOS 98 11/22/2020    ALT 15 11/22/2020    AST 33 11/22/2020    PROT 6.5 11/22/2020    BILITOT 0.5 11/22/2020    LABALBU 3.9 11/22/2020       No results found for: MG    Lab Results   Component Value Date    INR 1.40 (H) 12/08/2020    INR 1.70 (H) 12/01/2020    INR 3.50 (H) 11/24/2020         Lab Results   Component Value Date    LABA1C 5.4 05/03/2018       Lab Results   Component Value Date    TRIG 48 06/16/2020    HDL 66 06/16/2020    LDLCALC 61 06/16/2020    LDLDIRECT 83 09/30/2014    LABVLDL 17 04/06/2017       No results found for: TSH      Testing Reviewed:      I have individually reviewed the cardiac test below:    ECHO:   Results for orders placed during the hospital encounter of 10/16/20   ECHO Complete 2D W Doppler W Color    Narrative Transthoracic Echocardiography Report (TTE)     Demographics      Patient Name  Renu Christine Gender             Male                 L      MR #          [id number]      Race                                                  Ethnicity      Account #     [de-identified]      Room Number      Accession     [id number]     Date of Study      10/16/2020   Number      Date of Birth 1940     Referring          Darron Nieves MD                                Physician          Leilani Lucas MD      Age           68 year(s)     Sonographer        FRANC Joe, RDCS,                                                   RDMS, RVT                                   Interpreting       Leilani Lucas MD                                Physician     Procedure    Type of Study      TTE procedure:ECHOCARDIOGRAM COMPLETE 2D W DOPPLER W COLOR.      Procedure Date  Date: 10/16/2020 Start: 11:20 AM    Study Location: Echo Lab  Technical Quality: Adequate visualization    Indications:Post TAVR 30 days. Additional Medical History:chronic obstructive pulmonary disease, TAVR,  hypertension, aortic stenosis, hyperlipidemia, osteoarthritis    Patient Status: Routine    Height: 69 inches Weight: 158 pounds BSA: 1.87 m^2 BMI: 23.33 kg/m^2    BP: 130/72 mmHg    Allergies    - Other allergy:(Keflex). - Other allergy:(Brilinta and Keflex). Conclusions      Summary   Normal left ventricular size and systolic function. There were no regional wall motion abnormalities. Wall thickness was within normal limits. Ejection fraction was estimated at 55-60%. Doppler parameters were consistent with abnormal left ventricular   relaxation (grade 1 diastolic dysfunction). Left atrial size was severely dilated. S/p TAVR. DOPPLER: Transaortic velocity was within the normal range with   no evidence of aortic stenosis (mean 7 mmHg, EOA 1.2 cm2, V max 1.8 m/s). There was trace perivalvular aortic regurgitation. There was trace mitral regurgitation. IVC size is within normal limits with normal respiratory phasic changes. Signature      ----------------------------------------------------------------   Electronically signed by Zoey Herron MD (Interpreting   physician) on 10/16/2020 at 02:57 PM   ----------------------------------------------------------------      Findings      Mitral Valve   The mitral valve structure was normal with normal leaflet separation. DOPPLER: The transmitral velocity was within the normal range with no   evidence for mitral stenosis. There was trace mitral regurgitation. Aortic Valve   S/p TAVR. DOPPLER: Transaortic velocity was within the normal range with   no evidence of aortic stenosis (mean 7 mmHg, EOA 1.2 cm2, V max 1.8 m/s). There was trace perivalvular aortic regurgitation.       Tricuspid Valve   The tricuspid valve structure was normal with normal leaflet separation. DOPPLER: There was no evidence of tricuspid stenosis. There was no   evidence of tricuspid regurgitation. Pulmonic Valve   The pulmonic valve leaflets were not well seen. DOPPLER: The transpulmonic   velocity was within the normal range with no evidence for regurgitation. Left Atrium   Left atrial size was severely dilated. Left Ventricle   Normal left ventricular size and systolic function. There were no regional wall motion abnormalities. Wall thickness was within normal limits. Ejection fraction was estimated at 55-60%. Doppler parameters were consistent with abnormal left ventricular   relaxation (grade 1 diastolic dysfunction). Right Atrium   Right atrial size was normal.      Right Ventricle   The right ventricular size was normal with normal systolic function and   wall thickness. Pericardial Effusion   The pericardium was normal in appearance with no evidence of a pericardial   effusion. Pleural Effusion   No evidence of pleural effusion. Aorta / Great Vessels   IVC size is within normal limits with normal respiratory phasic changes.      M-Mode/2D Measurements & Calculations      LV Diastolic      LV Systolic Dimension: 3.7 cm    LA Dimension: 4.1 cmLA   Dimension: 5.3 cm LV Volume Diastolic: 196 ml      Area: 22.5 cm^2   LV FS:30.2 %      LV Volume Systolic: 34.7 ml   LV PW Diastolic:  LV EDV/LV EDV Index: 135 ml/72   1.2 cm            m^2LV ESV/LV ESV Index: 63.7   Septum Diastolic: FR/37 m^2                        RV Diastolic Dimension:   1.1 cm            EF Calculated: 57 %              3.3 cm                                                         LA volume/Index: 82.1                     LVOT: 2 cm                       ml /44m^2     Doppler Measurements & Calculations      MV Peak E-Wave:     AV Peak Velocity: 176     LVOT Peak Velocity: 61.4   81.4 cm/s           cm/s                      cm/s   MV Peak A-Wave:     AV Peak Gradient: 12.39   LVOT Mean Velocity: 36.4   98.1 cm/s           mmHg                      cm/s   MV E/A Ratio: 0.83  AV Mean Velocity: 124     LVOT Peak Gradient: 2   MV Peak Gradient:   cm/s                      mmHgLVOT Mean Gradient: 1   2.65 mmHg           AV Mean Gradient: 7 mmHg  mmHg                       AV VTI: 40 cm   MV Deceleration     AV Area (Continuity):1.17 TV Peak E-Wave: 39.8 cm/s   Time: 271 msec      cm^2                      TV Peak A-Wave: 37.7 cm/s                          LVOT VTI: 14.9 cm         TV Peak Gradient: 0.63 mmHg                       AV P1/2t: 543 msec                       IVRT: 225 msec            PV Peak Velocity: 43.7 cm/s                                                 PV Peak Gradient: 0.76 mmHg   MR Velocity: 528   cm/s                AV DVI (VTI): 0.37AV DVI                       (Vmax):0.35     [email or web address]? [id number]%3d%3d        Assessment/Plan   Paroxysmal non-valvular atrial fibrillation - [id number]-ZOFG score: 5, HASBLED = 4   LLE PAD s/p Supera implant 5.5 x 60  S/p TAVR - S329 - +4 cc  S/p LAD PCI, 6/2020    We had a long discussion with the patient and myself regarding the risks/benefits of stroke prophylaxis and anticoagulation using ACC guidelines and risk calculators. Shared decision making has been completed with her primary physician (see EMR). We discussed the options including no prophylaxis, aspirin only, DOACs, Warfarin, and mechanical occlusion of OTIS (Watchman). The patient was able to comprehend their overall risk of stroke versus bleeding. The patient agreed that the patient was not a candidate for long-term anticoagulation due to the above issues. The patient is clinically a candidate for left atrial appendage occlusion using the Watchman device.   Patient and family were informed of the risk/benefits of the procedure, the need for further imaging pre-procedure (PEGGY/CTA) and post-procedure (PEGGY) at specified intervals. Further, the patient understands that they will require anticoagulation before the procedure and after the procedure in the short-term. The patient is clinically a candidate for short-term anticoagulation. Further, discussion regarding the possibility of CVA related to other etiologies other than afib were also discussed including intracranial disease, carotid disease, cerebral vascular malformation, aortic atheroma, non-OTIS cardioembolic source, etc.  Patient understood that the WATCHMAN device is designed to reduce the risk of CVA in the setting of afib only and does not reduce the risk of CVA related to any other cause of stroke. All parties also understand that if post-procedure the patient develops another reason for anti-coagulation that this is a separate issue from the stroke prophylaxis for atrial fibrillation and that the device does not negate need for anticoagulation for other reasons. At this time, the only reason for anticoagulation is atrial fibrillation. All of the patient's/family's questions were answered to their satisfaction and they have decided to move forward with further work-up for the Watchman device. We will follow-up with the patient and primary cardiologist/caregivers throughout the process.       Plan:  1) Pre-procedure imaging PEGGY or CTA based on renal function  2) Structural Heart Coordinator to follow-up to ensure work-up completed  3) Watchman information/brochure given  4) Continue all anticoagulation as Rx'ed till instructed otherwise by the Structural Heart Team    Disposition:  WATCHMAN protocol     Electronically signed by Liam Rm MD   12/10/2020 at 2:25 PM EST

## 2020-12-14 NOTE — PROGRESS NOTES
Hospital Facility-Based Program  Phase 2 Cardiac Rehab Weekly Progress Report      Patient prescribed exercise:  9:00 class. 3 times per week in rehab, 1-4 times per week at home for the amount of sessions/weeks specified by insurance. Current Levels: Treadmill: 2.0mph/0% for 20 minutes,  NuStep:  45 Mathews for 8 minutes    Progression Discussion: Maintain/Increase Aerobic exercise 28 minutes to work on endurance. Attempt to increase intensity by 5-20% for each modality this week. Try to increase intensities until Brenton SOTOMAYOR rates the exercises a 13-17 on Darcy RPE.

## 2020-12-15 ENCOUNTER — HOSPITAL ENCOUNTER (OUTPATIENT)
Dept: PHARMACY | Age: 80
Setting detail: THERAPIES SERIES
Discharge: HOME | End: 2020-12-15
Payer: MEDICARE

## 2020-12-15 ENCOUNTER — APPOINTMENT (OUTPATIENT)
Dept: CARDIAC REHAB | Age: 80
End: 2020-12-15
Payer: MEDICARE

## 2020-12-15 ENCOUNTER — HOSPITAL ENCOUNTER (OUTPATIENT)
Dept: CARDIAC REHAB | Age: 80
Setting detail: THERAPIES SERIES
Discharge: HOME | End: 2020-12-15
Payer: MEDICARE

## 2020-12-15 VITALS — TEMPERATURE: 97.4 F

## 2020-12-15 LAB — POC INR: 1.6 (ref 0.8–1.2)

## 2020-12-15 PROCEDURE — 93798 PHYS/QHP OP CAR RHAB W/ECG: CPT

## 2020-12-15 PROCEDURE — 85610 PROTHROMBIN TIME: CPT | Performed by: PHARMACIST

## 2020-12-15 PROCEDURE — 36416 COLLJ CAPILLARY BLOOD SPEC: CPT | Performed by: PHARMACIST

## 2020-12-15 PROCEDURE — 99211 OFF/OP EST MAY X REQ PHY/QHP: CPT | Performed by: PHARMACIST

## 2020-12-17 ENCOUNTER — APPOINTMENT (OUTPATIENT)
Dept: CARDIAC REHAB | Age: 80
End: 2020-12-17
Payer: MEDICARE

## 2020-12-17 ENCOUNTER — HOSPITAL ENCOUNTER (OUTPATIENT)
Dept: CARDIAC REHAB | Age: 80
Setting detail: THERAPIES SERIES
Discharge: HOME | End: 2020-12-17
Payer: MEDICARE

## 2020-12-17 PROCEDURE — 93798 PHYS/QHP OP CAR RHAB W/ECG: CPT

## 2020-12-21 ENCOUNTER — TELEPHONE (OUTPATIENT)
Dept: CARDIOLOGY CLINIC | Age: 80
End: 2020-12-21

## 2020-12-21 NOTE — PROGRESS NOTES
Hospital Facility-Based Program  Phase 2 Cardiac Rehab Weekly Progress Report      Patient prescribed exercise:  9:00 class. 3 times per week in rehab, 1-4 times per week at home for the amount of sessions/weeks specified by insurance. Current Levels: Treadmill: 2.0mph/0% for 15 minutes,  NuStep:  38 Mathews for 12 minutes, UBE: 30w for 5 minutes. Progression Discussion: Maintain/Increase Aerobic exercise 32 minutes to work on endurance. Attempt to increase intensity by 5-20% for each modality this week. Try to increase intensities until Brenton SOTOMAYOR rates the exercises a 13-17 on Darcy RPE.

## 2020-12-22 ENCOUNTER — TELEPHONE (OUTPATIENT)
Dept: CARDIOLOGY CLINIC | Age: 80
End: 2020-12-22

## 2020-12-22 ENCOUNTER — APPOINTMENT (OUTPATIENT)
Dept: CARDIAC REHAB | Age: 80
End: 2020-12-22
Payer: MEDICARE

## 2020-12-22 ENCOUNTER — HOSPITAL ENCOUNTER (OUTPATIENT)
Dept: CARDIAC REHAB | Age: 80
Setting detail: THERAPIES SERIES
Discharge: HOME | End: 2020-12-22
Payer: MEDICARE

## 2020-12-22 ENCOUNTER — HOSPITAL ENCOUNTER (OUTPATIENT)
Dept: PHARMACY | Age: 80
Setting detail: THERAPIES SERIES
Discharge: HOME | End: 2020-12-22
Payer: MEDICARE

## 2020-12-22 VITALS — TEMPERATURE: 98.5 F

## 2020-12-22 LAB — POC INR: 2.2 (ref 0.8–1.2)

## 2020-12-22 PROCEDURE — 99211 OFF/OP EST MAY X REQ PHY/QHP: CPT

## 2020-12-22 PROCEDURE — 93798 PHYS/QHP OP CAR RHAB W/ECG: CPT

## 2020-12-22 PROCEDURE — 85610 PROTHROMBIN TIME: CPT

## 2020-12-22 PROCEDURE — 36416 COLLJ CAPILLARY BLOOD SPEC: CPT

## 2020-12-22 NOTE — TELEPHONE ENCOUNTER
Procedure: Debbi[place]   Date: 01.05.2021  Arrival Time: 800am   Meds to Hold: Coumadin 3 days prior to apt. ,  Aldactone and lasix the morning of the procedure    Covid:  testing ordered, to be done:   1 wk s/p alley apt with Radha Haq- 1.11.2021 at 1045am

## 2020-12-22 NOTE — PROGRESS NOTES
Medication Management [address]  [phone] (phone)  [phone] (fax)      Mr. Raquel Wilcox is a 68 y.o.  male with history of Afib who presents today for anticoagulation monitoring and adjustment. Patient verifies current tablet strength. Patient follows the calendar provided at each visit. No missed or extra doses. Patient denies s/s bruising/swelling/SOB/chest pain. Patient reports getting a trace of blood on tissue about half the time when he blows his nose. No blood in urine or stool. No dietary changes. No changes in medication/OTC agents/Herbals. No change in alcohol use or tobacco use. No change in activity level. Patient denies headaches/dizziness/lightheadedness/falls. No vomiting/diarrhea or acute illness. No Procedures scheduled in the future at this time. Patient is planning a Watchman sometime in January and will inform clinic when this is scheduled. Assessment:   Lab Results   Component Value Date    INR 2.20 (H) 12/22/2020    INR 1.60 (H) 12/15/2020    INR 1.40 (H) 12/08/2020     INR therapeutic   Recent Labs     12/22/20  0806   INR 2.20*     Patient presents with first therapeutic INR following multiple dose adjustments. Plan:  Continue Coumadin 2.5 mg MF and 5 mg TuWThSaSu. Recheck INR in 1 week(s). Patient reminded to call the Anticoagulation Clinic with any signs or symptoms of bleeding or with any medication changes. Patient given instructions utilizing the teach back method. Discharged ambulatory in no apparent distress. After visit summary printed and reviewed with patient.       Medications reviewed and updated on home medication list Yes    Influenza vaccine:     [] given    [x] declined   [x] received previously   [] plans to receive at a later time   [] refused    [x] documented in [address]:  [facility] [address]: Yes Total # of Interventions Recommended: 0  - Maintenance Safety Lab Monitoring #: 1  Total Interventions Accepted: 0  Time Spent (min): 1410  [address], PharmD, BCPS  12/22/2020  8:29 AM

## 2020-12-22 NOTE — LETTER
[facility]  [address],  [place]  Ph. [phone]  Fx. [phone] (Attention [place])      Patient Milagro Layne [birth year] is scheduled for a Watchman procedure (left atrial appendage closure)   Performing Physician:  Dr. Leola Bill MD at [address] [place] [address] on January 5, 2021.     ______ Does have Dental Clearance    ______ Does Not have Dental Clearance      Physician Signature: ________________________________Date: ______________________

## 2020-12-22 NOTE — TELEPHONE ENCOUNTER
Orders received from Dr. Brittani Carrasquillo that once the [facility] shared decision form was obtained to schedule the patient for his WATCHMAN procedure, obtain a COVID test prior, have the patient hold his Coumadin 3 days prior and hold Aldactone the morning of the procedure with poost WATCHMAN Coumadin theraputic goal is 2-3.

## 2020-12-23 NOTE — TELEPHONE ENCOUNTER
Spoke with Casey Camargop wife today. Lynnette Lawson had some teeth extracted a few weeks ago and now has a sore on his gums. He does have an apt on 12.23.2020 at 1230pm  Dr. Tru Hernandez    Ph. [phone]  Fx. [phone]    Dental Clearance form faxed.       Will call patient tomorrow to inform him if he is able to move forward with the watchman on 1.5.2021

## 2020-12-24 ENCOUNTER — APPOINTMENT (OUTPATIENT)
Dept: CARDIAC REHAB | Age: 80
End: 2020-12-24
Payer: MEDICARE

## 2020-12-24 ENCOUNTER — HOSPITAL ENCOUNTER (OUTPATIENT)
Dept: CARDIAC REHAB | Age: 80
Setting detail: THERAPIES SERIES
Discharge: HOME | End: 2020-12-24
Payer: MEDICARE

## 2020-12-24 PROCEDURE — 93798 PHYS/QHP OP CAR RHAB W/ECG: CPT

## 2020-12-28 NOTE — PROGRESS NOTES
Hospital Facility-Based Program  Phase 2 Cardiac Rehab Weekly Progress Report      Patient prescribed exercise:  9:00 class. 3 times per week in rehab, 1-4 times per week at home for the amount of sessions/weeks specified by insurance. Current Levels: Treadmill: 2.0mph/0% for 15 minutes, NuStep:  38 Mathews for 15 minutes, UBE: 30w for 5 minutes. Progression Discussion: Maintain/Increase Aerobic exercise 35 minutes to work on endurance. Attempt to increase intensity by 5-20% for each modality this week. Try to increase intensities until Brenton SOTOMAYOR rates the exercises a 13-17 on Darcy RPE.

## 2020-12-28 NOTE — PROGRESS NOTES
Hospital Facility-Based Program  Phase 2 Cardiac Rehab Weekly Progress Report      Patient prescribed exercise:  9:00 class. 3 times per week in rehab, 1-4 times per week at home for the amount of sessions/weeks specified by insurance. Current Levels: Treadmill: 2.0mph/0% for 15 minutes, NuStep:  38 Feng for 12 minutes, UBE: 30 feng for 5 minutes. Progression Discussion: Maintain/Increase Aerobic exercise 15 minutes to work on endurance. Attempt to increase intensity by 5-20% for each modality this week. Try to increase intensities until Brenton SOTOMAYOR rates the exercises a 13-17 on Darcy RPE.

## 2020-12-29 ENCOUNTER — TELEPHONE (OUTPATIENT)
Dept: CARDIOLOGY CLINIC | Age: 80
End: 2020-12-29

## 2020-12-29 ENCOUNTER — APPOINTMENT (OUTPATIENT)
Dept: CARDIAC REHAB | Age: 80
End: 2020-12-29
Payer: MEDICARE

## 2020-12-29 ENCOUNTER — HOSPITAL ENCOUNTER (OUTPATIENT)
Dept: PHARMACY | Age: 80
Setting detail: THERAPIES SERIES
Discharge: HOME | End: 2020-12-29
Payer: MEDICARE

## 2020-12-29 ENCOUNTER — HOSPITAL ENCOUNTER (OUTPATIENT)
Dept: CARDIAC REHAB | Age: 80
Setting detail: THERAPIES SERIES
Discharge: HOME | End: 2020-12-29
Payer: MEDICARE

## 2020-12-29 ENCOUNTER — HOSPITAL ENCOUNTER (OUTPATIENT)
Age: 80
Discharge: HOME | End: 2020-12-29
Payer: MEDICARE

## 2020-12-29 VITALS — TEMPERATURE: 97.7 F

## 2020-12-29 LAB — POC INR: 2.1 (ref 0.8–1.2)

## 2020-12-29 PROCEDURE — U0003 INFECTIOUS AGENT DETECTION BY NUCLEIC ACID (DNA OR RNA); SEVERE ACUTE RESPIRATORY SYNDROME CORONAVIRUS 2 (SARS-COV-2) (CORONAVIRUS DISEASE [COVID-19]), AMPLIFIED PROBE TECHNIQUE, MAKING USE OF HIGH THROUGHPUT TECHNOLOGIES AS DESCRIBED BY CMS-2020-01-R: HCPCS

## 2020-12-29 PROCEDURE — 36416 COLLJ CAPILLARY BLOOD SPEC: CPT | Performed by: PHARMACIST

## 2020-12-29 PROCEDURE — 93798 PHYS/QHP OP CAR RHAB W/ECG: CPT

## 2020-12-29 PROCEDURE — 99211 OFF/OP EST MAY X REQ PHY/QHP: CPT | Performed by: PHARMACIST

## 2020-12-29 PROCEDURE — 85610 PROTHROMBIN TIME: CPT | Performed by: PHARMACIST

## 2020-12-29 NOTE — PROGRESS NOTES
Medication Management [address]  [phone] (phone)  [phone] (fax)      Mr. Teresa Wray is a 68 y.o.  male with history of Afib who presents today for anticoagulation monitoring and adjustment. Patient verifies current dosing regimen and tablet strength. No missed or extra doses. Patient denies s/s swelling/SOB/chest pain. Patient states 2-3 nose bleeds in the last month that is only a few drops. Bruising on back in October is now almost healed. No blood in urine or stool. Broccoli casserole and coleslaw over the holidays. No changes in medication/OTC agents/Herbals. No change in tobacco use. A few \"extra\" beers over the holidays. No change in activity level. Patient denies headaches/dizziness/lightheadedness/falls. No vomiting/diarrhea or acute illness. Watchman procedure 1-5-2021 with Dr. Monico Nageotte. Per TE 12/22 hold coumadin 3 days prior. Assessment:   Lab Results   Component Value Date    INR 2.10 (H) 12/29/2020    INR 2.20 (H) 12/22/2020    INR 1.60 (H) 12/15/2020     INR therapeutic   Recent Labs     12/29/20  0822   INR 2.10*       Patient interview completed and discussed with pharmacist by Davi Knox PharmD Candidate 2021      Plan:  Continue Coumadin 2.5mg MF and 5mg TWThSaS. Hold Coumadin 1/2-1/4 for Watchman on 1/5. Recheck INR 1 week(s) after Watchman. Patient reminded to call the Anticoagulation Clinic with any signs or symptoms of bleeding or with any medication changes. Patient given instructions utilizing the teach back method. Discharged ambulatory in no apparent distress. After visit summary printed and reviewed with patient.       Medications reviewed and updated on home medication list Yes    Influenza vaccine:     [] given    [x] declined   [x] received previously   [] plans to receive at a later time   [] refused    [] documented in Epic CLINICAL PHARMACY CONSULT: MED RECONCILIATION/REVIEW ADDENDUM    For Pharmacy Admin Tracking Only    PHSO: Yes  Total # of Interventions Recommended: 1  - Decreased Dose #: 1  - Maintenance Safety Lab Monitoring #: 1  Total Interventions Accepted: 1  Time Spent (min): 20    Jett MarshallD

## 2020-12-29 NOTE — TELEPHONE ENCOUNTER
Patient's wife was in office and dropped off record of operation from 11/1/04. Mary Lou Robert it was something you might need to see. Report is scanned into media.

## 2020-12-30 LAB — SARS-COV-2: NOT DETECTED

## 2020-12-31 ENCOUNTER — HOSPITAL ENCOUNTER (OUTPATIENT)
Dept: CARDIAC REHAB | Age: 80
Setting detail: THERAPIES SERIES
Discharge: HOME | End: 2020-12-31
Payer: MEDICARE

## 2020-12-31 ENCOUNTER — APPOINTMENT (OUTPATIENT)
Dept: CARDIAC REHAB | Age: 80
End: 2020-12-31
Payer: MEDICARE

## 2020-12-31 NOTE — PLAN OF CARE
exercise on his own. He does not want to participate in education classes. [] Pre Contemplation  [x] Contemplation  [] Preparation  [] Action  [] Maintenance  [] Relapse [] Pre Contemplation  [] Contemplation  [] Preparation  [] Action  [] Maintenance  [] Relapse [] Pre Contemplation  [] Contemplation  [x] Preparation  [] Action  [] Maintenance  [] Relapse [] Pre Contemplation  [] Contemplation  [] Preparation  [] Action  [] Maintenance  [] Relapse   EXERCISE ASSESSMENT  EXERCISE ASSESSMENT EXERCISE ASSESSMENT EXERCISE ASSESSMENT EXERCISE ASSESSMENT   6 Min Walk Test  Distance walked:   0.06 miles  316.8 ft.  1.5 METs  Max HR:105 BPM  RPE:  11    THR:    Rhythm:  NSR w/ PVCs     6 Min Walk Test  Distance walked:   ** miles  ** ft  ** METs  Max HR:** BPM      RPE:  **  %Change ft= **    Rhythm:  **   DASI: 6.05 METs  DASI: 4.4 METs DASI: ** METs DASI: ** METs DASI: ** METs   Return to Work  [facility] on returning to work? [] Yes              [] No   [] Disabled     [x] Retired    Return to work:  Retired Return to work:  Retired Return to work:  Retired Return to work:  Retired   Orthopedic Limitations/  [x] Yes    [] No  If yes please list:  Ruptured disk in back, hx of PAD      Orthopedic Limitations   Orthopedic Limitations   Orthopedic Limitations  No AD   Orthopedic Limitations     Fall Risk  Fall risk assessed? [x] Yes      [] No    Balance Issues? [] Yes      [x] No     [] Walker [] Cane    [x] Safety issues reviewed       Fall Risk   Fall Risk Fall Risk  N/A Fall Risk   Home Exercise  [x] Yes    [] No  Type: walking on TM  Frequency: daily  Duration: 20mins  Home Exercise  [x] Yes    [] No  Type: walking  Frequency:daily  Duration: 30 min Home Exercise  [] Yes    [] No  Type: **  Frequency: **  Duration: ** Home Exercise  [] Yes    [] No  unsure Home Exercise  [] Yes    [] No  Type: **  Frequency: **  Duration: **   Angina with Activity?   [] Yes    [x] No  Angina Management: na  Angina with Activity? [] Yes    [x] No   Angina with Activity? [] Yes    [] No  Angina Management: ** Angina with Activity? [] Yes    [] No  unsure Angina with Activity?   [] Yes    [] No  Angina Management: **   EXERCISE PLAN  EXERCISE PLAN EXERCISE PLAN EXERCISE PLAN EXERCISE PLAN   *Interventions*  *Interventions* *Interventions* *Interventions* *Interventions*   Exercise Prescription  (per physician & CR staff)  Exercise Prescription  (per physician & CR staff) Exercise Prescription  (per physician & CR staff) Exercise Prescription  (per physician & CR staff) Exercise Prescription  (per physician & CR staff)   Cardiovascular  Cardiovascular Cardiovascular Cardiovascular Cardiovascular   Mode:    [x] Treadmill (TM)  [x] Schwinn Airdyne (AD)  [x] Arms Ergometer (AE)  [x] NuStep  [] Elliptical (E)  MODE:    [x] Treadmill (TM)  [x] Arms Ergometer (AE)  [x] NuStep   MODE:    [x] Treadmill (TM)  [] Schwinn Airdyne (AD)  [x] Arms Ergometer (AE)  [x] NuStep  [] Elliptical (E) MODE:    [x] Treadmill (TM)  [] Schwinn Airdyne (AD)  [x] Arms Ergometer (AE)  [x] NuStep  [] Elliptical (E) MODE:    [] Treadmill (TM)  [] Schwinn Airdyne (AD)  [] Arms Ergometer (AE)  [] NuStep  [] Elliptical (E)   Initial Workloads  TM: [email or web address] 2.2 METs  AD: 0.6 level = 2.2 METs  NS: 38  Feng= 2.2 METs  AE: 0.3 level = 1.6 METs  Current Workloads  TM: 2.0 @ 0%= 2.6 METs  NS:  38 Feng= 2.2 METs  AE:  20watts = 1.7 METs Current Workloads  TM: 2.0 @0 %= 2.6 METs    NS:38   Feng= 2.2 METs  AE: 30Watts = 2 METs Current Workloads  TM: 2.0 @ 0%= 2.6 METs  NS: 40 Feng= 2.3 METs  AE: 30 feng  = 2.0 METs Current Workloads  TM:  @ %=  METs  AD:  level =  METs  NS:   Feng=  METs  AE:  level =  METs     Frequency:    ICR: 3x/week  Home: 2-3x/wk  Frequency:   ICR: 3x/week  Home: 3x/wk Frequency:  ICR: 3x/week  Home: 3-4x/wk Frequency:  ICR: 3x/week  Home: 3-4x/wk Frequency:  Brenton SOTOMAYOR will continue exercise at  5-7 days/week   Duration:   Total aerobic exercise = 30-45 min    5-8 min/mode  Duration:  Total aerobic exercises = 21 min     8min/mode Duration:  Total aerobic exercises =  30 min     12 min/mode Duration:  Total aerobic exercises = 30-45 min     15 min/mode Duration:  Total erobic exercise =  60-90 min   Intensity:   MET Level = 2.2  RPE = 12-15  Intensity:  Max MET Level = 2.6  RPE = 12-15 Intensity:  Max MET Level = 2.6  RPE = 12-15 Intensity:  Max MET Level = 2.6  RPE = 12-15 Intensity:  Max MET Level = ** RPE = 12-15   Progression: increase aerobic activity up to 15 min over next 4 weeks by increasing time 2-3 min/week. Progression:  increase aerobic activity up to 15 min over next 4 weeks by increasing time 2-3 min/week. Progression:increase aerobic activity up to 15 min over next 4 weeks by increasing time 2-3 min/week. Progression:  Work on increasing workloads as tolerated each visit Progression:  Increase time/intensity when RPE <13, and HR is in [employer]   [x] Yes      [] No  Upper and Lower body strength training 2x/wk    Wt: 2#       Reps:  8-15    *Increase wt. after completing 15 reps with an RPE of <12/13. [] Yes      [x] No  Upper and Lower body strength training 2x/wk    Pt does not come on Fridays to participate in strength training [] Yes      [x] No  Upper and Lower body strength training 2x/wk      Pt does not come on Fridays to participate in strength training [] Yes      [x] No  Upper and Lower body strength training 2x/wk     Continue Strength Training at home   [] Exercise Log & Strength training handout given     Wt: **#       Reps:  8-15    *Increase wt. after completing 15 reps with an RPE of <12/13.    Flexibility  Flexibility Flexibility Flexibility Flexibility   [x] Yes      [] No  25 min session of Core Strength & Flexibility 1x/per week   Attends Core Strength & Flexibility   [] Yes      [x] No Attends Core Strength & Flexibility   [] Yes [x] No Attends Core Strength & Flexibility   [] Yes      [x] No Continue Core Strength & Flexibility at home   Home Exercise  *Rakesh SOTOMAYOR verbalizes planning to walk daily days/week for 20-30 min on days not in rehab. Home Exercise  *Rakesh SOTOMAYOR verbalizes planning to continue walking  daily for 30 min on days not in rehab. Home Exercise  *Rakesh SOTOMAYOR verbalizes planning to walk 3-4 days/week for 30 min on days not in rehab. Home Exercise  *unsue on rakesh's plan. Home Exercise  *Rakesh SOTOMAYOR verbalizes his/her plan to ** ** days/week for ** min @ **   *Education*  *Education* *Education* *Education* *Education*   RPE Scale  [x] Yes      [] No  Exercise Safety  [x] Yes      [] No  Equipment Orientation  [x] Yes      [] No  S/S to Report  [x] Yes      [] No  Warm Up/Cool Down  [x] Yes      [] No  Home Exercise  [x] Yes      [] No     All Exercise Education Completed  [] Yes      [] No   Exercise Education Recommended    Workshops  [x] Improving Performance  [x] Balance Training & Fall Prevention  [x] Exercise Biomechanics  [x] Resistance Training & Core Strength  Exercise Education Attended/Date     Exercise Education Attended/Date  na Exercise Education Attended/Date   All Sessions Completed?     [] Yes  [] No   *Goals*  *Goals* *Goals* *Goals* *Goals*   Initial Exercise Goals  Exercise Goals  Exercise Goals   Exercise Goals  Exercise Goals   Rakesh SOTOMAYOR plans to:  [x] Attend exercise sessions 3x/wk  [x] initiate home exercise 2-3x/wk for 10-20 min  [x] Increase 6 min walk distance by 10%  [x] Attend Exercise workshops  Rakesh SOTOMAYOR plans to:  [x] Attend exercise sessions 3x/wk  [x] continue home exercise 2-3x/wk for 20-30 min  [x] Attend Exercise workshops Tiffanie Clemente plans to:  [x] Attend exercise sessions 3x/wk  [x] continue home exercise 3-4x/wk for 30-45 min  [x] Determine plan of exercise following rehab  [x] Attend Exercise workshops Tiffanie Clemente plans to:  [x] Attend exercise sessions 2x/wk  [x] home exercise 3-4x/wk for 30-45 min Brenton SOTOMAYOR achieved exercise goals?     []  Yes    [] No  If no, why?  **  [] Increased 6 min walk distance by 10%  [] Currently exercising 30-60 min/day, 5-7days/wk   [] Plans to continue exercise on own  [] Plans to join a local fitness center to continue exercise  [] Does not plan to continue to exercise after rehab   Return to ADL or Hobbies:  Brenton SOTOMAYOR would like to improve strength and endurance so he is able to return to everyday activities getting easier  Return to ADL or Hobbies:  Brenton SOTOMAYOR would like to improve strength and endurance so he is able to return to everyday activities getting easier Return to ADL or Hobbies:  Brenton SOTOMAYOR would like to improve strength and endurance so he  is able to return to all previous activities Return to ADL or Hobbies:  Brenton SOTOMAYOR would like to improve strength and endurance so he is able to return to all previous activities Return to ADL or Hobbies:  Brenton SOTOMAYOR would like to improve strength and endurance so he/she is able to return to **    *MET level required for above goal: 3.0 METs  MET level Achieved: 2.6*METs MET level Achieved:  2.6 METs MET level Achieved: 2.6 METs MET level Achieved:  **METs     Individual Cardiac Treatment Plan - Nutrition  NUTRITION  ASSESSMENT/PLAN  NUTRITION  REASSESSMENT NUTRITION   REASSESSMENT NUTRITION   REASSESSMENT NUTRITION  DISCHARGE/FOLLOW-UP   Stages of Change  Stages of Change Stages of Change Stages of Change Stages of Change   [x] Pre Contemplation  [] Contemplation  [] Preparation  [] Action  [] Maintenance  [] Relapse  [] Pre Contemplation  [x] Contemplation  [] Preparation  [] Action  [] Maintenance  [] Relapse [] Pre Contemplation  [x] Contemplation  [] Preparation  [] Action  [] Maintenance  [] Relapse [] Pre Contemplation  [] Contemplation  [x] Preparation  [] Action  [] Maintenance  [] Relapse [] Pre Contemplation  [] Contemplation  [] Preparation  [] Action  [] Maintenance  [] Relapse   NUTRITION ASSESSMENT NUTRITION ASSESSMENT NUTRITION ASSESSMENT NUTRITION ASSESSMENT NUTRITION ASSESSMENT   Weight Management  Weight: 158.0       Height: 69in   BMI: 23.4   Weight Management  Weight: 157                  Weight Management  Weight: 158                Weight Management  Weight: 159 Weight Management  Weight: **                    BMI: **   Eating Plan  Current eating habits: none  Eating Plan  Changes: \"NONE\" Eating Plan  Changes: Eating Plan  Changes: none Eating Plan Improvements:   Alcohol Use  [] none          [x] daily  [] weekly      [] special   Type: beer  Amount: 6 cans        Diet Assessment Tool:  RATE YOUR PLATE  *Given to patient to complete and return. Diet Assessment Tool:    Score: **/69    Pt did not complete       Diet Assessment Tool: RATE YOUR PLATE  Score: **/47   NUTRITION PLAN  NUTRITION PLAN NUTRITION PLAN NUTRITION PLAN NUTRITION PLAN   *Interventions*  *Interventions* *Interventions* *Interventions* *Interventions*   Initial Survey given  Goal Setting Discussion:   [x] Yes      [] No       Follow Up Survey Reviewed & Goals Updated:     Professional Referral  Please check if needed. [] Dietitian Consult   [] Wt. Management Referral  [] Other:   Professional Referral  Please check if needed. [] Dietitian Consult   [] Wt. Management Referral  [] Other: Professional Referral  Please check if needed. [] Dietitian Consult   [] Wt. Management Referral  [] Other: Professional Referral  Please check if needed. [] Dietitian Consult   [] Wt. Management Referral  [] Other: Professional Referral  Please check if needed. [] Dietitian Consult   [] Wt.  Management Referral  [] Other:   *Education*  *Education* *Education* *Education* *Education*   Nutritional Education Recommended    [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 [] Yes  [] No   Cooking School  Recommended     [x] Adding Flavor  [x] Fast & Healthy     Breakfasts  [x] Salads & Dressings  [x] Soups & Simple     Sauces  [x] Simple Sides  [x] Appetizers &     Snacks  [x] Delicious Desserts  [x] Plant Proteins  [x] Fast Evening Meals  [x] Weekend Breakfasts  [x] Cook once, Eat       twice  [x] [place] [employer]  Cooking School  Sessions Completed   [facility]  Sessions Completed     Cooking School  Sessions Completed     Cooking School    # of sessions completed:  **   *Goals*  *Goals* *Goals* *Goals* *Goals*   Brenton SOTOMAYOR's nutritional goals are as follows:  Complete and return diet survey  Brenton SOTOMAYOR's nutritional goals are as follows:  [] Attend Nutrition Workshops  [] Attend 1:1   [] Attend Cooking Classes   Brenton SOTOMAYOR's nutritional goals are as follows:  [] Attend Nutrition Workshops  [] Attend 1:1   [] Attend Cooking Classes  [x] Complete and return diet survey  [] ** Brenton SOTOMAYOR's nutritional goals are as follows:  [] Attend Nutrition Workshops  [] Attend 1:1   [] Attend Cooking Classes  [x] Complete and return diet survey Brenton SOTOMAYOR achieved nutritional goals   [] Yes    [] No  If no, why?   Use knowledge gained to continue Pritikin eating plan at home       Individual Cardiac Treatment Plan - Psychosocial  PSYCHOSOCIAL  ASSESSMENT/PLAN  PSYCHOSOCIAL  REASSESSMENT PSYCHOSOCIAL   REASSESSMENT PSYCHOSOCIAL   REASSESSMENT PSYCHOSOCIAL  DISCHARGE/FOLLOW-UP   Stages of Change  Stages of Change Stages of Change Stages of Change Stages of Change   [] Pre Contemplation  [x] Contemplation  [] Preparation  [] Action  [] Maintenance  [] Relapse  [] Pre Contemplation  [x] Contemplation  [] Preparation  [] Action  [] Maintenance  [] Relapse [] Pre Contemplation  [x] Contemplation  [] Preparation  [] Action  [] Maintenance  [] Relapse [] Pre Contemplation  [] Contemplation  [x] Preparation  [] Action  [] Maintenance  [] Relapse [] Pre Contemplation  [] Contemplation  [] Preparation  [] Action  [] Maintenance  [] Relapse   PSYCHOSOCIAL ASSESSMENT  PSYCHOSOCIAL ASSESSMENT PSYCHOSOCIAL ASSESSMENT PSYCHOSOCIAL ASSESSMENT PSYCHOSOCIAL ASSESSMENT   Behavioral Outcomes  Behavioral Outcomes Behavioral Outcomes Behavioral Outcomes Behavioral Outcomes   Tool Used:  Ki & Charly, Quality of Life Index, Cardiac Version IV  *Given to patient to complete. Tool Used:    Ki & Charly, Quality of Life Index, Cardiac Version IV     QOL Index Score: **  HF:**  S&E:**  P&S: **  Family: **    Pt did not complete surveys. Tool Used:     Ki & Charly, Quality of Life Index, Cardiac Version IV    QOL Index Score: **  HF:**  S&E:**  P&S: **  Family: **   PHQ-9 score 1  Depression Severity  [x]Minimal  []Mild   []Moderate  []Moderately Severe  []Severe     PHQ-9 score **  Depression Severity  []Minimal  []Mild   []Moderate  []Moderately Severe []Severe   Does patient have Family Support? [x] Yes      [] No  No signs of marital/family distress        Within the Past Month:  *Have you wished you were dead or wished you could go to sleep and not wake up? [] Yes      [x] No  *Have you had any thoughts of killing yourself? [] Yes      [x] No          Using a scale of 0-10, 0=none, 10=very:   Rate your depression: 0  Rate your anxiety:  0   Using a scale of 0-10, 0=none, 10=very:   Rate your depression: 0  Rate your anxiety:  0 Using a scale of 0-10, 0=none, 10=very:   Rate your depression: **  Rate your anxiety:  ** Using a scale of 0-10, 0=none, 10=very:   Rate your depression: **  Rate your anxiety:  ** Using a scale of 0-10, 0=none, 10=very:   Rate your depression: **  Rate your anxiety:  **   Signs and Symptoms of Depression Present? [] Yes      [x] No    Signs and Symptoms of Depression Present? [] Yes      [x] No   Signs and Symptoms of Depression Present?     [] Yes      [x] No  If yes, please explain:  ** Signs and Symptoms of Depression Present? [] Yes      [x] No  If yes, please explain:  ** Signs and Symptoms of Depression Present? [] Yes      [] No  If yes, please explain:  **   Signs and Symptoms of Anxiety Present? [] Yes      [x] No    Signs and Symptoms of Anxiety Present? [] Yes      [x] No   Signs and Symptoms of Anxiety Present? [] Yes      [x] No  If yes, please explain:  ** Signs and Symptoms of Anxiety Present? [] Yes      [x] No  If yes, please explain:  ** Signs and Symptoms of Anxiety Present? [] Yes      [] No  If yes, please explain:  **   [] Patient has poor eye contact   [] Flat affect present. [] Signs of anxiety, anger or hostility    [] Signs social isolation present. [x]  Signs of alcohol or substance abuse        PSYCHOSOCIAL PLAN  PSYCHOSOCIAL PLAN PSYCHOSOCIAL PLAN PSYCHOSOCIAL PLAN PSYCHOSOCIAL PLAN   *Interventions*  *Interventions* *Interventions* *Interventions* *Interventions*   *Please check if needed  [] Psych Consult  [] Physician Referral  [x] Stress Management Skills  *Please check if needed  [] Psych Consult  [] Physician Referral  [x] Stress Management Skills *Please check if needed  [] Psych Consult  [] Physician Referral  [x] Stress Management Skills *Please check if needed  [] Psych Consult  [] Physician Referral  [] Stress Management Skills *Please check if needed  [] Psych Consult  [] Physician Referral  [] Stress Management Skills   Is patient currently taking anti-depressant or anti-anxiety medications? [] Yes      [x] No    Change in anti-depressant or anti-anxiety medications? [] Yes      [x] No   Change in anti-depressant or anti-anxiety medications? [] Yes      [x] No  If yes, please list medications:  ** Change in anti-depressant or anti-anxiety medications? [] Yes      [x] No  If yes, please list medications:  ** Change in anti-depressant or anti-anxiety medications?   [] Yes      [] No  If yes, please list medications:  **   *Education*  *Education* ASSESSMENT  RISK FACTOR/EDUCATION ASSESSMENT RISK FACTOR/EDUCATION ASSESSMENT RISK FACTOR/EDUCATION ASSESSMENT RISK FACTOR /EDUCATION ASSESSMENT   Hypertension  [x] Yes      [] No    Resting BP: 138/68  Peak Ex BP:166/70  Medication: na    Hypertension  Resting BP: 122/52  Peak Ex BP:178/76  Medication Changes:  [] Yes      [x] No Hypertension  Resting BP: 148/74  Peak Ex BP:178/72  Medication Changes:  [] Yes      [x] No Hypertension  Resting BP: 150/74  Peak Ex BP:150/74  Medication Changes:  [] Yes      [x] No Hypertension  Resting BP: **  Peak Ex BP:**  Medication Changes:  [] Yes      [] No   Lipids  HLD/DLD  [x] Yes      [] No  TOTAL CHOL: 137  HDL:  66  LDL:  61  TRI  Medication: lovastatin  Lipids  Medication Changes:  [] Yes      [x] No     Lipids  Medication Changes:  [] Yes      [x] No     Lipids  Medication Changes:  [] Yes      [x] No     Lipids    TOTAL CHOL: **  HDL:  **  LDL:  **  TRIG:  **  Medication Changes:  [] Yes      [] No   Diabetes  [] Yes      [x] No  FBS: 102             Diabetes  NA   Diabetes  na     Diabetes   Diabetes  Most Recent BS:  BS have been in range  [] Yes      [] No  Medication Changes  [] Yes      [] No       Tobacco Use  [] Current  [x] Former  [] Never    Years smoked: 48    Date Quit:     Smokeless Tobacco use:   [] Yes      [x] No    Tobacco Use  Change in smoking status   [] Yes      [x] No     Tobacco Use  Change in smoking status   [] Yes      [x] No       Tobacco Use  Change in smoking status   [] Yes      [x] No     Tobacco Use  Change in smoking status   [] Yes      [] No    Quit date: **             Learning Barriers  Please select one:  [] Speech  [] Literacy  [] Hearing  [] Cognitive  [] Vision  [x] Ready to Learn  Learning Barriers Addressed:   [x] Yes      [] No   Learning Barriers Addressed:   [] Yes      [] No  na Learning Barriers Addressed:  [] Yes      [] No Learning Barriers Addressed:  [] Yes      [] No     RISK FACTOR/EDUCATION PLAN  RISK FACTOR/EDUCATION PLAN RISK FACTOR/EDUCATION PLAN RISK FACTOR/EDUCATION PLAN RISK FACTOR/EDUCATION PLAN   *Interventions*  *Interventions* *Interventions* *Interventions* *Interventions*   Recommended Educational Videos    [x] Overview of The Pritikin Eating Plan  [x] Becoming A Pritikin   [x] Diseases of Our Time-Part 1  [x] Calorie Density  [x] Label Reading-Part 1  [x] Move It! [x] Healthy Minds, Bodies, Hearts  [x] Dining Out-Part 1  [x] Heart Disease Risk Reduction  [x] Metabolic Syndrome & Belly Fat  [x] Facts on Fat  [x] Diseases of Our Times-Part 2  [x] Biology of Weight Control  [x] Biomechanical Limitations  [x] Nurtition Action Plan    Completed Videos/Date      10/8/2020  Overview of The Pritikin Eating Plan    10/13/2020  Diseases 1 Completed Videos/Date  na Completed Videos/Date Recommended Educational Videos Completed    [] Yes      [] No    **If not completed, Why? **           Smoking Cessation/Relaspe Prevention Intervention needed? [] Yes      [x] No  *Pt verbalizes and agrees to attend intervention  Smoking Cessation/Relapse Prevention Scheduled? [] Yes      [x] No  NOT NEEDED     Smoking Cessation Counseling attended  [] Yes      [] No  **If not completed, Why? **   Professional Referrals:  *Please check if needed  [] Diabetes Clinic  [] Lipid Clinic   [] Other:      Professional Referrals:  *Please check if needed  [] Diabetes Clinic  [] Lipid Clinic   [] Other:   Preventative Medication  Preventative Medication Preventative Medication Preventative Medication Preventative Medication   Aspirin  [x] Yes    [] No  Blood Thinner: Clopidogrel/Effient/Brillinta  [] Yes    [] No  Beta Blocker  [x] Yes    [] No  Ace Inhibitor  [] Yes    [x] No  Statin/Lipid Lowering  [x] Yes    [] No  Medication Changes? [] Yes    [x] No Medication Changes? [] Yes    [x] No Medication Changes? [] Yes    [x] No Medication Changes?   [] Yes    [] No   *Education*  *Education* *Education* *Education* *Education* Does Brenton SOTOMAYOR require any additional education? [] Yes    [x] No    Does Brenton SOTOMAYOR require any additional education? [] Yes    [x] No Does Brenton SOTOMAYOR require any additional education? [] Yes    [x] No Does Brenton SOTOMAYOR require any additional education? [] Yes    [x] No Does Brenton SOTOMAYOR require any additional education?   [] Yes    [] No   Recommended Additional Educational Videos    Medical  [] Hypertension & Heart Disease  [] Decoding Lab Results  [] Aging Enhancing Your QoL  [] Sleep Disorders  Exercise  [] Body Composition  [] Improve Performance  [] Exercise Action Plan  [] Intro to Yoga  Behavioral  [] How Our Thoughts Can Heal Our Hearts  [] Smoking Cessation  Nutrition  [] Planning Your Eating Strategy  [] Lable Reading- Part 2  [] Dining Out - Part 2  [] Targeting Your Nutrition Priorities  [] Fueling a Healthy Body  [] Menu Workshop  [employer] [] Breakfast & Snacks  [] [employer] Salads & Dressing  [] [address] [place] [address]  [] Soups & Desserts    Additional Educational Videos Completed Additional Educational Videos Completed Additional Educational Videos Completed Additional Educational Videos Completed    [] Yes    [] No   *Goals*  *Goals* *Goals* *Goals* *Goals*   Brenton SOTOMAYOR's risk factor/education goals are as follows:    [x] Optimal BP <140/90  [] Blood Sugar <120  [x] Attend recommended video education sessions  [x] Takes medications as prescribed 100% of the time   [] **  Brenton SOTOMAYOR's risk factor/education goals are as follows:    [x] Optimal BP <140/90  [] Blood Sugar <120  [x] Attend recommended video education sessions  [x] Takes medications as prescribed 100% of the time    Brenton SOTOMAYOR's risk factor/education goals are as follows:    [x] Optimal BP <140/90  [] Blood Sugar <120  [x] Attend recommended video education sessions  [x] Takes medications as prescribed 100% of the time   [] ** Brenton SOTOMAYOR's risk factor/education goals are as follows:    [x] Optimal BP <140/90  [] Blood Sugar <120  [x] Attend recommended video education sessions  [x] Takes medications as prescribed 100% of the time   [] ** Brenton SOTOMAYOR achieved risk factor goals?   [] Yes    [] No  If no, why?  **     Monitored telemetry has revealed NSR w/ PVCs   Monitored telemetry has revealed **  [] documented arrhythmia at increasing workloads  [] associated symptoms ** Monitored telemetry has revealed NSR w/ PVCs   Monitored telemetry has revealed:NSR   Monitored telemetry has revealed NSR  [] documented arrhythmia at increasing workloads  [] associated symptoms **   Physician Response    [x] Cardiac rehab is reasonably and medically necessary for continuous cardiac monitoring surveillance  of patient's cardiac activity  [x] Initiate continuous telemerty monitoring and notify me with any concerns  [] Other   Physician Response    [x] Cardiac rehab is reasonably and medically necessary for continuous cardiac monitoring surveillance  of patient's cardiac activity  [x] Continue continuous telemerty monitoring and notify me with any concerns  [] Other     Physician Response    [x] Cardiac rehab is reasonably and medically necessary for continuous cardiac monitoring surveillance  of patient's cardiac activity  [x] Continue continuous telemerty monitoring and notify me with any concerns   [] Other     Physician Response    [x] Cardiac rehab is reasonably and medically necessary for continuous cardiac monitoring surveillance  of patient's cardiac activity  [x] Continue continuous telemerty monitoring and notify me with any concerns   [] Other          Cosigned by:  Heidi Amador MD at 10/9/2020 10:24 AM    Revision History     Date/Time  User  Provider Type  Action    10/9/2020 10:24 AM  Heidi Amador MD  Physician  Cosign    10/8/2020 12:12 PM  Korin Perkins        Cosigned by:  Heidi Amador MD at 10/26/2020  6:55 AM    Revision History     Date/Time  User  Provider Type  Action    10/26/2020  6:55 AM  Heidi Amador MD  Physician Cosign    10/22/2020 12:23 PM  Ciro Pineda  Exercise Physiologist  Sign     Cosigned by:  Lynnette Jones MD at 11/6/2020  3:08 PM    Revision History     Date/Time  User  Provider Type  Action    11/6/2020  3:08 PM  Lynnette Jones MD  Physician  Cosign    11/5/2020 10:31 AM  Chidi Mensah  Exercise Physiologist  Sign      Cosigned by: Lynnette Jones MD at 12/3/2020 12:54 PM   Revision History  Date/Time User Provider Type Action   12/3/2020 12:54 PM Lynnette Jones MD Physician Cosign   12/3/2020 12:13 PM Zenda Bloch Exercise Physiologist

## 2021-01-04 ENCOUNTER — PREP FOR PROCEDURE (OUTPATIENT)
Dept: CARDIOLOGY | Age: 81
End: 2021-01-04

## 2021-01-04 RX ORDER — CLINDAMYCIN PHOSPHATE 900 MG/50ML
900 INJECTION, SOLUTION INTRAVENOUS
Status: CANCELLED | OUTPATIENT
Start: 2021-01-04 | End: 2021-01-04

## 2021-01-04 RX ORDER — ACETAMINOPHEN 325 MG/1
650 TABLET ORAL EVERY 4 HOURS PRN
Status: CANCELLED | OUTPATIENT
Start: 2021-01-04

## 2021-01-04 RX ORDER — DIPHENHYDRAMINE HYDROCHLORIDE 50 MG/ML
50 INJECTION INTRAMUSCULAR; INTRAVENOUS ONCE
Status: CANCELLED | OUTPATIENT
Start: 2021-01-04 | End: 2021-01-04

## 2021-01-04 RX ORDER — SODIUM CHLORIDE 0.9 % (FLUSH) 0.9 %
10 SYRINGE (ML) INJECTION PRN
Status: CANCELLED | OUTPATIENT
Start: 2021-01-04

## 2021-01-04 RX ORDER — SODIUM CHLORIDE 9 MG/ML
INJECTION, SOLUTION INTRAVENOUS CONTINUOUS
Status: CANCELLED | OUTPATIENT
Start: 2021-01-04

## 2021-01-04 NOTE — PROGRESS NOTES
Hospital Facility-Based Program  Phase 2 Cardiac Rehab Weekly Progress Report      Patient prescribed exercise:  9:00 class. 3 times per week in rehab, 1-4 times per week at home for the amount of sessions/weeks specified by insurance. eNida Bimal is on hold from cardiac rehab. He is having a watchman procedure on 1/5/2021.

## 2021-01-05 ENCOUNTER — APPOINTMENT (OUTPATIENT)
Dept: CARDIAC CATH/INVASIVE PROCEDURES | Age: 81
DRG: 274 | End: 2021-01-05
Payer: MEDICARE

## 2021-01-05 ENCOUNTER — APPOINTMENT (OUTPATIENT)
Dept: CARDIAC REHAB | Age: 81
End: 2021-01-05
Payer: MEDICARE

## 2021-01-05 ENCOUNTER — TELEPHONE (OUTPATIENT)
Dept: CARDIOLOGY CLINIC | Age: 81
End: 2021-01-05

## 2021-01-05 ENCOUNTER — ANESTHESIA EVENT (OUTPATIENT)
Dept: CARDIAC CATH/INVASIVE PROCEDURES | Age: 81
DRG: 274 | End: 2021-01-05
Payer: MEDICARE

## 2021-01-05 ENCOUNTER — ANESTHESIA (OUTPATIENT)
Dept: CARDIAC CATH/INVASIVE PROCEDURES | Age: 81
DRG: 274 | End: 2021-01-05
Payer: MEDICARE

## 2021-01-05 VITALS — DIASTOLIC BLOOD PRESSURE: 59 MMHG | SYSTOLIC BLOOD PRESSURE: 120 MMHG | OXYGEN SATURATION: 100 %

## 2021-01-05 DIAGNOSIS — Z95.818 PRESENCE OF WATCHMAN LEFT ATRIAL APPENDAGE CLOSURE DEVICE: Primary | ICD-10-CM

## 2021-01-05 DIAGNOSIS — I48.91 ATRIAL FIBRILLATION, UNSPECIFIED TYPE (HCC): ICD-10-CM

## 2021-01-05 DIAGNOSIS — I48.0 PAROXYSMAL ATRIAL FIBRILLATION (HCC): ICD-10-CM

## 2021-01-05 DIAGNOSIS — Z13.9 SCREENING PROCEDURE: ICD-10-CM

## 2021-01-05 PROCEDURE — 3700000000 HC ANESTHESIA ATTENDED CARE

## 2021-01-05 PROCEDURE — 6360000002 HC RX W HCPCS

## 2021-01-05 PROCEDURE — 7100000001 HC PACU RECOVERY - ADDTL 15 MIN

## 2021-01-05 PROCEDURE — 3700000001 HC ADD 15 MINUTES (ANESTHESIA)

## 2021-01-05 PROCEDURE — 2580000003 HC RX 258: Performed by: NURSE PRACTITIONER

## 2021-01-05 PROCEDURE — 7100000000 HC PACU RECOVERY - FIRST 15 MIN

## 2021-01-05 PROCEDURE — 2500000003 HC RX 250 WO HCPCS

## 2021-01-05 RX ORDER — SUCCINYLCHOLINE CHLORIDE 20 MG/ML
INJECTION INTRAMUSCULAR; INTRAVENOUS PRN
Status: DISCONTINUED | OUTPATIENT
Start: 2021-01-05 | End: 2021-01-05 | Stop reason: SDUPTHER

## 2021-01-05 RX ORDER — ONDANSETRON 2 MG/ML
INJECTION INTRAMUSCULAR; INTRAVENOUS PRN
Status: DISCONTINUED | OUTPATIENT
Start: 2021-01-05 | End: 2021-01-05 | Stop reason: SDUPTHER

## 2021-01-05 RX ORDER — FENTANYL CITRATE 50 UG/ML
INJECTION, SOLUTION INTRAMUSCULAR; INTRAVENOUS PRN
Status: DISCONTINUED | OUTPATIENT
Start: 2021-01-05 | End: 2021-01-05 | Stop reason: SDUPTHER

## 2021-01-05 RX ORDER — PROTAMINE SULFATE 10 MG/ML
INJECTION, SOLUTION INTRAVENOUS PRN
Status: DISCONTINUED | OUTPATIENT
Start: 2021-01-05 | End: 2021-01-05 | Stop reason: SDUPTHER

## 2021-01-05 RX ORDER — GLYCOPYRROLATE 1 MG/5 ML
SYRINGE (ML) INTRAVENOUS PRN
Status: DISCONTINUED | OUTPATIENT
Start: 2021-01-05 | End: 2021-01-05 | Stop reason: SDUPTHER

## 2021-01-05 RX ORDER — NEOSTIGMINE METHYLSULFATE 5 MG/5 ML
SYRINGE (ML) INTRAVENOUS PRN
Status: DISCONTINUED | OUTPATIENT
Start: 2021-01-05 | End: 2021-01-05 | Stop reason: SDUPTHER

## 2021-01-05 RX ORDER — PHENYLEPHRINE HCL IN 0.9% NACL 1 MG/10 ML
SYRINGE (ML) INTRAVENOUS PRN
Status: DISCONTINUED | OUTPATIENT
Start: 2021-01-05 | End: 2021-01-05 | Stop reason: SDUPTHER

## 2021-01-05 RX ORDER — CLINDAMYCIN PHOSPHATE 150 MG/ML
INJECTION, SOLUTION INTRAVENOUS PRN
Status: DISCONTINUED | OUTPATIENT
Start: 2021-01-05 | End: 2021-01-05 | Stop reason: SDUPTHER

## 2021-01-05 RX ORDER — PROPOFOL 10 MG/ML
INJECTION, EMULSION INTRAVENOUS PRN
Status: DISCONTINUED | OUTPATIENT
Start: 2021-01-05 | End: 2021-01-05 | Stop reason: SDUPTHER

## 2021-01-05 RX ORDER — DEXAMETHASONE SODIUM PHOSPHATE 4 MG/ML
INJECTION, SOLUTION INTRA-ARTICULAR; INTRALESIONAL; INTRAMUSCULAR; INTRAVENOUS; SOFT TISSUE PRN
Status: DISCONTINUED | OUTPATIENT
Start: 2021-01-05 | End: 2021-01-05 | Stop reason: SDUPTHER

## 2021-01-05 RX ORDER — ROCURONIUM BROMIDE 10 MG/ML
INJECTION, SOLUTION INTRAVENOUS PRN
Status: DISCONTINUED | OUTPATIENT
Start: 2021-01-05 | End: 2021-01-05 | Stop reason: SDUPTHER

## 2021-01-05 RX ORDER — HEPARIN SODIUM 1000 [USP'U]/ML
INJECTION, SOLUTION INTRAVENOUS; SUBCUTANEOUS PRN
Status: DISCONTINUED | OUTPATIENT
Start: 2021-01-05 | End: 2021-01-05 | Stop reason: SDUPTHER

## 2021-01-05 RX ORDER — LIDOCAINE HYDROCHLORIDE 20 MG/ML
INJECTION, SOLUTION INFILTRATION; PERINEURAL PRN
Status: DISCONTINUED | OUTPATIENT
Start: 2021-01-05 | End: 2021-01-05 | Stop reason: SDUPTHER

## 2021-01-05 RX ADMIN — DEXAMETHASONE SODIUM PHOSPHATE 5 MG: 4 INJECTION, SOLUTION INTRAMUSCULAR; INTRAVENOUS at 07:33

## 2021-01-05 RX ADMIN — Medication 100 MCG: at 07:34

## 2021-01-05 RX ADMIN — Medication 0.4 MG: at 08:41

## 2021-01-05 RX ADMIN — ROCURONIUM BROMIDE 40 MG: 10 INJECTION INTRAVENOUS at 07:32

## 2021-01-05 RX ADMIN — Medication 100 MCG: at 07:39

## 2021-01-05 RX ADMIN — Medication 100 MCG: at 07:55

## 2021-01-05 RX ADMIN — Medication 3 MG: at 08:41

## 2021-01-05 RX ADMIN — Medication 100 MCG: at 07:44

## 2021-01-05 RX ADMIN — Medication 100 MCG: at 07:50

## 2021-01-05 RX ADMIN — ONDANSETRON 4 MG: 2 INJECTION INTRAMUSCULAR; INTRAVENOUS at 08:32

## 2021-01-05 RX ADMIN — ROCURONIUM BROMIDE 5 MG: 10 INJECTION INTRAVENOUS at 07:28

## 2021-01-05 RX ADMIN — HEPARIN SODIUM 8000 UNITS: 1000 INJECTION INTRAVENOUS; SUBCUTANEOUS at 08:05

## 2021-01-05 RX ADMIN — CLINDAMYCIN PHOSPHATE 900 MG: 150 INJECTION, SOLUTION INTRAVENOUS at 07:28

## 2021-01-05 RX ADMIN — Medication 100 MCG: at 08:28

## 2021-01-05 RX ADMIN — PROPOFOL 140 MG: 10 INJECTION, EMULSION INTRAVENOUS at 07:28

## 2021-01-05 RX ADMIN — SODIUM CHLORIDE: 9 INJECTION, SOLUTION INTRAVENOUS at 08:51

## 2021-01-05 RX ADMIN — HEPARIN SODIUM 5000 UNITS: 1000 INJECTION INTRAVENOUS; SUBCUTANEOUS at 08:01

## 2021-01-05 RX ADMIN — Medication 100 MCG: at 08:08

## 2021-01-05 RX ADMIN — LIDOCAINE HYDROCHLORIDE 100 MG: 20 INJECTION, SOLUTION INFILTRATION; PERINEURAL at 07:28

## 2021-01-05 RX ADMIN — SUCCINYLCHOLINE CHLORIDE 140 MG: 20 INJECTION, SOLUTION INTRAMUSCULAR; INTRAVENOUS at 07:28

## 2021-01-05 RX ADMIN — PROTAMINE SULFATE 50 MG: 10 INJECTION, SOLUTION INTRAVENOUS at 08:32

## 2021-01-05 RX ADMIN — Medication 100 MCG: at 07:58

## 2021-01-05 RX ADMIN — FENTANYL CITRATE 50 MCG: 50 INJECTION, SOLUTION INTRAMUSCULAR; INTRAVENOUS at 07:28

## 2021-01-05 ASSESSMENT — PULMONARY FUNCTION TESTS
PIF_VALUE: 15
PIF_VALUE: 15
PIF_VALUE: 21
PIF_VALUE: 16
PIF_VALUE: 15
PIF_VALUE: 1
PIF_VALUE: 23
PIF_VALUE: 15
PIF_VALUE: 15
PIF_VALUE: 16
PIF_VALUE: 15
PIF_VALUE: 0
PIF_VALUE: 16
PIF_VALUE: 0
PIF_VALUE: 15
PIF_VALUE: 1
PIF_VALUE: 15
PIF_VALUE: 16
PIF_VALUE: 15
PIF_VALUE: 16
PIF_VALUE: 15
PIF_VALUE: 16
PIF_VALUE: 2
PIF_VALUE: 15
PIF_VALUE: 16
PIF_VALUE: 15
PIF_VALUE: 2
PIF_VALUE: 15
PIF_VALUE: 16
PIF_VALUE: 16
PIF_VALUE: 15
PIF_VALUE: 2
PIF_VALUE: 15
PIF_VALUE: 15

## 2021-01-05 NOTE — ANESTHESIA PROCEDURE NOTES
Arterial Line:    An arterial line was placed using surface landmarks, in the OR for the following indication(s): continuous blood pressure monitoring. A 20 gauge (size), (length), Arrow (type) catheter was placed, Seldinger technique used, into the right radial artery, secured by tape and Tegaderm. Anesthesia type: General    Events:  patient tolerated procedure well with no complications.   Anesthesiologist: Joy Villatoro MD  Performed: Anesthesiologist   Preanesthetic Checklist  Completed: patient identified, IV checked, site marked, risks and benefits discussed, surgical consent, monitors and equipment checked, pre-op evaluation, timeout performed, anesthesia consent given, oxygen available and patient being monitored

## 2021-01-05 NOTE — ANESTHESIA PRE PROCEDURE
Department of Anesthesiology  Preprocedure Note       Name:  Nader Loaiza   Age:  68 y.o.  :  1940                                          MRN:  [id number]         Date:  2021      Surgeon: * Surgery not found *    Procedure:     Medications prior to admission:   Prior to Admission medications    Medication Sig Start Date End Date Taking? Authorizing Provider   spironolactone (ALDACTONE) 25 MG tablet Take 0.5 tablets by mouth daily 20  Yes Sincere Rebolledo MD   umeclidinium-vilanterol [facility] ELLIPTA) 62.5-25 MCG/INH AEPB inhaler Inhale 1 puff into the lungs daily 11/3/20  Yes LEWIS Fuentes CNP   lovastatin (MEVACOR) 20 MG tablet Take 1 tablet by mouth nightly  Patient taking differently: Take 20 mg by mouth daily  10/21/20  Yes Cesilia Clifford MD   OXYGEN Please provide patient with 3LPM of oxygen at night time for nocturnal hypoxia. Patient to have repeat pulse oximetry testing done on oxygen 10/20/20  Yes LEWIS Ovalles CNP   metoprolol succinate (TOPROL XL) 25 MG extended release tablet Take 1 tablet by mouth daily 10/19/20  Yes Natalie Hernandez PA-C   clopidogrel (PLAVIX) 75 MG tablet Take 1 tablet by mouth daily 10/19/20  Yes Natalie Hernandez PA-C   warfarin (COUMADIN) 5 MG tablet Take 1 tablet by mouth daily 20   Hoa Greer MD   nitroGLYCERIN (NITROSTAT) 0.4 MG SL tablet up to max of 3 total doses.  If no relief after 1 dose, call 911. 20   Michelle Orozco MD       Current medications:    Current Facility-Administered Medications   Medication Dose Route Frequency Provider Last Rate Last Admin    0.9 % sodium chloride infusion   Intravenous Continuous LEWIS Hu  mL/hr at 21 0603 New Bag at 21 0603    acetaminophen (TYLENOL) tablet 650 mg  650 mg Oral Q4H PRN LEWIS Hu CNP  clindamycin (CLEOCIN) 900 mg in dextrose 5 % 50 mL IVPB  900 mg Intravenous On Call to 3060 Bety TaiLEWIS CNP 50 mL/hr at 01/05/21 0637 900 mg at 01/05/21 6242    sodium chloride flush 0.9 % injection 10 mL  10 mL Intravenous PRN Gary GarnerLEWIS CNP           Allergies: Allergies   Allergen Reactions    Brilinta [Ticagrelor] Shortness Of Breath    Keflex [Cephalexin] Rash       Problem List:    Patient Active Problem List   Diagnosis Code    Elevated glucose R73.09    Osteoarthritis M19.90    ASCVD (arteriosclerotic cardiovascular disease) I25.10    COPD, moderate ([address] J44.9    Aortic valve disorder I35.9    Abnormal ankle brachial index (VONDA) R68.89    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    Coronary artery disease due to lipid rich plaque I25.10, I25.83    Intermittent claudication (HCC) I73.9    S/P coronary angioplasty Z98.61    S/P cardiac cath Z98.890    S/P percutaneous transluminal angioplasty (PTA) with stent placement Z95.820    Severe aortic stenosis I35.0    Essential hypertension I10    Hyperlipidemia LDL goal <70 E78.5    S/P TAVR (transcatheter aortic valve replacement) Z95.2    Nocturnal hypoxia G47.34    Chronic respiratory failure with hypoxia (HCC) J96.11    Hypersomnia G47.10    Other emphysema (HCC) J43.8    Afib (HCC) I48.91       Past Medical History:        Diagnosis Date    ASCVD (arteriosclerotic cardiovascular disease)     Atrial fibrillation (HCC)     CAD (coronary artery disease)     Cerebral artery occlusion with cerebral infarction (HCC)     TIA    COPD (chronic obstructive pulmonary disease) (HCC)     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 Lowest de-sat 77%.     Osteoarthritis     Osteopenia  Osteopenia     [facility]peripheral [facility] disease) [facility]     Polio 1948    Rheumatic fever        Past Surgical History:        Procedure Laterality Date    AORTIC VALVE REPAIR  2020    TAVR    BALLOON ANGIOPLASTY, ARTERY  2018    s/p Left CFA, SFA and popliteal intervention    FACIAL COSMETIC SURGERY      as a kid    HAND SURGERY Right     carpal tunnel     PTCA  2020    stent LAD    TONSILLECTOMY AND ADENOIDECTOMY         Social History:    Social History     Tobacco Use    Smoking status: Former Smoker     Packs/day: 1.50     Years: 50.00     Pack years: 75.00     Types: Cigarettes     Quit date: 10/9/2004     Years since quittin.2    Smokeless tobacco: Never Used   Substance Use Topics    Alcohol use: Yes     Alcohol/week: 30.0 standard drinks     Types: 30 Cans of beer per week     Comment: 6 beers per day                                 Counseling given: Not Answered      Vital Signs (Current):   Vitals:    21 0521 21 0522 21 0523   BP: (!) 175/65  (!) 165/60   Pulse: 68  69   Resp: 20  16   Temp: 97.6 °F (36.4 °C)     TempSrc: Oral     SpO2:  92%    Weight:  156 lb (70.8 kg)    Height:  5' 8\" (1.727 m)                                               BP Readings from Last 3 Encounters:   21 (!) 165/60   12/10/20 112/70   20 136/68       NPO Status:                                                                                 BMI:   Wt Readings from Last 3 Encounters:   21 156 lb (70.8 kg)   12/10/20 156 lb 12.8 oz (71.1 kg)   20 162 lb (73.5 kg)     Body mass index is 23.72 kg/m².     CBC:   Lab Results   Component Value Date    WBC 8.8 2021    RBC 4.50 2021    HGB 13.6 2021    HCT 41.7 2021    MCV 92.7 2021     2021       CMP:   Lab Results   Component Value Date     2021    K 4.2 2021     2021    CO2 24 2021    BUN 18 2021    CREATININE 0.7 2021 LABGLOM >90 01/05/2021    GLUCOSE 98 01/05/2021    GLUCOSE 90 04/06/2017    PROT 7.0 01/05/2021    CALCIUM 9.5 01/05/2021    BILITOT 0.7 01/05/2021    ALKPHOS 81 01/05/2021    AST 19 01/05/2021    ALT 8 01/05/2021       POC Tests: No results for input(s): POCGLU, POCNA, POCK, POCCL, POCBUN, POCHEMO, POCHCT in the last 72 hours. Coags:   Lab Results   Component Value Date    INR 1.02 01/05/2021    APTT 27.9 01/05/2021       HCG (If Applicable): No results found for: PREGTESTUR, PREGSERUM, HCG, HCGQUANT     ABGs: No results found for: PHART, PO2ART, ONM3YEG, ZIF7MMO, BEART, J1RPHZAK     Type & Screen (If Applicable):  Lab Results   Component Value Date    LABRH NEG 09/16/2020       Drug/Infectious Status (If Applicable):  No results found for: HIV, HEPCAB    COVID-19 Screening (If Applicable):   Lab Results   Component Value Date    COVID19 Not Detected 12/29/2020         Anesthesia Evaluation   no history of anesthetic complications:   Airway: Mallampati: II  TM distance: >3 FB   Neck ROM: full  Mouth opening: > = 3 FB Dental:          Pulmonary:normal exam    (+) COPD:            Patient did not smoke on day of surgery. Cardiovascular:  Exercise tolerance: good (>4 METS),   (+) hypertension:, CAD:, dysrhythmias: atrial fibrillation,                   Neuro/Psych:   (+) CVA:,             GI/Hepatic/Renal: Neg GI/Hepatic/Renal ROS            Endo/Other:    (+) blood dyscrasia: anticoagulation therapy:., .          Pt had no PAT visit       Abdominal:           Vascular: negative vascular ROS. Anesthesia Plan      general     ASA 3       Induction: intravenous. arterial line and PEGGY  MIPS: Postoperative opioids intended and Prophylactic antiemetics administered. Anesthetic plan and risks discussed with patient. Plan discussed with CRNA.                   Argentina Mary MD   1/5/2021

## 2021-01-05 NOTE — TELEPHONE ENCOUNTER
Orders received from Dr. Viktor Smith to schedule the patient for his 45 day post WATCHMAN (February 19th), CBC and BMP. Janki, can you assist in scheduling this?

## 2021-01-05 NOTE — ANESTHESIA PROCEDURE NOTES
Procedure Performed: PEGGY     Start Time:        End Time:      Preanesthesia Checklist:  Patient identified, IV assessed, risks and benefits discussed, monitors and equipment assessed, procedure being performed at surgeon's request and anesthesia consent obtained. General Procedure Information  Diagnostic Indications for Echo:  assessment of ascending aorta, assessment of surgical repair, hemodynamic monitoring, suspected pericardial effusion and assessment of valve function  Physician Requesting Echo: Diana Cast MD  CPT Code:  [id number]  Location performed:  OR  Intubated  Bite block placed  Heart visualized  Probe Insertion:  Easy  Probe Type:  3D  Modalities:  3D, color flow mapping, continuous wave Doppler and pulse wave Doppler    Echocardiographic and Doppler Measurements    Ventricles    Right Ventricle:  Cavity size normal.  Global function normal.  Ejection Fraction 50%. Left Ventricle:  Cavity size normal.  Global Function normal.      Ventricular Regional Function:  1- Basal Anteroseptal:  normal  2- Basal Anterior:  normal  3- Basal Anterolateral:  normal  4- Basal Inferolateral:  normal  5- Basal Inferior:  normal  6- Basal Inferoseptal:  normal  7- Mid Anteroseptal:  normal  8- Mid Anterior:  normal  9- Mid Anterolateral:  normal  10- Mid Inferolateral:  normal  11- Mid Inferior:  normal  12- Mid Inferoseptal:  normal  13- Apical Anterior:  normal  14- Apical Lateral:  normal  15- Apical Inferior:  normal  16- Apical Septal:  normal  17- [place]:  normal      Valves    Aortic Valve: Annulus bioprosthetic. Stenosis not present. Regurgitation none. Mitral Valve: Annulus normal.  Stenosis not present. Regurgitation mild. Leaflets normal.  Leaflet motions normal.      Tricuspid Valve: Annulus normal.  Stenosis not present. Regurgitation none. Pulmonic Valve: Annulus normal.  Stenosis not present. Regurgitation none.           Aorta    Ascending Aorta:  Size normal. Aortic Arch:  Size normal.    Descending Aorta:  Size normal.          Atria      Left Atrium:  Size dilated. Spontaneous echo contrast not present. Thrombus not present. Left atrial appendage normal.  Other Atria Findings:       OTIS evaluated and measured at 0, 45, 90, 135 degrees. Watchman device placement guided and deployment visualized. Compression measurements taken. Tug test with good results. No color flow around the device. Septa    Atrial Septum:      Other atrial septal defect findings:       Intra-atrial septal puncture and crossing visualized. Small left to right defect present at the end of the procedure.     Ventricular Septum:  Intra-ventricular septum morphology normal.          Other Findings  Pericardium:  normal      Anesthesia Information  Performed Personally  Anesthesiologist:  Jama Lopez MD

## 2021-01-05 NOTE — ANESTHESIA POSTPROCEDURE EVALUATION
Department of Anesthesiology  Postprocedure Note    Patient: Lieutenant Jeans  MRN: [id number]  YOB: 1940  Date of evaluation: 1/5/2021  Time:  11:30 AM     Procedure Summary     Date: 01/05/21 Room / Location: [facility] [place] CATH LAB    Anesthesia Start: 0719 Anesthesia Stop: 0900    Procedure: STR ATRIAL APPEND CLOS W ANES Diagnosis:     Scheduled Providers: Alejandro Restrepo MD Responsible Provider: Alejandro Restrepo MD    Anesthesia Type: general ASA Status: 3          Anesthesia Type: No value filed. Jordi Phase I: Jordi Score: 10    Jordi Phase II:      Last vitals: Reviewed and per EMR flowsheets.        Anesthesia Post Evaluation    Patient location during evaluation: PACU  Patient participation: complete - patient participated  Level of consciousness: awake and alert  Airway patency: patent  Nausea & Vomiting: no nausea  Complications: no  Cardiovascular status: blood pressure returned to baseline and hemodynamically stable  Respiratory status: acceptable and spontaneous ventilation  Hydration status: euvolemic

## 2021-01-06 NOTE — TELEPHONE ENCOUNTER
Patient is scheduled for the PEGGY on 2.19.2021 arrival time 1pm   Covid test to be done 02.12.2021 ([facility])  Instructions placed on your desk

## 2021-01-07 ENCOUNTER — HOSPITAL ENCOUNTER (OUTPATIENT)
Dept: CARDIAC REHAB | Age: 81
Setting detail: THERAPIES SERIES
Discharge: HOME | End: 2021-01-07
Payer: MEDICARE

## 2021-01-07 ENCOUNTER — APPOINTMENT (OUTPATIENT)
Dept: CARDIAC REHAB | Age: 81
End: 2021-01-07
Payer: MEDICARE

## 2021-01-11 ENCOUNTER — OFFICE VISIT (OUTPATIENT)
Dept: CARDIOTHORACIC SURGERY | Age: 81
End: 2021-01-11

## 2021-01-11 VITALS
OXYGEN SATURATION: 95 % | BODY MASS INDEX: 23.82 KG/M2 | HEIGHT: 68 IN | SYSTOLIC BLOOD PRESSURE: 138 MMHG | DIASTOLIC BLOOD PRESSURE: 70 MMHG | WEIGHT: 157.2 LBS | HEART RATE: 75 BPM

## 2021-01-11 DIAGNOSIS — I48.91 ATRIAL FIBRILLATION, UNSPECIFIED TYPE (HCC): Primary | ICD-10-CM

## 2021-01-11 PROCEDURE — 99024 POSTOP FOLLOW-UP VISIT: CPT | Performed by: PHYSICIAN ASSISTANT

## 2021-01-11 NOTE — PROGRESS NOTES
Hospital Facility-Based Program  Phase 2 Cardiac Rehab Weekly Progress Report    Pt is currently on Hold. Recent Watchman procedure. Wava Prim

## 2021-01-11 NOTE — PROGRESS NOTES
Structural Heart/Cardiology Follow up    1/11/2021 11:25 AM    Patient's Name/Date of Birth: Dequan Hoskins / 1940 (68 y.o.)    PCP: Juan José Chahal MD      Date: January 11, 2021     HPI  We had the pleasure of seeing Dequan Hoskins in the office today, as you know this is a very pleasant 68y.o. year old male with a history of atrial fibrillation who underwent a successful Watchman procedure on 01/05/20 . The pt had an uneventful hospital stay. The pt denies chest pressure, palpitations, SOB, fever, chills, N/V/D. He has no major complaints. He does say is has noticed some itching and minor rash on chest since starting coumadin months ago. PastMedical History:  Edouard Khan  has a past medical history of ASCVD (arteriosclerotic cardiovascular disease), Atrial fibrillation ([address] CAD (coronary artery disease), Cerebral artery occlusion with cerebral infarction ([address] COPD (chronic obstructive pulmonary disease) ([address] Dysplasia of vocal cord, Elevated glucose, Hyperlipidemia, Hypertension, Nocturnal hypoxia, Osteoarthritis, Osteopenia, Osteopenia, PAD (peripheral artery disease) ([address] Polio, and Rheumatic fever. Past Surgical History:  The patient  has a past surgical history that includes Tonsillectomy and adenoidectomy; Facial cosmetic surgery; Balloon angioplasty, artery (07/2018); Hand surgery (Right); Aortic valve repair (09/2020); Percutaneous Transluminal Coronary Angio (06/2020); and other surgical history (01/05/2021). Allergies: The patient is allergic to brilinta [ticagrelor] and keflex [cephalexin].     Medications:    Current Outpatient Medications:     spironolactone (ALDACTONE) 25 MG tablet, Take 0.5 tablets by mouth daily, Disp: 30 tablet, Rfl: 3    warfarin (COUMADIN) 5 MG tablet, Take 1 tablet by mouth daily, Disp: 30 tablet, Rfl: 3    umeclidinium-vilanterol (ANORO ELLIPTA) 62.5-25 MCG/INH AEPB inhaler, Inhale 1 puff into the lungs daily, Disp: 90 puff, Rfl: 3   lovastatin (MEVACOR) 20 MG tablet, Take 1 tablet by mouth nightly (Patient taking differently: Take 20 mg by mouth daily ), Disp: 90 tablet, Rfl: 1    OXYGEN, Please provide patient with 3LPM of oxygen at night time for nocturnal hypoxia. Patient to have repeat pulse oximetry testing done on oxygen, Disp: 3 L, Rfl: 0    metoprolol succinate (TOPROL XL) 25 MG extended release tablet, Take 1 tablet by mouth daily, Disp: 30 tablet, Rfl: 0    clopidogrel (PLAVIX) 75 MG tablet, Take 1 tablet by mouth daily, Disp: 30 tablet, Rfl: 0    nitroGLYCERIN (NITROSTAT) 0.4 MG SL tablet, up to max of 3 total doses. If no relief after 1 dose, call 911., Disp: 25 tablet, Rfl: 1    Family History: This patient's family history includes Arthritis in his maternal grandmother; Heart Disease in his father. Social History:  Neida SOTOMAYOR  reports that he quit smoking about 16 years ago. His smoking use included cigarettes. He has a 75.00 pack-year smoking history. He has never used smokeless tobacco. He reports current alcohol use of about 30.0 standard drinks of alcohol per week. He reports that he does not use drugs. ROS:   Constitutional: Negative for activity change, chills, fatigue, fever and unexpected weight change. Respiratory: Negative for apnea, shortness of breath, wheezing and stridor. Cardiovascular: Negative for chest pain, palpitations and leg swelling. Gastrointestinal: Negative for hematochezia, melana, constipation, and N/V/D. Musculoskeletal: Negative for myalgias  Skin: Negative for color change, rash and wound. Neurological: Negative for dizziness or syncope.       Vitals:    01/11/21 1044   BP: 138/70   Site: Left Upper Arm   Pulse: 75   SpO2: 95%   Weight: 157 lb 3.2 oz (71.3 kg)   Height: 5' 8\" (1.727 m)       Physical Exam:   General Appearance: alert ,conversing, in no acute distress  Cardiovascular: normal rate, regular rhythm, normal S1 and S2, no murmurs, rubs, clicks, or gallops  Pulmonary/Chest: clear to auscultation bilaterally- no wheezes, rales or rhonchi, normal air movement, no respiratory distress  Abdomen:soft, non-tender, non-distended, normal bowel sounds, no bruits,   Extremities: no cyanosis, clubbing or edema. Pulses: Bilateral radial, femoral, DP, and PT pulses intact. No femoral bruits noted. Skin: warm and dry, no rash or erythema  Head: normocephalic and atraumatic  Neck: supple and non-tender without mass, no thyromegaly, no JVD   Musculoskeletal: normal range of motion, no joint swelling, deformity or tenderness. Neurological: alert, oriented, normal speech, no focal findings or movement disorder noted. Groin: Wounds healing appropriately. No signs of infection or hematoma. Assessment:   Watchman F/u office appointment. Hx of atrial fibrillation    Plan:   1. Follow up with Dr. Magnolia Jones at 45 days with a CBC, BMP, and Echo prior to appointment. 2. Continue Coumadin and ASA till OTIS is evaluated with echo. 1. Pt is okay to continue coumadin even with rash and itching   3.  Keep INR 2-3    The plan of care was discussed in detail with Dr. Magnolia Jones and Dr. Pereyra Saliva

## 2021-01-12 ENCOUNTER — APPOINTMENT (OUTPATIENT)
Dept: CARDIAC REHAB | Age: 81
End: 2021-01-12
Payer: MEDICARE

## 2021-01-12 ENCOUNTER — HOSPITAL ENCOUNTER (OUTPATIENT)
Dept: PHARMACY | Age: 81
Setting detail: THERAPIES SERIES
Discharge: HOME | End: 2021-01-12
Payer: MEDICARE

## 2021-01-12 VITALS — TEMPERATURE: 97.8 F

## 2021-01-12 LAB — POC INR: 1.8 (ref 0.8–1.2)

## 2021-01-12 PROCEDURE — 85610 PROTHROMBIN TIME: CPT

## 2021-01-12 PROCEDURE — 36416 COLLJ CAPILLARY BLOOD SPEC: CPT

## 2021-01-12 PROCEDURE — 99211 OFF/OP EST MAY X REQ PHY/QHP: CPT

## 2021-01-12 NOTE — PROGRESS NOTES
Medication Management [address]  [phone] (phone)  [phone] (fax)      Mr. Jovan Guaman is a 68 y.o.  male with history of Afib who presents today for anticoagulation monitoring and adjustment. Patient verifies current dosing regimen and tablet strength. No missed or extra doses. Patient denies s/s bleeding/bruising/swelling/SOB/chest pain - bruising on arm/wrist from where needle was inserted from watchman  No blood in urine or stool. No dietary changes. No changes in medication/OTC agents/Herbals. No change in alcohol use or tobacco use. No change in activity level. - activity level down since procedure  Patient denies headaches/dizziness/lightheadedness/falls. No vomiting/diarrhea or acute illness. No Procedures scheduled in the future at this time. - Watchman was on 1/5/21. PEGGY planned for 1/19/21. Assessment:   Lab Results   Component Value Date    INR 1.80 (H) 01/12/2021    INR 1.01 01/06/2021    INR 1.02 01/05/2021     INR subtherapeutic   Recent Labs     01/12/21  0820   INR 1.80*         Plan:  Coumadin 7.5 mg x 1 dose, then continue Coumadin 2.5 mg MF, 5 mg all other days. Recheck INR in 1 week(s). Patient reminded to call the Anticoagulation Clinic with any signs or symptoms of bleeding or with any medication changes. Patient given instructions utilizing the teach back method. After visit summary printed and reviewed with patient.       Medications reviewed and updated on home medication list Yes    Influenza vaccine:     [] given    [x] declined   [] received previously   [] plans to receive at a later time   [] refused    [] documented in [address]: MED RECONCILIATION/REVIEW [address]: Yes  Total # of Interventions Recommended: 1  - Increased Dose #: 1  - Maintenance Safety Lab Monitoring #: 1  Total Interventions Accepted: 1  Time Spent (min): 20 Juana Roque, PharmD, BCPS  1/12/2021  8:58 AM

## 2021-01-14 ENCOUNTER — APPOINTMENT (OUTPATIENT)
Dept: CARDIAC REHAB | Age: 81
End: 2021-01-14
Payer: MEDICARE

## 2021-01-19 ENCOUNTER — APPOINTMENT (OUTPATIENT)
Dept: CARDIAC REHAB | Age: 81
End: 2021-01-19
Payer: MEDICARE

## 2021-01-19 ENCOUNTER — HOSPITAL ENCOUNTER (OUTPATIENT)
Dept: CARDIAC REHAB | Age: 81
Setting detail: THERAPIES SERIES
Discharge: HOME | End: 2021-01-19
Payer: MEDICARE

## 2021-01-19 ENCOUNTER — HOSPITAL ENCOUNTER (OUTPATIENT)
Dept: PHARMACY | Age: 81
Setting detail: THERAPIES SERIES
Discharge: HOME | End: 2021-01-19
Payer: MEDICARE

## 2021-01-19 VITALS — TEMPERATURE: 96.7 F

## 2021-01-19 DIAGNOSIS — I48.0 PAROXYSMAL ATRIAL FIBRILLATION (HCC): ICD-10-CM

## 2021-01-19 LAB — POC INR: 2.3 (ref 0.8–1.2)

## 2021-01-19 PROCEDURE — 93798 PHYS/QHP OP CAR RHAB W/ECG: CPT

## 2021-01-19 PROCEDURE — 36416 COLLJ CAPILLARY BLOOD SPEC: CPT | Performed by: PHARMACIST

## 2021-01-19 PROCEDURE — 99211 OFF/OP EST MAY X REQ PHY/QHP: CPT | Performed by: PHARMACIST

## 2021-01-19 PROCEDURE — 85610 PROTHROMBIN TIME: CPT | Performed by: PHARMACIST

## 2021-01-19 NOTE — PROGRESS NOTES
Medication Management [address]  [phone] (phone)  [phone] (fax)      Mr. Aisha Roper is a 68 y.o.  male with history of Afib who presents today for anticoagulation monitoring and adjustment. Patient verifies current dosing regimen and tablet strength. No missed or extra doses. Patient denies s/s bleeding/bruising/swelling/SOB/chest pain  No blood in urine or stool. No dietary changes. No changes in medication/OTC agents/Herbals. No change in alcohol use or tobacco use. Patient reports some beers 3 to 4 occasionally but not different than baseline. No change in activity level. Patient denies headaches/dizziness/lightheadedness/falls. No vomiting/diarrhea or acute illness. No Procedures scheduled in the future at this time. PEGGY scheduled on 2/19/21 post watchman procedure. Assessment:   Lab Results   Component Value Date    INR 2.30 (H) 01/19/2021    INR 1.80 (H) 01/12/2021    INR 1.01 01/06/2021     INR therapeutic   Recent Labs     01/19/21  0817   INR 2.30*     Patient interview completed and discussed with pharmacist by RAIZA Baker PharmD Candidate 2021      Plan:   Continue Coumadin 2.5mg MF 5mg STuWThS. Recheck INR in 2 week(s). Patient reminded to call the Anticoagulation Clinic with any signs or symptoms of bleeding or with any medication changes. Patient given instructions utilizing the teach back method. Discharged ambulatory with wife in no apparent distress. After visit summary printed and reviewed with patient.       Medications reviewed and updated on home medication list Yes    Influenza vaccine:     [] given    [x] declined   [x] received previously   [] plans to receive at a later time   [] refused    [x] documented in [address]:  [facility] [address]: Yes  Total # of Interventions Recommended: 0 - Maintenance Safety Lab Monitoring #: 1  Total Interventions Accepted: 0  Time Spent (min): 20    Coley Siemens, PharmD 1/19/2021 8:38 AM

## 2021-01-21 ENCOUNTER — APPOINTMENT (OUTPATIENT)
Dept: CARDIAC REHAB | Age: 81
End: 2021-01-21
Payer: MEDICARE

## 2021-01-21 ENCOUNTER — HOSPITAL ENCOUNTER (OUTPATIENT)
Dept: CARDIAC REHAB | Age: 81
Setting detail: THERAPIES SERIES
End: 2021-01-21
Payer: MEDICARE

## 2021-01-25 ENCOUNTER — HOSPITAL ENCOUNTER (EMERGENCY)
Age: 81
Discharge: HOME | End: 2021-01-25
Attending: EMERGENCY MEDICINE
Payer: MEDICARE

## 2021-01-25 VITALS
RESPIRATION RATE: 16 BRPM | SYSTOLIC BLOOD PRESSURE: 139 MMHG | OXYGEN SATURATION: 92 % | DIASTOLIC BLOOD PRESSURE: 66 MMHG | HEART RATE: 95 BPM | TEMPERATURE: 98.9 F

## 2021-01-25 DIAGNOSIS — J44.9 CHRONIC OBSTRUCTIVE PULMONARY DISEASE, UNSPECIFIED COPD TYPE (HCC): ICD-10-CM

## 2021-01-25 DIAGNOSIS — R04.0 EPISTAXIS: Primary | ICD-10-CM

## 2021-01-25 DIAGNOSIS — Z99.81 SUPPLEMENTAL OXYGEN DEPENDENT: ICD-10-CM

## 2021-01-25 DIAGNOSIS — Z79.01 WARFARIN ANTICOAGULATION: ICD-10-CM

## 2021-01-25 LAB — INR BLD: 2.77 (ref 0.85–1.13)

## 2021-01-25 PROCEDURE — 36415 COLL VENOUS BLD VENIPUNCTURE: CPT

## 2021-01-25 PROCEDURE — 85610 PROTHROMBIN TIME: CPT

## 2021-01-25 PROCEDURE — 99283 EMERGENCY DEPT VISIT LOW MDM: CPT

## 2021-01-25 ASSESSMENT — ENCOUNTER SYMPTOMS
TROUBLE SWALLOWING: 0
RHINORRHEA: 0
WHEEZING: 0
SHORTNESS OF BREATH: 0
SORE THROAT: 0
DIARRHEA: 0
VOICE CHANGE: 0
ABDOMINAL PAIN: 0
VOMITING: 0
NAUSEA: 0
SINUS PRESSURE: 0
CONSTIPATION: 0
COUGH: 0
BACK PAIN: 0
CHEST TIGHTNESS: 0

## 2021-01-25 NOTE — PROGRESS NOTES
[facility]ospital Facility-Based Program  Phase 2 Cardiac Rehab Weekly Progress Report      Patient prescribed exercise:  9:00 class. 3 times per week in rehab, 1-4 times per week at home for the amount of sessions/weeks specified by insurance. Current Levels: Treadmill: 2.0mph/0% for 15 minutes, NuStep:  40 Mathews for 15 minutes    Progression Discussion: Maintain/Increase Aerobic exercise 30 minutes to work on endurance. Attempt to increase intensity by 5-20% for each modality this week. Try to increase intensities until Brenton SOTOMAYOR rates the exercises a 13-17 on Darcy RPE.

## 2021-01-26 ENCOUNTER — HOSPITAL ENCOUNTER (EMERGENCY)
Age: 81
Discharge: HOME | End: 2021-01-26
Payer: MEDICARE

## 2021-01-26 ENCOUNTER — APPOINTMENT (OUTPATIENT)
Dept: CARDIAC REHAB | Age: 81
End: 2021-01-26
Payer: MEDICARE

## 2021-01-26 ENCOUNTER — HOSPITAL ENCOUNTER (OUTPATIENT)
Dept: CARDIAC REHAB | Age: 81
Setting detail: THERAPIES SERIES
Discharge: HOME | End: 2021-01-26
Payer: MEDICARE

## 2021-01-26 VITALS
HEART RATE: 78 BPM | RESPIRATION RATE: 20 BRPM | BODY MASS INDEX: 24.25 KG/M2 | WEIGHT: 160 LBS | DIASTOLIC BLOOD PRESSURE: 84 MMHG | HEIGHT: 68 IN | OXYGEN SATURATION: 93 % | SYSTOLIC BLOOD PRESSURE: 175 MMHG | TEMPERATURE: 97.9 F

## 2021-01-26 DIAGNOSIS — Z87.898 H/O EPISTAXIS: ICD-10-CM

## 2021-01-26 DIAGNOSIS — T17.208A: Primary | ICD-10-CM

## 2021-01-26 PROCEDURE — 99213 OFFICE O/P EST LOW 20 MIN: CPT

## 2021-01-26 PROCEDURE — 99213 OFFICE O/P EST LOW 20 MIN: CPT | Performed by: NURSE PRACTITIONER

## 2021-01-26 ASSESSMENT — ENCOUNTER SYMPTOMS
COUGH: 0
VOMITING: 0
SHORTNESS OF BREATH: 0
SORE THROAT: 0
NAUSEA: 0

## 2021-01-26 NOTE — ED TRIAGE NOTES
Pt to ED via intake with c/o nose bleed. Pt reports their nose has been bleeding for approx. 1hr. Pt reports they take coumadin and plavix daily. Pt denies any trauma to their nose. Pt  BP is hypertensive. Pt wife remains at bedside.

## 2021-01-26 NOTE — ED PROVIDER NOTES
[address] [facility]        CHIEF COMPLAINT    Chief Complaint   Patient presents with    Epistaxis       Nurses Notes reviewed and I agree except as noted in the HPI. HPI    Juli Kraft is a 68 y.o. male who presents for evaluation of epistaxis since 6:00 this afternoon. The patient admits that he blow his nose earlier however did not bleed until 6:00 tonight. Patient uses oxygen 4 L at night and 3 L during the day due to COPD and congestive heart failure. Patient admits that the use electricity for their heater and it is so dry inside her house staff. Patient use Coumadin due to his heart ailments. Did not have any headache no nausea vomiting no shortness of breath or chest pain at present      REVIEW OF SYSTEMS    Review of Systems   Constitutional: Negative for appetite change, chills, diaphoresis, fatigue and fever. HENT: Positive for nosebleeds. Negative for congestion, ear discharge, ear pain, postnasal drip, rhinorrhea, sinus pressure, sore throat, trouble swallowing and voice change. Respiratory: Negative for cough, chest tightness, shortness of breath and wheezing. Cardiovascular: Negative for chest pain, palpitations and leg swelling. Gastrointestinal: Negative for abdominal pain, constipation, diarrhea, nausea and vomiting. Musculoskeletal: Negative for arthralgias, back pain, joint swelling, myalgias, neck pain and neck stiffness. Skin: Negative for rash. Neurological: Negative for dizziness, syncope, weakness, light-headedness, numbness and headaches.        PAST MEDICAL HISTORY has a past medical history of ASCVD (arteriosclerotic cardiovascular disease), Atrial fibrillation ([facility] [address] CAD (coronary artery disease), Cerebral artery occlusion with cerebral infarction ([facility] [address] COPD (chronic obstructive pulmonary disease) ([facility] [address] Dysplasia of vocal cord, Elevated glucose, Hyperlipidemia, Hypertension, Nocturnal hypoxia, Osteoarthritis, Osteopenia, Osteopenia, PAD (peripheral artery disease) ([facility] [address] Polio, and Rheumatic fever. 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 CURRENT MEDICATIONS    Discharge Medication List as of 1/25/2021 10:07 PM      CONTINUE these medications which have NOT CHANGED    Details   warfarin (COUMADIN) 5 MG tablet Take 1 tablet by mouth daily, Disp-30 tablet,R-3Normal      clopidogrel (PLAVIX) 75 MG tablet Take 1 tablet by mouth daily, Disp-30 tablet,R-0Normal      spironolactone (ALDACTONE) 25 MG tablet Take 0.5 tablets by mouth daily, Disp-30 tablet,R-3Normal      umeclidinium-vilanterol (ANORO ELLIPTA) 62.5-25 MCG/INH AEPB inhaler Inhale 1 puff into the lungs daily, Disp-90 puff,R-3Normal      lovastatin (MEVACOR) 20 MG tablet Take 1 tablet by mouth nightly, Disp-90 tablet,R-1Normal      OXYGEN Please provide patient with 3LPM of oxygen at night time for nocturnal hypoxia. Patient to have repeat pulse oximetry testing done on oxygen, Disp-3 L,R-0Print      metoprolol succinate (TOPROL XL) 25 MG extended release tablet Take 1 tablet by mouth daily, Disp-30 tablet,R-0Normal      nitroGLYCERIN (NITROSTAT) 0.4 MG SL tablet up to max of 3 total doses. If no relief after 1 dose, call 911., Disp-25 tablet, R-1Normal             ALLERGIES    is allergic to brilinta [ticagrelor] and keflex [cephalexin]. FAMILY HISTORY    He indicated that his mother is . He indicated that his father is . He indicated that his maternal grandmother is . He indicated that his maternal grandfather is . family history includes Arthritis in his maternal grandmother; Heart Disease in his father. SOCIAL HISTORY     reports that he quit smoking about 16 years ago. His smoking use included cigarettes. He has a 75.00 pack-year smoking history. He has never used smokeless tobacco. He reports current alcohol use of about 30.0 standard drinks of alcohol per week. He reports that he does not use drugs. PHYSICAL EXAM      INITIAL VITALS: /66   Pulse 95   Temp 98.9 °F (37.2 °C) (Oral)   Resp 16   SpO2 92% Estimated body mass index is 23.9 kg/m² as calculated from the following:    Height as of 21: 5' 8\" (1.727 m). Weight as of 21: 157 lb 3.2 oz (71.3 kg). Physical Exam  Vitals signs reviewed. Constitutional:       Appearance: He is well-developed. HENT:      Head: Normocephalic and atraumatic. Right Ear: External ear normal.      Left Ear: External ear normal.      Nose: Nose normal.      Comments: Dry blood on the external naris bilateral, there is no active bleeding at this time  Eyes:      General: No scleral icterus. Conjunctiva/sclera: Conjunctivae normal.      Pupils: Pupils are equal, round, and reactive to light. Neck:      Musculoskeletal: Normal range of motion and neck supple. Thyroid: No thyromegaly. Vascular: No JVD. Cardiovascular:      Rate and Rhythm: Normal rate and regular rhythm. Heart sounds: No murmur. No friction rub. Pulmonary:      Effort: Pulmonary effort is normal.      Breath sounds: Decreased breath sounds present. No wheezing or rales. Chest:      Chest wall: No tenderness. Abdominal:      General: Bowel sounds are normal.      Palpations: Abdomen is soft. There is no mass. Tenderness: There is no abdominal tenderness. Musculoskeletal:      Left lower leg: Left lower leg edema:    Lymphadenopathy:      Cervical: No cervical adenopathy. Skin:     Findings: No rash. Neurological:      Mental Status: He is alert and oriented to person, place, and time. Psychiatric:         Behavior: Behavior is cooperative. MEDICAL DECISION MAKING    DIFFERENTIAL DIAGNOSIS:   left nostrils epistaxis, chronic anticoagulation, recent watchman procedure, COPD oxygen dependent      DIAGNOSTIC RESULTS      LABS:   Labs Reviewed   PROTIME-INR - Abnormal; Notable for the following components:       Result Value    INR 2.77 (*)     All other components within normal limits     All other unresulted laboratory test above are normal:    Vitals:    Vitals:    01/25/21 2028 01/25/21 2034 01/25/21 2140 01/25/21 2213   BP: (!) 183/68  139/66    Pulse: 95      Resp: 16      Temp: 98.9 °F (37.2 °C)      TempSrc: Oral      SpO2: (!) 81% 94% 95% 92%       EMERGENCY DEPARTMENT COURSE:    Medications - No data to display    The pt was seen and evaluated by me. Within the department, I observed the pt's vitalsigns to be within acceptable range. Laboratory studies were performed, results were reviewed with the patient. Within the department, the pt was observed for any recurrence of his epistaxis. Patient did not have any bleeding while in the emergency room. Advised the patient to use petrolatum and humidifier at home. I observed the pt's condition to be hemodynamically stable during the duration of their stay. I explained my proposed course of treatment to the pt, and they were amenable to my decision. They were discharged home, and they will return to the ED if their symptoms become more severein nature, or otherwise change. Controlled Substances Monitoring:        CRITICAL CARE:   None. CONSULTS:  None    PROCEDURES:  None.     FINAL IMPRESSION       1. Epistaxis

## 2021-01-26 NOTE — ED NOTES
Pt placed on 4L nasal cannula at this time at blowby in mouth. Patient states he is normally this low at night while getting ready for bed.       Chip Jaffe RN  01/25/21 2030       Chip Jaffe RN  01/25/21 2032

## 2021-01-26 NOTE — PLAN OF CARE
[address] [place] [address] Facility-Based Program  Individualized Cardiac Treatment Plan    Patient Name:  Abhi Andrews  :  1940  Age:  68 y.o. MRN:  [id number]  Diagnosis: TAVR  Date of Event: 2020   Physician:  Dr. Ksenia Torres  Next Office Visit:  Not scheduled, Jose Kwonnicholas 10/19/20  Date Entered Program: 10/8/2020  Risk Stratifications: [] Low [] Intermediate [x] High  Allergies: Allergies   Allergen Reactions    Brilinta [Ticagrelor] Shortness Of Breath    Keflex [Cephalexin] Rash       COVID -19 Screen  Do you have any of the following symptoms:  [] Fever [] Cough [] SOB [] Muscle/Body Ache [] Loss of taste/smell [x] None    Have you traveled outside of the [place]? [] Yes      [x] No    Have you been around anyone who has tested Positive for COVID 19?  [] Yes      [x] No    The following Education has been completed with patient. [x] Wait in the lobby until we call you back to rehab. [x] You must wear a mask while in the medical center, and in cardiac rehab. Please bring your own. [x] Bring your own bottle of water with you. [x] You can bring a visitor with you, however, they will need to sit in the waiting room while you are in cardiac rehab. Individual Cardiac Treatment Plan -EXERCISE  INITIAL  30 DAY 60 DAY 90 DAY FINAL DAY   EXERCISE  ASSESSMENT/PLAN  EXERCISE  REASSESSMENT EXERCISE   REASSESSMENT EXERCISE   REASSESSMENT EXERCISE  DISCHARGE/FOLLOW-UP   Date: 10/8/2020  Date: 2020 Date: 12/3/20 Date: 2020 Date: 2021   Session #1  Session # 8 Session # 12  Pt did not attend this session. Session # 18  Pt was not here for session today. Session # 19  Last session completed on **    2021- Patient not in attendance for reassessment. Has only attended one session since last reassessment.     Stages of Change  Stages of Change Stages of Change Stages of Change Stages of Change   [] Pre Contemplation  [] Contemplation  [x] Preparation  [] Action  [] Maintenance  [] Relapse Pt discharged today 10/22/20. He does not want to complete the program. He wants to exercise on his own. He does not want to participate in education classes. [] Pre Contemplation  [x] Contemplation  [] Preparation  [] Action  [] Maintenance  [] Relapse [] Pre Contemplation  [] Contemplation  [] Preparation  [] Action  [] Maintenance  [] Relapse [] Pre Contemplation  [] Contemplation  [x] Preparation  [] Action  [] Maintenance  [] Relapse [] Pre Contemplation  [] Contemplation  [] Preparation  [] Action  [] Maintenance  [] Relapse   EXERCISE ASSESSMENT  EXERCISE ASSESSMENT EXERCISE ASSESSMENT EXERCISE ASSESSMENT EXERCISE ASSESSMENT   6 Min Walk Test  Distance walked:   0.06 miles  316.8 ft.  1.5 METs  Max HR:105 BPM  RPE:  11    THR:    Rhythm:  NSR w/ PVCs     6 Min Walk Test  Distance walked:   ** miles  ** ft  ** METs  Max HR:** BPM      RPE:  **  %Change ft= **    Rhythm:  **   DASI: 6.05 METs  DASI: 4.4 METs DASI: ** METs DASI: ** METs DASI: ** METs   Return to Work  [facility] on returning to work? [] Yes              [] No   [] Disabled     [x] Retired    Return to work:  Retired Return to work:  Retired Return to work:  Retired Return to work:  Retired   Orthopedic Limitations/  [x] Yes    [] No  If yes please list:  Ruptured disk in back, hx of PAD      Orthopedic Limitations   Orthopedic Limitations   Orthopedic Limitations  No AD   Orthopedic Limitations    No AD     Fall Risk  Fall risk assessed? [x] Yes      [] No    Balance Issues?   [] Yes      [x] No     [] Walker [] Cane    [x] Safety issues reviewed       Fall Risk   Fall Risk Fall Risk  N/A Fall Risk    NA   Home Exercise  [x] Yes    [] No  Type: walking on TM  Frequency: daily  Duration: 20mins  Home Exercise  [x] Yes    [] No  Type: walking  Frequency:daily  Duration: 30 min Home Exercise  [] Yes    [] No  Type: **  Frequency: **  Duration: ** Home Exercise  [] Yes    [] No  unsure Home Exercise  [] Yes    [] No  Type: **  Frequency: **  Duration: **    1/26- unknown   Angina with Activity? [] Yes    [x] No  Angina Management: na  Angina with Activity? [] Yes    [x] No   Angina with Activity? [] Yes    [] No  Angina Management: ** Angina with Activity? [] Yes    [] No  unsure Angina with Activity?   [] Yes    [] No  Angina Management: **  1/26- unknown   EXERCISE PLAN  EXERCISE PLAN EXERCISE PLAN EXERCISE PLAN EXERCISE PLAN   *Interventions*  *Interventions* *Interventions* *Interventions* *Interventions*   Exercise Prescription  (per physician & CR staff)  Exercise Prescription  (per physician & CR staff) Exercise Prescription  (per physician & CR staff) Exercise Prescription  (per physician & CR staff) Exercise Prescription  (per physician & CR staff)   Cardiovascular  Cardiovascular Cardiovascular Cardiovascular Cardiovascular   Mode:    [x] Treadmill (TM)  [x] Schwinn Airdyne (AD)  [x] Arms Ergometer (AE)  [x] NuStep  [] Elliptical (E)  MODE:    [x] Treadmill (TM)  [x] Arms Ergometer (AE)  [x] NuStep   MODE:    [x] Treadmill (TM)  [] Schwinn Airdyne (AD)  [x] Arms Ergometer (AE)  [x] NuStep  [] Elliptical (E) MODE:    [x] Treadmill (TM)  [] Schwinn Airdyne (AD)  [x] Arms Ergometer (AE)  [x] NuStep  [] Elliptical (E) MODE:    [x] Treadmill (TM)  [] Schwinn Airdyne (AD)  [x] Arms Ergometer (AE)  [x] NuStep  [] Elliptical (E)   Initial Workloads  TM: [email or web address] 2.2 METs  AD: 0.6 level = 2.2 METs  NS: 38  Feng= 2.2 METs  AE: 0.3 level = 1.6 METs  Current Workloads  TM: 2.0 @ 0%= 2.6 METs  NS:  38 Feng= 2.2 METs  AE:  20watts = 1.7 METs Current Workloads  TM: 2.0 @0 %= 2.6 METs    NS:38   Feng= 2.2 METs  AE: 30Watts = 2 METs Current Workloads  TM: 2.0 @ 0%= 2.6 METs  NS: 40 Feng= 2.3 METs  AE: 30 feng  = 2.0 METs Current Workloads  TM: 2.0 @ 0%= 2.6 METs  NS: 40 Feng= 2.3 METs  AE: 30 feng  = 2.0 METs     Frequency:    ICR: 3x/week  Home: 2-3x/wk  Frequency:   ICR: Yes      [] No  25 min session of Core Strength & Flexibility 1x/per week   Attends Core Strength & Flexibility   [] Yes      [x] No Attends Core Strength & Flexibility   [] Yes      [x] No Attends Core Strength & Flexibility   [] Yes      [x] No Continue Core Strength & Flexibility at home   Home Exercise  *Rakesh СВЕТЛАНА verbalizes planning to walk daily days/week for 20-30 min on days not in rehab. Home Exercise  *Rakesh SOTOMAYOR verbalizes planning to continue walking  daily for 30 min on days not in rehab. Home Exercise  *Rakesh SOTOMAYOR verbalizes planning to walk 3-4 days/week for 30 min on days not in rehab. Home Exercise  *unsue on rakesh's plan. Home Exercise  *Rakesh СВЕТЛАНА verbalizes his/her plan to ** ** days/week for ** min @ **    1/26- unknown   *Education*  *Education* *Education* *Education* *Education*   RPE Scale  [x] Yes      [] No  Exercise Safety  [x] Yes      [] No  Equipment Orientation  [x] Yes      [] No  S/S to Report  [x] Yes      [] No  Warm Up/Cool Down  [x] Yes      [] No  Home Exercise  [x] Yes      [] No     All Exercise Education Completed  [x] Yes      [] No   Exercise Education Recommended    Workshops  [x] Improving Performance  [x] Balance Training & Fall Prevention  [x] Exercise Biomechanics  [x] Resistance Training & Core Strength  Exercise Education Attended/Date     Exercise Education Attended/Date  na Exercise Education Attended/Date   All Sessions Completed?     Patient switched to TCR, declined remaining education   *Goals*  *Goals* *Goals* *Goals* *Goals*   Initial Exercise Goals  Exercise Goals  Exercise Goals   Exercise Goals  Exercise Goals   Rakesh SOTOMAYOR plans to:  [x] Attend exercise sessions 3x/wk  [x] initiate home exercise 2-3x/wk for 10-20 min  [x] Increase 6 min walk distance by 10%  [x] Attend Exercise workshops  Rakesh SOTOMAYOR plans to:  [x] Attend exercise sessions 3x/wk  [x] continue home exercise 2-3x/wk for 20-30 min  [x] Attend Exercise workshops [place] plans to:  [x] Attend exercise sessions 3x/wk  [x] continue home exercise 3-4x/wk for 30-45 min  [x] Determine plan of exercise following rehab  [x] Attend Exercise workshops Saran SOTOMAYOR's plans to:  [x] Attend exercise sessions 2x/wk  [x] home exercise 3-4x/wk for 30-45 min Brenton SOTOMAYOR achieved exercise goals?     []  Yes    [] No  If no, why?  **  [] Increased 6 min walk distance by 10%  [] Currently exercising 30-60 min/day, 5-7days/wk   [] Plans to continue exercise on own  [] Plans to join a local fitness center to continue exercise  [] Does not plan to continue to exercise after rehab   Return to ADL or Hobbies:  Brenton SOTOMAYOR would like to improve strength and endurance so he is able to return to everyday activities getting easier  Return to ADL or Hobbies:  Brenton SOTOMAYOR would like to improve strength and endurance so he is able to return to everyday activities getting easier Return to ADL or Hobbies:  Brenton SOTOMAYOR would like to improve strength and endurance so he  is able to return to all previous activities Return to ADL or Hobbies:  Brenton SOTOMAYOR would like to improve strength and endurance so he is able to return to all previous activities Return to ADL or Hobbies:  Brenton SOTOMAYOR would like to improve strength and endurance so he is able to return to all previous activites   *MET level required for above goal: 3.0 METs  MET level Achieved: 2.6*METs MET level Achieved:  2.6 METs MET level Achieved: 2.6 METs MET level Achieved:  2.6 METs     Individual Cardiac Treatment Plan - Nutrition  NUTRITION  ASSESSMENT/PLAN  NUTRITION  REASSESSMENT NUTRITION   REASSESSMENT NUTRITION   REASSESSMENT NUTRITION  DISCHARGE/FOLLOW-UP   Stages of Change  Stages of Change Stages of Change Stages of Change Stages of Change   [x] Pre Contemplation  [] Contemplation  [] Preparation  [] Action  [] Maintenance  [] Relapse  [] Pre Contemplation  [x] Contemplation  [] Preparation  [] Action  [] Maintenance  [] Relapse [] Pre Contemplation  [x] Contemplation  [] Nutritional Education Recommended    [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 Patient switched to [employer], declined remaining education   Cooking School  Recommended     [x] Adding Flavor  [x] Fast & Healthy     Breakfasts  [x] Salads & Dressings  [x] Soups & Simple     Sauces  [x] Simple Sides  [x] Appetizers &     Snacks  [x] Delicious Desserts  [x] Plant Proteins  [x] Fast Evening Meals  [x] Weekend Breakfasts  [x] Cook once, Eat       twice  [x] [employer]  Cooking School  Sessions Completed   [address]  Sessions Completed    na Cooking School  Sessions Completed     Cooking School    Patient switched to [employer], declined remaining education   *Goals*  *Goals* *Goals* *Goals* *Goals*   Brenton SOTOMAYOR's nutritional goals are as follows:  Complete and return diet survey  Brenton SOTOMAYOR's nutritional goals are as follows:  [] Attend Nutrition Workshops  [] Attend 1:1   [] Attend Cooking Classes   Brenton SOTOMAYOR's nutritional goals are as follows:  [] Attend Nutrition Workshops  [] Attend 1:1   [] Attend Cooking Classes  [x] Complete and return diet survey  [] ** Brenton SOTOMAYOR's nutritional goals are as follows:  [] Attend Nutrition Workshops  [] Attend 1:1   [] Attend Cooking Classes  [x] Complete and return diet survey Brenton SOTOMAYOR achieved nutritional goals   [] Yes    [] No  If no, why?   Use knowledge gained to continue Pritikin eating plan at home       Individual Cardiac Treatment Plan - Psychosocial  PSYCHOSOCIAL  ASSESSMENT/PLAN  PSYCHOSOCIAL  REASSESSMENT PSYCHOSOCIAL   REASSESSMENT PSYCHOSOCIAL   REASSESSMENT PSYCHOSOCIAL  DISCHARGE/FOLLOW-UP   Stages of Change  Stages of Change Stages of Change Stages of Change Stages of Change   [] Pre Contemplation  [x] Contemplation  [] Preparation  [] Action  [] Maintenance  [] Relapse  [] Pre Contemplation  [x] Contemplation  [] Preparation  [] Action  [] Maintenance  [] Relapse [] Pre Contemplation  [x] Contemplation  [] Preparation  [] Action  [] Maintenance  [] Relapse [] Pre Contemplation  [] Contemplation  [x] Preparation  [] Action  [] Maintenance  [] Relapse [] Pre Contemplation  [] Contemplation  [x] Preparation  [] Action  [] Maintenance  [] Relapse   PSYCHOSOCIAL ASSESSMENT  PSYCHOSOCIAL ASSESSMENT PSYCHOSOCIAL ASSESSMENT PSYCHOSOCIAL ASSESSMENT PSYCHOSOCIAL ASSESSMENT   Behavioral Outcomes  Behavioral Outcomes Behavioral Outcomes Behavioral Outcomes Behavioral Outcomes   Tool Used:  Ferrans & Parks, Quality of Life Index, Cardiac Version IV  *Given to patient to complete. Tool Used:    [employer] Parks, Quality of Life Index, Cardiac Version IV     QOL Index Score: **  HF:**  S&E:**  P&S: **  Family: **    Pt did not complete surveys. Tool Used:     [employer]ans & Parks, Quality of Life Index, Cardiac Version IV    QOL Index Score: **  HF:**  S&E:**  P&S: **  Family: **   PHQ-9 score 1  Depression Severity  [x]Minimal  []Mild   []Moderate  []Moderately Severe  []Severe     PHQ-9 score **  Depression Severity  []Minimal  []Mild   []Moderate  []Moderately Severe []Severe   Does patient have Family Support? [x] Yes      [] No  No signs of marital/family distress        Within the Past Month:  *Have you wished you were dead or wished you could go to sleep and not wake up? [] Yes      [x] No  *Have you had any thoughts of killing yourself?   [] Yes      [x] No          Using a scale of 0-10, 0=none, 10=very:   Rate your depression: 0  Rate your anxiety:  0   Using a scale of 0-10, 0=none, 10=very:   Rate your depression: 0  Rate your anxiety:  0 Using a scale of 0-10, 0=none, 10=very:   Rate your depression: **  Rate your anxiety:  ** Using a scale of 0-10, 0=none, 10=very:   Rate your depression: **  Rate your anxiety:  ** Using a scale of 0-10, 0=none, 10=very:   Rate your depression: **  Rate your anxiety:  **   Signs and Symptoms of Depression Present? [] Yes      [x] No    Signs and Symptoms of Depression Present? [] Yes      [x] No   Signs and Symptoms of Depression Present? [] Yes      [x] No  If yes, please explain:  ** Signs and Symptoms of Depression Present? [] Yes      [x] No  If yes, please explain:  ** Signs and Symptoms of Depression Present? [] Yes      [x] No     Signs and Symptoms of Anxiety Present? [] Yes      [x] No    Signs and Symptoms of Anxiety Present? [] Yes      [x] No   Signs and Symptoms of Anxiety Present? [] Yes      [x] No  If yes, please explain:  ** Signs and Symptoms of Anxiety Present? [] Yes      [x] No  If yes, please explain:  ** Signs and Symptoms of Anxiety Present? [] Yes      [x] No     [] Patient has poor eye contact   [] Flat affect present. [] Signs of anxiety, anger or hostility    [] Signs social isolation present. [x]  Signs of alcohol or substance abuse        PSYCHOSOCIAL PLAN  PSYCHOSOCIAL PLAN PSYCHOSOCIAL PLAN PSYCHOSOCIAL PLAN PSYCHOSOCIAL PLAN   *Interventions*  *Interventions* *Interventions* *Interventions* *Interventions*   *Please check if needed  [] Psych Consult  [] Physician Referral  [x] Stress Management Skills  *Please check if needed  [] Psych Consult  [] Physician Referral  [x] Stress Management Skills *Please check if needed  [] Psych Consult  [] Physician Referral  [x] Stress Management Skills *Please check if needed  [] Psych Consult  [] Physician Referral  [] Stress Management Skills *Please check if needed  [] Psych Consult  [] Physician Referral  [] Stress Management Skills   Is patient currently taking anti-depressant or anti-anxiety medications? [] Yes      [x] No    Change in anti-depressant or anti-anxiety medications? [] Yes      [x] No   Change in anti-depressant or anti-anxiety medications?   [] Yes      [x] No  If yes, please list medications:  ** Change in anti-depressant or anti-anxiety medications? [] Yes      [x] No  If yes, please list medications:  ** Change in anti-depressant or anti-anxiety medications? [] Yes      [x] No     *Education*  *Education* *Education* *Education* *Education*   Healthy Mind-Set Workshops Recommended  [x] Stress & Health  [x] Taking Charge of Stress  [x] New Thoughts, New Behaviors  [x] Managing Moods & Relationships  Healthy Mind-Set Workshops Attended/Date Healthy Mind-Set Workshops Attended/Date  na Healthy Mind-Set Workshops Attended/Date Healthy Mind-Set Workshops  Completed    Patient switched to [email or web address], declined remaining education   *Goals*  *Goals* *Goals* *Goals* *Goals*   Brenton SOTOMAYOR's psychosocial goals are as follows:  Complete HADS & Ki Parks, Quality of Life Index, Cardiac Version IV  Brenton SOTOMAYOR's psychosocial goals are as follows:  [] Attend Healthy Mind-Set Workshops  [] Reduce depression symptom severity by 1 level   Brenton SOTOMAYOR's psychosocial goals are as follows:  [] Attend Healthy Mind-Set Workshops  [x] Reduce depression symptom severity by 1 level  [] ** Brenton SOTOMAYOR's psychosocial goals are as follows:  [] Attend Healthy Mind-Set Workshops  [x] Reduce depression symptom severity by 1 level  [] ** Brenton SOTOMAYOR achieved psychosocial goals?   [] Yes    [] No  If no, why?  **  [] Use methods learned to continue to reduce depression symptom severity by 1 level  [] **     Individual Cardiac Treatment Plan - Other:  Risk Factor/Education  RISK FACTOR  ASSESSMENT/PLAN  RISK FACTOR  REASSESSMENT  RISK FACTOR  REASSESSMENT RISK FACTOR  REASSESSMENT RISK FACTOR   DISCHARGE/FOLLOW-UP   Stages of Change  Stages of Change Stages of Change Stages of Change Stages of Change   [] Pre Contemplation  [x] Contemplation  [] Preparation  [] Action  [] Maintenance  [] Relapse  [] Pre Contemplation  [x] Contemplation  [] Preparation  [] Action  [] Maintenance  [] Relapse [] Pre Contemplation  [] Contemplation  [] Preparation  [] Action  [] Maintenance  [] Relapse [] Pre Contemplation  [] Contemplation  [x] Preparation  [] Action  [] Maintenance  [] Relapse [] Pre Contemplation  [] Contemplation  [x] Preparation  [] Action  [] Maintenance  [] Relapse   RISK FACTOR/EDUCATION ASSESSMENT  RISK FACTOR/EDUCATION ASSESSMENT RISK FACTOR/EDUCATION ASSESSMENT RISK FACTOR/EDUCATION ASSESSMENT RISK FACTOR /EDUCATION ASSESSMENT   Hypertension  [x] Yes      [] No    Resting BP: 138/68  Peak Ex BP:166/70  Medication: na    Hypertension  Resting BP: 122/52  Peak Ex BP:178/76  Medication Changes:  [] Yes      [x] No Hypertension  Resting BP: 148/74  Peak Ex BP:178/72  Medication Changes:  [] Yes      [x] No Hypertension  Resting BP: 150/74  Peak Ex BP:150/74  Medication Changes:  [] Yes      [x] No Hypertension  Resting BP: 140/82  Peak Ex BP:170/76  Medication Changes:  [] Yes      [x] No   Lipids  HLD/DLD  [x] Yes      [] No  TOTAL CHOL: 137  HDL:  66  LDL:  61  TRI  Medication: lovastatin  Lipids  Medication Changes:  [] Yes      [x] No     Lipids  Medication Changes:  [] Yes      [x] No     Lipids  Medication Changes:  [] Yes      [x] No     Lipids    Not redrawn    Medication Changes:  [] Yes      [x] No   Diabetes  [] Yes      [x] No  FBS: 102             Diabetes  NA   Diabetes  na     Diabetes   Diabetes  NA       Tobacco Use  [] Current  [x] Former  [] Never    Years smoked: 48    Date Quit:     Smokeless Tobacco use:   [] Yes      [x] No    Tobacco Use  Change in smoking status   [] Yes      [x] No     Tobacco Use  Change in smoking status   [] Yes      [x] No       Tobacco Use  Change in smoking status   [] Yes      [x] No     Tobacco Use  Change in smoking status   [] Yes      [x] No               Learning Barriers  Please select one:  [] Speech  [] Literacy  [] Hearing  [] Cognitive  [] Vision  [x] Ready to Learn  Learning Barriers Addressed:   [x] Yes      [] No   Learning Barriers Addressed:   [] Yes      [] No  na Learning Barriers Addressed:  [] Yes      [] No Learning Barriers Addressed:  [x] Yes      [] No     RISK FACTOR/EDUCATION PLAN  RISK FACTOR/EDUCATION PLAN RISK FACTOR/EDUCATION PLAN RISK FACTOR/EDUCATION PLAN RISK FACTOR/EDUCATION PLAN   *Interventions*  *Interventions* *Interventions* *Interventions* *Interventions*   Recommended Educational Videos    [x] Overview of The Pritikin Eating Plan  [x] Becoming A Pritikin   [x] Diseases of Our Time-Part 1  [x] Calorie Density  [x] Label Reading-Part 1  [x] Move It! [x] Healthy Minds, Bodies, Hearts  [x] Dining Out-Part 1  [x] Heart Disease Risk Reduction  [x] Metabolic Syndrome & Belly Fat  [x] Facts on Fat  [x] Diseases of Our Times-Part 2  [x] Biology of Weight Control  [x] Biomechanical Limitations  [x] Nurtition Action Plan    Completed Videos/Date      10/8/2020  Overview of The Pritikin Eating Plan    10/13/2020  Diseases 1 Completed Videos/Date  na Completed Videos/Date Recommended Educational Videos Completed    Patient switched to TCR, declined remaining education           Smoking Cessation/Relaspe Prevention Intervention needed? [] Yes      [x] No  *Pt verbalizes and agrees to attend intervention  Smoking Cessation/Relapse Prevention Scheduled? [] Yes      [x] No  NOT NEEDED     Smoking Cessation Counseling attended  NA   Professional Referrals:  *Please check if needed  [] Diabetes Clinic  [] Lipid Clinic   [] Other:      Professional Referrals:  *Please check if needed  [] Diabetes Clinic  [] Lipid Clinic   [] Other:   Preventative Medication  Preventative Medication Preventative Medication Preventative Medication Preventative Medication   Aspirin  [x] Yes    [] No  Blood Thinner: Clopidogrel/Effient/Brillinta  [] Yes    [] No  Beta Blocker  [x] Yes    [] No  Ace Inhibitor  [] Yes    [x] No  Statin/Lipid Lowering  [x] Yes    [] No  Medication Changes? [] Yes    [x] No Medication Changes? [] Yes    [x] No Medication Changes?   [] Yes    [x] No factor/education goals are as follows:    [x] Optimal BP <140/90  [] Blood Sugar <120  [x] Attend recommended video education sessions  [x] Takes medications as prescribed 100% of the time   [] ** Brenton SOTOMAYOR achieved risk factor goals?   [] Yes    [] No  If no, why?  **     Monitored telemetry has revealed NSR w/ PVCs   Monitored telemetry has revealed **  [] documented arrhythmia at increasing workloads  [] associated symptoms ** Monitored telemetry has revealed NSR w/ PVCs   Monitored telemetry has revealed:NSR   Monitored telemetry has revealed NSR  [] documented arrhythmia at increasing workloads  [] associated symptoms ** Monitored telemetry has revealed NSR     Physician Response    [x] Cardiac rehab is reasonably and medically necessary for continuous cardiac monitoring surveillance  of patient's cardiac activity  [x] Initiate continuous telemerty monitoring and notify me with any concerns  [] Other   Physician Response    [x] Cardiac rehab is reasonably and medically necessary for continuous cardiac monitoring surveillance  of patient's cardiac activity  [x] Continue continuous telemerty monitoring and notify me with any concerns  [] Other     Physician Response    [x] Cardiac rehab is reasonably and medically necessary for continuous cardiac monitoring surveillance  of patient's cardiac activity  [x] Continue continuous telemerty monitoring and notify me with any concerns   [] Other     Physician Response    [x] Cardiac rehab is reasonably and medically necessary for continuous cardiac monitoring surveillance  of patient's cardiac activity  [x] Continue continuous telemerty monitoring and notify me with any concerns   [] Other           Cosigned by:  Taryn King MD at 10/9/2020 10:24 AM    Revision History     Date/Time  User  Provider Type  Action    10/9/2020 10:24 AM  Taryn King MD  Physician  Cosign    10/8/2020 12:12 PM  Korin Perkins        Cosigned by:  Taryn King MD at

## 2021-01-26 NOTE — ED PROVIDER NOTES
[address]  Urgent Care Encounter       CHIEF COMPLAINT       Chief Complaint   Patient presents with    Foreign Body in Nose     possible nasal packing stuck in posterior nasal passsage       Nurses Notes reviewed and I agree except as noted in the HPI. HISTORY OF PRESENT ILLNESS   Aisha Roper is a 68 y.o. male who presents for evaluation of concerns of a foreign body in the posterior nose/throat. Patient states that yesterday he developed a fairly significant nosebleed to the left nostril. States that his wife placed over-the-counter nasal packing into his nose. Reports that he remove the packing after the stated time and a second was placed. Patient reports that the bleeding continued and therefore he presented to the emergency department. He states that in the emergency department the packing that he had placed at home could not be visualized and the emergency doctor stated that it likely fell out. Patient states that today he feels as though there is something stuck in the back of his throat and he is concerned that it may be the nasal packing. He states that he has not had any nasal bleeding today. He denies any fever, chills or any other symptoms. The history is provided by the patient. REVIEW OF SYSTEMS     Review of Systems   Constitutional: Negative for chills and fever. HENT: Positive for nosebleeds. Negative for congestion and sore throat. Respiratory: Negative for cough and shortness of breath. Cardiovascular: Negative for chest pain. Gastrointestinal: Negative for nausea and vomiting. Musculoskeletal: Negative for arthralgias and myalgias. Skin: Negative for rash. Allergic/Immunologic: Negative for environmental allergies. Neurological: Negative for headaches. Hematological: Bruises/bleeds easily (anticoagulated).        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 Lowest de-sat 77%.  Osteoarthritis     Osteopenia     Osteopenia     PAD (peripheral artery disease) ([facility] [address]     Polio 1946    Rheumatic fever 1946       SURGICALHISTORY     Patient  has a past surgical history that includes Tonsillectomy and adenoidectomy; Facial cosmetic surgery; Balloon angioplasty, artery (07/2018); Hand surgery (Right); Aortic valve repair (09/2020); Percutaneous Transluminal Coronary Angio (06/2020); and other surgical history (01/05/2021). CURRENT MEDICATIONS       Discharge Medication List as of 1/26/2021  5:31 PM      CONTINUE these medications which have NOT CHANGED    Details   spironolactone (ALDACTONE) 25 MG tablet Take 0.5 tablets by mouth daily, Disp-30 tablet,R-3Normal      warfarin (COUMADIN) 5 MG tablet Take 1 tablet by mouth daily, Disp-30 tablet,R-3Normal      umeclidinium-vilanterol (ANORO ELLIPTA) 62.5-25 MCG/INH AEPB inhaler Inhale 1 puff into the lungs daily, Disp-90 puff,R-3Normal      lovastatin (MEVACOR) 20 MG tablet Take 1 tablet by mouth nightly, Disp-90 tablet,R-1Normal      OXYGEN Please provide patient with 3LPM of oxygen at night time for nocturnal hypoxia.  Patient to have repeat pulse oximetry testing done on oxygen, Disp-3 L,R-0Print      metoprolol succinate (TOPROL XL) 25 MG extended release tablet Take 1 tablet by mouth daily, Disp-30 tablet,R-0Normal      clopidogrel (PLAVIX) 75 MG tablet Take 1 tablet by mouth daily, Disp-30 tablet,R-0Normal Left Nostril: Occlusion present. Comments: Left nasal passage is relatively occluded related to bloody discharge. No bleeding is actively noted at this time. Mouth/Throat:        Comments: Stringy, gelatinous object noted to the left posterior oropharynx  Eyes:      Conjunctiva/sclera:      Right eye: Right conjunctiva is not injected. Left eye: Left conjunctiva is not injected. Pupils: Pupils are equal.   Neck:      Musculoskeletal: Normal range of motion. Cardiovascular:      Rate and Rhythm: Normal rate and regular rhythm. Heart sounds: No murmur. Pulmonary:      Effort: Pulmonary effort is normal. No respiratory distress. Breath sounds: Normal breath sounds. Musculoskeletal:      Right knee: He exhibits normal range of motion. Left knee: He exhibits normal range of motion. Skin:     General: Skin is warm. Findings: No rash. Neurological:      Mental Status: He is alert and oriented to person, place, and time. Psychiatric:         Behavior: Behavior normal.         DIAGNOSTIC RESULTS     Labs:No results found for this visit on 01/26/21. IMAGING:    No orders to display         EKG: none      URGENT CARE COURSE:     Vitals:    01/26/21 1711   BP: (!) 175/84   Pulse: 78   Resp: 20   Temp: 97.9 °F (36.6 °C)   TempSrc: Temporal   SpO2: 93%   Weight: 160 lb (72.6 kg)   Height: 5' 8\" (1.727 m)       Medications - No data to display         PROCEDURES:  None    FINAL IMPRESSION      1. Foreign body in nasopharynx, initial encounter    2. H/O epistaxis          DISPOSITION/ PLAN     An alligator forceps was used to remove object posterior oropharynx that is consistent with a blood clot. No bleeding was noted after removal and no definitive packing foreign object could be identified at this time. Patient has an appointment scheduled with his PCP for tomorrow and is advised to keep this appointment. He is agreeable to plan as discussed.       PATIENT REFERRED TO: Cesilia Clifford MD  [address] / [place] [id number]      DISCHARGE MEDICATIONS:  Discharge Medication List as of 1/26/2021  5:31 PM          Discharge Medication List as of 1/26/2021  5:31 PM          Discharge Medication List as of 1/26/2021  5:31 PM          LEWIS Smith - CNP    (Please note that portions of this note were completed with a voice recognition program. Efforts were made to edit the dictations but occasionally words are mis-transcribed.)          LEWIS Smith CNP  01/26/21 5436

## 2021-01-27 ENCOUNTER — OFFICE VISIT (OUTPATIENT)
Dept: FAMILY MEDICINE CLINIC | Age: 81
End: 2021-01-27
Payer: MEDICARE

## 2021-01-27 VITALS
HEIGHT: 68 IN | OXYGEN SATURATION: 97 % | BODY MASS INDEX: 24.16 KG/M2 | TEMPERATURE: 96.8 F | HEART RATE: 68 BPM | DIASTOLIC BLOOD PRESSURE: 78 MMHG | SYSTOLIC BLOOD PRESSURE: 130 MMHG | WEIGHT: 159.4 LBS

## 2021-01-27 DIAGNOSIS — T17.1XXA FOREIGN BODY IN NOSE, INITIAL ENCOUNTER: ICD-10-CM

## 2021-01-27 DIAGNOSIS — Z79.01 ANTICOAGULATED: ICD-10-CM

## 2021-01-27 DIAGNOSIS — R04.0 EPISTAXIS: Primary | ICD-10-CM

## 2021-01-27 PROCEDURE — 1036F TOBACCO NON-USER: CPT | Performed by: FAMILY MEDICINE

## 2021-01-27 PROCEDURE — 1123F ACP DISCUSS/DSCN MKR DOCD: CPT | Performed by: FAMILY MEDICINE

## 2021-01-27 PROCEDURE — 99213 OFFICE O/P EST LOW 20 MIN: CPT | Performed by: FAMILY MEDICINE

## 2021-01-27 PROCEDURE — G8420 CALC BMI NORM PARAMETERS: HCPCS | Performed by: FAMILY MEDICINE

## 2021-01-27 PROCEDURE — G8427 DOCREV CUR MEDS BY ELIG CLIN: HCPCS | Performed by: FAMILY MEDICINE

## 2021-01-27 PROCEDURE — 4040F PNEUMOC VAC/ADMIN/RCVD: CPT | Performed by: FAMILY MEDICINE

## 2021-01-27 PROCEDURE — G8484 FLU IMMUNIZE NO ADMIN: HCPCS | Performed by: FAMILY MEDICINE

## 2021-01-27 ASSESSMENT — ENCOUNTER SYMPTOMS: SORE THROAT: 0

## 2021-01-27 ASSESSMENT — PATIENT HEALTH QUESTIONNAIRE - PHQ9
SUM OF ALL RESPONSES TO PHQ QUESTIONS 1-9: 0
SUM OF ALL RESPONSES TO PHQ QUESTIONS 1-9: 0

## 2021-01-27 NOTE — PROGRESS NOTES
[facility]  [address] [facility]  Dept: [phone]  Dept Fax: [phone]  Loc: [phone]  PROGRESS NOTE      Visit Date: 1/27/2021    Jamel Mei is a 68 y.o. male who presents today for:  Chief Complaint   Patient presents with   Alanis Ribeiro ED Follow-up     [facility]    Epistaxis     x 2 days       Subjective:  HPI    Epistaxis:  Seem om ER pm 1/25 and urgent care on 1/26/21. They had packed it themselves a few days ago. The bleeding had stopped in ER 2 days ago. Yesterday, in urgent care they removed a blood clot from his throat. He feels like something is in his nose yet. He is still taking plavix and coumadin. INR 2.77  About 2 days ago. Wife is present    Review of Systems   Constitutional: Negative for chills and fever. HENT: Negative for sore throat.       Patient Active Problem List   Diagnosis    Elevated glucose    Osteoarthritis    ASCVD (arteriosclerotic cardiovascular disease)    COPD, moderate ([address]    Aortic valve disorder    Abnormal ankle brachial index (VONDA)    Hypertension secondary to other renal disorders (CODE)    Coronary artery disease due to lipid rich plaque    Intermittent claudication (HCC)    S/P coronary angioplasty    S/P cardiac cath    S/P percutaneous transluminal angioplasty (PTA) with stent placement    Severe aortic stenosis    Essential hypertension    Hyperlipidemia LDL goal <70    S/P TAVR (transcatheter aortic valve replacement)    Nocturnal hypoxia    Chronic respiratory failure with hypoxia (HCC)    Hypersomnia    Other emphysema (HCC)    Afib (HCC)    Presence of Watchman left atrial appendage closure device     Past Medical History:   Diagnosis Date    ASCVD (arteriosclerotic cardiovascular disease)     Atrial fibrillation ([address]     CAD (coronary artery disease)     Cerebral artery occlusion with cerebral infarction ([address]     TIA    COPD (chronic obstructive pulmonary disease) (HCC)     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 Lowest de-sat 77%.  Osteoarthritis     Osteopenia     Osteopenia     [facility]peripheral artery disease) ([facility] [address]     Polio 1946    Rheumatic fever       Past Surgical History:   Procedure Laterality Date    AORTIC VALVE REPAIR  2020    TAVR    BALLOON ANGIOPLASTY, ARTERY  2018    s/p Left CFA, SFA and popliteal intervention    FACIAL COSMETIC SURGERY      as a kid    HAND SURGERY Right     carpal tunnel     OTHER SURGICAL HISTORY  2021    Left Atrial Appenadage closure/watchman    PTCA  2020    stent LAD    TONSILLECTOMY AND ADENOIDECTOMY       Family History   Problem Relation Age of Onset    Heart Disease Father     Arthritis Maternal Grandmother      Social History     Tobacco Use    Smoking status: Former Smoker     Packs/day: 1.50     Years: 50.00     Pack years: 75.00     Types: Cigarettes     Quit date: 10/9/2004     Years since quittin.3    Smokeless tobacco: Never Used   Substance Use Topics    Alcohol use: Yes     Alcohol/week: 30.0 standard drinks     Types: 30 Cans of beer per week     Comment: 6 beers per day       Current Outpatient Medications   Medication Sig Dispense Refill    spironolactone (ALDACTONE) 25 MG tablet Take 0.5 tablets by mouth daily 30 tablet 3    warfarin (COUMADIN) 5 MG tablet Take 1 tablet by mouth daily 30 tablet 3    umeclidinium-vilanterol (ANORO ELLIPTA) 62.5-25 MCG/INH AEPB inhaler Inhale 1 puff into the lungs daily 90 puff 3    lovastatin (MEVACOR) 20 MG tablet Take 1 tablet by mouth nightly (Patient taking differently: Take 20 mg by mouth daily ) 90 tablet 1    OXYGEN Please provide patient with 3LPM of oxygen at night time for nocturnal hypoxia. Patient to have repeat pulse oximetry testing done on oxygen 3 L 0    metoprolol succinate (TOPROL XL) 25 MG extended release tablet Take 1 tablet by mouth daily 30 tablet 0    clopidogrel (PLAVIX) 75 MG tablet Take 1 tablet by mouth daily 30 tablet 0    nitroGLYCERIN (NITROSTAT) 0.4 MG SL tablet up to max of 3 total doses. If no relief after 1 dose, call 911. 25 tablet 1     No current facility-administered medications for this visit. Allergies   Allergen Reactions    Brilinta [Ticagrelor] Shortness Of Breath    Keflex [Cephalexin] Rash     Health Maintenance   Topic Date Due    Hepatitis C screen  1940    DTaP/Tdap/Td vaccine (1 - Tdap) 04/15/1961    Annual Wellness Visit (AWV)  05/29/2019    COVID-19 Vaccine (1 of 2) 01/29/2021 (Originally 4/15/1956)    Lipid screen  06/16/2021    Potassium monitoring  01/06/2022    Creatinine monitoring  01/06/2022    Flu vaccine  Completed    Shingles Vaccine  Completed    Pneumococcal 65+ years Vaccine  Completed    Hepatitis A vaccine  Aged Out    Hepatitis B vaccine  Aged Out    Hib vaccine  Aged Out    Meningococcal (ACWY) vaccine  Aged Out       Objective:  /78 (Site: Left Upper Arm, Position: Sitting)   Pulse 68   Temp 96.8 °F (36 °C)   Ht 5' 8\" (1.727 m)   Wt 159 lb 6.4 oz (72.3 kg)   SpO2 97%   BMI 24.24 kg/m²   Physical Exam  Vitals signs reviewed. Constitutional:       General: He is not in acute distress. Appearance: He is not ill-appearing. HENT:      Nose:      Right Nostril: No foreign body, epistaxis or occlusion. Left Nostril: Foreign body present. No occlusion. Comments: Left nare with dried blood present and mucus. Foreign body identified in anterior nare which was removed with forceps easily. No bleeding post procedure. Mouth/Throat:      Mouth: Mucous membranes are moist.      Pharynx: No oropharyngeal exudate or posterior oropharyngeal erythema.    Neurological:      Mental Status: He is alert. Impression/Plan:  1. Epistaxis  Acute uncomplicated injury. epistaxis of left nare: not currently bleeding. Foreign body was removed today from left nare. Continue Plavix and Coumadin. Recommend saline nasal spray. 2. Foreign body in nose, initial encounter    3. Anticoagulated    Reviewed ER and urgent care notes from last 2 days    They voiced understanding. All questions answered. They agreed with treatment plan. See patient instructions for any educational materials that may have been given. Discussed use, benefit, and side effects of prescribed medications. Reviewed health maintenance. (Please note that portions of this note may have been completed with a voice recognition program.  Efforts were made to edit the dictation but occasionally words are mis-transcribed.)    Return if symptoms worsen or fail to improve.       Electronically signed by Kiera Lake MD on 1/27/2021 at 9:28 AM

## 2021-01-28 ENCOUNTER — APPOINTMENT (OUTPATIENT)
Dept: CARDIAC REHAB | Age: 81
End: 2021-01-28
Payer: MEDICARE

## 2021-01-28 ENCOUNTER — HOSPITAL ENCOUNTER (OUTPATIENT)
Dept: CARDIAC REHAB | Age: 81
Setting detail: THERAPIES SERIES
End: 2021-01-28
Payer: MEDICARE

## 2021-02-02 ENCOUNTER — HOSPITAL ENCOUNTER (OUTPATIENT)
Dept: CARDIAC REHAB | Age: 81
Setting detail: THERAPIES SERIES
End: 2021-02-02
Payer: MEDICARE

## 2021-02-02 ENCOUNTER — HOSPITAL ENCOUNTER (OUTPATIENT)
Dept: PHARMACY | Age: 81
Setting detail: THERAPIES SERIES
Discharge: HOME | End: 2021-02-02
Payer: MEDICARE

## 2021-02-02 ENCOUNTER — APPOINTMENT (OUTPATIENT)
Dept: CARDIAC REHAB | Age: 81
End: 2021-02-02
Payer: MEDICARE

## 2021-02-02 ENCOUNTER — HOSPITAL ENCOUNTER (OUTPATIENT)
Dept: CARDIAC REHAB | Age: 81
Setting detail: THERAPIES SERIES
Discharge: HOME | End: 2021-02-02
Payer: MEDICARE

## 2021-02-02 VITALS — TEMPERATURE: 98.3 F

## 2021-02-02 DIAGNOSIS — I48.0 PAROXYSMAL ATRIAL FIBRILLATION (HCC): ICD-10-CM

## 2021-02-02 LAB — POC INR: 1.9 (ref 0.8–1.2)

## 2021-02-02 PROCEDURE — 85610 PROTHROMBIN TIME: CPT

## 2021-02-02 PROCEDURE — 99211 OFF/OP EST MAY X REQ PHY/QHP: CPT

## 2021-02-02 PROCEDURE — 36416 COLLJ CAPILLARY BLOOD SPEC: CPT

## 2021-02-02 PROCEDURE — 93798 PHYS/QHP OP CAR RHAB W/ECG: CPT

## 2021-02-02 NOTE — PROGRESS NOTES
Medication Management [address]   [phone] (phone)  [phone] (fax)      Mr. Ema Hendrickson is a 68 y.o.  male with history of Afib who presents today for anticoagulation monitoring and adjustment. Patient verifies current dosing regimen and tablet strength. No missed or extra doses. Patient denies s/s bleeding/bruising/swelling/SOB/chest pain - patient was seen in the ED on 1/25. Per patient his nose was bleeding for 1.5 hours. INR therapeutic. No blood in urine or stool. No dietary changes. No changes in medication/OTC agents/Herbals. No change in alcohol use or tobacco use. No change in activity level. Patient denies headaches/dizziness/lightheadedness/falls. No vomiting/diarrhea or acute illness. No Procedures scheduled in the future at this time.-watchman patient, PEGGY scheduled for 2/19    Assessment:   Lab Results   Component Value Date    INR 1.90 (H) 02/02/2021    INR 2.77 (H) 01/25/2021    INR 2.30 (H) 01/19/2021     INR subtherapeutic   Recent Labs     02/02/21  0842   INR 1.90*         Plan:  Coumadin 7.5 mg x 1 then continue Coumadin 2.5 mg MF, 5 mg TWThSS. Recheck INR in 2 week(s). Patient reminded to call the Anticoagulation Clinic with any signs or symptoms of bleeding or with any medication changes. Patient given instructions utilizing the teach back method. Discharged ambulatory in no apparent distress. After visit summary printed and reviewed with patient.       Medications reviewed and updated on home medication list Yes    Influenza vaccine:     [] given    [] declined   [] received previously   [] plans to receive at a later time   [] refused    [x] documented in [address]:  [facility] [address]: Yes  Total # of Interventions Recommended: 2  - Increased Dose #: 1  - Maintenance Safety Lab Monitoring #: 1 Total Interventions Accepted: 2  Time Spent (min): [address], PharmD, BCPS  2/2/2021  9:02 AM

## 2021-02-03 ENCOUNTER — TELEPHONE (OUTPATIENT)
Dept: CARDIOLOGY CLINIC | Age: 81
End: 2021-02-03

## 2021-02-03 RX ORDER — CLOPIDOGREL BISULFATE 75 MG/1
75 TABLET ORAL DAILY
Qty: 90 TABLET | Refills: 3 | Status: SHIPPED | OUTPATIENT
Start: 2021-02-03 | End: 2021-10-07 | Stop reason: SINTOL

## 2021-02-04 ENCOUNTER — HOSPITAL ENCOUNTER (OUTPATIENT)
Dept: CARDIAC REHAB | Age: 81
Setting detail: THERAPIES SERIES
Discharge: HOME | End: 2021-02-04
Payer: MEDICARE

## 2021-02-04 ENCOUNTER — APPOINTMENT (OUTPATIENT)
Dept: CARDIAC REHAB | Age: 81
End: 2021-02-04
Payer: MEDICARE

## 2021-02-04 PROCEDURE — 93798 PHYS/QHP OP CAR RHAB W/ECG: CPT

## 2021-02-09 ENCOUNTER — APPOINTMENT (OUTPATIENT)
Dept: CARDIAC REHAB | Age: 81
End: 2021-02-09
Payer: MEDICARE

## 2021-02-09 ENCOUNTER — HOSPITAL ENCOUNTER (OUTPATIENT)
Dept: CARDIAC REHAB | Age: 81
Setting detail: THERAPIES SERIES
Discharge: HOME | End: 2021-02-09
Payer: MEDICARE

## 2021-02-09 PROCEDURE — 93798 PHYS/QHP OP CAR RHAB W/ECG: CPT

## 2021-02-09 NOTE — PROGRESS NOTES
Hospital Facility-Based Program  Phase 2 Cardiac Rehab Weekly Progress Report      Patient prescribed exercise:  9:00 class. 2 times per week in rehab, 1-4 times per week at home for the amount of sessions/weeks specified by insurance. Current Levels: Treadmill: 2. 1mph/0% for 15 minutes, NuStep:  42 Feng for 15 minutes, UBE: 20 feng for 5 minutes. Progression Discussion: Maintain/Increase Aerobic exercise 15 minutes to work on endurance. Attempt to increase intensity by 5-20% for each modality this week. Try to increase intensities until Brenton SOTOMAYOR rates the exercises a 13-17 on Darcy RPE.

## 2021-02-11 ENCOUNTER — HOSPITAL ENCOUNTER (OUTPATIENT)
Dept: CARDIAC REHAB | Age: 81
Setting detail: THERAPIES SERIES
Discharge: HOME | End: 2021-02-11
Payer: MEDICARE

## 2021-02-11 ENCOUNTER — APPOINTMENT (OUTPATIENT)
Dept: CARDIAC REHAB | Age: 81
End: 2021-02-11
Payer: MEDICARE

## 2021-02-11 VITALS — HEIGHT: 69 IN | WEIGHT: 161 LBS | BODY MASS INDEX: 23.85 KG/M2

## 2021-02-11 PROCEDURE — 93798 PHYS/QHP OP CAR RHAB W/ECG: CPT

## 2021-02-11 NOTE — PLAN OF CARE
[address] [place] [address] Facility-Based Program  Individualized Cardiac Treatment Plan    Patient Name:  Oracio Maharaj  :  1940  Age:  68 y.o. MRN:  [id number]  Diagnosis: TAVR  Date of Event: 2020   Physician:  Dr. Sergei Welsh  Next Office Visit:  Not scheduled Marybel Desiree 10/19/20  Date Entered Program: 10/8/2020  Risk Stratifications: [] Low [] Intermediate [x] High  Allergies: Allergies   Allergen Reactions    Brilinta [Ticagrelor] Shortness Of Breath    Keflex [Cephalexin] Rash       COVID -19 Screen  Do you have any of the following symptoms:  [] Fever [] Cough [] SOB [] Muscle/Body Ache [] Loss of taste/smell [x] None    Have you traveled outside of the [place]? [] Yes      [x] No    Have you been around anyone who has tested Positive for COVID 19?  [] Yes      [x] No    The following Education has been completed with patient. [x] Wait in the lobby until we call you back to rehab. [x] You must wear a mask while in the medical center, and in cardiac rehab. Please bring your own. [x] Bring your own bottle of water with you. [x] You can bring a visitor with you, however, they will need to sit in the waiting room while you are in cardiac rehab. Individual Cardiac Treatment Plan EXERCISE  INITIAL  30 DAY 60 DAY 90 DAY FINAL DAY   EXERCISE  ASSESSMENT/PLAN  EXERCISE  REASSESSMENT EXERCISE   REASSESSMENT EXERCISE   REASSESSMENT EXERCISE  DISCHARGE/FOLLOW-UP   Date: 10/8/2020  Date: 2020 Date: 12/3/20 Date: 2020 Date: 2021   Session #1  Session # 8 Session # 12  Pt did not attend this session. Session # 18  Pt was not here for session today. Session # 19  Last session completed on 21- Patient not in attendance for reassessment. Has only attended one session since last reassessment.     Stages of Change  Stages of Change Stages of Change Stages of Change Stages of Change   [] Pre Contemplation  [] Contemplation [x] Preparation  [] Action  [] Maintenance  [] Relapse Pt discharged today 10/22/20. He does not want to complete the program. He wants to exercise on his own. He does not want to participate in education classes. [] Pre Contemplation  [x] Contemplation  [] Preparation  [] Action  [] Maintenance  [] Relapse [] Pre Contemplation  [] Contemplation  [] Preparation  [] Action  [] Maintenance  [] Relapse [] Pre Contemplation  [] Contemplation  [x] Preparation  [] Action  [] Maintenance  [] Relapse [] Pre Contemplation  [] Contemplation  [x] Preparation  [] Action  [] Maintenance  [] Relapse   EXERCISE ASSESSMENT  EXERCISE ASSESSMENT EXERCISE ASSESSMENT EXERCISE ASSESSMENT EXERCISE ASSESSMENT   6 Min Walk Test  Distance walked:   0.06 miles  316.8 ft.  1.5 METs  Max HR:105 BPM  RPE:  11    THR:    Rhythm:  NSR w/ PVCs     6 Min Walk Test  Distance walked:   0.23 miles  1214.4 ft  2.7 METs  Max HR: 89 BPM     RPE: 13  %Change ft= 92    Rhythm: NSR   DASI: 6.05 METs  DASI: 4.4 METs DASI: ** METs DASI: ** METs DASI: 5.07 METs   Return to Work  [facility] on returning to work? [] Yes              [] No   [] Disabled     [x] Retired    Return to work:  Retired Return to work:  Retired Return to work:  Retired Return to work:  Retired   Orthopedic Limitations/  [x] Yes    [] No  If yes please list:  Ruptured disk in back, hx of PAD      Orthopedic Limitations   Orthopedic Limitations   Orthopedic Limitations  No AD   Orthopedic Limitations    No AD     Fall Risk  Fall risk assessed? [x] Yes      [] No    Balance Issues?   [] Yes      [x] No     [] Walker [] Cane    [x] Safety issues reviewed       Fall Risk   Fall Risk Fall Risk  N/A Fall Risk    NA   Home Exercise  [x] Yes    [] No  Type: walking on TM  Frequency: daily  Duration: 20mins  Home Exercise  [x] Yes    [] No  Type: walking  Frequency:daily  Duration: 30 min Home Exercise  [] Yes    [] No  Type: **  Frequency: **  Duration: ** Home Exercise ICR: 3x/week  Home: 3x/wk Frequency:  ICR: 3x/week  Home: 3-4x/wk Frequency:  ICR: 3x/week  Home: 3-4x/wk Frequency:  Brenton SOTOMAYOR will continue exercise at  5-7 days/week   Duration:   Total aerobic exercise = 30-45 min    5-8 min/mode  Duration:  Total aerobic exercises = 21 min     8min/mode Duration:  Total aerobic exercises =  30 min     12 min/mode Duration:  Total aerobic exercises = 30-45 min     15 min/mode Duration:  Total erobic exercise =  60-90 min   Intensity:   MET Level = 2.2  RPE = 12-15  Intensity:  Max MET Level = 2.6  RPE = 12-15 Intensity:  Max MET Level = 2.6  RPE = 12-15 Intensity:  Max MET Level = 2.6  RPE = 12-15 Intensity:  Max MET Level = 2.6   RPE = 12-15   Progression: increase aerobic activity up to 15 min over next 4 weeks by increasing time 2-3 min/week. Progression:  increase aerobic activity up to 15 min over next 4 weeks by increasing time 2-3 min/week. Progression:increase aerobic activity up to 15 min over next 4 weeks by increasing time 2-3 min/week. Progression:  Work on increasing workloads as tolerated each visit Progression:  Increase time/intensity when RPE <13, and HR is in [employer]   [x] Yes      [] No  Upper and Lower body strength training 2x/wk    Wt: 2#       Reps:  8-15    *Increase wt. after completing 15 reps with an RPE of <12/13.   [] Yes      [x] No  Upper and Lower body strength training 2x/wk    Pt does not come on Fridays to participate in strength training [] Yes      [x] No  Upper and Lower body strength training 2x/wk      Pt does not come on Fridays to participate in strength training [] Yes      [x] No  Upper and Lower body strength training 2x/wk     Continue Strength Training at home   [x] Exercise Log & Strength training handout given     Pt does not come on Fridays to participate in strength training   Flexibility  Flexibility Flexibility Flexibility Flexibility [x] Yes      [] No  25 min session of Core Strength & Flexibility 1x/per week   Attends Core Strength & Flexibility   [] Yes      [x] No Attends Core Strength & Flexibility   [] Yes      [x] No Attends Core Strength & Flexibility   [] Yes      [x] No Continue Core Strength & Flexibility at home   Home Exercise  *Rakesh SOTOMAYOR verbalizes planning to walk daily days/week for 20-30 min on days not in rehab. Home Exercise  *Rakesh SOTOMAYOR verbalizes planning to continue walking  daily for 30 min on days not in rehab. Home Exercise  *Rakesh SOTOMAYOR verbalizes planning to walk 3-4 days/week for 30 min on days not in rehab. Home Exercise  *unsue on rakesh's plan. Home Exercise  *Rakesh SOTOMAYOR verbalizes planning to walk 3-4 days/week for 30 min on days not in rehab.    1/26- unknown   *Education*  *Education* *Education* *Education* *Education*   RPE Scale  [x] Yes      [] No  Exercise Safety  [x] Yes      [] No  Equipment Orientation  [x] Yes      [] No  S/S to Report  [x] Yes      [] No  Warm Up/Cool Down  [x] Yes      [] No  Home Exercise  [x] Yes      [] No     All Exercise Education Completed  [x] Yes      [] No   Exercise Education Recommended    Workshops  [x] Improving Performance  [x] Balance Training & Fall Prevention  [x] Exercise Biomechanics  [x] Resistance Training & Core Strength  Exercise Education Attended/Date     Exercise Education Attended/Date  na Exercise Education Attended/Date   All Sessions Completed?     Patient switched to TCR, declined remaining education   *Goals*  *Goals* *Goals* *Goals* *Goals*   Initial Exercise Goals  Exercise Goals  Exercise Goals   Exercise Goals  Exercise Goals   Rakesh SOTOMAYOR plans to:  [x] Attend exercise sessions 3x/wk  [x] initiate home exercise 2-3x/wk for 10-20 min  [x] Increase 6 min walk distance by 10%  [x] Attend Exercise workshops  Rakesh SOTOMAYOR plans to:  [x] Attend exercise sessions 3x/wk  [x] continue home exercise 2-3x/wk for 20-30 min [x] Attend Exercise workshops Catracho SOTOMAYOR's plans to:  [x] Attend exercise sessions 3x/wk  [x] continue home exercise 3-4x/wk for 30-45 min  [x] Determine plan of exercise following rehab  [x] Attend Exercise workshops Catracho SOTOMAYOR's plans to:  [x] Attend exercise sessions 2x/wk  [x] home exercise 3-4x/wk for 30-45 min Brenton SOTOMAYOR achieved exercise goals?     [x]  Yes    [] No    [x] Increased 6 min walk distance by 10%  [x] Currently exercising 30-60 min/day, 5-7days/wk   [x] Plans to continue exercise on own  [] Plans to join a local fitness center to continue exercise  [] Does not plan to continue to exercise after rehab   Return to ADL or Hobbies:  Brenton SOTOMAYOR would like to improve strength and endurance so he is able to return to everyday activities getting easier  Return to ADL or Hobbies:  Brenton SOTOMAYOR would like to improve strength and endurance so he is able to return to everyday activities getting easier Return to ADL or Hobbies:  Brenton SOTOMAYOR would like to improve strength and endurance so he  is able to return to all previous activities Return to ADL or Hobbies:  Brenton SOTOMAYOR would like to improve strength and endurance so he is able to return to all previous activities Return to ADL or Hobbies:  Brenton SOTOMAYOR would like to improve strength and endurance so he is able to return to all previous activites   *MET level required for above goal: 3.0 METs  MET level Achieved: 2.6*METs MET level Achieved:  2.6 METs MET level Achieved: 2.6 METs MET level Achieved:  2.6 METs     Individual Cardiac Treatment Plan  Nutrition  NUTRITION  ASSESSMENT/PLAN  NUTRITION  REASSESSMENT NUTRITION   REASSESSMENT NUTRITION   REASSESSMENT NUTRITION  DISCHARGE/FOLLOW-UP   Stages of Change  Stages of Change Stages of Change Stages of Change Stages of Change   [x] Pre Contemplation  [] Contemplation  [] Preparation  [] Action  [] Maintenance  [] Relapse  [] Pre Contemplation  [x] Contemplation  [] Preparation  [] Action  [] Maintenance [] Relapse [] Pre Contemplation  [x] Contemplation  [] Preparation  [] Action  [] Maintenance  [] Relapse [] Pre Contemplation  [] Contemplation  [x] Preparation  [] Action  [] Maintenance  [] Relapse [] Pre Contemplation  [] Contemplation  [x] Preparation  [] Action  [] Maintenance  [] Relapse   NUTRITION ASSESSMENT  NUTRITION ASSESSMENT NUTRITION ASSESSMENT NUTRITION ASSESSMENT NUTRITION ASSESSMENT   Weight Management  Weight: 158.0       Height: 69in   BMI: 23.4   Weight Management  Weight: 157                  Weight Management  Weight: 158                Weight Management  Weight: 159 Weight Management  Weight: 161                    BMI: 23.8   Eating Plan  Current eating habits: none  Eating Plan  Changes: \"NONE\" Eating Plan  Changes: Eating Plan  Changes: none Eating Plan Improvements: none   Alcohol Use  [] none          [x] daily  [] weekly      [] special   Type: beer  Amount: 6 cans        Diet Assessment Tool:  RATE YOUR PLATE  *Given to patient to complete and return. Diet Assessment Tool:    Score: **/69    Pt did not complete       Diet Assessment Tool: RATE YOUR PLATE  Score: 14/38   NUTRITION PLAN  NUTRITION PLAN NUTRITION PLAN NUTRITION PLAN NUTRITION PLAN   *Interventions*  *Interventions* *Interventions* *Interventions* *Interventions*   Initial Survey given  Goal Setting Discussion:   [x] Yes      [] No       Follow Up Survey Reviewed & Goals Updated: yes     Professional Referral  Please check if needed. [] Dietitian Consult   [] Wt. Management Referral  [] Other:   Professional Referral  Please check if needed. [] Dietitian Consult   [] Wt. Management Referral  [] Other: Professional Referral  Please check if needed. [] Dietitian Consult   [] Wt. Management Referral  [] Other: Professional Referral  Please check if needed. [] Dietitian Consult   [] Wt. Management Referral  [] Other: Professional Referral  Please check if needed. [] Dietitian Consult   [] Wt.  Management Referral [] Other:   *Education*  *Education* *Education* *Education* *Education*   Nutritional Education Recommended    [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 Patient switched to TCR, declined remaining education   Cooking School  Recommended     [x] Adding Flavor  [x] Fast & Healthy     Breakfasts  [x] Salads & Dressings  [x] Soups & Simple     Sauces  [x] Simple Sides  [x] Appetizers &     Snacks  [x] Delicious Desserts  [x] Plant Proteins  [x] Fast Evening Meals  [x] Weekend Breakfasts  [x] Cook once, Eat       twice  [x] [place] [employer]  Cooking School  Sessions Completed   [address]  Sessions Completed    na Cooking School  Sessions Completed     Cooking School    Patient switched to [employer], declined remaining education   *Goals*  *Goals* *Goals* *Goals* *Goals*   Brenton SOTOMAYOR's nutritional goals are as follows:  Complete and return diet survey  Brenton SOTOMAYOR's nutritional goals are as follows:  [] Attend Nutrition Workshops  [] Attend 1:1   [] Attend Cooking Classes   Brenton SOTOMAYOR's nutritional goals are as follows:  [] Attend Nutrition Workshops  [] Attend 1:1   [] Attend Cooking Classes  [x] Complete and return diet survey  [] ** Brenton SOTOMAYOR's nutritional goals are as follows:  [] Attend Nutrition Workshops  [] Attend 1:1   [] Attend Cooking Classes  [x] Complete and return diet survey Brenton SOTOMAYOR achieved nutritional goals   [x] Yes    [] No  If no, why?   Use knowledge gained to continue Pritikin eating plan at home       Individual Cardiac Treatment Plan  Psychosocial  PSYCHOSOCIAL  ASSESSMENT/PLAN  PSYCHOSOCIAL  REASSESSMENT PSYCHOSOCIAL   REASSESSMENT PSYCHOSOCIAL   REASSESSMENT PSYCHOSOCIAL  DISCHARGE/FOLLOW-UP Rate your anxiety:  ** Using a scale of 0-10, 0=none, 10=very:   Rate your depression: **  Rate your anxiety:  ** Using a scale of 0-10, 0=none, 10=very:   Rate your depression: 0  Rate your anxiety: 0   Signs and Symptoms of Depression Present? [] Yes      [x] No    Signs and Symptoms of Depression Present? [] Yes      [x] No   Signs and Symptoms of Depression Present? [] Yes      [x] No  If yes, please explain:  ** Signs and Symptoms of Depression Present? [] Yes      [x] No  If yes, please explain:  ** Signs and Symptoms of Depression Present? [] Yes      [x] No     Signs and Symptoms of Anxiety Present? [] Yes      [x] No    Signs and Symptoms of Anxiety Present? [] Yes      [x] No   Signs and Symptoms of Anxiety Present? [] Yes      [x] No  If yes, please explain:  ** Signs and Symptoms of Anxiety Present? [] Yes      [x] No  If yes, please explain:  ** Signs and Symptoms of Anxiety Present? [] Yes      [x] No     [] Patient has poor eye contact   [] Flat affect present. [] Signs of anxiety, anger or hostility    [] Signs social isolation present. [x]  Signs of alcohol or substance abuse        PSYCHOSOCIAL PLAN  PSYCHOSOCIAL PLAN PSYCHOSOCIAL PLAN PSYCHOSOCIAL PLAN PSYCHOSOCIAL PLAN   *Interventions*  *Interventions* *Interventions* *Interventions* *Interventions*   *Please check if needed  [] Psych Consult  [] Physician Referral  [x] Stress Management Skills  *Please check if needed  [] Psych Consult  [] Physician Referral  [x] Stress Management Skills *Please check if needed  [] Psych Consult  [] Physician Referral  [x] Stress Management Skills *Please check if needed  [] Psych Consult  [] Physician Referral  [] Stress Management Skills *Please check if needed  [] Psych Consult  [] Physician Referral  [] Stress Management Skills   Is patient currently taking anti-depressant or anti-anxiety medications?   [] Yes      [x] No [] Maintenance  [] Relapse  [] Pre Contemplation  [x] Contemplation  [] Preparation  [] Action  [] Maintenance  [] Relapse [] Pre Contemplation  [] Contemplation  [] Preparation  [] Action  [] Maintenance  [] Relapse [] Pre Contemplation  [] Contemplation  [x] Preparation  [] Action  [] Maintenance  [] Relapse [] Pre Contemplation  [] Contemplation  [x] Preparation  [] Action  [] Maintenance  [] Relapse   RISK FACTOR/EDUCATION ASSESSMENT  RISK FACTOR/EDUCATION ASSESSMENT RISK FACTOR/EDUCATION ASSESSMENT RISK FACTOR/EDUCATION ASSESSMENT RISK FACTOR /EDUCATION ASSESSMENT   Hypertension  [x] Yes      [] No    Resting BP: 138/68  Peak Ex BP:166/70  Medication: na    Hypertension  Resting BP: 122/52  Peak Ex BP:178/76  Medication Changes:  [] Yes      [x] No Hypertension  Resting BP: 148/74  Peak Ex BP:178/72  Medication Changes:  [] Yes      [x] No Hypertension  Resting BP: 150/74  Peak Ex BP:150/74  Medication Changes:  [] Yes      [x] No Hypertension  Resting BP: 140/82  Peak Ex BP:170/76  Medication Changes:  [] Yes      [x] No   Lipids  HLD/DLD  [x] Yes      [] No  TOTAL CHOL: 137  HDL:  66  LDL:  61  TRI  Medication: lovastatin  Lipids  Medication Changes:  [] Yes      [x] No     Lipids  Medication Changes:  [] Yes      [x] No     Lipids  Medication Changes:  [] Yes      [x] No     Lipids    Not redrawn    Medication Changes:  [] Yes      [x] No   Diabetes  [] Yes      [x] No  FBS: 102             Diabetes  NA   Diabetes  na     Diabetes   Diabetes  NA       Tobacco Use  [] Current  [x] Former  [] Never    Years smoked: 48    Date Quit:     Smokeless Tobacco use:   [] Yes      [x] No    Tobacco Use  Change in smoking status   [] Yes      [x] No     Tobacco Use  Change in smoking status   [] Yes      [x] No       Tobacco Use  Change in smoking status   [] Yes      [x] No     Tobacco Use  Change in smoking status   [] Yes      [x] No               Learning Barriers  Please select one:  [] Speech [] Literacy  [] Hearing  [] Cognitive  [] Vision  [x] Ready to Learn  Learning Barriers Addressed:   [x] Yes      [] No   Learning Barriers Addressed:   [] Yes      [] No  na Learning Barriers Addressed:  [] Yes      [] No Learning Barriers Addressed:  [x] Yes      [] No     RISK FACTOR/EDUCATION PLAN  RISK FACTOR/EDUCATION PLAN RISK FACTOR/EDUCATION PLAN RISK FACTOR/EDUCATION PLAN RISK FACTOR/EDUCATION PLAN   *Interventions*  *Interventions* *Interventions* *Interventions* *Interventions*   Recommended Educational Videos    [x] Overview of The Pritikin Eating Plan  [x] Becoming A Pritikin   [x] Diseases of Our Time-Part 1  [x] Calorie Density  [x] Label Reading-Part 1  [x] Move It! [x] Healthy Minds, Bodies, Hearts  [x] Dining Out-Part 1  [x] Heart Disease Risk Reduction  [x] Metabolic Syndrome & Belly Fat  [x] Facts on Fat  [x] Diseases of Our Times-Part 2  [x] Biology of Weight Control  [x] Biomechanical Limitations  [x] Nurtition Action Plan    Completed Videos/Date      10/8/2020  Overview of The Pritikin Eating Plan    10/13/2020  Diseases 1 Completed Videos/Date  na Completed Videos/Date Recommended Educational Videos Completed    Patient switched to TCR, declined remaining education           Smoking Cessation/Relaspe Prevention Intervention needed? [] Yes      [x] No  *Pt verbalizes and agrees to attend intervention  Smoking Cessation/Relapse Prevention Scheduled?    [] Yes      [x] No  NOT NEEDED     Smoking Cessation Counseling attended  NA   Professional Referrals:  *Please check if needed  [] Diabetes Clinic  [] Lipid Clinic   [] Other:      Professional Referrals:  *Please check if needed  [] Diabetes Clinic  [] Lipid Clinic   [] Other:   Preventative Medication  Preventative Medication Preventative Medication Preventative Medication Preventative Medication   Aspirin  [x] Yes    [] No  Blood Thinner: Clopidogrel/Effient/Brillinta  [] Yes    [] No  Beta Blocker  [x] Yes    [] No  Ace Inhibitor [] Yes    [x] No  Statin/Lipid Lowering  [x] Yes    [] No  Medication Changes? [] Yes    [x] No Medication Changes? [] Yes    [x] No Medication Changes? [] Yes    [x] No Medication Changes? [] Yes    [x] No   *Education*  *Education* *Education* *Education* *Education*   Does Brenton SOTOMAYOR require any additional education? [] Yes    [x] No    Does Brenton SOTOMAYOR require any additional education? [] Yes    [x] No Does Brenton SOTOMAYOR require any additional education? [] Yes    [x] No Does Brenton SOTOMAYOR require any additional education? [] Yes    [x] No Does Brenton SOTOMAYOR require any additional education?   [] Yes    [x] No   Recommended Additional Educational Videos    Medical  [] Hypertension & Heart Disease  [] Decoding Lab Results  [] Aging Enhancing Your QoL  [] Sleep Disorders  Exercise  [] Body Composition  [] Improve Performance  [] Exercise Action Plan  [] Intro to Yoga  Behavioral  [] How Our Thoughts Can Heal Our Hearts  [] Smoking Cessation  Nutrition  [] Planning Your Eating Strategy  [] Lable Reading- Part 2  [] Dining Out - Part 2  [] Targeting Your Nutrition Priorities  [] Fueling a Healthy Body  [] Menu Workshop  [employer] [] Breakfast & Snacks  [] [employer] Salads & Dressing  [] [address] [place] [address]  [] Soups & Desserts    Additional Educational Videos Completed Additional Educational Videos Completed Additional Educational Videos Completed Additional Educational Videos Completed       *Goals*  *Goals* *Goals* *Goals* *Goals*   Brenton SOTOMAYOR's risk factor/education goals are as follows:    [x] Optimal BP <140/90  [] Blood Sugar <120  [x] Attend recommended video education sessions  [x] Takes medications as prescribed 100% of the time   [] **  Brenton SOTOMAYOR's risk factor/education goals are as follows:    [x] Optimal BP <140/90  [] Blood Sugar <120  [x] Attend recommended video education sessions  [x] Takes medications as prescribed 100% of the time    Brenton SOTOMAYOR's risk factor/education goals are as follows: [x] Optimal BP <140/90  [] Blood Sugar <120  [x] Attend recommended video education sessions  [x] Takes medications as prescribed 100% of the time   [] ** Brenton SOTOMAYOR's risk factor/education goals are as follows:    [x] Optimal BP <140/90  [] Blood Sugar <120  [x] Attend recommended video education sessions  [x] Takes medications as prescribed 100% of the time   [] ** Brenton SOTOMAYOR achieved risk factor goals?   [x] Yes    [] No       Monitored telemetry has revealed NSR w/ PVCs   Monitored telemetry has revealed **  [] documented arrhythmia at increasing workloads  [] associated symptoms ** Monitored telemetry has revealed NSR w/ PVCs   Monitored telemetry has revealed:NSR   Monitored telemetry has revealed NSR  [] documented arrhythmia at increasing workloads  [] associated symptoms ** Monitored telemetry has revealed NSR     Physician Response    [x] Cardiac rehab is reasonably and medically necessary for continuous cardiac monitoring surveillance  of patient's cardiac activity  [x] Initiate continuous telemerty monitoring and notify me with any concerns  [] Other   Physician Response    [x] Cardiac rehab is reasonably and medically necessary for continuous cardiac monitoring surveillance  of patient's cardiac activity  [x] Continue continuous telemerty monitoring and notify me with any concerns  [] Other     Physician Response    [x] Cardiac rehab is reasonably and medically necessary for continuous cardiac monitoring surveillance  of patient's cardiac activity  [x] Continue continuous telemerty monitoring and notify me with any concerns   [] Other     Physician Response    [x] Cardiac rehab is reasonably and medically necessary for continuous cardiac monitoring surveillance  of patient's cardiac activity  [x] Continue continuous telemerty monitoring and notify me with any concerns   [] Other           Cosigned by:  Heidi Amador MD at 10/9/2020 10:24 AM    Revision History     Date/Time  User  Provider Type  Action 10/9/2020 10:24 AM  Taryn King MD  Physician  Cosign    10/8/2020 12:12 PM  Korin Perkins        Cosigned by:  Taryn King MD at 10/26/2020  6:55 AM    Revision History     Date/Time  User  Provider Type  Action    10/26/2020  6:55 AM  Taryn King MD  Physician  Cosign    10/22/2020 12:23 PM  Macey Hager  Exercise Physiologist  Sign     Cosigned by:  Taryn King MD at 11/6/2020  3:08 PM    Revision History     Date/Time  User  Provider Type  Action    11/6/2020  3:08 PM  Taryn King MD  Physician  Cosign    11/5/2020 10:31 AM  Amy Kraus  Exercise Physiologist  Sign      Cosigned by: Taryn King MD at 12/3/2020 12:54 PM   Revision History  Date/Time User Provider Type Action   12/3/2020 12:54 PM Taryn King MD Physician Cosign   12/3/2020 12:13 PM [place] [id number] Exercise Physiologist      Cosigned by: Taryn King MD at 1/1/2021 12:13 PM   Revision History  Date/Time User Provider Type Action   1/1/2021 12:13 PM Taryn King MD Physician Cosign   12/31/2020  9:19 AM Angelita Ragsdale Exercise Physiologist Sign     Cosigned by: Taryn King MD at 2/2/2021  7:28 AM   Revision History  Date/Time User Provider Type Action   2/2/2021  7:28 AM Taryn King MD Physician Cosign   1/26/2021  9:53 AM Korin Perkins Exercise Physiologist

## 2021-02-12 ENCOUNTER — HOSPITAL ENCOUNTER (OUTPATIENT)
Age: 81
Discharge: HOME | End: 2021-02-12
Payer: MEDICARE

## 2021-02-12 LAB
ANION GAP SERPL CALCULATED.3IONS-SCNC: 12 MEQ/L (ref 8–16)
BUN BLDV-MCNC: 14 MG/DL (ref 7–22)
CALCIUM SERPL-MCNC: 9.5 MG/DL (ref 8.5–10.5)
CHLORIDE BLD-SCNC: 100 MEQ/L (ref 98–111)
CO2: 26 MEQ/L (ref 23–33)
CREAT SERPL-MCNC: 0.8 MG/DL (ref 0.4–1.2)
ERYTHROCYTE [DISTWIDTH] IN BLOOD BY AUTOMATED COUNT: 13.2 % (ref 11.5–14.5)
ERYTHROCYTE [DISTWIDTH] IN BLOOD BY AUTOMATED COUNT: 44.5 FL (ref 35–45)
GFR SERPL CREATININE-BSD FRML MDRD: > 90 ML/MIN/1.73M2
GLUCOSE BLD-MCNC: 86 MG/DL (ref 70–108)
HCT VFR BLD CALC: 41.3 % (ref 42–52)
HEMOGLOBIN: 13.5 GM/DL (ref 14–18)
MCH RBC QN AUTO: 30.2 PG (ref 26–33)
MCHC RBC AUTO-ENTMCNC: 32.7 GM/DL (ref 32.2–35.5)
MCV RBC AUTO: 92.4 FL (ref 80–94)
PLATELET # BLD: 279 THOU/MM3 (ref 130–400)
PMV BLD AUTO: 10 FL (ref 9.4–12.4)
POTASSIUM SERPL-SCNC: 4.8 MEQ/L (ref 3.5–5.2)
RBC # BLD: 4.47 MILL/MM3 (ref 4.7–6.1)
SODIUM BLD-SCNC: 138 MEQ/L (ref 135–145)
WBC # BLD: 7.9 THOU/MM3 (ref 4.8–10.8)

## 2021-02-12 PROCEDURE — 36415 COLL VENOUS BLD VENIPUNCTURE: CPT

## 2021-02-12 PROCEDURE — 80048 BASIC METABOLIC PNL TOTAL CA: CPT

## 2021-02-12 PROCEDURE — 85027 COMPLETE CBC AUTOMATED: CPT

## 2021-02-12 PROCEDURE — U0003 INFECTIOUS AGENT DETECTION BY NUCLEIC ACID (DNA OR RNA); SEVERE ACUTE RESPIRATORY SYNDROME CORONAVIRUS 2 (SARS-COV-2) (CORONAVIRUS DISEASE [COVID-19]), AMPLIFIED PROBE TECHNIQUE, MAKING USE OF HIGH THROUGHPUT TECHNOLOGIES AS DESCRIBED BY CMS-2020-01-R: HCPCS

## 2021-02-13 LAB — SARS-COV-2: NOT DETECTED

## 2021-02-15 DIAGNOSIS — Z79.01 ANTICOAGULATED ON COUMADIN: ICD-10-CM

## 2021-02-15 DIAGNOSIS — I48.0 PAROXYSMAL ATRIAL FIBRILLATION (HCC): Primary | ICD-10-CM

## 2021-02-16 ENCOUNTER — APPOINTMENT (OUTPATIENT)
Dept: PHARMACY | Age: 81
End: 2021-02-16
Payer: MEDICARE

## 2021-02-17 ENCOUNTER — HOSPITAL ENCOUNTER (OUTPATIENT)
Age: 81
Discharge: HOME | End: 2021-02-17
Payer: MEDICARE

## 2021-02-17 ENCOUNTER — HOSPITAL ENCOUNTER (OUTPATIENT)
Dept: PHARMACY | Age: 81
Setting detail: THERAPIES SERIES
Discharge: HOME | End: 2021-02-17
Payer: MEDICARE

## 2021-02-17 DIAGNOSIS — Z79.01 ANTICOAGULATED ON COUMADIN: Primary | ICD-10-CM

## 2021-02-17 DIAGNOSIS — I48.0 PAROXYSMAL ATRIAL FIBRILLATION (HCC): ICD-10-CM

## 2021-02-17 DIAGNOSIS — Z79.01 ANTICOAGULATED ON COUMADIN: ICD-10-CM

## 2021-02-17 LAB — POC INR: 1.9 (ref 0.73–1.11)

## 2021-02-17 PROCEDURE — 99211 OFF/OP EST MAY X REQ PHY/QHP: CPT

## 2021-02-17 PROCEDURE — 85610 PROTHROMBIN TIME: CPT

## 2021-02-17 NOTE — PROGRESS NOTES
Medication Management [address]  [phone] (phone)  [phone] (fax)      Anticoagulation encounter completed via telephone. Patient had lab result completed at outside lab. Spoke with patient's wife, Micaela Pallas, during the telephone visit. Mr. Yina Norris is a 68 y.o.  male with history of Afib. Patient verifies current dosing regimen and tablet strength. No missed or extra doses. Patient denies s/s bleeding/bruising/swelling/SOB/chest pain  No blood in urine or stool. No dietary changes. - last week patient had salad with a little bit of broccoli   No changes in medication/OTC agents/Herbals. No change in alcohol use or tobacco use. - reports to a few beers yesterday  No change in activity level. Patient denies headaches/dizziness/lightheadedness/falls. No vomiting/diarrhea or acute illness. No Procedures scheduled in the future at this time. - PEGGY scheduled for 2/19/21    Assessment:   Lab Results   Component Value Date    INR 1.90 (H) 02/17/2021    INR 1.90 (H) 02/02/2021    INR 2.77 (H) 01/25/2021     INR subtherapeutic   Recent Labs     02/17/21  0825   INR 1.90*       Plan:  Coumadin 7.5 mg today, then continue Coumadin 2.5 mg MF, 5 mg all other days. Recheck INR in 2 week(s). Patient reminded to call the Anticoagulation Clinic with any signs or symptoms of bleeding or with any medication changes. Patient given instructions utilizing the teach back method. Patient also instructed to call us if they are to stop Coumadin after PEGGY on Friday.     The following statement was review with patient regarding this virtual visit: We want to confirm that, for purposes of billing, this is a virtual visit with your provider for which we will submit a claim for reimbursement with your insurance company. You may be responsible for any copays, coinsurance amounts or other amounts not covered by your insurance company. If you do not accept this, unfortunately we will not be able to schedule a virtual visit with the provider. Do you accept?   Yes    CLINICAL PHARMACY CONSULT: MED RECONCILIATION/REVIEW ADDENDUM    For Pharmacy Admin Tracking Only    PHSO: Yes  Total # of Interventions Recommended: 1  - Increased Dose #: 1  - Maintenance Safety Lab Monitoring #: 1  Total Interventions Accepted: 1  Time Spent (min): [address], PharmD, BCPS  2/17/2021  12:44 PM

## 2021-02-18 ENCOUNTER — PREP FOR PROCEDURE (OUTPATIENT)
Dept: CARDIOLOGY | Age: 81
End: 2021-02-18

## 2021-02-18 RX ORDER — SODIUM CHLORIDE 9 MG/ML
INJECTION, SOLUTION INTRAVENOUS CONTINUOUS
Status: CANCELLED | OUTPATIENT
Start: 2021-02-18

## 2021-02-18 RX ORDER — SODIUM CHLORIDE 0.9 % (FLUSH) 0.9 %
10 SYRINGE (ML) INJECTION EVERY 12 HOURS SCHEDULED
Status: CANCELLED | OUTPATIENT
Start: 2021-02-18

## 2021-02-18 RX ORDER — SODIUM CHLORIDE 0.9 % (FLUSH) 0.9 %
10 SYRINGE (ML) INJECTION PRN
Status: CANCELLED | OUTPATIENT
Start: 2021-02-18

## 2021-02-19 ENCOUNTER — HOSPITAL ENCOUNTER (OUTPATIENT)
Dept: NURSING | Age: 81
Discharge: HOME | End: 2021-02-19
Payer: MEDICARE

## 2021-02-19 VITALS
RESPIRATION RATE: 16 BRPM | HEART RATE: 75 BPM | OXYGEN SATURATION: 96 % | TEMPERATURE: 98.2 F | DIASTOLIC BLOOD PRESSURE: 78 MMHG | SYSTOLIC BLOOD PRESSURE: 164 MMHG

## 2021-02-19 DIAGNOSIS — Z95.818 PRESENCE OF WATCHMAN LEFT ATRIAL APPENDAGE CLOSURE DEVICE: ICD-10-CM

## 2021-02-19 DIAGNOSIS — I48.0 PAROXYSMAL ATRIAL FIBRILLATION (HCC): ICD-10-CM

## 2021-02-19 LAB
ERYTHROCYTE [DISTWIDTH] IN BLOOD BY AUTOMATED COUNT: 13.1 % (ref 11.5–14.5)
ERYTHROCYTE [DISTWIDTH] IN BLOOD BY AUTOMATED COUNT: 43.6 FL (ref 35–45)
HCT VFR BLD CALC: 42.4 % (ref 42–52)
HEMOGLOBIN: 13.8 GM/DL (ref 14–18)
INR BLD: 2.51 (ref 0.85–1.13)
LV EF: 55 %
LVEF MODALITY: NORMAL
MCH RBC QN AUTO: 29.9 PG (ref 26–33)
MCHC RBC AUTO-ENTMCNC: 32.5 GM/DL (ref 32.2–35.5)
MCV RBC AUTO: 91.8 FL (ref 80–94)
PLATELET # BLD: 252 THOU/MM3 (ref 130–400)
PMV BLD AUTO: 9.5 FL (ref 9.4–12.4)
RBC # BLD: 4.62 MILL/MM3 (ref 4.7–6.1)
WBC # BLD: 8.3 THOU/MM3 (ref 4.8–10.8)

## 2021-02-19 PROCEDURE — 85610 PROTHROMBIN TIME: CPT

## 2021-02-19 PROCEDURE — 6370000000 HC RX 637 (ALT 250 FOR IP)

## 2021-02-19 PROCEDURE — 93325 DOPPLER ECHO COLOR FLOW MAPG: CPT

## 2021-02-19 PROCEDURE — 99152 MOD SED SAME PHYS/QHP 5/>YRS: CPT

## 2021-02-19 PROCEDURE — 93312 ECHO TRANSESOPHAGEAL: CPT

## 2021-02-19 PROCEDURE — 85027 COMPLETE CBC AUTOMATED: CPT

## 2021-02-19 PROCEDURE — 2580000003 HC RX 258: Performed by: PHYSICIAN ASSISTANT

## 2021-02-19 PROCEDURE — 6360000002 HC RX W HCPCS: Performed by: NUCLEAR MEDICINE

## 2021-02-19 PROCEDURE — 36415 COLL VENOUS BLD VENIPUNCTURE: CPT

## 2021-02-19 PROCEDURE — 93320 DOPPLER ECHO COMPLETE: CPT

## 2021-02-19 RX ORDER — SODIUM CHLORIDE 0.9 % (FLUSH) 0.9 %
10 SYRINGE (ML) INJECTION EVERY 12 HOURS SCHEDULED
Status: DISCONTINUED | OUTPATIENT
Start: 2021-02-19 | End: 2021-02-20 | Stop reason: HOSPADM

## 2021-02-19 RX ORDER — SODIUM CHLORIDE 0.9 % (FLUSH) 0.9 %
10 SYRINGE (ML) INJECTION PRN
Status: DISCONTINUED | OUTPATIENT
Start: 2021-02-19 | End: 2021-02-20 | Stop reason: HOSPADM

## 2021-02-19 RX ORDER — SODIUM CHLORIDE 9 MG/ML
INJECTION, SOLUTION INTRAVENOUS CONTINUOUS
Status: DISCONTINUED | OUTPATIENT
Start: 2021-02-19 | End: 2021-02-20 | Stop reason: HOSPADM

## 2021-02-19 RX ORDER — MIDAZOLAM HYDROCHLORIDE 2 MG/2ML
1 INJECTION, SOLUTION INTRAMUSCULAR; INTRAVENOUS PRN
Status: DISCONTINUED | OUTPATIENT
Start: 2021-02-19 | End: 2021-02-20 | Stop reason: HOSPADM

## 2021-02-19 RX ORDER — FLUMAZENIL 0.1 MG/ML
0.2 INJECTION, SOLUTION INTRAVENOUS PRN
Status: DISCONTINUED | OUTPATIENT
Start: 2021-02-19 | End: 2021-02-20 | Stop reason: HOSPADM

## 2021-02-19 RX ORDER — NALOXONE HYDROCHLORIDE 0.4 MG/ML
0.4 INJECTION, SOLUTION INTRAMUSCULAR; INTRAVENOUS; SUBCUTANEOUS PRN
Status: DISCONTINUED | OUTPATIENT
Start: 2021-02-19 | End: 2021-02-20 | Stop reason: HOSPADM

## 2021-02-19 RX ORDER — FENTANYL CITRATE 50 UG/ML
25 INJECTION, SOLUTION INTRAMUSCULAR; INTRAVENOUS PRN
Status: DISCONTINUED | OUTPATIENT
Start: 2021-02-19 | End: 2021-02-20 | Stop reason: HOSPADM

## 2021-02-19 RX ADMIN — MIDAZOLAM 1 MG: 1 INJECTION INTRAMUSCULAR; INTRAVENOUS at 14:47

## 2021-02-19 RX ADMIN — FENTANYL CITRATE 25 MCG: 50 INJECTION, SOLUTION INTRAMUSCULAR; INTRAVENOUS at 14:47

## 2021-02-19 RX ADMIN — SODIUM CHLORIDE: 9 INJECTION, SOLUTION INTRAVENOUS at 13:02

## 2021-02-19 RX ADMIN — MIDAZOLAM 1 MG: 1 INJECTION INTRAMUSCULAR; INTRAVENOUS at 14:48

## 2021-02-19 ASSESSMENT — PAIN SCALES - GENERAL: PAINLEVEL_OUTOF10: 0

## 2021-02-19 NOTE — PROGRESS NOTES
1245: Patient arrived ambulatory for PEGGY with Dr. Radha Waller. Procedure explained to patient, all questions answered. Patient stated he has been NPO since 5 am.  Patient takes Coumadin and Plavix at home. INR and CBC collected and sent to lab. IV fluids started. PT RIGHTS AND RESPONSIBILITIES OFFERED TO PT.    1445: Dr. Radha Waller at bedside. 1447: Sedation medication started. 1450: Scope in.    4950: Scope out. Patient waking up. Alert and oriented x4. Tolerated well. 1530: Patient awake. Vitals stable. Provided patient with a beverage. 1600: AVS reviewed with patient and wife, voiced understanding. Patient discharged in wheelchair. _m___ Safety:       (Environmental)  ? [place] to environment  ? Ensure ID band is correct and in place/ allergy band as needed  ? Assess for fall risk  ? Initiate fall precautions as applicable (fall band, side rails, etc.)  ? Call light within reach  ? Bed in low position/ wheels locked    _m___ Pain:       ? Assess pain level and characteristics  ? Administer analgesics as ordered  ? Assess effectiveness of pain management and report to MD as needed    _m___ Knowledge Deficit:  ? Assess baseline knowledge  ? Provide teaching at level of understanding  ? Provide teaching via preferred learning method  ? Evaluate teaching effectiveness    _m___ Hemodynamic/Respiratory Status:       (Pre and Post Procedure Monitoring)  ? Assess/Monitor vital signs and LOC  ? Assess Baseline SpO2 prior to any sedation  ? Obtain weight/height  ? Assess vital signs/ LOC until patient meets discharge criteria  ?  Monitor procedure site and notify MD of any issues

## 2021-02-19 NOTE — H&P
[address] [id number]  Sedation/Analgesia History & Physical    Pt Name: Ana Causey  Account number:   MRN: [[id number]]  YOB: 1940  Provider Performing Procedure: Malia Gutiérrez MD [facility]  Primary Care Physician: Yary Waters MD  Date: 2/19/2021    PRE-PROCEDURE    Code Status: FULL CODE  Brief History/Pre-Procedure Diagnosis:   A fib   Watchman      Consent: : I have discussed with the patient risks, benefits, and alternatives (and relevant risks, benefits, and side effects related to alternatives or not receiving care), and likelihood of the success. The patient and/or representative appear to understand and agree to proceed. The discussion encompasses risks, benefits, and side effects related to the alternatives and the risks related to not receiving the proposed care, treatment, and services. MEDICAL HISTORY  [x]ASHD/ANGINA/MI/CHF   [x]Hypertension  []Diabetes  []Hyperlipidemia  []Smoking  []Family Hx of ASHD  []Additional information:       has a past medical history of ASCVD (arteriosclerotic cardiovascular disease), Atrial fibrillation ([address] CAD (coronary artery disease), Cerebral artery occlusion with cerebral infarction ([address] COPD (chronic obstructive pulmonary disease) ([address] Dysplasia of vocal cord, Elevated glucose, Hyperlipidemia, Hypertension, Nocturnal hypoxia, Osteoarthritis, Osteopenia, Osteopenia, PAD (peripheral artery disease) ([address] Polio, and Rheumatic fever. 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   Additional information:       ALLERGIES   Allergies as of 02/19/2021 - Review Complete 02/17/2021   Allergen Reaction Noted    Brilinta [ticagrelor] Shortness Of Breath 07/29/2020  Keflex [cephalexin] Rash 09/29/2014     Additional information:       MEDICATIONS   Aspirin  [x] 81 mg  [] 325 mg  [] None  Antiplatelet drug therapy use last 5 days  []No []Yes  Coumadin Use Last 5 Days []No []Yes  Other anticoagulant use last 5 days  []No []Yes    Current Outpatient Medications:     clopidogrel (PLAVIX) 75 MG tablet, Take 1 tablet by mouth daily, Disp: 90 tablet, Rfl: 3    spironolactone (ALDACTONE) 25 MG tablet, Take 0.5 tablets by mouth daily, Disp: 30 tablet, Rfl: 3    warfarin (COUMADIN) 5 MG tablet, Take 1 tablet by mouth daily, Disp: 30 tablet, Rfl: 3    umeclidinium-vilanterol (ANORO ELLIPTA) 62.5-25 MCG/INH AEPB inhaler, Inhale 1 puff into the lungs daily, Disp: 90 puff, Rfl: 3    lovastatin (MEVACOR) 20 MG tablet, Take 1 tablet by mouth nightly (Patient taking differently: Take 20 mg by mouth daily ), Disp: 90 tablet, Rfl: 1    OXYGEN, Please provide patient with 3LPM of oxygen at night time for nocturnal hypoxia. Patient to have repeat pulse oximetry testing done on oxygen, Disp: 3 L, Rfl: 0    metoprolol succinate (TOPROL XL) 25 MG extended release tablet, Take 1 tablet by mouth daily, Disp: 30 tablet, Rfl: 0    nitroGLYCERIN (NITROSTAT) 0.4 MG SL tablet, up to max of 3 total doses. If no relief after 1 dose, call 911., Disp: 25 tablet, Rfl: 1  Prior to Admission medications    Medication Sig Start Date End Date Taking?  Authorizing Provider   clopidogrel (PLAVIX) 75 MG tablet Take 1 tablet by mouth daily 2/3/21   Hill Tidwell MD   spironolactone (ALDACTONE) 25 MG tablet Take 0.5 tablets by mouth daily 12/4/20   Hill Tidwell MD   warfarin (COUMADIN) 5 MG tablet Take 1 tablet by mouth daily 11/9/20   Kelly Michael MD   umeclidinium-vilanterol [facility] ELLIPTA) 62.5-25 MCG/INH AEPB inhaler Inhale 1 puff into the lungs daily 11/3/20   LEWIS Bullard - CNP   lovastatin (MEVACOR) 20 MG tablet Take 1 tablet by mouth nightly Patient taking differently: Take 20 mg by mouth daily  10/21/20   Darell Osler, MD   OXYGEN Please provide patient with 3LPM of oxygen at night time for nocturnal hypoxia. Patient to have repeat pulse oximetry testing done on oxygen 10/20/20   LEWIS Chaudhary CNP   metoprolol succinate (TOPROL XL) 25 MG extended release tablet Take 1 tablet by mouth daily 10/19/20   Parth Weiner PA-C   nitroGLYCERIN (NITROSTAT) 0.4 MG SL tablet up to max of 3 total doses. If no relief after 1 dose, call 911. 6/17/20   Frandy Hill MD     Additional information:       VITAL SIGNS   There were no vitals filed for this visit. PHYSICAL:   General: No acute distress  HEENT:  Unremarkable for age  Neck: without increased JVD, carotid pulses 2+ bilaterally without bruits  Heart: RRR, S1 & S2 WNL, S4 gallop, without murmurs or rubs    Lungs: Clear to auscultation    Abdomen: BS present, without HSM, masses, or tenderness    Extremities: without C,C,E.  Pulses 2+ bilaterally  Mental Status: Alert & Oriented        PLANNED PROCEDURE   []Cath  []PCI                []Pacemaker/AICD  [x]PEGGY             []Cardioversion []Peripheral angiography/PTA  []Other:      SEDATION  Planned agent:[x]Midazolam []Meperidine [x]Sublimaze []Morphine  []Diazepam  []Other:     ASA Classification:  []1 [x]2 []3 []4 []5  Class 1: A normal healthy patient  Class 2: Pt with mild to moderate systemic disease  Class 3: Severe systemic disease or disturbance  Class 4: Severe systemic disorders that are already life threatening. Class 5: Moribund pt with little chances of survival, for more than 24 hours. Mallampati I Airway Classification:   []1 [x]2 []3 []4    [x]Pre-procedure diagnostic studies complete and results available. Comment:    [x]Previous sedation/anesthesia experiences assessed.    Comment: [x]The patient is an appropriate candidate to undergo the planned procedure sedation and anesthesia. (Refer to nursing sedation/analgesia documentation record)  [x]Formulation and discussion of sedation/procedure plan, risks, and expectations with patient and/or responsible adult completed. [x]Patient examined immediately prior to the procedure.  (Refer to nursing sedation/analgesia documentation record)    Marcus Sanchez MD [facility] [place]  Electronically signed 2/19/2021 at 12:22 PM

## 2021-02-23 ENCOUNTER — TELEPHONE (OUTPATIENT)
Dept: CARDIOLOGY CLINIC | Age: 81
End: 2021-02-23

## 2021-02-24 RX ORDER — METOPROLOL SUCCINATE 25 MG/1
TABLET, EXTENDED RELEASE ORAL
Qty: 30 TABLET | Refills: 3 | Status: SHIPPED | OUTPATIENT
Start: 2021-02-24 | End: 2021-02-25

## 2021-02-25 DIAGNOSIS — M54.41 CHRONIC BILATERAL LOW BACK PAIN WITH BILATERAL SCIATICA: ICD-10-CM

## 2021-02-25 DIAGNOSIS — M54.42 CHRONIC BILATERAL LOW BACK PAIN WITH BILATERAL SCIATICA: ICD-10-CM

## 2021-02-25 DIAGNOSIS — G89.29 CHRONIC BILATERAL LOW BACK PAIN WITH BILATERAL SCIATICA: ICD-10-CM

## 2021-02-25 RX ORDER — DICLOFENAC SODIUM 75 MG/1
75 TABLET, DELAYED RELEASE ORAL 2 TIMES DAILY PRN
Qty: 180 TABLET | Refills: 0 | Status: SHIPPED | OUTPATIENT
Start: 2021-02-25 | End: 2021-04-20 | Stop reason: SDUPTHER

## 2021-02-25 RX ORDER — METOPROLOL SUCCINATE 25 MG/1
TABLET, EXTENDED RELEASE ORAL
Qty: 30 TABLET | Refills: 3 | Status: SHIPPED | OUTPATIENT
Start: 2021-02-25 | End: 2021-03-08 | Stop reason: SINTOL

## 2021-03-08 ENCOUNTER — TELEPHONE (OUTPATIENT)
Dept: PULMONOLOGY | Age: 81
End: 2021-03-08

## 2021-03-08 DIAGNOSIS — J96.11 CHRONIC RESPIRATORY FAILURE WITH HYPOXIA (HCC): ICD-10-CM

## 2021-03-08 DIAGNOSIS — J43.9 PULMONARY EMPHYSEMA, UNSPECIFIED EMPHYSEMA TYPE (HCC): Primary | ICD-10-CM

## 2021-03-08 DIAGNOSIS — G47.34 NOCTURNAL HYPOXIA: ICD-10-CM

## 2021-03-08 RX ORDER — CARVEDILOL 6.25 MG/1
6.25 TABLET ORAL 2 TIMES DAILY WITH MEALS
Qty: 60 TABLET | Refills: 1 | Status: SHIPPED | OUTPATIENT
Start: 2021-03-08 | End: 2021-04-27 | Stop reason: SDUPTHER

## 2021-03-08 RX ORDER — CARVEDILOL 6.25 MG/1
6.25 TABLET ORAL 2 TIMES DAILY WITH MEALS
COMMUNITY
End: 2021-03-08 | Stop reason: SDUPTHER

## 2021-03-08 NOTE — TELEPHONE ENCOUNTER
Would like to get a POC conserving tank. [facility] have Oxi Go, it is like Inogene.  They would need a Rx for it

## 2021-03-08 NOTE — TELEPHONE ENCOUNTER
Rosey Neff, patient's wife called in stating that Tashi Knox has developed a rash and itching after taking the Metoprolol. She was questioning if this is a common side effect and if he could take something else?

## 2021-03-12 ENCOUNTER — TELEPHONE (OUTPATIENT)
Dept: PULMONOLOGY | Age: 81
End: 2021-03-12

## 2021-03-12 DIAGNOSIS — J96.11 CHRONIC RESPIRATORY FAILURE WITH HYPOXIA (HCC): ICD-10-CM

## 2021-03-12 DIAGNOSIS — J43.9 PULMONARY EMPHYSEMA, UNSPECIFIED EMPHYSEMA TYPE (HCC): Primary | ICD-10-CM

## 2021-03-12 DIAGNOSIS — G47.34 NOCTURNAL HYPOXIA: ICD-10-CM

## 2021-03-12 NOTE — TELEPHONE ENCOUNTER
Dionicio Mo from [facility] called and asked the O2 order from 3/8/21 be changed or add Portable Oxygen Concentrator to order? Patient is requesting portable/Oxygo per Dionicio Mo. Please advise.

## 2021-03-17 ENCOUNTER — NURSE TRIAGE (OUTPATIENT)
Dept: OTHER | Facility: CLINIC | Age: 81
End: 2021-03-17

## 2021-03-17 ENCOUNTER — OFFICE VISIT (OUTPATIENT)
Dept: FAMILY MEDICINE CLINIC | Age: 81
End: 2021-03-17
Payer: MEDICARE

## 2021-03-17 ENCOUNTER — HOSPITAL ENCOUNTER (OUTPATIENT)
Age: 81
Discharge: HOME | End: 2021-03-17
Payer: MEDICARE

## 2021-03-17 VITALS
RESPIRATION RATE: 14 BRPM | OXYGEN SATURATION: 94 % | SYSTOLIC BLOOD PRESSURE: 124 MMHG | WEIGHT: 162.8 LBS | DIASTOLIC BLOOD PRESSURE: 62 MMHG | HEART RATE: 72 BPM | TEMPERATURE: 98.6 F | BODY MASS INDEX: 24.11 KG/M2 | HEIGHT: 69 IN

## 2021-03-17 DIAGNOSIS — I10 ESSENTIAL HYPERTENSION: ICD-10-CM

## 2021-03-17 DIAGNOSIS — L20.89 OTHER ATOPIC DERMATITIS: ICD-10-CM

## 2021-03-17 DIAGNOSIS — L50.9 URTICARIA: ICD-10-CM

## 2021-03-17 DIAGNOSIS — L50.9 URTICARIA: Primary | ICD-10-CM

## 2021-03-17 PROCEDURE — 1123F ACP DISCUSS/DSCN MKR DOCD: CPT | Performed by: FAMILY MEDICINE

## 2021-03-17 PROCEDURE — 84443 ASSAY THYROID STIM HORMONE: CPT

## 2021-03-17 PROCEDURE — G8427 DOCREV CUR MEDS BY ELIG CLIN: HCPCS | Performed by: FAMILY MEDICINE

## 2021-03-17 PROCEDURE — G8484 FLU IMMUNIZE NO ADMIN: HCPCS | Performed by: FAMILY MEDICINE

## 2021-03-17 PROCEDURE — G8420 CALC BMI NORM PARAMETERS: HCPCS | Performed by: FAMILY MEDICINE

## 2021-03-17 PROCEDURE — 4040F PNEUMOC VAC/ADMIN/RCVD: CPT | Performed by: FAMILY MEDICINE

## 2021-03-17 PROCEDURE — 36415 COLL VENOUS BLD VENIPUNCTURE: CPT

## 2021-03-17 PROCEDURE — 1036F TOBACCO NON-USER: CPT | Performed by: FAMILY MEDICINE

## 2021-03-17 PROCEDURE — 99213 OFFICE O/P EST LOW 20 MIN: CPT | Performed by: FAMILY MEDICINE

## 2021-03-17 RX ORDER — PREDNISONE 20 MG/1
40 TABLET ORAL DAILY
Qty: 10 TABLET | Refills: 0 | Status: SHIPPED | OUTPATIENT
Start: 2021-03-17 | End: 2021-03-22

## 2021-03-17 RX ORDER — LORATADINE 10 MG/1
10 TABLET ORAL 2 TIMES DAILY
Qty: 60 TABLET | Refills: 2 | Status: SHIPPED | OUTPATIENT
Start: 2021-03-17 | End: 2021-04-20

## 2021-03-17 RX ORDER — HYDROXYZINE HYDROCHLORIDE 10 MG/1
10 TABLET, FILM COATED ORAL 4 TIMES DAILY PRN
Qty: 30 TABLET | Refills: 0 | Status: SHIPPED | OUTPATIENT
Start: 2021-03-17 | End: 2021-05-05 | Stop reason: SDUPTHER

## 2021-03-17 NOTE — TELEPHONE ENCOUNTER
Patient called Julieta Mendoza at [facility] [place])  with red flag complaint. Brief description of triage: See below    Triage indicates for patient to see PCP today in the office. Care advice provided, patient verbalizes understanding; denies any other questions or concerns; instructed to call back for any new or worsening symptoms. Writer provided warm transfer to Edwin Parada at [facility] [place] for appointment scheduling. Attention Provider: Thank you for allowing me to participate in the care of your patient. The patient was connected to triage in response to information provided to the [facility]. Please do not respond through this encounter as the response is not directed to a shared pool. Reason for Disposition   SEVERE itching    Answer Assessment - Initial Assessment Questions  1. APPEARANCE of RASH: \"Describe the rash. \" (e.g., spots, blisters, raised areas, skin peeling, scaly)      Redness, scaly, rough    2. SIZE: \"How big are the spots? \" (e.g., tip of pen, eraser, coin; inches, centimeters)      No spots    3. LOCATION: \"Where is the rash located? \"      Back, abdomen, bilateral legs    4. COLOR: \"What color is the rash? \" (Note: It is difficult to assess rash color in people with darker-colored skin. When this situation occurs, simply ask the caller to describe what they see.)      Red    5. ONSET: \"When did the rash begin? \"      January 2021    6. FEVER: \"Do you have a fever? \" If so, ask: \"What is your temperature, how was it measured, and when did it start? \"      Denies    7. ITCHING: \"Does the rash itch? \" If so, ask: \"How bad is the itch? \" (Scale 1-10; or mild, moderate, severe)      Yes, Moderate/Severe    8. CAUSE: \"What do you think is causing the rash? \"      Unsure    9. MEDICATION FACTORS: \"Have you started any new medications within the last 2 weeks? \" (e.g., antibiotics)       Denies    10. OTHER SYMPTOMS: \"Do you have any other symptoms? \" (e.g., dizziness, headache, sore throat, joint pain) Denies    11. PREGNANCY: \"Is there any chance you are pregnant? \" \"When was your last menstrual period? \"        NA    Protocols used: RASH OR REDNESS - [facility]

## 2021-03-17 NOTE — PATIENT INSTRUCTIONS
Hold Diclofenac 75 mg while taking prednisone    Recommend daily moisturizer with a lotion/cream like  -- Eucerin, or Aquaphor or Curel. Non-scented and for sensitive skin       For claritin, use for next 2 weeks 10 mg twice a day, then reduce to once a day for another week, then may stop.

## 2021-03-17 NOTE — PROGRESS NOTES
Abhi Andrews (:  1940) is a 68 y.o. male,Established patient, here for evaluation of the following chief complaint(s):  Pruritis (All over body x 2 months)      ASSESSMENT/PLAN:  1. Urticaria  -     TSH; Future  -     loratadine (CLARITIN) 10 MG tablet; Take 1 tablet by mouth 2 times daily, Disp-60 tablet, R-2Normal  -     hydrOXYzine (ATARAX) 10 MG tablet; Take 1 tablet by mouth 4 times daily as needed for Itching, Disp-30 tablet, R-0Normal  -     predniSONE (DELTASONE) 20 MG tablet; Take 2 tablets by mouth daily for 5 days, Disp-10 tablet, R-0Normal  -     fluocinonide (LIDEX) 0.05 % cream; Apply topically 2 times daily. , Disp-60 g, R-1, Normal  2. Other atopic dermatitis  -     TSH; Future  -     loratadine (CLARITIN) 10 MG tablet; Take 1 tablet by mouth 2 times daily, Disp-60 tablet, R-2Normal  -     hydrOXYzine (ATARAX) 10 MG tablet; Take 1 tablet by mouth 4 times daily as needed for Itching, Disp-30 tablet, R-0Normal  -     predniSONE (DELTASONE) 20 MG tablet; Take 2 tablets by mouth daily for 5 days, Disp-10 tablet, R-0Normal  -     fluocinonide (LIDEX) 0.05 % cream; Apply topically 2 times daily. , Disp-60 g, R-1, Normal  3. Essential hypertension  -     TSH; Future    Moderate involvement for months now. Etiology unclear but discussed that over 50% of time we can't identify cause, but this is a manageable condition. Encouraged him that if this regimen does not work, we can help him find what works. It is not clear how long he will need medications but he has close follow up soon. Thus the following plan is in place. Claritin 10 mg a.m. and 10 mg p.m. will be done for the next 2 weeks and if he has rash controlled, then he can try 10 mg once a day. If rash returns he is to return to twice a day until he sees his doctor. If he is able to go to once a day without flaring up then he can do that for another week or so and then stop.   Of course if he starts with rash again he may restart the leg swelling. Gastrointestinal: Negative for abdominal pain, diarrhea and nausea. Genitourinary: Negative for difficulty urinating. Skin: Positive for rash. Neurological: Negative for dizziness, numbness and headaches. Hematological: Negative for adenopathy. Does not bruise/bleed easily. Psychiatric/Behavioral: The patient is nervous/anxious. Physical Exam  Constitutional:       General: He is not in acute distress. Appearance: Normal appearance. He is not ill-appearing. Cardiovascular:      Rate and Rhythm: Normal rate and regular rhythm. Heart sounds: No murmur. Pulmonary:      Effort: Pulmonary effort is normal. No respiratory distress. Breath sounds: Normal breath sounds. No wheezing. Musculoskeletal:         General: No swelling. Skin:     Findings: Rash (on back he has multiple lightly erythematous, evenly-raised lesions, mostly subcentimer to 1.5 cm diameter. ) present. Comments: In general skin is dry, worse on arms and trunk. On arms and trunk areas of more hyperkeratotic feel to skin. Proximal forearm and carleen-lateral trunk with a few superficially excoriated areas. Neurological:      Mental Status: He is alert and oriented to person, place, and time. Psychiatric:      Comments: Anxious some       Lab Results   Component Value Date     02/12/2021    K 4.8 02/12/2021    K 4.6 01/06/2021     02/12/2021    CO2 26 02/12/2021    BUN 14 02/12/2021    CREATININE 0.8 02/12/2021    GLUCOSE 86 02/12/2021    GLUCOSE 90 04/06/2017    CALCIUM 9.5 02/12/2021      Lab Results   Component Value Date    WBC 8.3 02/19/2021    HGB 13.8 (L) 02/19/2021    HCT 42.4 02/19/2021    MCV 91.8 02/19/2021     02/19/2021     Lab Results   Component Value Date    ALT 8 (L) 01/05/2021    AST 19 01/05/2021    ALKPHOS 81 01/05/2021    BILITOT 0.7 01/05/2021     an electronic signature was used to authenticate this note.     --Elio Lagunas MD

## 2021-03-18 ENCOUNTER — TELEPHONE (OUTPATIENT)
Dept: FAMILY MEDICINE CLINIC | Age: 81
End: 2021-03-18

## 2021-03-18 PROBLEM — L50.9 URTICARIA: Status: ACTIVE | Noted: 2021-03-18

## 2021-03-18 PROBLEM — L20.89 OTHER ATOPIC DERMATITIS: Status: ACTIVE | Noted: 2021-03-18

## 2021-03-18 LAB — TSH SERPL DL<=0.05 MIU/L-ACNC: 1.46 UIU/ML (ref 0.4–4.2)

## 2021-03-18 ASSESSMENT — ENCOUNTER SYMPTOMS
ABDOMINAL PAIN: 0
SHORTNESS OF BREATH: 0
DIARRHEA: 0
TROUBLE SWALLOWING: 0
NAUSEA: 0
SORE THROAT: 0
RHINORRHEA: 0

## 2021-03-18 NOTE — RESULT ENCOUNTER NOTE
Please let patient know that his thyroid test was normal and not likely the cause of his urticaria and eczema. Thank you.

## 2021-03-18 NOTE — TELEPHONE ENCOUNTER
----- Message from Tyrone Jama MD sent at 3/18/2021  1:34 PM EDT -----  Please let patient know that his thyroid test was normal and not likely the cause of his urticaria and eczema. Thank you.

## 2021-04-20 ENCOUNTER — OFFICE VISIT (OUTPATIENT)
Dept: FAMILY MEDICINE CLINIC | Age: 81
End: 2021-04-20
Payer: MEDICARE

## 2021-04-20 VITALS
HEIGHT: 69 IN | SYSTOLIC BLOOD PRESSURE: 124 MMHG | OXYGEN SATURATION: 94 % | WEIGHT: 161.4 LBS | BODY MASS INDEX: 23.91 KG/M2 | DIASTOLIC BLOOD PRESSURE: 72 MMHG | HEART RATE: 77 BPM | TEMPERATURE: 98.2 F | RESPIRATION RATE: 16 BRPM

## 2021-04-20 DIAGNOSIS — G89.29 CHRONIC BILATERAL LOW BACK PAIN WITH BILATERAL SCIATICA: ICD-10-CM

## 2021-04-20 DIAGNOSIS — I73.9 PAD (PERIPHERAL ARTERY DISEASE) (HCC): ICD-10-CM

## 2021-04-20 DIAGNOSIS — M54.41 CHRONIC BILATERAL LOW BACK PAIN WITH BILATERAL SCIATICA: ICD-10-CM

## 2021-04-20 DIAGNOSIS — E78.2 MIXED HYPERLIPIDEMIA: ICD-10-CM

## 2021-04-20 DIAGNOSIS — M54.42 CHRONIC BILATERAL LOW BACK PAIN WITH BILATERAL SCIATICA: ICD-10-CM

## 2021-04-20 DIAGNOSIS — Z00.00 ROUTINE GENERAL MEDICAL EXAMINATION AT A HEALTH CARE FACILITY: Primary | ICD-10-CM

## 2021-04-20 DIAGNOSIS — I10 ESSENTIAL HYPERTENSION: ICD-10-CM

## 2021-04-20 PROCEDURE — G0438 PPPS, INITIAL VISIT: HCPCS | Performed by: FAMILY MEDICINE

## 2021-04-20 PROCEDURE — 1123F ACP DISCUSS/DSCN MKR DOCD: CPT | Performed by: FAMILY MEDICINE

## 2021-04-20 PROCEDURE — 4040F PNEUMOC VAC/ADMIN/RCVD: CPT | Performed by: FAMILY MEDICINE

## 2021-04-20 RX ORDER — SPIRONOLACTONE 25 MG/1
12.5 TABLET ORAL DAILY
Qty: 45 TABLET | Refills: 1 | Status: ON HOLD | OUTPATIENT
Start: 2021-04-20 | End: 2021-08-10 | Stop reason: ALTCHOICE

## 2021-04-20 RX ORDER — DICLOFENAC SODIUM 75 MG/1
75 TABLET, DELAYED RELEASE ORAL 2 TIMES DAILY PRN
Qty: 180 TABLET | Refills: 0 | Status: SHIPPED | OUTPATIENT
Start: 2021-04-20 | End: 2021-10-21

## 2021-04-20 RX ORDER — LOVASTATIN 20 MG/1
20 TABLET ORAL DAILY
Qty: 90 TABLET | Refills: 1 | Status: SHIPPED | OUTPATIENT
Start: 2021-04-20 | End: 2021-10-21 | Stop reason: SDUPTHER

## 2021-04-20 ASSESSMENT — PATIENT HEALTH QUESTIONNAIRE - PHQ9
SUM OF ALL RESPONSES TO PHQ QUESTIONS 1-9: 0
1. LITTLE INTEREST OR PLEASURE IN DOING THINGS: 0
2. FEELING DOWN, DEPRESSED OR HOPELESS: 0

## 2021-04-20 ASSESSMENT — LIFESTYLE VARIABLES
HOW OFTEN DURING THE LAST YEAR HAVE YOU FAILED TO DO WHAT WAS NORMALLY EXPECTED FROM YOU BECAUSE OF DRINKING: 0
HAVE YOU OR SOMEONE ELSE BEEN INJURED AS A RESULT OF YOUR DRINKING: 0
AUDIT-C TOTAL SCORE: 1
HOW OFTEN DO YOU HAVE A DRINK CONTAINING ALCOHOL: 1
HAS A RELATIVE, FRIEND, DOCTOR, OR ANOTHER HEALTH PROFESSIONAL EXPRESSED CONCERN ABOUT YOUR DRINKING OR SUGGESTED YOU CUT DOWN: 0
HOW OFTEN DURING THE LAST YEAR HAVE YOU HAD A FEELING OF GUILT OR REMORSE AFTER DRINKING: 0

## 2021-04-20 NOTE — PATIENT INSTRUCTIONS
Personalized Preventive Plan for Juan Miguel Bryan - 4/20/2021  Medicare offers a range of preventive health benefits. Some of the tests and screenings are paid in full while other may be subject to a deductible, co-insurance, and/or copay. Some of these benefits include a comprehensive review of your medical history including lifestyle, illnesses that may run in your family, and various assessments and screenings as appropriate. After reviewing your medical record and screening and assessments performed today your provider may have ordered immunizations, labs, imaging, and/or referrals for you. A list of these orders (if applicable) as well as your Preventive Care list are included within your After Visit Summary for your review. Other Preventive Recommendations:    · A preventive eye exam performed by an eye specialist is recommended every 1-2 years to screen for glaucoma; cataracts, macular degeneration, and other eye disorders. · A preventive dental visit is recommended every 6 months. · Try to get at least 150 minutes of exercise per week or 10,000 steps per day on a pedometer . · Order or download the FREE \"Exercise & Physical Activity: Your Everyday Guide\" from [employer]. Call 8-518.472.4700 or search [employer] online. · You need 9652-6917 mg of calcium and 5672-3737 IU of vitamin D per day. It is possible to meet your calcium requirement with diet alone, but a vitamin D supplement is usually necessary to meet this goal.  · When exposed to the sun, use a sunscreen that protects against both UVA and UVB radiation with an SPF of 30 or greater. Reapply every 2 to 3 hours or after sweating, drying off with a towel, or swimming. · Always wear a seat belt when traveling in a car. Always wear a helmet when riding a bicycle or motorcycle.

## 2021-04-20 NOTE — PROGRESS NOTES
Medicare Annual Wellness Visit  Name: Phil Kuo Date: 2021   MRN: [id number] Sex: Male   Age: 80 y.o. Ethnicity: Non-/Non    : 1940 Race: Jonah Mariano is here for Medicare AWV and 6 Month Follow-Up    Screenings for behavioral, psychosocial and functional/safety risks, and cognitive dysfunction are all negative except as indicated below. These results, as well as other patient data from the [address] form, are documented in Flowsheets linked to this Encounter. Allergies   Allergen Reactions    Brilinta [Ticagrelor] Shortness Of Breath    Keflex [Cephalexin] Rash       Prior to Visit Medications    Medication Sig Taking? Authorizing Provider   carvedilol (COREG) 6.25 MG tablet Take 1 tablet by mouth 2 times daily (with meals) Yes [address], MD   diclofenac (VOLTAREN) 75 MG EC tablet Take 1 tablet by mouth 2 times daily as needed for Pain Yes Diego Wise MD   clopidogrel (PLAVIX) 75 MG tablet Take 1 tablet by mouth daily Yes Natalie Monet MD   spironolactone (ALDACTONE) 25 MG tablet Take 0.5 tablets by mouth daily Yes Natalie Monet MD   umeclidinium-vilanterol (ANORO ELLIPTA) 62.5-25 MCG/INH AEPB inhaler Inhale 1 puff into the lungs daily Yes LEWIS Fuentes CNP   lovastatin (MEVACOR) 20 MG tablet Take 1 tablet by mouth nightly  Patient taking differently: Take 20 mg by mouth daily  Yes Diego Wise MD   OXYGEN Please provide patient with 3LPM of oxygen at night time for nocturnal hypoxia. Patient to have repeat pulse oximetry testing done on oxygen Yes LEWIS Fuentes CNP   nitroGLYCERIN (NITROSTAT) 0.4 MG SL tablet up to max of 3 total doses. If no relief after 1 dose, call 911.   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 Lowest de-sat 77%.  Osteoarthritis     Osteopenia     Osteopenia     PAD (peripheral artery disease) ([facility]     Polio 1946    Rheumatic fever 1946       Past Surgical History:   Procedure Laterality Date    AORTIC VALVE REPAIR  09/2020    TAVR    BALLOON ANGIOPLASTY, ARTERY  07/2018    s/p Left CFA, SFA and popliteal intervention    FACIAL COSMETIC SURGERY      as a kid    HAND SURGERY Right     carpal tunnel     OTHER SURGICAL HISTORY  01/05/2021    Left Atrial Appenadage closure/watchman    PTCA  06/2020    stent LAD    TONSILLECTOMY AND ADENOIDECTOMY         Family History   Problem Relation Age of Onset    Heart Disease Father     Arthritis Maternal Grandmother        CareTeam (Including outside providers/suppliers regularly involved in providing care):   Patient Care Team:  Lexie Rockwell MD as PCP - General (Family Medicine)  Lexie Rockwell MD as PCP [facility]    Wt Readings from Last 3 Encounters:   04/20/21 161 lb 6.4 oz (73.2 kg)   03/17/21 162 lb 12.8 oz (73.8 kg)   02/11/21 161 lb (73 kg)     Vitals:    04/20/21 1146   BP: 124/72   Site: Left Upper Arm   Position: Sitting   Pulse: 77   Resp: 16   Temp: 98.2 °F (36.8 °C)   SpO2: 94%   Weight: 161 lb 6.4 oz (73.2 kg)   Height: 5' 9\" (1.753 m)     Body mass index is 23.83 kg/m². Based upon direct observation of the patient, evaluation of cognition reveals remote memory intact, recent memory impaired. Physical Exam  Vitals signs reviewed. Constitutional:       Appearance: He is not ill-appearing.    Cardiovascular:      Rate and Rhythm: Normal rate and regular rhythm. Pulmonary:      Effort: Pulmonary effort is normal. No respiratory distress. Breath sounds: Normal breath sounds. No rhonchi. Musculoskeletal:      Right lower leg: No edema. Left lower leg: No edema. Neurological:      Mental Status: He is alert. Mental status is at baseline. Psychiatric:         Mood and Affect: Mood normal.         Behavior: Behavior normal.           Patient's complete Health Risk Assessment and screening values have been reviewed and are found in Flowsheets. The following problems were reviewed today and where indicated follow up appointments were made and/or referrals ordered. Positive Risk Factor Screenings with Interventions:               No Positive Risk Factors identified today.     Personalized Preventive Plan   Current Health Maintenance Status  Immunization History   Administered Date(s) Administered    Influenza Vaccine, unspecified formulation 09/10/2014    Influenza Virus Vaccine 10/14/2015    Influenza, High Dose (Fluzone 65 yrs and older) 09/26/2016    Influenza, Kena Joellen, IM, (6 mo and older Fluzone, Flulaval, Fluarix and 3 yrs and older Afluria) 09/25/2018    Influenza, Quadv, adjuvanted, 65 yrs +, IM, PF (Fluad) 09/03/2020    Influenza, Triv, inactivated, subunit, adjuvanted, IM (Fluad 65 yrs and older) 09/21/2017, 09/25/2018, 09/16/2019    Pneumococcal Conjugate 13-valent ([id number]) 04/28/2016    Pneumococcal Polysaccharide ([id number]) 10/10/2018    Zoster Live (Zostavax) 04/19/2016    Zoster Recombinant (Shingrix) 07/03/2019, 09/16/2019        Health Maintenance   Topic Date Due    Hepatitis C screen  Never done    COVID-19 Vaccine (1) Never done    DTaP/Tdap/Td vaccine (1 - Tdap) Never done   [employer] Visit (AWV)  Never done    Lipid screen  06/16/2021    Potassium monitoring  02/12/2022    Creatinine monitoring  02/12/2022    Flu vaccine  Completed    Shingles Vaccine  Completed    Pneumococcal 65+ years Vaccine  Completed    Hepatitis A vaccine  Aged Out    Hepatitis B vaccine  Aged Out    Hib vaccine  Aged Out    Meningococcal (ACWY) vaccine  Aged Out     Recommendations for [employer] Due: see orders and patient instructions/AVS.  . Recommended screening schedule for the next 5-10 years is provided to the patient in written form: see Patient Instructions/AVS.    Linda Mccauley was seen today for medicare awv and 6 month follow-up. Diagnoses and all orders for this visit:    Routine general medical examination at a health care facility    Essential hypertension  -     spironolactone (ALDACTONE) 25 MG tablet; Take 0.5 tablets by mouth daily    Chronic bilateral low back pain with bilateral sciatica  -     diclofenac (VOLTAREN) 75 MG EC tablet; Take 1 tablet by mouth 2 times daily as needed for Pain    Mixed hyperlipidemia  -     lovastatin (MEVACOR) 20 MG tablet; Take 1 tablet by mouth daily    PAD (peripheral artery disease) (HCC)           PAD:  Chronic. Stable. Some symptoms. continue plavix and statin. Above meds refilled. He had covid vaccine    Wife is present    Chronic SOB due to COPD. On supplemental oxygen with exertion and at night. Plans to go to [place] this fall. Seen by Dr. Juventino Ramos for urticaria a few weeks ago. Itching is better but not resolved.      F/u 6 months HTN      Cici Chaidez MD

## 2021-04-23 ENCOUNTER — TELEPHONE (OUTPATIENT)
Dept: FAMILY MEDICINE CLINIC | Age: 81
End: 2021-04-23

## 2021-04-23 NOTE — TELEPHONE ENCOUNTER
May take Atarax 4 times per day. Continue current medications   If itching persists, consider follow-up appointment in the office to re-eval    Please advise patient.   Misti Davis MD

## 2021-04-27 RX ORDER — CARVEDILOL 6.25 MG/1
6.25 TABLET ORAL 2 TIMES DAILY WITH MEALS
Qty: 60 TABLET | Refills: 0 | Status: SHIPPED | OUTPATIENT
Start: 2021-04-27 | End: 2021-05-17 | Stop reason: SDUPTHER

## 2021-04-27 NOTE — TELEPHONE ENCOUNTER
Annie Pike called requesting a refill on the following medications:  Requested Prescriptions     Pending Prescriptions Disp Refills    carvedilol (COREG) 6.25 MG tablet 60 tablet 1     Sig: Take 1 tablet by mouth 2 times daily (with meals)     Pharmacy verified: [address] [place] [address]  .       Date of last visit: 7/16/2020  Date of next visit (if applicable): 3/91/7954

## 2021-05-04 ENCOUNTER — OFFICE VISIT (OUTPATIENT)
Dept: PULMONOLOGY | Age: 81
End: 2021-05-04
Payer: MEDICARE

## 2021-05-04 VITALS
WEIGHT: 159.6 LBS | OXYGEN SATURATION: 97 % | TEMPERATURE: 97.1 F | BODY MASS INDEX: 23.64 KG/M2 | HEIGHT: 69 IN | SYSTOLIC BLOOD PRESSURE: 118 MMHG | DIASTOLIC BLOOD PRESSURE: 64 MMHG | HEART RATE: 66 BPM

## 2021-05-04 DIAGNOSIS — J96.11 CHRONIC RESPIRATORY FAILURE WITH HYPOXIA (HCC): ICD-10-CM

## 2021-05-04 DIAGNOSIS — J43.9 PULMONARY EMPHYSEMA, UNSPECIFIED EMPHYSEMA TYPE (HCC): Primary | ICD-10-CM

## 2021-05-04 DIAGNOSIS — Z87.891 FORMER SMOKER, STOPPED SMOKING IN DISTANT PAST: ICD-10-CM

## 2021-05-04 DIAGNOSIS — J44.9 COPD, MODERATE (HCC): ICD-10-CM

## 2021-05-04 PROCEDURE — 4040F PNEUMOC VAC/ADMIN/RCVD: CPT | Performed by: NURSE PRACTITIONER

## 2021-05-04 PROCEDURE — G8420 CALC BMI NORM PARAMETERS: HCPCS | Performed by: NURSE PRACTITIONER

## 2021-05-04 PROCEDURE — G8926 SPIRO NO PERF OR DOC: HCPCS | Performed by: NURSE PRACTITIONER

## 2021-05-04 PROCEDURE — 99214 OFFICE O/P EST MOD 30 MIN: CPT | Performed by: NURSE PRACTITIONER

## 2021-05-04 PROCEDURE — 1123F ACP DISCUSS/DSCN MKR DOCD: CPT | Performed by: NURSE PRACTITIONER

## 2021-05-04 PROCEDURE — 3023F SPIROM DOC REV: CPT | Performed by: NURSE PRACTITIONER

## 2021-05-04 PROCEDURE — G8427 DOCREV CUR MEDS BY ELIG CLIN: HCPCS | Performed by: NURSE PRACTITIONER

## 2021-05-04 PROCEDURE — 1036F TOBACCO NON-USER: CPT | Performed by: NURSE PRACTITIONER

## 2021-05-04 ASSESSMENT — ENCOUNTER SYMPTOMS
WHEEZING: 0
EYES NEGATIVE: 1
VOMITING: 0
SHORTNESS OF BREATH: 1
ABDOMINAL PAIN: 0
DIARRHEA: 0
COUGH: 1
NAUSEA: 0

## 2021-05-04 NOTE — PROGRESS NOTES
[facility]     Patient: Jm Sifuentes, 80 y.o.   : 1940  2021    Pt of Dr. Benji Oneil   Patient presents with    Follow-up     Pulmonary emphysema 6 month follow up with no test         HPI  Brenton SOTOMAYOR is here for 6 month  follow up for COPD    MMRC Grade 2:  I walk slower than people of the same age on the level ground because of breathlessness, or I have to stop for breath when walking on my own pace on level. PMH rheumatic fever and Polio as child   C/o puritis to torso, saw PCP given steroids, cleared it up but has come back,unclear etiology   Walks daily on his treadmill able to do 1 mile at slow paced   NO ER visits/ UC visits    taking medications as instructed, no medication side effects noted, no significant ongoing wheezing or shortness of breath, using bronchodilator MDI less than twice a week, no swelling of ankles, no chest pain  Are the above symptoms at your baseline Yes  Any recent exacerbations that have required oral atb or steroids No  Any new medical issues since last visit : No  Current Inhalers:  Anoro   Previous Inhalers:  **  Is UTD with vaccines, including   Last 6MWT: 10/15/2020  On 4LPM O2 ( confirmed with overnight pulse ox testing. Most recent test 2020) at night and 3LPM with activity during the day   Completed cardiac rehab s/p TAVR procedure       SOCIAL HISTORY:  Social History     Tobacco Use    Smoking status: Former Smoker     Packs/day: 1.50     Years: 50.00     Pack years: 75.00     Types: Cigarettes     Quit date: 10/9/2004     Years since quittin.5    Smokeless tobacco: Never Used   Substance Use Topics    Alcohol use:  Yes     Alcohol/week: 30.0 standard drinks     Types: 30 Cans of beer per week     Comment: 6 beers per day     Drug use: No         CURRENT MEDICATIONS:  Current Outpatient Medications   Medication Sig Dispense Refill    carvedilol (COREG) 6.25 MG tablet Take 1 emphysema type ([facility]     2. Chronic respiratory failure with hypoxia (HCC)     3. Former smoker, stopped smoking in distant past     4. COPD, moderate ([facility]       Plan    -encouraged to stay active, continue exercising   Continue Anoro ( did discuss possible skin rash from Anoro. Only reported in < 1 % of patient , pt feels the rash started months after starting ANoro, does not feel this is cause.  Wants to stay on Anoro)   PRN albuterol   Continue oxygen 4LPM at night, 3LPM during the day with activity   Keep UTD with vaccines     Total time spent on encounter was 30 minutes    Will see Eleanor Torres in: 1 year    Electronically signed by LEWIS Obrien CNP on 5/4/2021 at 12:36 PM

## 2021-05-05 DIAGNOSIS — L20.89 OTHER ATOPIC DERMATITIS: ICD-10-CM

## 2021-05-05 DIAGNOSIS — L50.9 URTICARIA: ICD-10-CM

## 2021-05-05 RX ORDER — HYDROXYZINE HYDROCHLORIDE 10 MG/1
10 TABLET, FILM COATED ORAL 4 TIMES DAILY PRN
Qty: 30 TABLET | Refills: 0 | Status: SHIPPED | OUTPATIENT
Start: 2021-05-05 | End: 2021-07-15 | Stop reason: SDUPTHER

## 2021-05-05 NOTE — TELEPHONE ENCOUNTER
Vishal Ibarra called requesting a refill on the following medications:  Requested Prescriptions     Pending Prescriptions Disp Refills    hydrOXYzine (ATARAX) 10 MG tablet 30 tablet 0     Sig: Take 1 tablet by mouth 4 times daily as needed for Itching       Date of last visit: 4/20/2021  Date of next visit (if applicable):10/20/2021  Date of last refill: 03/27/21  Pharmacy Name: [facility]

## 2021-05-17 ENCOUNTER — HOSPITAL ENCOUNTER (OUTPATIENT)
Age: 81
Discharge: HOME | End: 2021-05-17
Payer: MEDICARE

## 2021-05-17 ENCOUNTER — OFFICE VISIT (OUTPATIENT)
Dept: CARDIOLOGY CLINIC | Age: 81
End: 2021-05-17
Payer: MEDICARE

## 2021-05-17 VITALS
HEIGHT: 69 IN | SYSTOLIC BLOOD PRESSURE: 148 MMHG | DIASTOLIC BLOOD PRESSURE: 64 MMHG | WEIGHT: 160.6 LBS | BODY MASS INDEX: 23.79 KG/M2 | HEART RATE: 72 BPM

## 2021-05-17 DIAGNOSIS — I73.9 PAD (PERIPHERAL ARTERY DISEASE) (HCC): Primary | ICD-10-CM

## 2021-05-17 DIAGNOSIS — R06.09 DOE (DYSPNEA ON EXERTION): ICD-10-CM

## 2021-05-17 DIAGNOSIS — I48.91 ATRIAL FIBRILLATION, UNSPECIFIED TYPE (HCC): ICD-10-CM

## 2021-05-17 LAB
ANION GAP SERPL CALCULATED.3IONS-SCNC: 9 MEQ/L (ref 8–16)
BUN BLDV-MCNC: 13 MG/DL (ref 7–22)
CALCIUM SERPL-MCNC: 9.3 MG/DL (ref 8.5–10.5)
CHLORIDE BLD-SCNC: 101 MEQ/L (ref 98–111)
CO2: 27 MEQ/L (ref 23–33)
CREAT SERPL-MCNC: 0.9 MG/DL (ref 0.4–1.2)
GFR SERPL CREATININE-BSD FRML MDRD: 81 ML/MIN/1.73M2
GLUCOSE BLD-MCNC: 95 MG/DL (ref 70–108)
POTASSIUM SERPL-SCNC: 4.9 MEQ/L (ref 3.5–5.2)
SODIUM BLD-SCNC: 137 MEQ/L (ref 135–145)

## 2021-05-17 PROCEDURE — 99214 OFFICE O/P EST MOD 30 MIN: CPT | Performed by: INTERNAL MEDICINE

## 2021-05-17 PROCEDURE — 36415 COLL VENOUS BLD VENIPUNCTURE: CPT

## 2021-05-17 PROCEDURE — 1123F ACP DISCUSS/DSCN MKR DOCD: CPT | Performed by: INTERNAL MEDICINE

## 2021-05-17 PROCEDURE — G8427 DOCREV CUR MEDS BY ELIG CLIN: HCPCS | Performed by: INTERNAL MEDICINE

## 2021-05-17 PROCEDURE — 4040F PNEUMOC VAC/ADMIN/RCVD: CPT | Performed by: INTERNAL MEDICINE

## 2021-05-17 PROCEDURE — 1036F TOBACCO NON-USER: CPT | Performed by: INTERNAL MEDICINE

## 2021-05-17 PROCEDURE — G8420 CALC BMI NORM PARAMETERS: HCPCS | Performed by: INTERNAL MEDICINE

## 2021-05-17 PROCEDURE — 80048 BASIC METABOLIC PNL TOTAL CA: CPT

## 2021-05-17 RX ORDER — CARVEDILOL 6.25 MG/1
6.25 TABLET ORAL 2 TIMES DAILY WITH MEALS
Qty: 180 TABLET | Refills: 3 | Status: SHIPPED | OUTPATIENT
Start: 2021-05-17 | End: 2021-10-21

## 2021-05-17 RX ORDER — POTASSIUM CHLORIDE 20 MEQ/1
20 TABLET, EXTENDED RELEASE ORAL DAILY
Qty: 90 TABLET | Refills: 1 | Status: SHIPPED | OUTPATIENT
Start: 2021-05-17 | End: 2022-03-03 | Stop reason: SDUPTHER

## 2021-05-17 RX ORDER — HYDROXYZINE HYDROCHLORIDE 25 MG/1
25 TABLET, FILM COATED ORAL EVERY 8 HOURS PRN
Qty: 30 TABLET | Refills: 0 | Status: SHIPPED | OUTPATIENT
Start: 2021-05-17 | End: 2021-06-08 | Stop reason: SDUPTHER

## 2021-05-17 RX ORDER — FUROSEMIDE 40 MG/1
40 TABLET ORAL DAILY
Qty: 90 TABLET | Refills: 1 | Status: SHIPPED | OUTPATIENT
Start: 2021-05-17 | End: 2021-10-21

## 2021-05-17 NOTE — PROGRESS NOTES
medical history of ASCVD (arteriosclerotic cardiovascular disease), Atrial fibrillation ([address] CAD (coronary artery disease), Cerebral artery occlusion with cerebral infarction ([address] COPD (chronic obstructive pulmonary disease) ([address] Dysplasia of vocal cord, Elevated glucose, Hyperlipidemia, Hypertension, Nocturnal hypoxia, Osteoarthritis, Osteopenia, Osteopenia, PAD (peripheral artery disease) ([address] Polio, and Rheumatic fever. Social History  Brenton SOTOMAYOR  reports that he quit smoking about 16 years ago. His smoking use included cigarettes. He has a 75.00 pack-year smoking history. He has never used smokeless tobacco. He reports current alcohol use of about 30.0 standard drinks of alcohol per week. He reports that he does not use drugs. Family History  Brenton SOTOMAYOR family history includes Arthritis in his maternal grandmother; Heart Disease in his father. There is no family history of bicuspid aortic valve, aneurysms, heart transplant, pacemakers, defibrillators, or sudden cardiac death. Past Surgical History   Past Surgical History:   Procedure Laterality Date    AORTIC VALVE REPAIR  09/2020    TAVR    BALLOON ANGIOPLASTY, ARTERY  07/2018    s/p Left CFA, SFA and popliteal intervention    FACIAL COSMETIC SURGERY      as a kid    HAND SURGERY Right     carpal tunnel     OTHER SURGICAL HISTORY  01/05/2021    Left Atrial Appenadage closure/watchman    PTCA  06/2020    stent LAD    TONSILLECTOMY AND ADENOIDECTOMY         Review of Systems   Constitutional: Negative for chills and fever  HENT: Negative for congestion, sinus pressure, sneezing and sore throat. Eyes: Negative for pain, discharge, redness and itching. Respiratory: Negative for apnea, cough  Gastrointestinal: Negative for blood in stool, constipation, diarrhea   Endocrine: Negative for cold intolerance, heat intolerance, polydipsia. Genitourinary: Negative for dysuria, enuresis, flank pain and hematuria.    Musculoskeletal: Negative 0.8 02/12/2021    CALCIUM 9.5 02/12/2021    LABGLOM >90 02/12/2021    GLUCOSE 86 02/12/2021    GLUCOSE 90 04/06/2017       Lab Results   Component Value Date    ALKPHOS 81 01/05/2021    ALT 8 01/05/2021    AST 19 01/05/2021    PROT 7.0 01/05/2021    BILITOT 0.7 01/05/2021    LABALBU 3.8 01/05/2021       No results found for: MG    Lab Results   Component Value Date    INR 2.51 (H) 02/19/2021    INR 1.90 (H) 02/17/2021    INR 1.90 (H) 02/02/2021         Lab Results   Component Value Date    LABA1C 5.4 05/03/2018       Lab Results   Component Value Date    TRIG 48 06/16/2020    HDL 66 06/16/2020    LDLCALC 61 06/16/2020    LDLDIRECT 83 09/30/2014    LABVLDL 17 04/06/2017       Lab Results   Component Value Date    TSH 1.460 03/17/2021         Testing Reviewed:      I have individually reviewed the cardiac test below:    ECHO: Results for orders placed during the hospital encounter of 10/16/20    ECHO Complete 2D W Doppler W Color    Narrative  Transthoracic Echocardiography Report (TTE)    Demographics    Patient Name  Trinity Byrne Gender             Male  L    MR #          [id number]      Race                   Ethnicity    Account #     [de-identified]      Room Number    Accession     [id number]     Date of Study      10/16/2020  Number    Date of Birth 1940     Referring          Ubaldo Shipman MD  Physician          Sandeep Jack MD    Age           68 year(s)     Sonographer        D. Venson Sacks, RDCS,  RDMS, RVT    Interpreting       Sandeep Jack MD  Physician    Procedure    Type of Study    TTE procedure:ECHOCARDIOGRAM COMPLETE 2D W DOPPLER W COLOR. Procedure Date  Date: 10/16/2020 Start: 11:20 AM    Study Location: Echo Lab  Technical Quality: Adequate visualization    Indications:Post TAVR 30 days.     Additional Medical History:chronic obstructive pulmonary disease, TAVR,  hypertension, aortic stenosis, hyperlipidemia, osteoarthritis    Patient Status: Routine    Height: 69 inches Weight: 158 pounds BSA: 1.87 m^2 BMI: 23.33 kg/m^2    BP: 130/72 mmHg    Allergies  - Other allergy:(Keflex). - Other allergy:(Brilinta and Keflex). Conclusions    Summary  Normal left ventricular size and systolic function. There were no regional wall motion abnormalities. Wall thickness was within normal limits. Ejection fraction was estimated at 55-60%. Doppler parameters were consistent with abnormal left ventricular  relaxation (grade 1 diastolic dysfunction). Left atrial size was severely dilated. S/p TAVR. DOPPLER: Transaortic velocity was within the normal range with  no evidence of aortic stenosis (mean 7 mmHg, EOA 1.2 cm2, V max 1.8 m/s). There was trace perivalvular aortic regurgitation. There was trace mitral regurgitation. IVC size is within normal limits with normal respiratory phasic changes. Signature    ----------------------------------------------------------------  Electronically signed by Shauna Andrade MD (Interpreting  physician) on 10/16/2020 at 02:57 PM  ----------------------------------------------------------------    Findings    Mitral Valve  The mitral valve structure was normal with normal leaflet separation. DOPPLER: The transmitral velocity was within the normal range with no  evidence for mitral stenosis. There was trace mitral regurgitation. Aortic Valve  S/p TAVR. DOPPLER: Transaortic velocity was within the normal range with  no evidence of aortic stenosis (mean 7 mmHg, EOA 1.2 cm2, V max 1.8 m/s). There was trace perivalvular aortic regurgitation. Tricuspid Valve  The tricuspid valve structure was normal with normal leaflet separation. DOPPLER: There was no evidence of tricuspid stenosis. There was no  evidence of tricuspid regurgitation. Pulmonic Valve  The pulmonic valve leaflets were not well seen. DOPPLER: The transpulmonic  velocity was within the normal range with no evidence for regurgitation.     Left Atrium  Left atrial size was severely dilated. Left Ventricle  Normal left ventricular size and systolic function. There were no regional wall motion abnormalities. Wall thickness was within normal limits. Ejection fraction was estimated at 55-60%. Doppler parameters were consistent with abnormal left ventricular  relaxation (grade 1 diastolic dysfunction). Right Atrium  Right atrial size was normal.    Right Ventricle  The right ventricular size was normal with normal systolic function and  wall thickness. Pericardial Effusion  The pericardium was normal in appearance with no evidence of a pericardial  effusion. Pleural Effusion  No evidence of pleural effusion. Aorta / Great Vessels  IVC size is within normal limits with normal respiratory phasic changes.     M-Mode/2D Measurements & Calculations    LV Diastolic      LV Systolic Dimension: 3.7 cm    LA Dimension: 4.1 cmLA  Dimension: 5.3 cm LV Volume Diastolic: 957 ml      Area: 22.5 cm^2  LV FS:30.2 %      LV Volume Systolic: 31.8 ml  LV PW Diastolic:  LV EDV/LV EDV Index: 135 ml/72  1.2 cm            m^2LV ESV/LV ESV Index: 76.2  Septum Diastolic: PX/38 m^2                        RV Diastolic Dimension:  1.1 cm            EF Calculated: 57 %              3.3 cm    LA volume/Index: 82.1  LVOT: 2 cm                       ml /44m^2    Doppler Measurements & Calculations    MV Peak E-Wave:     AV Peak Velocity: 176     LVOT Peak Velocity: 61.4  81.4 cm/s           cm/s                      cm/s  MV Peak A-Wave:     AV Peak Gradient: 12.39   LVOT Mean Velocity: 36.4  98.1 cm/s           mmHg                      cm/s  MV E/A Ratio: 0.83  AV Mean Velocity: 124     LVOT Peak Gradient: 2  MV Peak Gradient:   cm/s                      mmHgLVOT Mean Gradient: 1  2.65 mmHg           AV Mean Gradient: 7 mmHg  mmHg  AV VTI: 40 cm  MV Deceleration     AV Area (Continuity):1.17 TV Peak E-Wave: 39.8 cm/s  Time: 271 msec      cm^2                      TV Peak A-Wave: 37.7 cm/s    LVOT VTI: 14.9 cm TV Peak Gradient: 0.63 mmHg  AV P1/2t: 543 msec  IVRT: 225 msec            PV Peak Velocity: 43.7 cm/s  PV Peak Gradient: 0.76 mmHg  MR Velocity: 528  cm/s                AV DVI (VTI): 0.37AV DVI  (Vmax):0.35    [id number]       Assessment/Plan   Paroxysmal non-valvular atrial fibrillation - UNA4XJ4-LNOL score: 5, HASBLED = 4 s/p WATCHMAN  LLE PAD s/p Supera implant 5.5 x 60  S/p TAVR - S329 - +4 cc  S/p LAD PCI, 6/0375  Chronic diastolic CHF, NYHA II  Chronic oxygen use  PEÑA  LLE discomfort at 20 minutes  Diffuse pruritus - family thinks related to meds  Check event monitor, arterial duplex, add Lasix 40 mg q day and KCL supplementation. BMP 1 week. Valve is doing well. No CVA issues. Add Hydroxyzine. May need to stop some of the meds to evaluate allergic issues. Could go to [facility]. He did cardiac rehab. May need pulmonary evaluation. Discussed diet/exercise/BP/weight loss/health lifestyle choices/lipids; the patient understands the goals and will try to comply.     Disposition:  6 months         Electronically signed by Mariza Fitch MD   5/17/2021 at 9:10 AM EDT

## 2021-05-18 ENCOUNTER — TELEPHONE (OUTPATIENT)
Dept: CARDIOLOGY CLINIC | Age: 81
End: 2021-05-18

## 2021-05-18 NOTE — TELEPHONE ENCOUNTER
Prior auth for hydroxyzine 25 mg tablets sent to plan via cover my meds. Key: [id number] - [place] Case ID: [id number]  Determination pending.

## 2021-05-18 NOTE — TELEPHONE ENCOUNTER
Patient's wife Winona Dancer (on hipaa) called over stating that he is needing a refill on his plavix and she didn't think it had been sent with his other prescriptions yesterday. She was advised that it had been refilled by dr Claudell Peters on 02/03/2021 with refills to [facility].  She is going to follow up with the pharmacy and call back if they are unable to fill it

## 2021-05-27 ENCOUNTER — HOSPITAL ENCOUNTER (OUTPATIENT)
Dept: NON INVASIVE DIAGNOSTICS | Age: 81
Discharge: HOME | End: 2021-05-27
Payer: MEDICARE

## 2021-05-27 ENCOUNTER — HOSPITAL ENCOUNTER (OUTPATIENT)
Dept: INTERVENTIONAL RADIOLOGY/VASCULAR | Age: 81
Discharge: HOME | End: 2021-05-27
Payer: MEDICARE

## 2021-05-27 DIAGNOSIS — I73.9 PAD (PERIPHERAL ARTERY DISEASE) (HCC): ICD-10-CM

## 2021-05-27 DIAGNOSIS — I48.91 ATRIAL FIBRILLATION, UNSPECIFIED TYPE (HCC): ICD-10-CM

## 2021-05-27 PROCEDURE — 93270 REMOTE 30 DAY ECG REV/REPORT: CPT

## 2021-05-27 PROCEDURE — 93925 LOWER EXTREMITY STUDY: CPT

## 2021-05-27 NOTE — PROCEDURES
The skin was prepped and a 30 day cardiac event monitor was applied. The patient was instructed on the documentation of symptoms and the purpose of the monitor as well as the things to avoid while wearing the monitor. The patient was instructed to remove and return the monitor on 06/27/2021.   The serial number of the monitor that was applied is [id number]

## 2021-05-28 ENCOUNTER — TELEPHONE (OUTPATIENT)
Dept: CARDIOLOGY CLINIC | Age: 81
End: 2021-05-28

## 2021-06-07 NOTE — TELEPHONE ENCOUNTER
Talked to Cherise Burns on [employer]. She states he had been on Hydroxyzine in the past which really helped with his itching. Dr. Ekaterina Hutchins last note states he could go to dermatology-never has. Dr. Nadine Becerra last prescribed Hydroxyzine 25mg TID. Okay to refill? ?

## 2021-06-07 NOTE — TELEPHONE ENCOUNTER
Patients wife Rajesh Dennis (on hippa) calling in for patient who was last seen 5/17/2021 by Dr Carlos Salter. She said within the past month or so Dr Carlos Salter prescribed the patient something for itching and they are asking for a refill?  Pharmacy is [facility], please call them to advise

## 2021-06-10 RX ORDER — HYDROXYZINE HYDROCHLORIDE 25 MG/1
25 TABLET, FILM COATED ORAL EVERY 8 HOURS PRN
Qty: 30 TABLET | Refills: 1 | OUTPATIENT
Start: 2021-06-10 | End: 2021-08-05 | Stop reason: SDUPTHER

## 2021-06-11 ENCOUNTER — TELEPHONE (OUTPATIENT)
Dept: CARDIOLOGY CLINIC | Age: 81
End: 2021-06-11

## 2021-06-11 DIAGNOSIS — Z95.2 HISTORY OF TRANSCATHETER AORTIC VALVE REPLACEMENT (TAVR): Primary | ICD-10-CM

## 2021-06-11 NOTE — TELEPHONE ENCOUNTER
Orders received from Dr. Anais Deng to schedule the patient for a CBC, BMP and ECHO prior to the 1 year post TAVR follow up appointment.

## 2021-07-03 NOTE — PROCEDURES
[address] [place] [address]                                 EVENT MONITOR    PATIENT NAME: Fracisco Marie                   :        1940  MED REC NO:   [id number]                           ROOM:  ACCOUNT NO:   [de-identified]                           ADMIT DATE: 2021  PROVIDER:     Kd Kern MD    TEST TYPE:  30-day event monitor. MONITORING PERIOD:  2021 to 2021. INDICATION:  Atrial fibrillation. FINDINGS:  Baseline rhythm is sinus rhythm with first-degree AV block  with intermittent PVCs and a heart rate of 82 beats per minute. No  findings of atrial fibrillation or flutter were noted. No findings of  high-grade AV block, nonsustained VT or VF were noted. SUMMARY:  Sinus rhythm, first-degree AV block.         Vicente Booker MD    D: 2021 15:30:18       T: 2021 22:31:15     JORGE ALBERTO/[email or web address]  Job#: [id number]     Doc#: [id number]    CC:

## 2021-07-08 ENCOUNTER — TELEPHONE (OUTPATIENT)
Dept: CARDIOLOGY CLINIC | Age: 81
End: 2021-07-08

## 2021-07-08 DIAGNOSIS — I73.9 PAD (PERIPHERAL ARTERY DISEASE) (HCC): ICD-10-CM

## 2021-07-08 DIAGNOSIS — I73.9 CLAUDICATION (HCC): ICD-10-CM

## 2021-07-08 DIAGNOSIS — R93.89 ABNORMAL ULTRASOUND: Primary | ICD-10-CM

## 2021-07-08 DIAGNOSIS — I10 ESSENTIAL HYPERTENSION: ICD-10-CM

## 2021-07-08 NOTE — TELEPHONE ENCOUNTER
Result note from vascular ultrasound in May, 2021-  His left leg is occluded   He was walking and getting by reasonably  If he wants to wait he can, otherwise if the pain is bothersome, we can proceed with angiogram    Patient wants to proceed with angiogram.   Order given to scheduling.

## 2021-07-08 NOTE — TELEPHONE ENCOUNTER
Patient's wife notified event monitor was okay. She states he has intermittent shortness of breath but sees pulmonary and states they will follow-up with her.

## 2021-07-09 NOTE — TELEPHONE ENCOUNTER
Prudence angio scheduled on 8/10/21 at 9:00 am, arrival time of 7:00 am. Patient to hold both water pills on date of procedure, take all other meds as usual. Case scheduled with Patrice Toth in IR. Spoke with patient's wife to relay all scheduling details, instructions also mailed to patient this date. Wife verbalized understanding of all instructions.

## 2021-07-15 DIAGNOSIS — L50.9 URTICARIA: ICD-10-CM

## 2021-07-15 DIAGNOSIS — L20.89 OTHER ATOPIC DERMATITIS: ICD-10-CM

## 2021-07-15 RX ORDER — HYDROXYZINE HYDROCHLORIDE 10 MG/1
10 TABLET, FILM COATED ORAL 4 TIMES DAILY PRN
Qty: 30 TABLET | Refills: 0 | Status: SHIPPED | OUTPATIENT
Start: 2021-07-15 | End: 2021-07-25

## 2021-07-15 NOTE — TELEPHONE ENCOUNTER
Carli Bass called requesting a refill on the following medications:  Requested Prescriptions     Pending Prescriptions Disp Refills    hydrOXYzine (ATARAX) 10 MG tablet 30 tablet 0     Sig: Take 1 tablet by mouth 4 times daily as needed for Itching       Date of last visit: 4/20/2021  Date of next visit (if applicable):Visit date not found  Date of last refill: 05/05/21  Pharmacy Name: Renetta Valencia LPN

## 2021-08-03 ENCOUNTER — TELEPHONE (OUTPATIENT)
Dept: CARDIOLOGY CLINIC | Age: 81
End: 2021-08-03

## 2021-08-03 ENCOUNTER — PRE-PROCEDURE TELEPHONE (OUTPATIENT)
Dept: INPATIENT UNIT | Age: 81
End: 2021-08-03

## 2021-08-05 RX ORDER — HYDROXYZINE HYDROCHLORIDE 25 MG/1
25 TABLET, FILM COATED ORAL EVERY 8 HOURS PRN
Qty: 30 TABLET | Refills: 1 | Status: SHIPPED | OUTPATIENT
Start: 2021-08-05 | End: 2021-09-09 | Stop reason: SDUPTHER

## 2021-08-05 NOTE — TELEPHONE ENCOUNTER
Kiko Martines called requesting a refill on the following medications:  Requested Prescriptions     Pending Prescriptions Disp Refills    hydrOXYzine (ATARAX) 25 MG tablet 30 tablet 1     Sig: Take 1 tablet by mouth every 8 hours as needed for Itching       Date of last visit: 4/20/2021  Date of next visit (if applicable):Visit date not found  Date of last refill: 06/10/21  Pharmacy Name: Yanet Valencia LPN

## 2021-08-09 ENCOUNTER — TELEPHONE (OUTPATIENT)
Dept: CARDIOLOGY CLINIC | Age: 81
End: 2021-08-09

## 2021-08-10 ENCOUNTER — HOSPITAL ENCOUNTER (OUTPATIENT)
Dept: INTERVENTIONAL RADIOLOGY/VASCULAR | Age: 81
Discharge: HOME | End: 2021-08-11
Attending: INTERNAL MEDICINE | Admitting: INTERNAL MEDICINE
Payer: MEDICARE

## 2021-08-10 DIAGNOSIS — I10 ESSENTIAL HYPERTENSION: ICD-10-CM

## 2021-08-10 DIAGNOSIS — I73.9 PAD (PERIPHERAL ARTERY DISEASE) (HCC): ICD-10-CM

## 2021-08-10 DIAGNOSIS — I73.9 CLAUDICATION (HCC): ICD-10-CM

## 2021-08-10 DIAGNOSIS — R93.89 ABNORMAL ULTRASOUND: ICD-10-CM

## 2021-08-10 LAB
ABO: NORMAL
ACTIVATED CLOTTING TIME: 202 SECONDS (ref 1–150)
ANION GAP SERPL CALCULATED.3IONS-SCNC: 11 MEQ/L (ref 8–16)
ANTIBODY SCREEN: NORMAL
BUN BLDV-MCNC: 16 MG/DL (ref 7–22)
CALCIUM SERPL-MCNC: 9.4 MG/DL (ref 8.5–10.5)
CHLORIDE BLD-SCNC: 100 MEQ/L (ref 98–111)
CHOLESTEROL, TOTAL: 137 MG/DL (ref 100–199)
CO2: 26 MEQ/L (ref 23–33)
CREAT SERPL-MCNC: 1 MG/DL (ref 0.4–1.2)
ERYTHROCYTE [DISTWIDTH] IN BLOOD BY AUTOMATED COUNT: 15.2 % (ref 11.5–14.5)
ERYTHROCYTE [DISTWIDTH] IN BLOOD BY AUTOMATED COUNT: 45.1 FL (ref 35–45)
GFR SERPL CREATININE-BSD FRML MDRD: 72 ML/MIN/1.73M2
GLUCOSE BLD-MCNC: 111 MG/DL (ref 70–108)
HCT VFR BLD CALC: 38 % (ref 42–52)
HDLC SERPL-MCNC: 50 MG/DL
HEMOGLOBIN: 11.4 GM/DL (ref 14–18)
INR BLD: 1.02 (ref 0.85–1.13)
LDL CHOLESTEROL CALCULATED: 69 MG/DL
MCH RBC QN AUTO: 24.6 PG (ref 26–33)
MCHC RBC AUTO-ENTMCNC: 30 GM/DL (ref 32.2–35.5)
MCV RBC AUTO: 81.9 FL (ref 80–94)
PLATELET # BLD: 304 THOU/MM3 (ref 130–400)
PMV BLD AUTO: 9.7 FL (ref 9.4–12.4)
POTASSIUM REFLEX MAGNESIUM: 4.3 MEQ/L (ref 3.5–5.2)
RBC # BLD: 4.64 MILL/MM3 (ref 4.7–6.1)
RH FACTOR: NORMAL
SODIUM BLD-SCNC: 137 MEQ/L (ref 135–145)
TRIGL SERPL-MCNC: 88 MG/DL (ref 0–199)
WBC # BLD: 9.4 THOU/MM3 (ref 4.8–10.8)

## 2021-08-10 PROCEDURE — C1887 CATHETER, GUIDING: HCPCS

## 2021-08-10 PROCEDURE — 2580000003 HC RX 258: Performed by: INTERNAL MEDICINE

## 2021-08-10 PROCEDURE — 6370000000 HC RX 637 (ALT 250 FOR IP): Performed by: INTERNAL MEDICINE

## 2021-08-10 PROCEDURE — 86900 BLOOD TYPING SEROLOGIC ABO: CPT

## 2021-08-10 PROCEDURE — 37224 HC PLASTY UNI FEMPOP: CPT | Performed by: INTERNAL MEDICINE

## 2021-08-10 PROCEDURE — 85610 PROTHROMBIN TIME: CPT

## 2021-08-10 PROCEDURE — 6360000004 HC RX CONTRAST MEDICATION: Performed by: INTERNAL MEDICINE

## 2021-08-10 PROCEDURE — 86901 BLOOD TYPING SEROLOGIC RH(D): CPT

## 2021-08-10 PROCEDURE — 36415 COLL VENOUS BLD VENIPUNCTURE: CPT

## 2021-08-10 PROCEDURE — 85347 COAGULATION TIME ACTIVATED: CPT

## 2021-08-10 PROCEDURE — 86850 RBC ANTIBODY SCREEN: CPT

## 2021-08-10 PROCEDURE — 80061 LIPID PANEL: CPT

## 2021-08-10 PROCEDURE — 75625 CONTRAST EXAM ABDOMINL AORTA: CPT | Performed by: INTERNAL MEDICINE

## 2021-08-10 PROCEDURE — 75630 X-RAY AORTA LEG ARTERIES: CPT | Performed by: INTERNAL MEDICINE

## 2021-08-10 PROCEDURE — 75716 ARTERY X-RAYS ARMS/LEGS: CPT | Performed by: INTERNAL MEDICINE

## 2021-08-10 PROCEDURE — 6360000002 HC RX W HCPCS: Performed by: INTERNAL MEDICINE

## 2021-08-10 PROCEDURE — 80048 BASIC METABOLIC PNL TOTAL CA: CPT

## 2021-08-10 PROCEDURE — 85027 COMPLETE CBC AUTOMATED: CPT

## 2021-08-10 PROCEDURE — 75774 ARTERY X-RAY EACH VESSEL: CPT | Performed by: INTERNAL MEDICINE

## 2021-08-10 RX ORDER — HEPARIN SODIUM 1000 [USP'U]/ML
8000 INJECTION, SOLUTION INTRAVENOUS; SUBCUTANEOUS ONCE
Status: COMPLETED | OUTPATIENT
Start: 2021-08-10 | End: 2021-08-10

## 2021-08-10 RX ORDER — POTASSIUM CHLORIDE 20 MEQ/1
20 TABLET, EXTENDED RELEASE ORAL DAILY
Status: DISCONTINUED | OUTPATIENT
Start: 2021-08-11 | End: 2021-08-11 | Stop reason: HOSPADM

## 2021-08-10 RX ORDER — CARVEDILOL 6.25 MG/1
6.25 TABLET ORAL 2 TIMES DAILY WITH MEALS
Status: DISCONTINUED | OUTPATIENT
Start: 2021-08-10 | End: 2021-08-11 | Stop reason: HOSPADM

## 2021-08-10 RX ORDER — MIDAZOLAM HYDROCHLORIDE 1 MG/ML
1 INJECTION INTRAMUSCULAR; INTRAVENOUS ONCE
Status: COMPLETED | OUTPATIENT
Start: 2021-08-10 | End: 2021-08-10

## 2021-08-10 RX ORDER — SODIUM CHLORIDE 0.9 % (FLUSH) 0.9 %
5-40 SYRINGE (ML) INJECTION PRN
Status: DISCONTINUED | OUTPATIENT
Start: 2021-08-10 | End: 2021-08-11 | Stop reason: HOSPADM

## 2021-08-10 RX ORDER — SODIUM CHLORIDE 0.9 % (FLUSH) 0.9 %
5-40 SYRINGE (ML) INJECTION PRN
Status: DISCONTINUED | OUTPATIENT
Start: 2021-08-10 | End: 2021-08-10 | Stop reason: SDUPTHER

## 2021-08-10 RX ORDER — ASPIRIN 81 MG/1
81 TABLET ORAL DAILY
COMMUNITY

## 2021-08-10 RX ORDER — FUROSEMIDE 40 MG/1
40 TABLET ORAL DAILY
Status: DISCONTINUED | OUTPATIENT
Start: 2021-08-10 | End: 2021-08-11 | Stop reason: HOSPADM

## 2021-08-10 RX ORDER — SODIUM CHLORIDE 9 MG/ML
25 INJECTION, SOLUTION INTRAVENOUS PRN
Status: DISCONTINUED | OUTPATIENT
Start: 2021-08-10 | End: 2021-08-11 | Stop reason: HOSPADM

## 2021-08-10 RX ORDER — ACETAMINOPHEN 325 MG/1
650 TABLET ORAL EVERY 4 HOURS PRN
Status: DISCONTINUED | OUTPATIENT
Start: 2021-08-10 | End: 2021-08-11 | Stop reason: HOSPADM

## 2021-08-10 RX ORDER — FENTANYL CITRATE 50 UG/ML
50 INJECTION, SOLUTION INTRAMUSCULAR; INTRAVENOUS ONCE
Status: COMPLETED | OUTPATIENT
Start: 2021-08-10 | End: 2021-08-10

## 2021-08-10 RX ORDER — CLOPIDOGREL BISULFATE 75 MG/1
75 TABLET ORAL DAILY
Status: DISCONTINUED | OUTPATIENT
Start: 2021-08-10 | End: 2021-08-10 | Stop reason: SDUPTHER

## 2021-08-10 RX ORDER — BACITRACIN, NEOMYCIN, POLYMYXIN B 400; 3.5; 5 [USP'U]/G; MG/G; [USP'U]/G
OINTMENT TOPICAL ONCE
Status: COMPLETED | OUTPATIENT
Start: 2021-08-10 | End: 2021-08-10

## 2021-08-10 RX ORDER — ASPIRIN 81 MG/1
81 TABLET ORAL DAILY
Status: DISCONTINUED | OUTPATIENT
Start: 2021-08-11 | End: 2021-08-11 | Stop reason: HOSPADM

## 2021-08-10 RX ORDER — CLOPIDOGREL BISULFATE 75 MG/1
75 TABLET ORAL DAILY
Status: DISCONTINUED | OUTPATIENT
Start: 2021-08-11 | End: 2021-08-11 | Stop reason: HOSPADM

## 2021-08-10 RX ORDER — HEPARIN SODIUM 1000 [USP'U]/ML
3000 INJECTION, SOLUTION INTRAVENOUS; SUBCUTANEOUS ONCE
Status: COMPLETED | OUTPATIENT
Start: 2021-08-10 | End: 2021-08-10

## 2021-08-10 RX ORDER — HYDROXYZINE HYDROCHLORIDE 25 MG/1
25 TABLET, FILM COATED ORAL EVERY 8 HOURS PRN
Status: DISCONTINUED | OUTPATIENT
Start: 2021-08-10 | End: 2021-08-11 | Stop reason: HOSPADM

## 2021-08-10 RX ORDER — SODIUM CHLORIDE 0.9 % (FLUSH) 0.9 %
5-40 SYRINGE (ML) INJECTION EVERY 12 HOURS SCHEDULED
Status: DISCONTINUED | OUTPATIENT
Start: 2021-08-10 | End: 2021-08-11 | Stop reason: HOSPADM

## 2021-08-10 RX ORDER — SODIUM CHLORIDE 0.9 % (FLUSH) 0.9 %
5-40 SYRINGE (ML) INJECTION EVERY 12 HOURS SCHEDULED
Status: DISCONTINUED | OUTPATIENT
Start: 2021-08-10 | End: 2021-08-10 | Stop reason: SDUPTHER

## 2021-08-10 RX ORDER — ASPIRIN 81 MG/1
81 TABLET, CHEWABLE ORAL DAILY
Status: DISCONTINUED | OUTPATIENT
Start: 2021-08-11 | End: 2021-08-10 | Stop reason: SDUPTHER

## 2021-08-10 RX ORDER — SODIUM CHLORIDE 9 MG/ML
INJECTION, SOLUTION INTRAVENOUS CONTINUOUS
Status: DISCONTINUED | OUTPATIENT
Start: 2021-08-10 | End: 2021-08-11 | Stop reason: HOSPADM

## 2021-08-10 RX ORDER — SODIUM CHLORIDE 9 MG/ML
25 INJECTION, SOLUTION INTRAVENOUS PRN
Status: DISCONTINUED | OUTPATIENT
Start: 2021-08-10 | End: 2021-08-10 | Stop reason: SDUPTHER

## 2021-08-10 RX ADMIN — MIDAZOLAM 1 MG: 1 INJECTION INTRAMUSCULAR; INTRAVENOUS at 11:58

## 2021-08-10 RX ADMIN — CARVEDILOL 6.25 MG: 6.25 TABLET ORAL at 20:55

## 2021-08-10 RX ADMIN — FENTANYL CITRATE 50 MCG: 50 INJECTION INTRAMUSCULAR; INTRAVENOUS at 11:59

## 2021-08-10 RX ADMIN — BACITRACIN, NEOMYCIN, POLYMYXIN B 0.9 G: 400; 3.5; 5 OINTMENT TOPICAL at 12:55

## 2021-08-10 RX ADMIN — HEPARIN SODIUM 8000 UNITS: 1000 INJECTION, SOLUTION INTRAVENOUS; SUBCUTANEOUS at 12:12

## 2021-08-10 RX ADMIN — HEPARIN SODIUM 3000 UNITS: 1000 INJECTION, SOLUTION INTRAVENOUS; SUBCUTANEOUS at 12:47

## 2021-08-10 RX ADMIN — HYDROXYZINE HYDROCHLORIDE 25 MG: 25 TABLET, FILM COATED ORAL at 20:57

## 2021-08-10 RX ADMIN — SODIUM CHLORIDE, PRESERVATIVE FREE 10 ML: 5 INJECTION INTRAVENOUS at 20:55

## 2021-08-10 RX ADMIN — IOPAMIDOL 110 ML: 612 INJECTION, SOLUTION INTRAVENOUS at 12:58

## 2021-08-10 RX ADMIN — MIDAZOLAM 1 MG: 1 INJECTION INTRAMUSCULAR; INTRAVENOUS at 12:19

## 2021-08-10 RX ADMIN — SODIUM CHLORIDE: 9 INJECTION, SOLUTION INTRAVENOUS at 09:24

## 2021-08-10 RX ADMIN — FENTANYL CITRATE 50 MCG: 50 INJECTION INTRAMUSCULAR; INTRAVENOUS at 12:20

## 2021-08-10 ASSESSMENT — PAIN SCALES - GENERAL
PAINLEVEL_OUTOF10: 0
PAINLEVEL_OUTOF10: 0
PAINLEVEL_OUTOF10: 4
PAINLEVEL_OUTOF10: 0

## 2021-08-10 NOTE — PROGRESS NOTES
0 Pt in specials radiology for abdominal aortogram with bilateral femoral runoffs. Explained procedure to pt and pt verbalizes understanding. Consent signed. [facility] and attached to monitor. [address] [place] Yong Alvarez to speak to pt.  8852 Right foot and groin prepped and draped. 1201 5 fr sheath inserted in right femoral artery. 1209 SPO2 88%. O2 2 liters per nasal cannula applied. [address] for intervention. 1216 Angioplasty of left SFA with 6 x 200 [place] 35 balloon. 1221 Angioplasty of left distal SFA with 6 x 150 IN. PACT Admiral balloon. 1225 Angioplasty of left proximal SFA with 6 x 150 IN. PACT Admiral balloon. 1244 Everflex stent 7 mm x 120 mm deployed in left proximal SFA per Dr Yong Alvraez. 1245 . Dr Yong Alvarez informed. Order received. 1251 Procedure complete. Prepping for closure of artery. 1255 Pro-Glide closure of right femoral artery complete. Site without redness, swelling or hematoma. Quick clot with 4 x 4 and op-site dressing applied. 1304 Pt positioned on bed for comfort. Transferred to  per bed and report called to [facility].

## 2021-08-10 NOTE — FLOWSHEET NOTE
Received in Cath Recovery. Report received from [facility]. Right femoral dressing is saturated with blood. No hematoma or swelling, area soft. Dressing removed and quickclot patch and pressure applied to site and held pressure for 20 minutes  IV 1000 0.9 NS infusing at 75 ml per hour in right forearm. Denies pain or discomfort. Monitor showing sinus bradycardia. See Post Cath Assessment flowsheet.   Wife in to see

## 2021-08-10 NOTE — PROGRESS NOTES
Pt admitted to  [facility] 1938 ambulatory for peripheral angiogram .  Pt NPO. Patient accompanied by spouse, [address] [facility]. Vital signs obtained. Assessment and data collection initiated. Oriented to room. Policies and procedures for 2E explained   All questions answered with no further questions at this time. Fall prevention and safety precautions discussed with patient. Care plan reviewed with patient and spouse. Patient and spouse verbalize understanding of the plan of care and contribute to goal setting.   States \"left leg cramps on him after walking approx 0.5 mile\"  1130 to procedure per bed

## 2021-08-10 NOTE — PROGRESS NOTES
Assumed care of patient. Fem-stop to right groin intact. Small area blood noted to dressing. Remains soft. Denies any needs at present. Fem-stop loosened slightly.

## 2021-08-10 NOTE — BRIEF OP NOTE
[address] [address]  Sedation/Analgesia Post Sedation Record    Pt Name: Ana Chavez  Account number: [de-identified]  MRN: [id number]  YOB: 1940  Procedure Performed By: MD MD Irma Coley, [address]  Primary Care Physician: Shilo Michaud MD  Date: 8/10/2021    POST-PROCEDURE    Physicians/Assistants: MD MD Irma Coley RPVI    Procedure Performed:Peripheral Angiogram/Intervention      Sedation/Anesthesia: Versed/ Fentanyl and 2% xylocaine local anesthesia. Estimated Blood Loss: < 50 ml. Specimens Removed: None         Disposition of Specimen: N/A        Complications: No Immediate Complications.        Post-procedure Diagnosis/Findings:       L SFA  - s/p 6.0 DCB and 7.0 x 120 Everflex  DAPT/Xarelto 2.5 mg BID         MD MD Irma Coley RPVI  Electronically signed 8/10/2021 at 12:56 PM  Interventional Cardiology

## 2021-08-10 NOTE — FLOWSHEET NOTE
Dr Shayna Browning in to see pt and wife   Site assessed per Dr Al Media released from [facility] with no new bleeding.  Site soft with no hematoma

## 2021-08-10 NOTE — H&P
[address] [address]  Sedation/Analgesia History & Physical    Pt Name: Montez Oleary  Account number: [de-identified]  MRN: [id number]  YOB: 1940  Provider Performing Procedure: Boy Mo MD MD  Referring Provider: Boy Mo MD   Primary Care Physician: Pretty Patel MD  Date: 8/10/2021    PRE-PROCEDURE    Code Status: FULL CODE  Brief History/Pre-Procedure Diagnosis:   Severe LLE  PAD, symptomatic      Consent: : I have discussed with the patient risks, benefits, and alternatives (and relevant risks, benefits, and side effects related to alternatives or not receiving care), and likelihood of the success. The patient and/or representative appear to understand and agree to proceed. The discussion encompasses risks, benefits, and side effects related to the alternatives and the risks related to not receiving the proposed care, treatment, and services. The indication, risks and benefits of the procedure and possible therapeutic consequences and alternatives were discussed with the patient. The patient was given the opportunity to ask questions and to have them answered to his/her satisfaction. Risks of the procedure include but are not limited to the following: Bleeding, hematoma including retroperitoneal hemmorhage, infection, pain and discomfort, injury to the aorta and other blood vessels, rhythm disturbance, low blood pressure, myocardial infarction, stroke, kidney damage/failure, myocardial perforation, allergic reactions to sedatives/contrast material, loss of pulse/vascular compromise requiring surgery, aneurysm/pseudoaneurysm formation, possible loss of a limb/hand/leg, needing blood transfusion, requiring emergent open heart surgery or emergent coronary intervention, the need for intubation/respiratory support, the requirement for defibrillation/cardioversion, and death. Alternatives to and omission of the suggested procedure were discussed.  The patient had no further questions and wished to proceed; the consent form was signed. MEDICAL HISTORY  [x]ASHD/ANGINA/MI/CHF   [x]Hypertension  []Diabetes  [x]Hyperlipidemia  []Smoking  []Family Hx of ASHD  [x]Additional information:       has a past medical history of ASCVD (arteriosclerotic cardiovascular disease), Atrial fibrillation ([facility] [address] CAD (coronary artery disease), Cerebral artery occlusion with cerebral infarction ([facility] [address] COPD (chronic obstructive pulmonary disease) ([facility] [address] Dysplasia of vocal cord, Elevated glucose, Hyperlipidemia, Hypertension, Nocturnal hypoxia, Osteoarthritis, Osteopenia, Osteopenia, PAD (peripheral artery disease) ([facility] [address] Polio, and Rheumatic fever. 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   Additional information:       ALLERGIES   Allergies as of 08/10/2021 - Fully Reviewed 08/10/2021   Allergen Reaction Noted    Brilinta [ticagrelor] Shortness Of Breath 07/29/2020    Keflex [cephalexin] Rash 09/29/2014     Additional information:       MEDICATIONS   Aspirin  [x] 81 mg  [] 325 mg  [] None  Antiplatelet drug therapy use last 5 days  []No [x]Yes  Coumadin Use Last 5 Days [x]No []Yes  Other anticoagulant use last 5 days  [x]No []Yes    Current Facility-Administered Medications:     0.9 % sodium chloride infusion, , Intravenous, Continuous, [address], MD, Last Rate: 75 mL/hr at 08/10/21 0924, New Bag at 08/10/21 0924    sodium chloride flush 0.9 % injection 5-40 mL, 5-40 mL, Intravenous, 2 times per day, [address], MD    sodium chloride flush 0.9 % injection 5-40 mL, 5-40 mL, Intravenous, PRN, [address], MD    0.9 % sodium chloride infusion, 25 mL, Intravenous, PRN, [address], MD  Prior to Admission medications    Medication Sig Start Date End Date Taking? Authorizing Provider   aspirin 81 MG EC tablet Take 81 mg by mouth daily   Yes Historical Provider, MD   hydrOXYzine (ATARAX) 25 MG tablet Take 1 tablet by mouth every 8 hours as needed for Itching 8/5/21 8/15/21 Yes Gordon Monahan MD   carvedilol (COREG) 6.25 MG tablet Take 1 tablet by mouth 2 times daily (with meals) 5/17/21  Yes Jay Jay Oconnor MD   furosemide (LASIX) 40 MG tablet Take 1 tablet by mouth daily 5/17/21  Yes Jay Jay Oconnor MD   potassium chloride (KLOR-CON M) 20 MEQ extended release tablet Take 1 tablet by mouth daily 5/17/21  Yes Jay Jay Oconnor MD   diclofenac (VOLTAREN) 75 MG EC tablet Take 1 tablet by mouth 2 times daily as needed for Pain 4/20/21  Yes Gordon Monahan MD   lovastatin (MEVACOR) 20 MG tablet Take 1 tablet by mouth daily 4/20/21  Yes Gordon Monahan MD   clopidogrel (PLAVIX) 75 MG tablet Take 1 tablet by mouth daily 2/3/21  Yes Jay Jay Oconnor MD   umeclidinium-vilanterol [facility] ELLIPTA) 62.5-25 MCG/INH AEPB inhaler Inhale 1 puff into the lungs daily 11/3/20  Yes LEWIS Fuentes CNP   OXYGEN Please provide patient with 3LPM of oxygen at night time for nocturnal hypoxia. Patient to have repeat pulse oximetry testing done on oxygen 10/20/20   LEWIS Fuentes CNP   nitroGLYCERIN (NITROSTAT) 0.4 MG SL tablet up to max of 3 total doses.  If no relief after 1 dose, call 911. 6/17/20   Nayely Walker MD     Additional information:       VITAL SIGNS   Vitals:    08/10/21 0945   BP: (!) 154/62   Pulse: 57   Resp: 18   Temp: 97.6 °F (36.4 °C)   SpO2: 97%       PHYSICAL:   General: No acute distress  HEENT:  Unremarkable for age  Neck: without increased JVD, carotid pulses 2+ bilaterally without bruits  Heart: RRR, S1 & S2 WNL, S4 gallop, without murmurs or rubs   NYHA: 2  Lungs: Clear to auscultation    Abdomen: BS present, without HSM, masses, or tenderness    Extremities: without C,C,E.  Pulses 1+ bilaterally  Mental Status: Alert & Oriented        PLANNED PROCEDURE   []Cath  []PCI                []Pacemaker/AICD  []PEGGY             []Cardioversion [x]Peripheral angiography/PTA  []Other:      SEDATION  Planned agent:[x]Midazolam []Meperidine [x]Sublimaze []Morphine  []Diazepam  []Other:     ASA Classification:  []1 []2 [x]3 []4 []5  Class 1: A normal healthy patient  Class 2: Pt with mild to moderate systemic disease  Class 3: Severe systemic disease or disturbance  Class 4: Severe systemic disorders that are already life threatening. Class 5: Moribund pt with little chances of survival, for more than 24 hours. Mallampati I Airway Classification:   []1 [x]2 []3 []4    [x]Pre-procedure diagnostic studies complete and results available. Comment:    [x]Previous sedation/anesthesia experiences assessed. Comment:    [x]The patient is an appropriate candidate to undergo the planned procedure sedation and anesthesia. (Refer to nursing sedation/analgesia documentation record)  [x]Formulation and discussion of sedation/procedure plan, risks, and expectations with patient and/or responsible adult completed. [x]Patient examined immediately prior to the procedure.  (Refer to nursing sedation/analgesia documentation record)    Gini Obrien MD MD   Electronically signed 8/10/2021 at 11:27 AM

## 2021-08-10 NOTE — FLOWSHEET NOTE
femo stop applied to site per MIKE Charles from cath lab   49mmHg   Dressing is dry and intact, site has no s/s of bleeding or swelling  Pt tolerating well   Dr Isael Vargas called condition report

## 2021-08-11 ENCOUNTER — TELEPHONE (OUTPATIENT)
Dept: CARDIOLOGY CLINIC | Age: 81
End: 2021-08-11

## 2021-08-11 VITALS
SYSTOLIC BLOOD PRESSURE: 136 MMHG | OXYGEN SATURATION: 94 % | HEIGHT: 68 IN | HEART RATE: 69 BPM | DIASTOLIC BLOOD PRESSURE: 60 MMHG | WEIGHT: 160 LBS | TEMPERATURE: 97.6 F | BODY MASS INDEX: 24.25 KG/M2 | RESPIRATION RATE: 16 BRPM

## 2021-08-11 LAB
ANION GAP SERPL CALCULATED.3IONS-SCNC: 11 MEQ/L (ref 8–16)
BUN BLDV-MCNC: 15 MG/DL (ref 7–22)
CALCIUM SERPL-MCNC: 9 MG/DL (ref 8.5–10.5)
CHLORIDE BLD-SCNC: 101 MEQ/L (ref 98–111)
CO2: 24 MEQ/L (ref 23–33)
CREAT SERPL-MCNC: 0.9 MG/DL (ref 0.4–1.2)
GFR SERPL CREATININE-BSD FRML MDRD: 81 ML/MIN/1.73M2
GLUCOSE BLD-MCNC: 99 MG/DL (ref 70–108)
POTASSIUM SERPL-SCNC: 4.7 MEQ/L (ref 3.5–5.2)
SODIUM BLD-SCNC: 136 MEQ/L (ref 135–145)

## 2021-08-11 PROCEDURE — 36415 COLL VENOUS BLD VENIPUNCTURE: CPT

## 2021-08-11 PROCEDURE — 6370000000 HC RX 637 (ALT 250 FOR IP): Performed by: INTERNAL MEDICINE

## 2021-08-11 PROCEDURE — 80048 BASIC METABOLIC PNL TOTAL CA: CPT

## 2021-08-11 PROCEDURE — 37226 PR REVSC OPN/PRQ FEM/POP W/STNT/ANGIOP SM VSL: CPT | Performed by: INTERNAL MEDICINE

## 2021-08-11 PROCEDURE — 99232 SBSQ HOSP IP/OBS MODERATE 35: CPT | Performed by: NURSE PRACTITIONER

## 2021-08-11 RX ADMIN — CLOPIDOGREL BISULFATE 75 MG: 75 TABLET ORAL at 08:37

## 2021-08-11 RX ADMIN — CARVEDILOL 6.25 MG: 6.25 TABLET ORAL at 08:37

## 2021-08-11 RX ADMIN — RIVAROXABAN 2.5 MG: 2.5 TABLET, FILM COATED ORAL at 08:40

## 2021-08-11 RX ADMIN — FUROSEMIDE 40 MG: 40 TABLET ORAL at 08:37

## 2021-08-11 RX ADMIN — POTASSIUM CHLORIDE 20 MEQ: 20 TABLET, EXTENDED RELEASE ORAL at 08:37

## 2021-08-11 RX ADMIN — ASPIRIN 81 MG: 81 TABLET ORAL at 08:37

## 2021-08-11 ASSESSMENT — PAIN SCALES - GENERAL: PAINLEVEL_OUTOF10: 0

## 2021-08-11 NOTE — PLAN OF CARE
Problem: Discharge Planning:  Goal: Discharged to appropriate level of care  Description: Discharged to appropriate level of care  Outcome: Ongoing  Note: Pt will discharge home when appropriate. Problem: Pain:  Goal: Pain level will decrease  Description: Pain level will decrease  Outcome: Ongoing  Note: Pt denies pain on my shift. Non pharmaceutical interventions like ice and repositioning offered before pain medications. Will continue to assess. Goal: Control of acute pain  Description: Control of acute pain  Outcome: Ongoing  Note: Pt denies pain on my shift. Non pharmaceutical interventions like ice and repositioning offered before pain medications. Will continue to assess. Goal: Control of chronic pain  Description: Control of chronic pain  Outcome: Ongoing  Note: Pt denies pain on my shift. Non pharmaceutical interventions like ice and repositioning offered before pain medications. Will continue to assess. Problem: Falls - Risk of:  Goal: Will remain free from falls  Description: Will remain free from falls  Outcome: Ongoing  Note: Fall precaution in place. Bed alarm/chair alarm. Bed locked in lowest position. Fall band applied if applicable. Call light and overhead table within reach. Will continue to monitor. Goal: Absence of physical injury  Description: Absence of physical injury  Outcome: Ongoing  Note: Absence of physical injury. Problem: Cardiac:  Goal: Ability to maintain an adequate cardiac output will improve  Description: Ability to maintain an adequate cardiac output will improve  Outcome: Ongoing  Note: Pt vitals are stable on my shift. Will continue to assess.     Goal: Complications related to the disease process, condition or treatment will be avoided or minimized  Description: Complications related to the disease process, condition or treatment will be avoided or minimized  Outcome: Ongoing  Note: Complications related to the disease process, condition or treatment avoided on my shift. Goal: Hemodynamic stability will improve  Description: Hemodynamic stability will improve  Outcome: Ongoing  Note: Pt vitals are within normal range. Cap refills and pulses all within normal ranges on all extremities. Will continue to assess. Goal: Risk factors for ineffective tissue perfusion will decrease  Description: Risk factors for ineffective tissue perfusion will decrease  Outcome: Ongoing   Pt verbalizes understanding of care plan. Pt contributes to goal settings.

## 2021-08-11 NOTE — PROGRESS NOTES
Discharge teaching and instructions for diagnosis/procedure of angiogram completed with patient using teach back method. AVS reviewed . Printed prescriptions, follow up appointments and care of self at home.  Discharge in a wheelchair to home with support per family

## 2021-08-11 NOTE — TELEPHONE ENCOUNTER
Called patient since discharge from the hospital.   He wasn't given to Xarelto free 30 day card and his pharmacy doesn't have the 2.5mg dose of Xarelto. Samples and card dropped off at patient's house by office staff.

## 2021-08-11 NOTE — PROGRESS NOTES
Cardiology Progress Note      Patient:  Aaliyah Blackwell  YOB: 1940  MRN: [id number]   Acct: [de-identified]  Admit Date:  8/10/2021  Primary Cardiologist: Jonathan Gilliland    Chief Complaint:   Pt presented for OP elective Le angiogram     Subjective (Events in last 24 hours):    Pt in bed - no c/o leg pain  RT groin site - dressing with drainage as marked (old)   No hematoma or ecchymosis - Pulses present - neurovascular check WNL     Pulses to LE 2+ palpable    Objective:   /60   Pulse 69   Temp 97.6 °F (36.4 °C) (Oral)   Resp 16   Ht 5' 8\" (1.727 m)   Wt 160 lb (72.6 kg)   SpO2 94%   BMI 24.33 kg/m²        TELEMETRY: SR no ectopy    Physical Exam:  General Appearance: alert and oriented to person, place and time, in no acute distress  Cardiovascular: normal rate, regular rhythm, normal S1 and S2, no murmurs, rubs, clicks, or gallops, distal pulses intact,   Pulmonary/Chest: clear to auscultation bilaterally- no wheezes, rales or rhonchi, normal air movement, no respiratory distress  Abdomen: soft, non-tender, non-distended, normal bowel sounds, no masses Extremities: no cyanosis, clubbing or edema, pulses present    Skin: warm and dry, no rash or erythema  Head: normocephalic and atraumatic   Musculoskeletal: normal range of motion, no joint swelling, deformity or tenderness  Neurological: alert, oriented, normal speech, no focal findings or movement disorder noted    Medications:    sodium chloride flush  5-40 mL Intravenous 2 times per day    clopidogrel  75 mg Oral Daily    carvedilol  6.25 mg Oral BID WC    furosemide  40 mg Oral Daily    potassium chloride  20 mEq Oral Daily    aspirin  81 mg Oral Daily    rivaroxaban  2.5 mg Oral BID      sodium chloride 75 mL/hr at 08/10/21 0924    sodium chloride       sodium chloride flush, 5-40 mL, PRN  sodium chloride, 25 mL, PRN  acetaminophen, 650 mg, Q4H PRN  hydrOXYzine, 25 mg, Q8H PRN        Diagnostics:    LE angiogram:  Post-procedure Diagnosis/Findings:                                   L SFA  - s/p 6.0 DCB and 7.0 x 120 Everflex  DAPT/Xarelto 2.5 mg BID                                          MD MD Darian Duran, RPVI  Electronically signed 8/10/2021 at 12:56 PM  Interventional Cardiology    Lab Data:    Cardiac Enzymes:  No results for input(s): CKTOTAL, CKMB, CKMBINDEX, TROPONINI in the last 72 hours.     CBC:   Lab Results   Component Value Date    WBC 9.4 08/10/2021    RBC 4.64 08/10/2021    HGB 11.4 08/10/2021    HCT 38.0 08/10/2021     08/10/2021       CMP:    Lab Results   Component Value Date     08/11/2021    K 4.7 08/11/2021    K 4.3 08/10/2021     08/11/2021    CO2 24 08/11/2021    BUN 15 08/11/2021    CREATININE 0.9 08/11/2021    LABGLOM 81 08/11/2021    GLUCOSE 99 08/11/2021    GLUCOSE 90 04/06/2017    CALCIUM 9.0 08/11/2021       Hepatic Function Panel:    Lab Results   Component Value Date    ALKPHOS 81 01/05/2021    ALT 8 01/05/2021    AST 19 01/05/2021    PROT 7.0 01/05/2021    BILITOT 0.7 01/05/2021    LABALBU 3.8 01/05/2021       Magnesium:  No results found for: MG    PT/INR:    Lab Results   Component Value Date    INR 1.02 08/10/2021       HgBA1c:    Lab Results   Component Value Date    LABA1C 5.4 05/03/2018       FLP:    Lab Results   Component Value Date    TRIG 88 08/10/2021    HDL 50 08/10/2021    LDLCALC 69 08/10/2021    LDLDIRECT 83 09/30/2014    LABVLDL 17 04/06/2017       TSH:    Lab Results   Component Value Date    TSH 1.460 03/17/2021         Assessment:    S\p OP elective LE angiogram 8/10/21: s\p L SFA  - s/p 6.0 DCB and 7.0 x 120 Everflex        Plan:  · ASA/plavix and xarelto -- stop asa in 30 days and continue plavix and xarelto   · Follow as OP    PAD rehab - seen              Electronically signed by LEWIS Nguyễn - CNP on 8/11/2021 at 9:39 AM

## 2021-08-12 ENCOUNTER — OFFICE VISIT (OUTPATIENT)
Dept: PULMONOLOGY | Age: 81
End: 2021-08-12
Payer: MEDICARE

## 2021-08-12 VITALS
BODY MASS INDEX: 24.07 KG/M2 | HEART RATE: 73 BPM | OXYGEN SATURATION: 93 % | SYSTOLIC BLOOD PRESSURE: 96 MMHG | HEIGHT: 68 IN | TEMPERATURE: 99.5 F | DIASTOLIC BLOOD PRESSURE: 62 MMHG | WEIGHT: 158.8 LBS

## 2021-08-12 DIAGNOSIS — Z87.891 FORMER SMOKER, STOPPED SMOKING IN DISTANT PAST: ICD-10-CM

## 2021-08-12 DIAGNOSIS — J96.11 CHRONIC RESPIRATORY FAILURE WITH HYPOXIA (HCC): ICD-10-CM

## 2021-08-12 DIAGNOSIS — J43.9 PULMONARY EMPHYSEMA, UNSPECIFIED EMPHYSEMA TYPE (HCC): Primary | ICD-10-CM

## 2021-08-12 PROCEDURE — 1036F TOBACCO NON-USER: CPT | Performed by: NURSE PRACTITIONER

## 2021-08-12 PROCEDURE — 3023F SPIROM DOC REV: CPT | Performed by: NURSE PRACTITIONER

## 2021-08-12 PROCEDURE — 1123F ACP DISCUSS/DSCN MKR DOCD: CPT | Performed by: NURSE PRACTITIONER

## 2021-08-12 PROCEDURE — G8427 DOCREV CUR MEDS BY ELIG CLIN: HCPCS | Performed by: NURSE PRACTITIONER

## 2021-08-12 PROCEDURE — G8420 CALC BMI NORM PARAMETERS: HCPCS | Performed by: NURSE PRACTITIONER

## 2021-08-12 PROCEDURE — G8926 SPIRO NO PERF OR DOC: HCPCS | Performed by: NURSE PRACTITIONER

## 2021-08-12 PROCEDURE — 99214 OFFICE O/P EST MOD 30 MIN: CPT | Performed by: NURSE PRACTITIONER

## 2021-08-12 PROCEDURE — 4040F PNEUMOC VAC/ADMIN/RCVD: CPT | Performed by: NURSE PRACTITIONER

## 2021-08-12 RX ORDER — UMECLIDINIUM BROMIDE AND VILANTEROL TRIFENATATE 62.5; 25 UG/1; UG/1
1 POWDER RESPIRATORY (INHALATION) DAILY
Qty: 90 PUFF | Refills: 3 | Status: SHIPPED | OUTPATIENT
Start: 2021-10-29 | End: 2022-05-16 | Stop reason: SDUPTHER

## 2021-08-12 ASSESSMENT — ENCOUNTER SYMPTOMS
ABDOMINAL PAIN: 0
VOMITING: 0
NAUSEA: 0
EYES NEGATIVE: 1
WHEEZING: 0
COUGH: 0
DIARRHEA: 0
SHORTNESS OF BREATH: 1

## 2021-08-12 NOTE — PROGRESS NOTES
[facility]     Patient: Ayse Kay, 80 y.o.   : 1940  2021    Pt of Dr. Isaias Yee   Patient presents with    Follow-up     Pulmonary emphysema 3 month pulmonary follow up needs f2f per medicare for O2 per [facility]         VAZQUEZ SOTOMAYOR is here for F2F O2 eval per [facility]   Last seen 6 months ago    Last 6MWT: 10/15/2020  On 4LPM O2 ( confirmed with overnight pulse ox testing. Most recent test 2020) at night and 3LPM with activity during the day   Has been walking about 1 mile a day for exercise with his oxygen on. Checks pulse ox at home  Feels PEÑA stable, no exacerbations     Completed cardiac rehab s/p TAVR procedure   Hospitalized   Last spirometry 10/6/2020: FEV 1 64% PBD - RV 75- does not meet EBV qualifications   Has had COVID 19 vaccine x 2     SOCIAL HISTORY:  Social History     Tobacco Use    Smoking status: Former Smoker     Packs/day: 1.50     Years: 50.00     Pack years: 75.00     Types: Cigarettes     Quit date: 10/9/2004     Years since quittin.8    Smokeless tobacco: Never Used   Vaping Use    Vaping Use: Never used   Substance Use Topics    Alcohol use:  Yes     Alcohol/week: 30.0 standard drinks     Types: 30 Cans of beer per week     Comment: 3 to 4 a week     Drug use: No         CURRENT MEDICATIONS:  Current Outpatient Medications   Medication Sig Dispense Refill    [START ON 10/29/2021] umeclidinium-vilanterol (ANORO ELLIPTA) 62.5-25 MCG/INH AEPB inhaler Inhale 1 puff into the lungs daily 90 puff 3    rivaroxaban (XARELTO) 2.5 MG TABS tablet Take 1 tablet by mouth 2 times daily 28 tablet 0    aspirin 81 MG EC tablet Take 81 mg by mouth daily      hydrOXYzine (ATARAX) 25 MG tablet Take 1 tablet by mouth every 8 hours as needed for Itching 30 tablet 1    carvedilol (COREG) 6.25 MG tablet Take 1 tablet by mouth 2 times daily (with meals) 180 tablet 3    furosemide (LASIX) 40 MG tablet Take 1 tablet by mouth daily 90 tablet 1    potassium chloride (KLOR-CON M) 20 MEQ extended release tablet Take 1 tablet by mouth daily 90 tablet 1    diclofenac (VOLTAREN) 75 MG EC tablet Take 1 tablet by mouth 2 times daily as needed for Pain 180 tablet 0    lovastatin (MEVACOR) 20 MG tablet Take 1 tablet by mouth daily 90 tablet 1    clopidogrel (PLAVIX) 75 MG tablet Take 1 tablet by mouth daily 90 tablet 3    OXYGEN Please provide patient with 3LPM of oxygen at night time for nocturnal hypoxia. Patient to have repeat pulse oximetry testing done on oxygen 3 L 0    nitroGLYCERIN (NITROSTAT) 0.4 MG SL tablet up to max of 3 total doses. If no relief after 1 dose, call 911. 25 tablet 1     No current facility-administered medications for this visit. Rosaura DUMAS   Review of Systems   Constitutional: Negative for activity change, appetite change, chills, fatigue and fever. HENT: Negative. Eyes: Negative. Respiratory: Positive for shortness of breath. Negative for cough and wheezing. Cardiovascular: Negative for chest pain, palpitations and leg swelling. Gastrointestinal: Negative for abdominal pain, diarrhea, nausea and vomiting. Genitourinary: Negative. Musculoskeletal: Negative. Left leg pain/soreness    Skin: Negative. Neurological: Negative. Hematological: Does not bruise/bleed easily. Psychiatric/Behavioral: Negative for suicidal ideas. Physical exam   BP 96/62 (Site: Left Upper Arm, Position: Sitting)   Pulse 73   Temp 99.5 °F (37.5 °C)   Ht 5' 8\" (1.727 m)   Wt 158 lb 12.8 oz (72 kg)   SpO2 93% Comment: on RA  BMI 24.15 kg/m²      Wt Readings from Last 3 Encounters:   08/12/21 158 lb 12.8 oz (72 kg)   08/10/21 160 lb (72.6 kg)   05/17/21 160 lb 9.6 oz (72.8 kg)     Physical Exam  Vitals and nursing note reviewed. Constitutional:       General: He is not in acute distress. Appearance: Normal appearance. He is well-developed.    HENT:      Mouth/Throat: Lips: Pink. Mouth: Mucous membranes are moist.      Pharynx: Oropharynx is clear. No oropharyngeal exudate or posterior oropharyngeal erythema. Eyes:      Conjunctiva/sclera: Conjunctivae normal.   Neck:      Vascular: No JVD. Cardiovascular:      Rate and Rhythm: Normal rate and regular rhythm. Heart sounds: No murmur heard. No friction rub. Pulmonary:      Effort: Pulmonary effort is normal. No accessory muscle usage or respiratory distress. Breath sounds: Normal breath sounds. No wheezing, rhonchi or rales. Chest:      Chest wall: No tenderness. Musculoskeletal:      Right lower leg: No edema. Left lower leg: No edema. Skin:     General: Skin is warm and dry. Capillary Refill: Capillary refill takes less than 2 seconds. Nails: There is no clubbing. Neurological:      Mental Status: He is alert. Psychiatric:         Mood and Affect: Mood normal.         Behavior: Behavior normal.         Thought Content: Thought content normal.         Judgment: Judgment normal.          Test results   Lung Nodule Screening     [] Qualifies    [x]Does not qualify   [] Declined    [] Completed     Assessment      Diagnosis Orders   1. Pulmonary emphysema, unspecified emphysema type ([address]  Full PFT Study With Bronchodilator   2. Chronic respiratory failure with hypoxia (HCC)  Full PFT Study With Bronchodilator   3.  Former smoker, stopped smoking in distant past         Plan    -using his oxygen compliantly with benefit, should continue 3LPM O2 w/ activity during the day, 4LPM continuous at night   -continue Anoro ellipta daily  -declines PRN JOSE LUIS  -planning to [place] in [place] if gets [phone] Gunjan Patterson from cardiology   -recommend to maintain healthy weight, continue to exercise, keep UTD with vaccinations   -schedule PFT before next visit     Total time spent on encounter was 30 minutes    Will see Grier Hatchet in: 9 months as previously scheduled     Electronically signed by Jayda Kumar APRN - CNP on 8/12/2021 at 3:42 PM

## 2021-08-13 NOTE — PROCEDURES
[address] [place] [address]                            CARDIAC CATHETERIZATION    PATIENT NAME: Nino Ramos                   :        1940  MED REC NO:   [id number]                           ROOM:       0030  ACCOUNT NO:   [de-identified]                           ADMIT DATE: 08/10/2021  PROVIDER:     Radha West MD    DATE OF PROCEDURE:  08/10/2021    PERIPHERAL ANGIOGRAM/INTERVENTION    INDICATION:  Left lower extremity life-limiting claudication, inability  to exercise related to lower extremity PAD; previous left lower  extremity intervention; lower extremity arterial duplex showing a left  VONDA of 0.53 with occlusion of the left SFA. DESCRIPTION OF PROCEDURE:  After informed consent was obtained from the  patient, he was brought to special procedure suite and prepped in  sterile fashion. Right femoral artery was chosen as the primary point  of access. Preprocedure timeout was completed. After infiltration of  the right inguinal region with 2% lidocaine using micropuncture and  modified Seldinger technique under fluoroscopic guidance and ultrasound  guidance, I was able to insert a 5-Wallisian sheath into the right femoral  artery. I then inserted a 5-Wallisian VCF catheter over a 0.035 angled  Glidewire and performed digital subtraction angiography of the abdominal  aortoiliac system. PERIPHERAL ANGIOGRAM:  ABDOMINAL AORTA:  Diffuse atheromatous disease. No significant aneurysm  or dissection noted. RENAL ARTERIES:  Left renal artery has severe calcification in the mid  segment, but has perfusion. Right renal artery is patent. COMMON ILIAC ARTERIES:  Diffuse calcification throughout, but patent. EXTERNAL ILIAC ARTERIES:  Diffuse calcification throughout, but patent. COMMON FEMORAL ARTERIES:  Bilaterally patent, but diffuse calcification  throughout.     At that point, I used a 5-Wallisian VCF catheter over 0.035 angled  Glidewire and crossed over and placed the catheter at the left common  femoral artery and performed angiography of the left lower extremity. LEFT SFA:  Occluded at the proximal segment. Reconstitutes via the  profunda collaterals to the mid to distal segment. POPLITEAL ARTERY:  Previously placed stent, appears to be  patent with mild diffuse disease throughout. Distal popliteal artery is  patent. TIBIOPERONEAL ARTERIES:  The anterior tibial artery appears to be  occluded at the mid segment. TP trunk is patent. The peroneal artery  is patent. The posterior tibial artery is occluded in the ostium. INTERVENTION:  Given the findings and presentation, I elected to proceed  with intervention first.  A 6-Macedonian ANL1 sheath was placed over 0.035  J-tipped Glidewire Advantage. I used a 0.035 angled TrailBlazer  catheter and I was able to knuckle the wire and pass across the occlusion. Thereafter, I exchanged out for a 6.0 x 200 Spring Arbor balloon and managed dilations at nominal  pressure throughout the entire length of the distal SFA to the proximal  SFA. I then used the 6 x 150 Admiral IN. PACT drug-coated balloon and  treated both through the entire SFA with 3-minute inflation at 12  atmospheres with both balloons. Once that was done, repeat angiography  was done demonstrating proximal dissection that was somewhat flow  limiting and a distal small transluminal dissection that was not flow  limiting. I then did another prolonged inflation with the 6 x 200  Spring Arbor balloon, but this proximal segment would not improve in terms of  flow limitation because of the dissection. Given these findings, I  elected to stent the proximal segment using quantitative angiography. I  placed the stent with a 7 x 120 EverFlex stent and it landed at the  proximal SFA and I postdilated the stent with a 7.0 balloon at nominal  pressure.   Once this was done, repeat angiography was done demonstrating  no significant flow limitation at the distal SFA. here was brisk flow all the way into the foot. Therefore, no further intervention was undertaken. I then pulled the  catheters back to perform RLE angiography. RIGHT COMMON FEMORAL ARTERY:  Diffuse calcification, but patent. RIGHT SFA:  Proximal 50% stenosis with calcification, but otherwise  patent and demonstrates there is diffuse calcification, but it was  patent. POPLITEAL ARTERIES:  Patent. TIBIOPERONEAL ARTERIES:  Diffuse disease throughout the entire length of  lower extremity tibioperoneal arteries. It appears that the TP trunk is  patent. IMMEDIATE COMPLICATIONS:  None. MEDICATIONS:  See EMR. ACCESS:  Perclose was used for hemostasis. ESTIMATED BLOOD LOSS:  Less than 50 mL. SUMMARY:  Successful L SFA angioplasty and stenting with 7 x 120 Everflex,  Preceded by 6.0 In. PACT DCB. PLAN:  1. Bedrest.  2.  Optimal medical therapy. 3.  Risk factor management. 4.  Routine access site care. 5.  IV fluids. 6.  Overnight observation. 7.  Xarelto 2.5 mg b.i.d. and Plavix to continue. 8.  Surveillance ABIs. 9.  Followup in the office in four to six weeks postprocedure. All the above were explained to the patient and the patient's family. They were agreeable and amenable to the plan.         Alex Pabon MD    D: 08/13/2021 7:47:07       T: 08/13/2021 10:15:32     JORGE ALBERTO/[email or web address]_MIKE  Job#: [id number]     Doc#: [id number]        CC:

## 2021-08-18 ENCOUNTER — OFFICE VISIT (OUTPATIENT)
Dept: FAMILY MEDICINE CLINIC | Age: 81
End: 2021-08-18
Payer: MEDICARE

## 2021-08-18 VITALS
OXYGEN SATURATION: 91 % | HEIGHT: 68 IN | SYSTOLIC BLOOD PRESSURE: 122 MMHG | RESPIRATION RATE: 16 BRPM | BODY MASS INDEX: 23.79 KG/M2 | TEMPERATURE: 96.9 F | WEIGHT: 157 LBS | DIASTOLIC BLOOD PRESSURE: 64 MMHG | HEART RATE: 56 BPM

## 2021-08-18 DIAGNOSIS — I73.9 PAD (PERIPHERAL ARTERY DISEASE) (HCC): Primary | ICD-10-CM

## 2021-08-18 DIAGNOSIS — I10 ESSENTIAL HYPERTENSION: ICD-10-CM

## 2021-08-18 DIAGNOSIS — Z79.01 ANTICOAGULATED: ICD-10-CM

## 2021-08-18 PROCEDURE — G8420 CALC BMI NORM PARAMETERS: HCPCS | Performed by: FAMILY MEDICINE

## 2021-08-18 PROCEDURE — 99213 OFFICE O/P EST LOW 20 MIN: CPT | Performed by: FAMILY MEDICINE

## 2021-08-18 PROCEDURE — 1036F TOBACCO NON-USER: CPT | Performed by: FAMILY MEDICINE

## 2021-08-18 PROCEDURE — 1123F ACP DISCUSS/DSCN MKR DOCD: CPT | Performed by: FAMILY MEDICINE

## 2021-08-18 PROCEDURE — 4040F PNEUMOC VAC/ADMIN/RCVD: CPT | Performed by: FAMILY MEDICINE

## 2021-08-18 PROCEDURE — G8427 DOCREV CUR MEDS BY ELIG CLIN: HCPCS | Performed by: FAMILY MEDICINE

## 2021-08-18 RX ORDER — PANTOPRAZOLE SODIUM 40 MG/1
40 TABLET, DELAYED RELEASE ORAL
Qty: 90 TABLET | Refills: 1 | Status: SHIPPED | OUTPATIENT
Start: 2021-08-18 | End: 2022-02-01

## 2021-08-18 SDOH — ECONOMIC STABILITY: FOOD INSECURITY: WITHIN THE PAST 12 MONTHS, YOU WORRIED THAT YOUR FOOD WOULD RUN OUT BEFORE YOU GOT MONEY TO BUY MORE.: NEVER TRUE

## 2021-08-18 SDOH — ECONOMIC STABILITY: FOOD INSECURITY: WITHIN THE PAST 12 MONTHS, THE FOOD YOU BOUGHT JUST DIDN'T LAST AND YOU DIDN'T HAVE MONEY TO GET MORE.: NEVER TRUE

## 2021-08-18 ASSESSMENT — ENCOUNTER SYMPTOMS
BLOOD IN STOOL: 0
SHORTNESS OF BREATH: 1
ABDOMINAL PAIN: 0
WHEEZING: 0

## 2021-08-18 ASSESSMENT — SOCIAL DETERMINANTS OF HEALTH (SDOH): HOW HARD IS IT FOR YOU TO PAY FOR THE VERY BASICS LIKE FOOD, HOUSING, MEDICAL CARE, AND HEATING?: NOT HARD AT ALL

## 2021-08-18 NOTE — PROGRESS NOTES
[facility]  [facility] [place]  [address] [facility]  Dept: [phone]  Dept Fax: [phone]  Loc: [phone]  PROGRESS NOTE      Visit Date: 8/18/2021    Jesse Pryor is a 80 y.o. male who presents today for:  Chief Complaint   Patient presents with    Follow[facility] from [facility] Stent     Shortness of Breath    Dizziness       Subjective:  HPI     Post hospitalization after left SFA angioplasty and stenting. On Xarelto, aspirin and Plavix. Needs to stop aspirin after 30 days. Has f/u with cardiology on 8/26 and 9/7. On lovastatin. Sometimes has dizziness/lightheadedness and has improved some. He did not wear supplemental oxygen into office. He takes diclofenac prn. No concerns today    Wife is present    Review of Systems   Constitutional: Negative for chills and fever. Respiratory: Positive for shortness of breath (on exertion). Negative for wheezing. Cardiovascular: Negative for chest pain and leg swelling. Gastrointestinal: Negative for abdominal pain and blood in stool. Hematological: Bruises/bleeds easily.      Patient Active Problem List   Diagnosis    Elevated glucose    Osteoarthritis    ASCVD (arteriosclerotic cardiovascular disease)    COPD, moderate ([address]    Aortic valve disorder    Abnormal ankle brachial index (VONDA)    Hypertension secondary to other renal disorders (CODE)    Coronary artery disease due to lipid rich plaque    Claudication (HCC)    S/P coronary angioplasty    S/P cardiac cath    S/P percutaneous transluminal angioplasty (PTA) with stent placement    Severe aortic stenosis    Essential hypertension    Hyperlipidemia LDL goal <70    S/P TAVR (transcatheter aortic valve replacement)    Nocturnal hypoxia    Chronic respiratory failure with hypoxia (HCC)    Hypersomnia    Other emphysema (HCC)    Afib (HCC)    Presence of Watchman left atrial appendage closure device    Anticoagulated on Coumadin    Urticaria    Other atopic dermatitis    PAD (peripheral artery disease) (HCC)    Abnormal ultrasound     Past Medical History:   Diagnosis Date    ASCVD (arteriosclerotic cardiovascular disease)     Atrial fibrillation (HCC)     CAD (coronary artery disease)     Cerebral artery occlusion with cerebral infarction (HCC)     TIA    [facility]ch[facility] obstructive pulmonary disease) ([facility])     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 Lowest de-sat 77%.  Osteoarthritis     Osteopenia     Osteopenia     [facility]) [facility]     Polio 1946    Rheumatic fever       Past Surgical History:   Procedure Laterality Date    AORTIC VALVE REPAIR  2020    TAVR    BALLOON ANGIOPLASTY, ARTERY  2018    s/p Left CFA, SFA and popliteal intervention    FACIAL COSMETIC SURGERY      as a kid    HAND SURGERY Right     carpal tunnel     OTHER SURGICAL HISTORY  2021    Left Atrial Appenadage closure/watchman    PTCA  2020    stent LAD    TONSILLECTOMY AND ADENOIDECTOMY       Family History   Problem Relation Age of Onset    Heart Disease Father     Arthritis Maternal Grandmother      Social History     Tobacco Use    Smoking status: Former Smoker     Packs/day: 1.50     Years: 50.00     Pack years: 75.00     Types: Cigarettes     Quit date: 10/9/2004     Years since quittin.8    Smokeless tobacco: Never Used   Substance Use Topics    Alcohol use:  Yes     Alcohol/week: 30.0 standard drinks     Types: 30 Cans of beer per week     Comment: 3 to 4 a week       Current Outpatient Medications   Medication Sig Dispense Refill    [START ON 10/29/2021] umeclidinium-vilanterol (ANORO ELLIPTA) 62.5-25 MCG/INH AEPB inhaler Inhale 1 puff into the lungs daily 90 puff 3    rivaroxaban (XARELTO) 2.5 MG TABS tablet Take 1 tablet by mouth 2 times daily 28 tablet 0    aspirin 81 MG EC tablet Take 81 mg by mouth daily      carvedilol (COREG) 6.25 MG tablet Take 1 tablet by mouth 2 times daily (with meals) 180 tablet 3    furosemide (LASIX) 40 MG tablet Take 1 tablet by mouth daily 90 tablet 1    potassium chloride (KLOR-CON M) 20 MEQ extended release tablet Take 1 tablet by mouth daily 90 tablet 1    diclofenac (VOLTAREN) 75 MG EC tablet Take 1 tablet by mouth 2 times daily as needed for Pain 180 tablet 0    lovastatin (MEVACOR) 20 MG tablet Take 1 tablet by mouth daily 90 tablet 1    clopidogrel (PLAVIX) 75 MG tablet Take 1 tablet by mouth daily 90 tablet 3    OXYGEN Please provide patient with 3LPM of oxygen at night time for nocturnal hypoxia. Patient to have repeat pulse oximetry testing done on oxygen 3 L 0    nitroGLYCERIN (NITROSTAT) 0.4 MG SL tablet up to max of 3 total doses. If no relief after 1 dose, call 911. (Patient not taking: Reported on 8/18/2021) 25 tablet 1     No current facility-administered medications for this visit.      Allergies   Allergen Reactions    Brilinta [Ticagrelor] Shortness Of Breath    Keflex [Cephalexin] Rash     Health Maintenance   Topic Date Due    Hepatitis C screen  Never done    DTaP/Tdap/Td vaccine (1 - Tdap) Never done    Flu vaccine (1) 09/01/2021    Annual Wellness Visit (AWV)  04/21/2022    Lipid screen  08/10/2022    Potassium monitoring  08/11/2022    Creatinine monitoring  08/11/2022    Shingles Vaccine  Completed    Pneumococcal 65+ years Vaccine  Completed    COVID-19 Vaccine  Completed    Hepatitis A vaccine  Aged Out    Hepatitis B vaccine  Aged Out    Hib vaccine  Aged Out    Meningococcal (ACWY) vaccine  Aged Out       Objective:  /64 (Site: Left Upper Arm, Position: Sitting)   Pulse 56   Temp 96.9 °F (36.1 °C)   Resp 16   Ht 5' 8\" (1.727 m)   Wt 157 lb (71.2 kg)   SpO2 91% BMI 23.87 kg/m²   Physical Exam  Vitals reviewed. Constitutional:       General: He is not in acute distress. Appearance: He is not ill-appearing. Cardiovascular:      Rate and Rhythm: Normal rate and regular rhythm. Pulmonary:      Effort: Pulmonary effort is normal. No respiratory distress. Breath sounds: Normal breath sounds. No wheezing. Neurological:      Mental Status: He is alert. Mental status is at baseline. Psychiatric:         Mood and Affect: Mood normal.         Behavior: Behavior normal.         Impression/Plan:  1. PAD (peripheral artery disease) (HCC)  Chronic. S/p stent. On xarelto, aspirin and plavix. Start protonix for GI prophylaxis. - pantoprazole (PROTONIX) 40 MG tablet; Take 1 tablet by mouth every morning (before breakfast)  Dispense: 90 tablet; Refill: 1    2. Essential hypertension  Chronic. Controlled. Continue lasix and coreg. Consider decreasing coreg dose if BP is low and having symptoms. 3. Anticoagulated  For A. Fib episodes. Start PPI for prophylaxis. - pantoprazole (PROTONIX) 40 MG tablet; Take 1 tablet by mouth every morning (before breakfast)  Dispense: 90 tablet; Refill: 1      They voiced understanding. All questions answered. They agreed with treatment plan. See patient instructions for any educational materials that may have been given. Discussed use, benefit, and side effects of prescribed medications. Reviewed health maintenance. (Please note that portions of this note may have been completed with a voice recognition program.  Efforts were made to edit the dictation but occasionally words are mis-transcribed.)    Return if symptoms worsen or fail to improve.       Electronically signed by Pretty Patel MD on 8/18/2021 at 11:47 AM

## 2021-08-26 ENCOUNTER — OFFICE VISIT (OUTPATIENT)
Dept: CARDIOLOGY CLINIC | Age: 81
End: 2021-08-26
Payer: MEDICARE

## 2021-08-26 ENCOUNTER — TELEPHONE (OUTPATIENT)
Dept: CARDIOLOGY CLINIC | Age: 81
End: 2021-08-26

## 2021-08-26 VITALS
HEIGHT: 68 IN | HEART RATE: 71 BPM | OXYGEN SATURATION: 92 % | DIASTOLIC BLOOD PRESSURE: 50 MMHG | BODY MASS INDEX: 23.64 KG/M2 | WEIGHT: 156 LBS | SYSTOLIC BLOOD PRESSURE: 100 MMHG

## 2021-08-26 DIAGNOSIS — I73.9 PAD (PERIPHERAL ARTERY DISEASE) (HCC): ICD-10-CM

## 2021-08-26 DIAGNOSIS — Z95.820 S/P PERIPHERAL ARTERY ANGIOPLASTY WITH STENT PLACEMENT: ICD-10-CM

## 2021-08-26 DIAGNOSIS — J44.9 COPD, MODERATE (HCC): ICD-10-CM

## 2021-08-26 DIAGNOSIS — Z95.2 S/P TAVR (TRANSCATHETER AORTIC VALVE REPLACEMENT): ICD-10-CM

## 2021-08-26 DIAGNOSIS — I25.10 CORONARY ARTERY DISEASE INVOLVING NATIVE CORONARY ARTERY OF NATIVE HEART WITHOUT ANGINA PECTORIS: ICD-10-CM

## 2021-08-26 DIAGNOSIS — I10 ESSENTIAL HYPERTENSION: ICD-10-CM

## 2021-08-26 DIAGNOSIS — Z99.81 ON HOME OXYGEN THERAPY: ICD-10-CM

## 2021-08-26 DIAGNOSIS — E78.5 DYSLIPIDEMIA: ICD-10-CM

## 2021-08-26 PROCEDURE — 99214 OFFICE O/P EST MOD 30 MIN: CPT | Performed by: NURSE PRACTITIONER

## 2021-08-26 PROCEDURE — G8427 DOCREV CUR MEDS BY ELIG CLIN: HCPCS | Performed by: NURSE PRACTITIONER

## 2021-08-26 PROCEDURE — G8926 SPIRO NO PERF OR DOC: HCPCS | Performed by: NURSE PRACTITIONER

## 2021-08-26 PROCEDURE — 3023F SPIROM DOC REV: CPT | Performed by: NURSE PRACTITIONER

## 2021-08-26 PROCEDURE — 1036F TOBACCO NON-USER: CPT | Performed by: NURSE PRACTITIONER

## 2021-08-26 PROCEDURE — 1123F ACP DISCUSS/DSCN MKR DOCD: CPT | Performed by: NURSE PRACTITIONER

## 2021-08-26 PROCEDURE — 4040F PNEUMOC VAC/ADMIN/RCVD: CPT | Performed by: NURSE PRACTITIONER

## 2021-08-26 PROCEDURE — G8420 CALC BMI NORM PARAMETERS: HCPCS | Performed by: NURSE PRACTITIONER

## 2021-08-26 RX ORDER — NITROGLYCERIN 0.4 MG/1
TABLET SUBLINGUAL
Qty: 25 TABLET | Refills: 1 | Status: SHIPPED | OUTPATIENT
Start: 2021-08-26 | End: 2022-03-07

## 2021-08-26 NOTE — PROGRESS NOTES
[facility] PROFESSIONAL SERVICES  [facility]   [address]   [address]   [address] [address]   Dept: [phone]   Dept Fax: [phone]: [phone]      No chief complaint on file. F/U Successful L SFA angioplasty and stenting on 8/11/21 in 81 y/o male with history of TAVR and WATCHMAN with significant PAD and prior LLE stenting, CAD, HTN, HLD, COPD on home O2 at 2.5-3 L with exertion and at night. No more pain with walking or at rest. Walking one to two miles on treadmill daily. Denies chest pain, palpitations, LESLIE, lightheadedness, dizziness or syncope. Has usual sob with COPD. Cardiologist:  Dr. Kim Mahan:   No fever, no chills, No fatigue or weight loss  Pulmonary:    No dyspnea, no wheezing  Cardiac:    Denies recent chest pain   GI:     No nausea or vomiting, no abdominal pain  Neuro:    No dizziness or light headedness  Musculoskeletal:  No recent active issues  Extremities:   No edema, good peripheral pulses      Past Medical History:   Diagnosis Date    ASCVD (arteriosclerotic cardiovascular disease)     Atrial fibrillation ([facility] [address]     CAD (coronary artery disease)     Cerebral artery occlusion with cerebral infarction ([facility] [address]     TIA    COPD (chronic obstructive pulmonary disease) ([facility])     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 Lowest de-sat 77%.  Osteoarthritis     Osteopenia     Osteopenia     PAD (peripheral artery disease) ([facility])     Polio 1946    Rheumatic fever 1946       Allergies   Allergen Reactions    Brilinta [Ticagrelor] Shortness Of Breath    Keflex [Cephalexin] Rash       Current Outpatient Medications   Medication Sig Dispense Refill    nitroGLYCERIN (NITROSTAT) 0.4 MG SL tablet up to max of 3 total doses.  If no relief after 1 dose, call 911. 25 tablet 1    pantoprazole (PROTONIX) 40 MG tablet Take 1 tablet by mouth every morning (before breakfast) 90 tablet 1    [START ON 10/29/2021] umeclidinium-vilanterol (ANORO ELLIPTA) 62.5-25 MCG/INH AEPB inhaler Inhale 1 puff into the lungs daily 90 puff 3    rivaroxaban (XARELTO) 2.5 MG TABS tablet Take 1 tablet by mouth 2 times daily 28 tablet 0    aspirin 81 MG EC tablet Take 81 mg by mouth daily Stop 4 weeks post peripheral intervention 21      carvedilol (COREG) 6.25 MG tablet Take 1 tablet by mouth 2 times daily (with meals) 180 tablet 3    furosemide (LASIX) 40 MG tablet Take 1 tablet by mouth daily 90 tablet 1    potassium chloride (KLOR-CON M) 20 MEQ extended release tablet Take 1 tablet by mouth daily 90 tablet 1    diclofenac (VOLTAREN) 75 MG EC tablet Take 1 tablet by mouth 2 times daily as needed for Pain 180 tablet 0    lovastatin (MEVACOR) 20 MG tablet Take 1 tablet by mouth daily 90 tablet 1    clopidogrel (PLAVIX) 75 MG tablet Take 1 tablet by mouth daily 90 tablet 3    OXYGEN Please provide patient with 3LPM of oxygen at night time for nocturnal hypoxia. Patient to have repeat pulse oximetry testing done on oxygen 3 L 0     No current facility-administered medications for this visit. Social History     Socioeconomic History    Marital status:      Spouse name: Shonda Milligan Number of children: 3    Years of education: None    Highest education level: None   Occupational History    None   Tobacco Use    Smoking status: Former Smoker     Packs/day: 1.50     Years: 50.00     Pack years: 75.00     Types: Cigarettes     Quit date: 10/9/2004     Years since quittin.8    Smokeless tobacco: Never Used   Vaping Use    Vaping Use: Never used   Substance and Sexual Activity    Alcohol use:  Yes     Alcohol/week: 30.0 standard drinks     Types: 30 Cans of beer per week     Comment: 3 to 4 a week     Drug use: No    Sexual activity: Not Currently Other Topics Concern    None   Social History Narrative    None     Social Determinants of Health     Financial Resource Strain: Low Risk     Difficulty of Paying Living Expenses: Not hard at all   Food Insecurity: No Food Insecurity    Worried About Running Out of Food in the Last Year: Never true    Husam of Food in the Last Year: Never true   Transportation Needs:     Lack of Transportation (Medical):  Lack of Transportation (Non-Medical):    Physical Activity:     Days of Exercise per Week:     Minutes of Exercise per Session:    Stress:     Feeling of Stress :    Social Connections:     Frequency of Communication with Friends and Family:     Frequency of Social Gatherings with Friends and Family:     Attends Alevism Services:     Active Member of Clubs or Organizations:     Attends Club or Organization Meetings:     Marital Status:    Intimate Partner Violence:     Fear of Current or Ex-Partner:     Emotionally Abused:     Physically Abused:     Sexually Abused:        Family History   Problem Relation Age of Onset    Heart Disease Father     Arthritis Maternal Grandmother        Blood pressure (!) 100/50, pulse 71, height 5' 8\" (1.727 m), weight 156 lb (70.8 kg), SpO2 92 %. General:   Well developed, well nourished  Lungs:   Clear to auscultation  Heart:    Normal S1 S2, No murmur, rubs, or gallops  Abdomen:   Soft, non tender, no organomegalies, positive bowel sounds  Extremities:   No edema, no cyanosis, good peripheral pulses  Neurological:   Awake, alert, oriented. No obvious focal deficits  Musculoskeletal:  No obvious deformities    EKG:     Peripheral angiogram/Intervention: 8/11/21  PERIPHERAL ANGIOGRAM:  ABDOMINAL AORTA:  Diffuse atheromatous disease. No significant aneurysm  or dissection noted.     RENAL ARTERIES:  Left renal artery has severe calcification in the mid  segment, but has perfusion.   Right renal artery is patent.     COMMON ILIAC ARTERIES:  Diffuse calcification throughout, but patent.     EXTERNAL ILIAC ARTERIES:  Diffuse calcification throughout, but patent.     COMMON FEMORAL ARTERIES:  Bilaterally patent, but diffuse calcification  throughout.     At that point, I used a 5-Ugandan VCF catheter over 0.035 angled  Glidewire and crossed over and placed the catheter at the left common  femoral artery and performed angiography of the left lower extremity.     LEFT SFA:  Occluded at the proximal segment. Reconstitutes via the  profunda collaterals to the mid to distal segment.     POPLITEAL ARTERY:  Previously placed stent, appears to be  patent with mild diffuse disease throughout. Distal popliteal artery is  patent.     TIBIOPERONEAL ARTERIES:  The anterior tibial artery appears to be  occluded at the mid segment. TP trunk is patent. The peroneal artery  is patent. The posterior tibial artery is occluded in the ostium.     INTERVENTION:  Given the findings and presentation, I elected to proceed  with intervention first.  A 6-Ugandan ANL1 sheath was placed over 0.035  J-tipped Glidewire Advantage. I used a 0.035 angled TrailBlazer  catheter and I was able to knuckle the wire and pass across the occlusion. Thereafter, I exchanged out for a 6.0 x 200 Meridian balloon and managed dilations at nominal  pressure throughout the entire length of the distal SFA to the proximal  SFA. I then used the 6 x 150 Admiral IN. PACT drug-coated balloon and  treated both through the entire SFA with 3-minute inflation at 12  atmospheres with both balloons. Once that was done, repeat angiography  was done demonstrating proximal dissection that was somewhat flow  limiting and a distal small transluminal dissection that was not flow  limiting. I then did another prolonged inflation with the 6 x 200  Meridian balloon, but this proximal segment would not improve in terms of  flow limitation because of the dissection.   Given these findings, I  elected to stent the proximal segment using quantitative angiography. I  placed the stent with a 7 x 120 EverFlex stent and it landed at the  proximal SFA and I postdilated the stent with a 7.0 balloon at nominal  pressure. Once this was done, repeat angiography was done demonstrating  no significant flow limitation at the distal SFA. here was brisk flow all the way into the foot. Therefore, no further intervention was undertaken. I then pulled the  catheters back to perform RLE angiography.     RIGHT COMMON FEMORAL ARTERY:  Diffuse calcification, but patent.     RIGHT SFA:  Proximal 50% stenosis with calcification, but otherwise  patent and demonstrates there is diffuse calcification, but it was  patent.     POPLITEAL ARTERIES:  Patent.     TIBIOPERONEAL ARTERIES:  Diffuse disease throughout the entire length of  lower extremity tibioperoneal arteries. It appears that the TP trunk is  patent.     IMMEDIATE COMPLICATIONS:  None.     MEDICATIONS:  See EMR.     ACCESS:  Perclose was used for hemostasis.     ESTIMATED BLOOD LOSS:  Less than 50 mL.     SUMMARY:  Successful L SFA angioplasty and stenting with 7 x 120 Everflex,  Preceded by 6.0 In. PACT DCB.     PLAN:  1. Bedrest.  2.  Optimal medical therapy. 3.  Risk factor management. 4.  Routine access site care. 5.  IV fluids. 6.  Overnight observation. 7.  Xarelto 2.5 mg b.i.d. and Plavix to continue. 8.  Surveillance ABIs. 9.  Followup in the office in four to six weeks postprocedure.     All the above were explained to the patient and the patient's family. They were agreeable and amenable to the plan.           Gigi Billy MD        Diagnosis Orders   1. S/P peripheral artery angioplasty with stent placement  VL DUP LOWER EXTREMITY ARTERIES BILATERAL   2. PAD (peripheral artery disease) (HCC)  VL DUP LOWER EXTREMITY ARTERIES BILATERAL   3. Coronary artery disease involving native coronary artery of native heart without angina pectoris     4. Essential hypertension     5. Dyslipidemia     6. S/P TAVR (transcatheter aortic valve replacement)     7. COPD, moderate ([address]     8. On home oxygen therapy         Orders Placed This Encounter   Procedures    VL DUP LOWER EXTREMITY ARTERIES BILATERAL     Standing Status:   Future     Standing Expiration Date:   8/26/2022     Order Specific Question:   Reason for exam:     Answer:   S/p peripheral angioplasty/stent, PAD   F/U Successful L SFA angioplasty and stenting on 8/11/21 in 81 y/o male with history of TAVR and WATCHMAN with significant PAD and prior LLE stenting, CAD, HTN, HLD, COPD on home O2 at 2.5-3 L with exertion and at night. No more pain with walking or at rest. Walking one to two miles on treadmill daily. Left lower extremity pink, warm to touch, strong pulses, no open areas/wounds. Denies chest pain, palpitations, LESLIE, lightheadedness, dizziness or syncope. Has usual sob with COPD. On coreg, asa, plavix, xarelto, lasix, lovastatin, SL NTG prn   Per discussion with Dr. Candice Boykin: Stop asa 4 weeks post intervention to decrease risk of bleeding and avoid triple anticoagulation  Serial VONDA's    Discussed use, benefit, and side effects of prescribed medications. Reports compliance and denies side effects with. All patient questions answered. Pt voiced understanding. Instructed to continue current medications, diet and exercise. Continue risk factor modification and medical management. Patient agreed with treatment plan. Follow up as directed.     Continue Dr Kia Rai current treatment plan  Follow up with Dr Candice Boykin as scheduled or sooner if needed

## 2021-08-26 NOTE — TELEPHONE ENCOUNTER
This patient has completed 6 months of DAPT post WATCHMAN. According to the [facility] pharmacological guidelines it is recommended to stop Plavix and continue Aspirin 81 mg daily. After reviewing the patient's chart it was found that the patient has a history of coronary stenting, TIAs and PAD with intervention. Dr. Moeller Plenty are you ok with continuing Plavix and Aspirin 81 mg daily?

## 2021-08-27 ENCOUNTER — HOSPITAL ENCOUNTER (OUTPATIENT)
Dept: INTERVENTIONAL RADIOLOGY/VASCULAR | Age: 81
Discharge: HOME | End: 2021-08-27
Payer: MEDICARE

## 2021-08-27 DIAGNOSIS — I73.9 PAD (PERIPHERAL ARTERY DISEASE) (HCC): ICD-10-CM

## 2021-08-27 DIAGNOSIS — Z95.820 S/P PERIPHERAL ARTERY ANGIOPLASTY WITH STENT PLACEMENT: ICD-10-CM

## 2021-08-27 PROCEDURE — 93925 LOWER EXTREMITY STUDY: CPT

## 2021-08-31 ENCOUNTER — TELEPHONE (OUTPATIENT)
Dept: PULMONOLOGY | Age: 81
End: 2021-08-31

## 2021-08-31 DIAGNOSIS — J96.11 CHRONIC RESPIRATORY FAILURE WITH HYPOXIA (HCC): Primary | ICD-10-CM

## 2021-08-31 DIAGNOSIS — J43.9 PULMONARY EMPHYSEMA, UNSPECIFIED EMPHYSEMA TYPE (HCC): ICD-10-CM

## 2021-08-31 NOTE — TELEPHONE ENCOUNTER
OV note from RAUL Maynard CNP  Per discussion with Dr. Cabrales Forward: Stop asa 4 weeks post intervention to decrease risk of bleeding and avoid triple anticoagulation

## 2021-09-09 RX ORDER — HYDROXYZINE HYDROCHLORIDE 25 MG/1
25 TABLET, FILM COATED ORAL EVERY 8 HOURS PRN
Qty: 60 TABLET | Refills: 1 | Status: SHIPPED | OUTPATIENT
Start: 2021-09-09 | End: 2021-09-19

## 2021-09-09 NOTE — TELEPHONE ENCOUNTER
Ana Chavez called requesting a refill on the following medications:  Requested Prescriptions     Pending Prescriptions Disp Refills    hydrOXYzine (ATARAX) 25 MG tablet 30 tablet 1     Sig: Take 1 tablet by mouth every 8 hours as needed for Itching       Date of last visit: 8/18/2021  Date of next visit (if applicable):Visit date not found  Date of last refill: 08/05/21  Pharmacy Name: Wilfredo Valencia LPN

## 2021-09-16 ENCOUNTER — NURSE TRIAGE (OUTPATIENT)
Dept: OTHER | Facility: CLINIC | Age: 81
End: 2021-09-16

## 2021-09-16 NOTE — TELEPHONE ENCOUNTER
lightheadedness, rest frequently    7. PREGNANCY: \"Is there any chance you are pregnant? \" \"When was your last menstrual period? \"      N/A    Protocols used: WEAKNESS (GENERALIZED) AND FATIGUE-ADULT-OH

## 2021-09-20 ENCOUNTER — OFFICE VISIT (OUTPATIENT)
Dept: FAMILY MEDICINE CLINIC | Age: 81
End: 2021-09-20
Payer: MEDICARE

## 2021-09-20 ENCOUNTER — TELEPHONE (OUTPATIENT)
Dept: CARDIOLOGY CLINIC | Age: 81
End: 2021-09-20

## 2021-09-20 VITALS
WEIGHT: 155.4 LBS | SYSTOLIC BLOOD PRESSURE: 90 MMHG | DIASTOLIC BLOOD PRESSURE: 40 MMHG | OXYGEN SATURATION: 98 % | BODY MASS INDEX: 23.63 KG/M2 | HEART RATE: 56 BPM

## 2021-09-20 DIAGNOSIS — I95.1 ORTHOSTATIC HYPOTENSION: Primary | ICD-10-CM

## 2021-09-20 DIAGNOSIS — L85.3 DRY SKIN: ICD-10-CM

## 2021-09-20 PROCEDURE — 4040F PNEUMOC VAC/ADMIN/RCVD: CPT | Performed by: NURSE PRACTITIONER

## 2021-09-20 PROCEDURE — G8427 DOCREV CUR MEDS BY ELIG CLIN: HCPCS | Performed by: NURSE PRACTITIONER

## 2021-09-20 PROCEDURE — 1123F ACP DISCUSS/DSCN MKR DOCD: CPT | Performed by: NURSE PRACTITIONER

## 2021-09-20 PROCEDURE — G8420 CALC BMI NORM PARAMETERS: HCPCS | Performed by: NURSE PRACTITIONER

## 2021-09-20 PROCEDURE — 99214 OFFICE O/P EST MOD 30 MIN: CPT | Performed by: NURSE PRACTITIONER

## 2021-09-20 PROCEDURE — 1036F TOBACCO NON-USER: CPT | Performed by: NURSE PRACTITIONER

## 2021-09-20 ASSESSMENT — ENCOUNTER SYMPTOMS: VOMITING: 0

## 2021-09-20 NOTE — PROGRESS NOTES
Haleigh Araiza (:  1940) is a 80 y.o. male,Established patient, here for evaluation of the following chief complaint(s):  Hypotension (lightheaded when standing, see BP log, rash on back-ithching)         ASSESSMENT/PLAN:  1. Orthostatic hypotension   - New  - Decrease coreg dose to 3.125 mg BID  - Unclear if xarelto is contributing  - Continue other medications as prescribed  - Advised patient to notify cardiology of current symptoms, medication adjustments  - Encouraged increased fluid intake, dietary sodium intake  - Consider compression stockings    2. Dry skin  - Continue atarax as needed  - Encouraged moisturizing lotion, especially after showers    Return if symptoms worsen or fail to improve. Subjective   SUBJECTIVE/OBJECTIVE:  Lightheadedness, dizziness. Started approximately 4 months. Worsening. Reports being started on xarelto at approximately the same time. Dizziness  This is a new problem. The current episode started more than 1 month ago. The problem occurs 2 to 4 times per day. The problem has been gradually worsening. Associated symptoms include a rash. Pertinent negatives include no vomiting. The symptoms are aggravated by bending and standing. He has tried nothing for the symptoms. Rash  This is a chronic problem. The current episode started more than 1 month ago. The problem is unchanged. The rash is diffuse. The rash is characterized by itchiness and redness. He was exposed to nothing. Pertinent negatives include no vomiting. Past treatments include antihistamine. The treatment provided mild relief. Review of Systems   Gastrointestinal: Negative for vomiting. Skin: Positive for rash. Neurological: Positive for dizziness and light-headedness. Objective   Physical Exam  Vitals and nursing note reviewed. Constitutional:       Appearance: He is well-developed. HENT:      Head: Normocephalic and atraumatic.       Right Ear: Hearing, tympanic membrane, ear canal and external ear normal.      Left Ear: Hearing, tympanic membrane, ear canal and external ear normal.      Nose:      Right Sinus: No maxillary sinus tenderness or frontal sinus tenderness. Left Sinus: No maxillary sinus tenderness or frontal sinus tenderness. Eyes:      General:         Right eye: No discharge. Left eye: No discharge. Conjunctiva/sclera: Conjunctivae normal.      Pupils: Pupils are equal, round, and reactive to light. Neck:      Vascular: No JVD. Cardiovascular:      Rate and Rhythm: Normal rate and regular rhythm. Heart sounds: Normal heart sounds. No murmur heard. Pulmonary:      Effort: Pulmonary effort is normal. No tachypnea. Breath sounds: Normal breath sounds. No stridor. No wheezing. Abdominal:      General: There is no distension. Tenderness: There is no abdominal tenderness. Musculoskeletal:         General: No deformity. Cervical back: Normal range of motion. Comments: ROM in all extremities WNL. No deformities. Lymphadenopathy:      Head:      Right side of head: No submental or submandibular adenopathy. Left side of head: No submental or submandibular adenopathy. Skin:     General: Skin is warm and dry. Capillary Refill: Capillary refill takes less than 2 seconds. Findings: No rash. Neurological:      Mental Status: He is alert and oriented to person, place, and time. Coordination: Coordination normal.   Psychiatric:         Mood and Affect: Mood normal.         Behavior: Behavior normal.         Thought Content: Thought content normal.         Judgment: Judgment normal.                  An electronic signature was used to authenticate this note.     --Boogie Good, LEWIS - CNP

## 2021-09-20 NOTE — PATIENT INSTRUCTIONS
** Cut coreg tablet in half. Take 1/2 tablet twice daily. Patient Education        Orthostatic Hypotension: Care Instructions  Your Care Instructions     Orthostatic hypotension is a quick drop in blood pressure. It happens when you get up from sitting or lying down. You may feel faint, lightheaded, or dizzy. When a person sits up or stands up, the body changes the way it pumps blood. This can slow the flow of blood to the brain for a very short time. And that can make you feel lightheaded. Many medicines can cause this problem, especially in older people. Lack of fluids (dehydration) or illnesses such as diabetes or heart disease also can cause it. Follow-up care is a key part of your treatment and safety. Be sure to make and go to all appointments, and call your doctor if you are having problems. It's also a good idea to know your test results and keep a list of the medicines you take. How can you care for yourself at home? · Be safe with medicines. Call your doctor if you think you are having a problem with your medicine. You will get more details on the specific medicines your doctor prescribes. · If you feel dizzy or lightheaded, sit down or lie down for a few minutes. Or you can sit down and put your head between your knees. This will help your blood pressure go back to normal and help your symptoms go away. · Follow your doctor's suggestions for ways to prevent symptoms like dizziness. These suggestions may include:  ? Get up slowly from bed or after sitting for a long time. If you are in bed, roll to your side and swing your legs over the edge of the bed and onto the floor. Push your body up to a sitting position. Wait for a while before you slowly stand up.  ? Add more salt to your diet, if your doctor recommends it. ? Drink plenty of fluids. Choose water and other caffeine-free clear liquids.  If you have kidney, heart, or liver disease and have to limit fluids, talk with your doctor before you increase the amount of fluids you drink. ? Avoid or limit alcohol to 2 drinks a day for men and 1 drink a day for women. Alcohol may interfere with your medicine. In addition, alcohol can make your low blood pressure worse by causing your body to lose water. ? Avoid or limit caffeine. Caffeine can cause your body to lose water. ? Wear compression stockings to help improve blood flow. When should you call for help? Call 911 anytime you think you may need emergency care. For example, call if:    · You passed out (lost consciousness). Watch closely for changes in your health, and be sure to contact your doctor if:    · You do not get better as expected. Where can you learn more? Go to https://WebPesadospeInsplorioneb.[employer] Studios. org and sign in to your "[employer] account. Enter P597 in the VQiao.com box to learn more about \"Orthostatic Hypotension: Care Instructions. \"     If you do not have an account, please click on the \"Sign Up Now\" link. Current as of: August 31, 2020               Content Version: 12.9  © 2006-2021 [employer]. Care instructions adapted under license by [facility] ([facility]). If you have questions about a medical condition or this instruction, always ask your healthcare professional. [place] [address] any warranty or liability for your use of this information. Patient Education        Dry Skin: Care Instructions  Your Care Instructions  Dry skin is a common problem, especially in areas where the air is very dry. Dry skin can also become a problem as you get older and lose natural oils that keep your skin moist.  A tendency toward dry, itchy skin may run in families. Some problems with the body's defenses (immune system), allergies, or an infection with a fungus may also cause patches of dry skin. An over-the-counter cream may help your dry skin. If your skin problem does not get better with home treatment, your doctor may prescribe ointment.  You may need antibiotics if you have a skin infection. Follow-up care is a key part of your treatment and safety. Be sure to make and go to all appointments, and call your doctor if you are having problems. It's also a good idea to know your test results and keep a list of the medicines you take. How can you care for yourself at home? Showers and baths  · Keep showers and baths short, and use warm or lukewarm water. Don't use hot water. It takes off more of your skin's natural oils. · Choose a mild skin cleanser like Aquanil or Cetaphil. · If you are taking a bath, use a skin cleanser at the very end. Then rinse off with fresh water. Gently pat your skin dry with a towel. Skin creams and moisturizers  · Apply moisturizer or skin cream right away (within 3 minutes) after a bath or shower. Use a moisturizer at other times too, as often as you need it. · Moisturizing creams are better than lotions. Try brands like CeraVe cream, Cetaphil cream, or Eucerin cream.  Other tips  · When washing clothes, use a small amount of detergent. Don't use fabric softeners or dryer sheets. · For small areas of itchy skin, try an over-the-counter 1% hydrocortisone cream.  · If you have very dry hands, spread petroleum jelly (such as Vaseline) on your hands before bed. Wear thin cotton gloves while you sleep. If your feet are dry, spread Vaseline on them and wear socks while you sleep. When should you call for help? Call your doctor now or seek immediate medical care if:    · You have signs of infection, such as:  ? Pain, warmth, or swelling in the skin. ? Red streaks near a wound in the skin. ? Pus coming from a wound in your skin. ? A fever. Watch closely for changes in your health, and be sure to contact your doctor if:    · You do not get better as expected. Where can you learn more? Go to https://rakel.[employer]ainSpan. org and sign in to your [employer] account.  Enter P313 in the Screenburn box to learn more about \"Dry Skin: Care Instructions. \"     If you do not have an account, please click on the \"Sign Up Now\" link. Current as of: March 3, 2021               Content Version: 12.9  © 3304-9756 Healthwise, Incorporated. Care instructions adapted under license by [facility] ([facility]). If you have questions about a medical condition or this instruction, always ask your healthcare professional. [address] any warranty or liability for your use of this information.

## 2021-09-20 NOTE — TELEPHONE ENCOUNTER
Patient's wife called in stating that Brenton's BP has been dropping when standing yesterday his BP sitting was 120/72 and standing was 68/40 then today his SBP sitting was 117 and standing was 90/40. She states over the last 3 weeks the patient has been increasingly dizzy/lightheaded, SOB and unsteady on his feet (requiring a walker which is new). The PCP has decreased his Coreg to 3.12 BID this morning. The wife was questioning if the Xarelto could be causing this? He is also on Lasix 40mg daily. Dr. Keturah Jackson please advise.

## 2021-09-30 ENCOUNTER — HOSPITAL ENCOUNTER (OUTPATIENT)
Dept: NON INVASIVE DIAGNOSTICS | Age: 81
Discharge: HOME | End: 2021-09-30
Payer: MEDICARE

## 2021-09-30 ENCOUNTER — HOSPITAL ENCOUNTER (OUTPATIENT)
Age: 81
Discharge: HOME | End: 2021-09-30
Payer: MEDICARE

## 2021-09-30 DIAGNOSIS — Z95.2 HISTORY OF TRANSCATHETER AORTIC VALVE REPLACEMENT (TAVR): ICD-10-CM

## 2021-09-30 LAB
ANION GAP SERPL CALCULATED.3IONS-SCNC: 10 MEQ/L (ref 8–16)
BUN BLDV-MCNC: 11 MG/DL (ref 7–22)
CALCIUM SERPL-MCNC: 9.3 MG/DL (ref 8.5–10.5)
CHLORIDE BLD-SCNC: 103 MEQ/L (ref 98–111)
CO2: 25 MEQ/L (ref 23–33)
CREAT SERPL-MCNC: 1 MG/DL (ref 0.4–1.2)
ERYTHROCYTE [DISTWIDTH] IN BLOOD BY AUTOMATED COUNT: 16.1 % (ref 11.5–14.5)
ERYTHROCYTE [DISTWIDTH] IN BLOOD BY AUTOMATED COUNT: 44 FL (ref 35–45)
GFR SERPL CREATININE-BSD FRML MDRD: 72 ML/MIN/1.73M2
GLUCOSE BLD-MCNC: 107 MG/DL (ref 70–108)
HCT VFR BLD CALC: 25.4 % (ref 42–52)
HEMOGLOBIN: 7.4 GM/DL (ref 14–18)
LV EF: 58 %
LVEF MODALITY: NORMAL
MCH RBC QN AUTO: 21.9 PG (ref 26–33)
MCHC RBC AUTO-ENTMCNC: 29.1 GM/DL (ref 32.2–35.5)
MCV RBC AUTO: 75.1 FL (ref 80–94)
PLATELET # BLD: 328 THOU/MM3 (ref 130–400)
PMV BLD AUTO: 8.8 FL (ref 9.4–12.4)
POTASSIUM SERPL-SCNC: 5 MEQ/L (ref 3.5–5.2)
RBC # BLD: 3.38 MILL/MM3 (ref 4.7–6.1)
SODIUM BLD-SCNC: 138 MEQ/L (ref 135–145)
WBC # BLD: 8.5 THOU/MM3 (ref 4.8–10.8)

## 2021-09-30 PROCEDURE — 85027 COMPLETE CBC AUTOMATED: CPT

## 2021-09-30 PROCEDURE — 36415 COLL VENOUS BLD VENIPUNCTURE: CPT

## 2021-09-30 PROCEDURE — 80048 BASIC METABOLIC PNL TOTAL CA: CPT

## 2021-09-30 PROCEDURE — 93306 TTE W/DOPPLER COMPLETE: CPT

## 2021-10-01 ENCOUNTER — TELEPHONE (OUTPATIENT)
Dept: FAMILY MEDICINE CLINIC | Age: 81
End: 2021-10-01

## 2021-10-01 DIAGNOSIS — D64.9 ANEMIA, UNSPECIFIED TYPE: Primary | ICD-10-CM

## 2021-10-01 NOTE — TELEPHONE ENCOUNTER
Mariana Roa called in stating that her father had his labs drawn yesterday and his hgb is 7.4 from 11.4. Brenton at this time has not had any obvious signs of bleeding. Dr. Nanette Echeverria please advise.

## 2021-10-01 NOTE — TELEPHONE ENCOUNTER
Recommend follow-up appointment in office to further evaluate. Repeat H&H on 10/4. Please advise patient.   Ziyad Sunshine MD

## 2021-10-01 NOTE — TELEPHONE ENCOUNTER
Called and spoke with Brenton's daughter she is a nurse and she will get H and H drawn on 10/04. She will keep an eye on him this weekend and knows to take him to ED if needed. They are following up with Dr. Tabby Guzman s NP on Monday.

## 2021-10-01 NOTE — TELEPHONE ENCOUNTER
Aly from Dr. Angi Umanzor office phoned- states that patient had an angioplasty about 2 months ago. Patient had a follow up appointment and had low blood pressure, dizziness and SOB. At that time, Dr. Ricki Dawn stopped patient's Coreg and Lasix. Patient stated that he feels better since stopping. Dr. Ricki Dawn ordered a random BMP and CBC on patient, and Hgb was 7.4. It was 11.4 on 08/10/21. Their exam showed no cause for bleeding and per family, there have not been any obvious signs of bleeding. Dr. Ricki Danw is wanting Dr. Rufino Cagle to follow up on labs for further workup.   Please advise

## 2021-10-01 NOTE — TELEPHONE ENCOUNTER
Spoke to Dr. Mireya Dalton and he states that from his standpoint the patient should be evaluated by his PCP to why his hgb has dropped. Called and updated Dr. Curt Jacob and Tommie Berg.

## 2021-10-04 ENCOUNTER — HOSPITAL ENCOUNTER (OUTPATIENT)
Age: 81
Discharge: HOME | End: 2021-10-04
Payer: MEDICARE

## 2021-10-04 LAB
ABSOLUTE RETIC #: 43 THOU/MM3 (ref 20–115)
ERYTHROCYTE [DISTWIDTH] IN BLOOD BY AUTOMATED COUNT: 16.2 % (ref 11.5–14.5)
ERYTHROCYTE [DISTWIDTH] IN BLOOD BY AUTOMATED COUNT: 45.1 FL (ref 35–45)
FERRITIN: 16 NG/ML (ref 22–322)
FOLATE: 10.4 NG/ML (ref 4.8–24.2)
HCT VFR BLD CALC: 27 % (ref 42–52)
HEMOGLOBIN: 7.7 GM/DL (ref 14–18)
IMMATURE RETIC FRACT: 4.9 % (ref 2.3–13.4)
IRON: 16 UG/DL (ref 65–195)
MCH RBC QN AUTO: 21.7 PG (ref 26–33)
MCHC RBC AUTO-ENTMCNC: 28.5 GM/DL (ref 32.2–35.5)
MCV RBC AUTO: 76.1 FL (ref 80–94)
PLATELET # BLD: 401 THOU/MM3 (ref 130–400)
PMV BLD AUTO: 9.2 FL (ref 9.4–12.4)
RBC # BLD: 3.55 MILL/MM3 (ref 4.7–6.1)
RETIC HEMOGLOBIN: 16.3 PG (ref 28.2–35.7)
RETICULOCYTE ABSOLUTE COUNT: 1.7 % (ref 0.5–2)
SCAN OF BLOOD SMEAR: NORMAL
TOTAL IRON BINDING CAPACITY: 449 UG/DL (ref 171–450)
VITAMIN B-12: 375 PG/ML (ref 211–911)
WBC # BLD: 9.1 THOU/MM3 (ref 4.8–10.8)

## 2021-10-04 PROCEDURE — 83550 IRON BINDING TEST: CPT

## 2021-10-04 PROCEDURE — 83540 ASSAY OF IRON: CPT

## 2021-10-04 PROCEDURE — 85027 COMPLETE CBC AUTOMATED: CPT

## 2021-10-04 PROCEDURE — 82728 ASSAY OF FERRITIN: CPT

## 2021-10-04 PROCEDURE — 82607 VITAMIN B-12: CPT

## 2021-10-04 PROCEDURE — 82746 ASSAY OF FOLIC ACID SERUM: CPT

## 2021-10-04 PROCEDURE — 85046 RETICYTE/HGB CONCENTRATE: CPT

## 2021-10-04 PROCEDURE — 36415 COLL VENOUS BLD VENIPUNCTURE: CPT

## 2021-10-04 NOTE — TELEPHONE ENCOUNTER
Noted. Recommend they have GI order IV iron if they deem appropriate since they are seeing him. Please advise patient.   Liliana James MD

## 2021-10-05 DIAGNOSIS — D50.9 IRON DEFICIENCY ANEMIA, UNSPECIFIED IRON DEFICIENCY ANEMIA TYPE: ICD-10-CM

## 2021-10-05 RX ORDER — DIPHENHYDRAMINE HYDROCHLORIDE 50 MG/ML
50 INJECTION INTRAMUSCULAR; INTRAVENOUS ONCE
Status: CANCELLED | OUTPATIENT
Start: 2021-10-08 | End: 2021-10-08

## 2021-10-05 RX ORDER — METHYLPREDNISOLONE SODIUM SUCCINATE 125 MG/2ML
125 INJECTION, POWDER, LYOPHILIZED, FOR SOLUTION INTRAMUSCULAR; INTRAVENOUS ONCE
Status: CANCELLED | OUTPATIENT
Start: 2021-10-08 | End: 2021-10-08

## 2021-10-05 RX ORDER — SODIUM CHLORIDE 9 MG/ML
INJECTION, SOLUTION INTRAVENOUS CONTINUOUS
Status: CANCELLED | OUTPATIENT
Start: 2021-10-08

## 2021-10-05 RX ORDER — EPINEPHRINE 1 MG/ML
0.3 INJECTION, SOLUTION, CONCENTRATE INTRAVENOUS PRN
Status: CANCELLED | OUTPATIENT
Start: 2021-10-08

## 2021-10-05 RX ORDER — SODIUM CHLORIDE 0.9 % (FLUSH) 0.9 %
5-40 SYRINGE (ML) INJECTION PRN
Status: CANCELLED | OUTPATIENT
Start: 2021-10-08

## 2021-10-05 RX ORDER — HEPARIN SODIUM (PORCINE) LOCK FLUSH IV SOLN 100 UNIT/ML 100 UNIT/ML
500 SOLUTION INTRAVENOUS PRN
Status: CANCELLED | OUTPATIENT
Start: 2021-10-08

## 2021-10-07 ENCOUNTER — OFFICE VISIT (OUTPATIENT)
Dept: CARDIOLOGY CLINIC | Age: 81
End: 2021-10-07
Payer: MEDICARE

## 2021-10-07 ENCOUNTER — HOSPITAL ENCOUNTER (OUTPATIENT)
Dept: NURSING | Age: 81
Discharge: HOME | End: 2021-10-07
Payer: MEDICARE

## 2021-10-07 VITALS
BODY MASS INDEX: 24.1 KG/M2 | DIASTOLIC BLOOD PRESSURE: 62 MMHG | HEIGHT: 68 IN | SYSTOLIC BLOOD PRESSURE: 128 MMHG | WEIGHT: 159 LBS | HEART RATE: 72 BPM

## 2021-10-07 VITALS
SYSTOLIC BLOOD PRESSURE: 147 MMHG | OXYGEN SATURATION: 96 % | DIASTOLIC BLOOD PRESSURE: 68 MMHG | RESPIRATION RATE: 16 BRPM | TEMPERATURE: 97.7 F | HEART RATE: 64 BPM

## 2021-10-07 DIAGNOSIS — I73.9 PAD (PERIPHERAL ARTERY DISEASE) (HCC): Primary | ICD-10-CM

## 2021-10-07 DIAGNOSIS — I10 ESSENTIAL HYPERTENSION: ICD-10-CM

## 2021-10-07 DIAGNOSIS — Z95.820 S/P PERIPHERAL ARTERY ANGIOPLASTY WITH STENT PLACEMENT: ICD-10-CM

## 2021-10-07 DIAGNOSIS — D50.9 IRON DEFICIENCY ANEMIA, UNSPECIFIED IRON DEFICIENCY ANEMIA TYPE: Primary | ICD-10-CM

## 2021-10-07 PROCEDURE — 99213 OFFICE O/P EST LOW 20 MIN: CPT | Performed by: INTERNAL MEDICINE

## 2021-10-07 PROCEDURE — 96365 THER/PROPH/DIAG IV INF INIT: CPT

## 2021-10-07 PROCEDURE — 1036F TOBACCO NON-USER: CPT | Performed by: INTERNAL MEDICINE

## 2021-10-07 PROCEDURE — 4040F PNEUMOC VAC/ADMIN/RCVD: CPT | Performed by: INTERNAL MEDICINE

## 2021-10-07 PROCEDURE — G8427 DOCREV CUR MEDS BY ELIG CLIN: HCPCS | Performed by: INTERNAL MEDICINE

## 2021-10-07 PROCEDURE — 6360000002 HC RX W HCPCS: Performed by: NURSE PRACTITIONER

## 2021-10-07 PROCEDURE — 1123F ACP DISCUSS/DSCN MKR DOCD: CPT | Performed by: INTERNAL MEDICINE

## 2021-10-07 PROCEDURE — G8420 CALC BMI NORM PARAMETERS: HCPCS | Performed by: INTERNAL MEDICINE

## 2021-10-07 PROCEDURE — G8484 FLU IMMUNIZE NO ADMIN: HCPCS | Performed by: INTERNAL MEDICINE

## 2021-10-07 PROCEDURE — 2580000003 HC RX 258: Performed by: NURSE PRACTITIONER

## 2021-10-07 RX ORDER — HEPARIN SODIUM (PORCINE) LOCK FLUSH IV SOLN 100 UNIT/ML 100 UNIT/ML
500 SOLUTION INTRAVENOUS PRN
OUTPATIENT
Start: 2021-10-14

## 2021-10-07 RX ORDER — SODIUM CHLORIDE 0.9 % (FLUSH) 0.9 %
5-40 SYRINGE (ML) INJECTION PRN
OUTPATIENT
Start: 2021-10-14

## 2021-10-07 RX ORDER — METHYLPREDNISOLONE SODIUM SUCCINATE 125 MG/2ML
125 INJECTION, POWDER, LYOPHILIZED, FOR SOLUTION INTRAMUSCULAR; INTRAVENOUS ONCE
OUTPATIENT
Start: 2021-10-14 | End: 2021-10-14

## 2021-10-07 RX ORDER — SODIUM CHLORIDE 9 MG/ML
INJECTION, SOLUTION INTRAVENOUS CONTINUOUS
OUTPATIENT
Start: 2021-10-14

## 2021-10-07 RX ORDER — DIPHENHYDRAMINE HYDROCHLORIDE 50 MG/ML
50 INJECTION INTRAMUSCULAR; INTRAVENOUS ONCE
OUTPATIENT
Start: 2021-10-14 | End: 2021-10-14

## 2021-10-07 RX ADMIN — FERRIC CARBOXYMALTOSE INJECTION 750 MG: 50 INJECTION, SOLUTION INTRAVENOUS at 09:15

## 2021-10-07 NOTE — PROGRESS NOTES
7330 pt arrives ambulatory for injectafer infusion. Infusion explained and questions answered. PT RIGHTS AND RESPONSIBILITIES OFFERED TO PT.  0931 infusion complete. Pt tolerated it well with no complaints. 1000 pt discharged ambulatory with instructions with no complaints.            _m___ Safety:       (Environmental)   [place] to environment   Ensure ID band is correct and in place/ allergy band as needed   Assess for fall risk   Initiate fall precautions as applicable (fall band, side rails, etc.)   Call light within reach   Bed in low position/ wheels locked    __m__ Pain:        Assess pain level and characteristics   Administer analgesics as ordered   Assess effectiveness of pain management and report to MD as needed    _m___ Knowledge Deficit:   Assess baseline knowledge   Provide teaching at level of understanding   Provide teaching via preferred learning method   Evaluate teaching effectiveness    _m___ Hemodynamic/Respiratory Status:       (Pre and Post Procedure Monitoring)   Assess/Monitor vital signs and LOC   Assess Baseline SpO2 prior to any sedation   Obtain weight/height   Assess vital signs/ LOC until patient meets discharge criteria   Monitor procedure site and notify MD of any issues

## 2021-10-07 NOTE — PROGRESS NOTES
[address] [place] [address]  Dept: [phone]  Dept Fax: [phone]  Loc: [phone]    Visit Date: 10/7/2021    Mr. Juan Manuel Butler is a 80 y.o. male  who presented for:  f/u    HPI:   HPI   Angela Albarran is a very pleasant 81M who is being seen in f/u L SFA  s/p PCI w/ SJ on 8/10/21. Patient is a reformed smoker w/ PMH significant for COPD, CVA, HTN, AS s/p TAVR, Afib s/p watchman. Patient reports worsening PEÑA and has difficult walking from parking lot without becoming short of breath. Patient reports that this is unchanged since before his TAVR. Patient is scheduled for endoscopy and has been receiving iron infusions. Last Hb was 7.7. Repots dizziness with standing suddenly, with SBP dropping from 116-92 from sitting to standing. Denies melena or hemato      [facility] 2018 w/ angioplastyDr Nallu  He feels tightness in his left leg. He has been able to walk for 20 minutes without significant discomfort. /64. 5-7 days he can walk 1 mile. He has a hx of stenting of left lower extremity. He is on Plavix. He gets sob and PEÑA. Family thinks that is worse than prior. He is on aldactone. He can do mainly all ADLs, but sometimes just very sob. Making urine daily. EF 55%, small L to R shunt. He feels that he is slower that last year. Current Outpatient Medications:     nitroGLYCERIN (NITROSTAT) 0.4 MG SL tablet, up to max of 3 total doses.  If no relief after 1 dose, call 911., Disp: 25 tablet, Rfl: 1    pantoprazole (PROTONIX) 40 MG tablet, Take 1 tablet by mouth every morning (before breakfast), Disp: 90 tablet, Rfl: 1    [START ON 10/29/2021] umeclidinium-vilanterol (ANORO ELLIPTA) 62.5-25 MCG/INH AEPB inhaler, Inhale 1 puff into the lungs daily, Disp: 90 puff, Rfl: 3    rivaroxaban (XARELTO) 2.5 MG TABS tablet, Take 1 tablet by mouth 2 times daily, Disp: 28 tablet, Rfl: 0    aspirin 81 MG EC tablet, Take 81 mg by mouth daily Stop 4 weeks post peripheral intervention 9/11/21 (Patient not taking: Reported on 9/20/2021), Disp: , Rfl:     carvedilol (COREG) 6.25 MG tablet, Take 1 tablet by mouth 2 times daily (with meals), Disp: 180 tablet, Rfl: 3    furosemide (LASIX) 40 MG tablet, Take 1 tablet by mouth daily, Disp: 90 tablet, Rfl: 1    potassium chloride (KLOR-CON M) 20 MEQ extended release tablet, Take 1 tablet by mouth daily, Disp: 90 tablet, Rfl: 1    diclofenac (VOLTAREN) 75 MG EC tablet, Take 1 tablet by mouth 2 times daily as needed for Pain, Disp: 180 tablet, Rfl: 0    lovastatin (MEVACOR) 20 MG tablet, Take 1 tablet by mouth daily, Disp: 90 tablet, Rfl: 1    clopidogrel (PLAVIX) 75 MG tablet, Take 1 tablet by mouth daily, Disp: 90 tablet, Rfl: 3    OXYGEN, Please provide patient with 3LPM of oxygen at night time for nocturnal hypoxia. Patient to have repeat pulse oximetry testing done on oxygen, Disp: 3 L, Rfl: 0    Past Medical History  Brenton SOTOMAYOR  has a past medical history of ASCVD (arteriosclerotic cardiovascular disease), Atrial fibrillation ([address] CAD (coronary artery disease), Cerebral artery occlusion with cerebral infarction ([address] COPD (chronic obstructive pulmonary disease) ([address] Dysplasia of vocal cord, Elevated glucose, Hyperlipidemia, Hypertension, Nocturnal hypoxia, Osteoarthritis, Osteopenia, Osteopenia, PAD (peripheral artery disease) ([address] Polio, and Rheumatic fever. Social History  Brenton SOTOMAYOR  reports that he quit smoking about 17 years ago. His smoking use included cigarettes. He has a 75.00 pack-year smoking history. He has never used smokeless tobacco. He reports current alcohol use of about 30.0 standard drinks of alcohol per week. He reports that he does not use drugs. Family History  Brenton SOTOMAYOR family history includes Arthritis in his maternal grandmother; Heart Disease in his father.     There is no family history of bicuspid aortic valve, aneurysms, heart transplant, pacemakers, defibrillators, or sudden cardiac death. Past Surgical History   Past Surgical History:   Procedure Laterality Date    AORTIC VALVE REPAIR  09/2020    TAVR    BALLOON ANGIOPLASTY, ARTERY  07/2018    s/p Left CFA, SFA and popliteal intervention    FACIAL COSMETIC SURGERY      as a kid    HAND SURGERY Right     carpal tunnel     OTHER SURGICAL HISTORY  01/05/2021    Left Atrial Appenadage closure/watchman    PTCA  06/2020    stent LAD    TONSILLECTOMY AND ADENOIDECTOMY         Review of Systems   Constitutional: Negative for chills and fever  HENT: Negative for congestion, sinus pressure, sneezing and sore throat. Eyes: Negative for pain, discharge, redness and itching. Respiratory: Negative for apnea, cough  Gastrointestinal: Negative for blood in stool, constipation, diarrhea   Endocrine: Negative for cold intolerance, heat intolerance, polydipsia. Genitourinary: Negative for dysuria, enuresis, flank pain and hematuria. Musculoskeletal: Negative for arthralgias, joint swelling and neck pain. Neurological: Negative for numbness and headaches. Psychiatric/Behavioral: Negative for agitation, confusion, decreased concentration and dysphoric mood. Objective: There were no vitals taken for this visit. Wt Readings from Last 3 Encounters:   09/20/21 155 lb 6.4 oz (70.5 kg)   08/26/21 156 lb (70.8 kg)   08/18/21 157 lb (71.2 kg)     BP Readings from Last 3 Encounters:   09/20/21 (!) 90/40   08/26/21 (!) 100/50   08/18/21 122/64       Nursing note and vitals reviewed. Physical Exam   Constitutional: Elderly white male oriented to person, place, and time. Appears well-developed and well-nourished. HENT:   Head: Normocephalic and atraumatic. Eyes: EOM are normal. Pupils are equal, round, and reactive to light. Neck: Normal range of motion. Neck supple. No JVD present. Cardiovascular: 2/6 MIRTHA at RUSB.  Irregular rhythm, normal heart sounds and intact distal pulses. +2 dp/pt pulses. Pulmonary/Chest: Rales at RLL. Otherwise Effort normal and breath sounds normal. No respiratory distress. No wheezes. Abdominal: Soft. Bowel sounds are normal. No distension. There is no tenderness. Musculoskeletal:  Normal range of motion. No edema. Neurological: Alert and oriented to person, place, and time. No cranial nerve deficit. Coordination normal.   Skin: Skin is warm and dry. Psychiatric: Normal mood and affect.        Lab Results   Component Value Date    CKTOTAL 59 11/22/2020       Lab Results   Component Value Date    WBC 9.1 10/04/2021    RBC 3.55 10/04/2021    HGB 7.7 10/04/2021    HCT 27.0 10/04/2021    MCV 76.1 10/04/2021    MCH 21.7 10/04/2021    MCHC 28.5 10/04/2021     10/04/2021    MPV 9.2 10/04/2021       Lab Results   Component Value Date     09/30/2021    K 5.0 09/30/2021    K 4.3 08/10/2021     09/30/2021    CO2 25 09/30/2021    BUN 11 09/30/2021    LABALBU 3.8 01/05/2021    CREATININE 1.0 09/30/2021    CALCIUM 9.3 09/30/2021    LABGLOM 72 09/30/2021    GLUCOSE 107 09/30/2021    GLUCOSE 90 04/06/2017       Lab Results   Component Value Date    ALKPHOS 81 01/05/2021    ALT 8 01/05/2021    AST 19 01/05/2021    PROT 7.0 01/05/2021    BILITOT 0.7 01/05/2021    LABALBU 3.8 01/05/2021       No results found for: MG    Lab Results   Component Value Date    INR 1.02 08/10/2021    INR 2.51 (H) 02/19/2021    INR 1.90 (H) 02/17/2021         Lab Results   Component Value Date    LABA1C 5.4 05/03/2018       Lab Results   Component Value Date    TRIG 88 08/10/2021    HDL 50 08/10/2021    LDLCALC 69 08/10/2021    LDLDIRECT 83 09/30/2014    LABVLDL 17 04/06/2017       Lab Results   Component Value Date    TSH 1.460 03/17/2021         Testing Reviewed:      I have individually reviewed the cardiac test below:    ECHO: Results for orders placed during the hospital encounter of 09/30/21    ECHO Complete 2D W Doppler W Color    Narrative  Transthoracic Liana Mac MD (Interpreting  physician) on 09/30/2021 at 02:05 PM  ----------------------------------------------------------------    Findings    Mitral Valve  The mitral valve structure was normal with normal leaflet separation. DOPPLER: The transmitral velocity was within the normal range with no  evidence for mitral stenosis. There was trace mitral regurgitation. Aortic Valve  S/p TAVR. DOPPLER: Transaortic velocity was within the normal range with  no evidence of aortic stenosis (mean gradient 6 mmHg, V max 1.8 m/s, EOA  1.7 cm2). There was mild perivalvular aortic regurgitation. Tricuspid Valve  The tricuspid valve structure was normal with normal leaflet separation. DOPPLER: There was no evidence of tricuspid stenosis. There was no  evidence of tricuspid regurgitation. Pulmonic Valve  The pulmonic valve leaflets were not well seen. DOPPLER: The transpulmonic  velocity was within the normal range with no evidence for regurgitation. Left Atrium  Left atrial size was normal.    Left Ventricle  Normal left ventricular size and systolic function. There were no regional wall motion abnormalities. Wall thickness was within normal limits. Ejection fraction was estimated at 55-60%. Doppler parameters were consistent with abnormal left ventricular  relaxation (grade 1 diastolic dysfunction). Right Atrium  Right atrial size was normal.    Right Ventricle  The right ventricular size was normal with normal systolic function and  wall thickness. Pericardial Effusion  The pericardium was normal in appearance with no evidence of a pericardial  effusion. Pleural Effusion  No evidence of pleural effusion. Aorta / Great Vessels  IVC size is within normal limits with normal respiratory phasic changes.     M-Mode/2D Measurements & Calculations    LV Diastolic     LV Systolic Dimension: 3.4   LA Dimension: 4.2 cmAO Root  Dimension: 4.9   cm                           Dimension: 3.3 cmLA Area: 19  cm LV Volume Diastolic: 449 ml  cm^2  LV FS:30.6 %     LV Volume Systolic: 08.0 ml  LV PW Diastolic: LV EDV/LV EDV Index: 118  1 cm             ml/64 m^2LV ESV/LV ESV  Septum           Index: 39.3 ml/21 m^2        RV Diastolic Dimension: 3.1  Diastolic: 1 cm  EF Calculated: 66.7 %        cm    LA/Aorta: 1.27  Ascending Aorta: 4 cm  LVOT: 2.1 cm                 LA volume/Index: 53.8 ml  /29m^2    Doppler Measurements & Calculations    MV Peak E-Wave: 82.3 AV Peak Velocity: 180    LVOT Peak Velocity: 77 cm/s  cm/s                 cm/s                     LVOT Mean Velocity: 51.1  MV Peak A-Wave: 96   AV Peak Gradient: 12.96  cm/s  cm/s                 mmHg                     LVOT Peak Gradient: 2  MV E/A Ratio: 0.86   AV Mean Velocity: 112    mmHgLVOT Mean Gradient: 1  MV Peak Gradient:    cm/s                     mmHg  2.71 mmHg            AV Mean Gradient: 6 mmHg  AV VTI: 34.9 cm          TV Peak E-Wave: 47.6 cm/s  MV Deceleration      AV Area                  TV Peak A-Wave: 33.4 cm/s  Time: 197 msec       (Continuity):1.75 cm^2  MV P1/2t: 58 msec                             TV Peak Gradient: 0.91 mmHg  MVA by PHT:3.79 cm^2 LVOT VTI: 17.6 cm  AV P1/2t: 377 msec       PV Peak Velocity: 56.1 cm/s  MV E' Septal         IVRT: 85 msec            PV Peak Gradient: 1.26 mmHg  Velocity: 4.9 cm/s  MV A' Septal  Velocity: 8.9 cm/s   AV DVI (VTI): 0.5AV DVI  MV E' Lateral        (Vmax):0.43  Velocity: 7 cm/s  MV A' Lateral  Velocity: 10.4 cm/s  E/E' septal: 16.8  E/E' lateral: 11.76  MR Velocity: 545  cm/s    [email or web address] [id number]       Assessment/Plan   Progressive Anemia - possible GI cause  PAD w/ L SFA  s/p SJ 8/10/21  Paroxysmal non-valvular atrial fibrillation - [id number] score: 5, HASBLED = 4 s/p WATCHMAN  LLE PAD s/p Supera implant 5.5 x 60  S/p TAVR - S329 - +4 cc  S/p LAD PCI,

## 2021-10-07 NOTE — PROGRESS NOTES
Follow-up. Patient has been anemic. He is following with Dr. María Abraham. He is having an iron infusion today and EGD next week. They state BP pills, ASA and Xarelto are on hold right now.

## 2021-10-12 ENCOUNTER — TELEPHONE (OUTPATIENT)
Dept: CARDIOLOGY CLINIC | Age: 81
End: 2021-10-12

## 2021-10-12 RX ORDER — HYDROXYZINE HYDROCHLORIDE 25 MG/1
25 TABLET, FILM COATED ORAL EVERY 8 HOURS PRN
Qty: 30 TABLET | Refills: 1 | Status: SHIPPED | OUTPATIENT
Start: 2021-10-12 | End: 2021-10-21 | Stop reason: SDUPTHER

## 2021-10-12 NOTE — TELEPHONE ENCOUNTER
[id number]  Leroy Guerrero    The following questions refer to your heart failure and how it may affect your life. Please read and complete the following questions. There is no right or wrong answers. Please evangelista the answer that best applies to you. 1. Heart failure affects different people in different ways. Some feel shortness of breath while others feel fatigue. Please indicate how much you are limited by heart failure (shortness of breath or fatigue) in your ability to do the following activities over the past 2 weeks. Activity Extremely Limited Quite a bit limited Moderatly Limited Slightly Limited Not at all Limited Limited for other reasons/ did not do the activity   Showering/ Bathing        x      Walking 1 block on Level ground      x   Hurrying or jogging (as if to catch a bus)      x        1         2       3  4       5   6     2. Over the past 2 weeks, how many times did you have swelling in your feet, ankles or legs when you woke up in the morning? Every morning 3 or more times per week, but not every day 1-2 times per week Less than once a week Never over the past 2 weeks         x   `        1          2   3       4                     5           3. Over the past 2 weeks, on average, how many times has fatigue limited your ability to do what you wanted? All of the time Several times per day At least once a day 3 or more times per week 1-2 times per week Less than once a week Never over the past 2 weeks        x               1        2     3  4        5       6  7        4. Over the past 2 weeks, on average, how many times has shortness of breath limited your ability to do what you    wanted? All of the time Several times per day At least once a day 3 or more times per week 1-2 times per week Less than once a week Never over the past 2 weeks        x               1        2     3  4         5       6  7     5.  Over the past 2 weeks, on average, how many times have you been forced to sleep sitting up in a chair or with at least 3 pillows to prop you up because of shortness of breath? Every night 3 or more times per week, but not every day 1-2 times per week Less than once a week Never over the past 2 weeks         x   `        1          2   3       4                     5        6.  Over the past 2 weeks, how much has your heart failure limited your enjoyment of life? It has extremely limited my enjoyment of life It has limited my enjoyment of life quite a bit It has moderately limited my enjoyment of life It has slightly limited my enjoyment of life It has not limited my enjoyment of life        x          1          2   3       4           5     7.  If you had to spend the rest of your life with your heart failure the way it is right now, how would you feel about this? Not at all satisfied Mostly dissatisfied Somewhat satisfied Mostly satisfied Completely satisfied        x               1                         2                            3                        4                            5        8. How much does your heart failure affect your lifestyle?  Please indicate how your heart failure may have limited your participation in the following activities over the past 2 weeks    Activity Severely limited Limited quite a bit Moderately limited Slightly limited Did not limit at all Does not apply or did not do this activity   Hobbies, recreational activities     x    Working or doing household chores     x    Visiting family or friends out of your home     x                1      2   3         4       5            6             Meter Walk Test    Time 1:  seconds  Time 2:  seconds  Time 3:  seconds

## 2021-10-12 NOTE — TELEPHONE ENCOUNTER
Shania Chisholm called requesting a refill on the following medications:  Requested Prescriptions     Pending Prescriptions Disp Refills    hydrOXYzine (ATARAX) 25 MG tablet 30 tablet 1     Sig: Take 1 tablet by mouth every 8 hours as needed for Itching       Date of last visit: 9/20/2021  Date of next visit (if applicable):Visit date not found  Date of last refill: 08/05/21  Pharmacy Name: Judi [facility] LPN

## 2021-10-14 ENCOUNTER — HOSPITAL ENCOUNTER (OUTPATIENT)
Dept: NURSING | Age: 81
Discharge: HOME | End: 2021-10-14
Payer: MEDICARE

## 2021-10-14 VITALS
RESPIRATION RATE: 18 BRPM | TEMPERATURE: 97.4 F | DIASTOLIC BLOOD PRESSURE: 61 MMHG | OXYGEN SATURATION: 96 % | SYSTOLIC BLOOD PRESSURE: 137 MMHG | HEART RATE: 74 BPM

## 2021-10-14 DIAGNOSIS — D50.9 IRON DEFICIENCY ANEMIA, UNSPECIFIED IRON DEFICIENCY ANEMIA TYPE: Primary | ICD-10-CM

## 2021-10-14 PROCEDURE — 6360000002 HC RX W HCPCS: Performed by: NURSE PRACTITIONER

## 2021-10-14 PROCEDURE — 96365 THER/PROPH/DIAG IV INF INIT: CPT

## 2021-10-14 PROCEDURE — 2580000003 HC RX 258: Performed by: NURSE PRACTITIONER

## 2021-10-14 RX ORDER — METHYLPREDNISOLONE SODIUM SUCCINATE 125 MG/2ML
125 INJECTION, POWDER, LYOPHILIZED, FOR SOLUTION INTRAMUSCULAR; INTRAVENOUS ONCE
OUTPATIENT
Start: 2021-10-14 | End: 2021-10-14

## 2021-10-14 RX ORDER — SODIUM CHLORIDE 9 MG/ML
INJECTION, SOLUTION INTRAVENOUS CONTINUOUS
OUTPATIENT
Start: 2021-10-14

## 2021-10-14 RX ORDER — SODIUM CHLORIDE 0.9 % (FLUSH) 0.9 %
5-40 SYRINGE (ML) INJECTION PRN
OUTPATIENT
Start: 2021-10-14

## 2021-10-14 RX ORDER — HEPARIN SODIUM (PORCINE) LOCK FLUSH IV SOLN 100 UNIT/ML 100 UNIT/ML
500 SOLUTION INTRAVENOUS PRN
OUTPATIENT
Start: 2021-10-14

## 2021-10-14 RX ORDER — DIPHENHYDRAMINE HYDROCHLORIDE 50 MG/ML
50 INJECTION INTRAMUSCULAR; INTRAVENOUS ONCE
OUTPATIENT
Start: 2021-10-14 | End: 2021-10-14

## 2021-10-14 RX ADMIN — FERRIC CARBOXYMALTOSE INJECTION 750 MG: 50 INJECTION, SOLUTION INTRAVENOUS at 09:11

## 2021-10-14 NOTE — PROGRESS NOTES
0900 Patient ambulatory to room for iron infusion with wife. Patient denies any problems from the last infusion. PT RIGHTS AND RESPONSIBILITIES OFFERED TO PT.    0911 Iron infusion started. 0633 Iron infusion complete. 7945 AVS reviewed with patient. Patient verbalizes understanding. Left ambulatory to discharge lobby.            _M___ Safety:       (Environmental)   [place] to environment   Ensure ID band is correct and in place/ allergy band as needed   Assess for fall risk   Initiate fall precautions as applicable (fall band, side rails, etc.)   Call light within reach   Bed in low position/ wheels locked    __M__ Pain:        Assess pain level and characteristics   Administer analgesics as ordered   Assess effectiveness of pain management and report to MD as needed    _M___ Knowledge Deficit:   Assess baseline knowledge   Provide teaching at level of understanding   Provide teaching via preferred learning method   Evaluate teaching effectiveness    _M___ Hemodynamic/Respiratory Status:       (Pre and Post Procedure Monitoring)   Assess/Monitor vital signs and LOC   Assess Baseline SpO2 prior to any sedation   Obtain weight/height   Assess vital signs/ LOC until patient meets discharge criteria   Monitor procedure site and notify MD of any issues

## 2021-10-18 ENCOUNTER — HOSPITAL ENCOUNTER (OUTPATIENT)
Age: 81
Discharge: HOME | End: 2021-10-18
Payer: MEDICARE

## 2021-10-18 LAB
ERYTHROCYTE [DISTWIDTH] IN BLOOD BY AUTOMATED COUNT: 25.1 % (ref 11.5–14.5)
ERYTHROCYTE [DISTWIDTH] IN BLOOD BY AUTOMATED COUNT: 70.6 FL (ref 35–45)
HCT VFR BLD CALC: 33 % (ref 42–52)
HEMOGLOBIN: 9.5 GM/DL (ref 14–18)
MCH RBC QN AUTO: 23.5 PG (ref 26–33)
MCHC RBC AUTO-ENTMCNC: 28.8 GM/DL (ref 32.2–35.5)
MCV RBC AUTO: 81.5 FL (ref 80–94)
PLATELET # BLD: 313 THOU/MM3 (ref 130–400)
PMV BLD AUTO: 9.7 FL (ref 9.4–12.4)
RBC # BLD: 4.05 MILL/MM3 (ref 4.7–6.1)
WBC # BLD: 9.3 THOU/MM3 (ref 4.8–10.8)

## 2021-10-18 PROCEDURE — 85027 COMPLETE CBC AUTOMATED: CPT

## 2021-10-18 PROCEDURE — 36415 COLL VENOUS BLD VENIPUNCTURE: CPT

## 2021-10-21 ENCOUNTER — OFFICE VISIT (OUTPATIENT)
Dept: FAMILY MEDICINE CLINIC | Age: 81
End: 2021-10-21
Payer: MEDICARE

## 2021-10-21 VITALS
BODY MASS INDEX: 23.93 KG/M2 | DIASTOLIC BLOOD PRESSURE: 60 MMHG | WEIGHT: 157.4 LBS | HEART RATE: 72 BPM | SYSTOLIC BLOOD PRESSURE: 122 MMHG | RESPIRATION RATE: 16 BRPM

## 2021-10-21 DIAGNOSIS — D50.9 IRON DEFICIENCY ANEMIA, UNSPECIFIED IRON DEFICIENCY ANEMIA TYPE: ICD-10-CM

## 2021-10-21 DIAGNOSIS — L29.9 GENERALIZED PRURITUS: ICD-10-CM

## 2021-10-21 DIAGNOSIS — E78.2 MIXED HYPERLIPIDEMIA: ICD-10-CM

## 2021-10-21 DIAGNOSIS — I10 ESSENTIAL HYPERTENSION: Primary | ICD-10-CM

## 2021-10-21 PROCEDURE — 4040F PNEUMOC VAC/ADMIN/RCVD: CPT | Performed by: NURSE PRACTITIONER

## 2021-10-21 PROCEDURE — G8420 CALC BMI NORM PARAMETERS: HCPCS | Performed by: NURSE PRACTITIONER

## 2021-10-21 PROCEDURE — 1123F ACP DISCUSS/DSCN MKR DOCD: CPT | Performed by: NURSE PRACTITIONER

## 2021-10-21 PROCEDURE — 99214 OFFICE O/P EST MOD 30 MIN: CPT | Performed by: NURSE PRACTITIONER

## 2021-10-21 PROCEDURE — 1036F TOBACCO NON-USER: CPT | Performed by: NURSE PRACTITIONER

## 2021-10-21 PROCEDURE — G8427 DOCREV CUR MEDS BY ELIG CLIN: HCPCS | Performed by: NURSE PRACTITIONER

## 2021-10-21 PROCEDURE — G8484 FLU IMMUNIZE NO ADMIN: HCPCS | Performed by: NURSE PRACTITIONER

## 2021-10-21 RX ORDER — LOVASTATIN 20 MG/1
20 TABLET ORAL DAILY
Qty: 90 TABLET | Refills: 1 | Status: SHIPPED | OUTPATIENT
Start: 2021-10-21 | End: 2022-03-28

## 2021-10-21 RX ORDER — FERROUS SULFATE 325(65) MG
325 TABLET ORAL
COMMUNITY
End: 2022-03-10

## 2021-10-21 RX ORDER — HYDROXYZINE HYDROCHLORIDE 25 MG/1
25 TABLET, FILM COATED ORAL EVERY 8 HOURS PRN
Qty: 180 TABLET | Refills: 1 | Status: SHIPPED | OUTPATIENT
Start: 2021-10-21 | End: 2022-01-21 | Stop reason: SDUPTHER

## 2021-10-21 ASSESSMENT — ENCOUNTER SYMPTOMS
COLOR CHANGE: 0
SHORTNESS OF BREATH: 0
EYE ITCHING: 0
EYE PAIN: 0
ABDOMINAL PAIN: 0
DIARRHEA: 0
COUGH: 0
BLOOD IN STOOL: 0
CONSTIPATION: 0
SINUS PRESSURE: 0
WHEEZING: 0
EYE REDNESS: 0
BACK PAIN: 0
EYE DISCHARGE: 0

## 2021-10-21 NOTE — PROGRESS NOTES
SRPX [facility] PROFESSIONAL [facility]  [address]  [address]  Dept: [phone]  Dept Fax: [phone]: [phone]     Visit Date:  10/21/2021    Patient:  Bety Ruby  YOB: 1940  Age: 80 y.o. Gender: male  BMI: Body mass index is 23.93 kg/m². Bety Ruby, Established patient, is being seen today for   Chief Complaint   Patient presents with    6 Month Follow-Up     refill of lovostatin and if to continue hydroxyzine would like refill to [facility]   . Assessment/Plan      1. Essential hypertension  - Chronic, stable  - No longer requiring treatment with medication  - Lasix and coreg d/c'ed by cardiology d/t orthostatic hypotension  - DASH diet, regular exercise encouraged    2. Mixed hyperlipidemia  - Chronic, stable  - Reviewed most recently completed lipid panel  - Continue lovastatin  - lovastatin (MEVACOR) 20 MG tablet; Take 1 tablet by mouth daily  Dispense: 90 tablet; Refill: 1    3. Generalized pruritus  - Chronic  - Continue prn atarax  - Encouraged use of moisturizing cream/lotion daily, especially after shower. Avoid hot showers. - hydrOXYzine (ATARAX) 25 MG tablet; Take 1 tablet by mouth every 8 hours as needed for Itching  Dispense: 180 tablet; Refill: 1    4. Iron deficiency anemia, unspecified iron deficiency anemia type  - Improving  - Xarelto and plavix d/c'ed by cardiology, but aspirin continues  - No cause yet identified  - Continue with oral iron supplementation and gi workup  - Next gi appt is Tuesday October 26th      Return in about 6 months (around 4/21/2022) for Hypertension. HPI:     6 month follow up of hypertension. Blood pressure is well controlled at this time. Coreg and furosemide were d/c'ed by cardiology d/t orthostatic hypotension. Currently undergoing testing for potential GI bleed d/t anemia. Currently supplementing iron with 1 tablet daily.  Has undergone 2 iv administrations of iron. Blood counts improving. Medications    Current Outpatient Medications:     ferrous sulfate (IRON 325) 325 (65 Fe) MG tablet, Take 325 mg by mouth daily (with breakfast), Disp: , Rfl:     hydrOXYzine (ATARAX) 25 MG tablet, Take 1 tablet by mouth every 8 hours as needed for Itching, Disp: 180 tablet, Rfl: 1    lovastatin (MEVACOR) 20 MG tablet, Take 1 tablet by mouth daily, Disp: 90 tablet, Rfl: 1    nitroGLYCERIN (NITROSTAT) 0.4 MG SL tablet, up to max of 3 total doses. If no relief after 1 dose, call 911., Disp: 25 tablet, Rfl: 1    pantoprazole (PROTONIX) 40 MG tablet, Take 1 tablet by mouth every morning (before breakfast), Disp: 90 tablet, Rfl: 1    [START ON 10/29/2021] umeclidinium-vilanterol (ANORO ELLIPTA) 62.5-25 MCG/INH AEPB inhaler, Inhale 1 puff into the lungs daily, Disp: 90 puff, Rfl: 3    aspirin 81 MG EC tablet, Take 81 mg by mouth daily Stop 4 weeks post peripheral intervention 9/11/21, Disp: , Rfl:     potassium chloride (KLOR-CON M) 20 MEQ extended release tablet, Take 1 tablet by mouth daily, Disp: 90 tablet, Rfl: 1    OXYGEN, Please provide patient with 3LPM of oxygen at night time for nocturnal hypoxia. Patient to have repeat pulse oximetry testing done on oxygen, Disp: 3 L, Rfl: 0    The patient is allergic to brilinta [ticagrelor] and keflex [cephalexin]. Past Medical History  Brenton SOTOMAYOR  has a past medical history of ASCVD (arteriosclerotic cardiovascular disease), Atrial fibrillation ([address] CAD (coronary artery disease), Cerebral artery occlusion with cerebral infarction ([address] COPD (chronic obstructive pulmonary disease) ([address] Dysplasia of vocal cord, Elevated glucose, Hyperlipidemia, Hypertension, Nocturnal hypoxia, Osteoarthritis, Osteopenia, Osteopenia, PAD (peripheral artery disease) ([address] Polio, and Rheumatic fever. Past Surgical History  The patient  has a past surgical history that includes Tonsillectomy and adenoidectomy;  Facial cosmetic surgery; Balloon angioplasty, artery (07/2018); Hand surgery (Right); Aortic valve repair (09/2020); Percutaneous Transluminal Coronary Angio (06/2020); and other surgical history (01/05/2021). Family History  This patient's family history includes Arthritis in his maternal grandmother; Heart Disease in his father. Social History  Brenton SOTOMAYOR  reports that he quit smoking about 17 years ago. His smoking use included cigarettes. He has a 75.00 pack-year smoking history. He has never used smokeless tobacco. He reports current alcohol use of about 30.0 standard drinks of alcohol per week. He reports that he does not use drugs. Health Maintenance:    Health maintenance reviewed. Health Maintenance   Topic Date Due    Hepatitis C screen  Never done    DTaP/Tdap/Td vaccine (1 - Tdap) Never done    COVID-19 Vaccine (3 - Suarez Peter booster) 08/25/2021    Annual Wellness Visit (AWV)  04/21/2022    Lipid screen  08/10/2022    Potassium monitoring  09/30/2022    Creatinine monitoring  09/30/2022    Flu vaccine  Completed    Shingles Vaccine  Completed    Pneumococcal 65+ years Vaccine  Completed    Hepatitis A vaccine  Aged Out    Hepatitis B vaccine  Aged Out    Hib vaccine  Aged Out    Meningococcal (ACWY) vaccine  Aged Out       Subjective/Objective:      Review of Systems   Constitutional: Negative for chills, fatigue and fever. HENT: Negative for congestion, ear pain, sinus pressure and sneezing. Eyes: Negative for pain, discharge, redness and itching. Respiratory: Negative for cough, shortness of breath and wheezing. Cardiovascular: Negative for chest pain, palpitations and leg swelling. Gastrointestinal: Negative for abdominal pain, blood in stool, constipation and diarrhea. Endocrine: Negative for polydipsia, polyphagia and polyuria. Genitourinary: Negative for difficulty urinating and hematuria. Musculoskeletal: Negative for arthralgias, back pain and neck pain.    Skin: Positive for rash. Negative for color change and pallor. Allergic/Immunologic: Negative for environmental allergies and food allergies. Neurological: Positive for light-headedness. Negative for dizziness, numbness and headaches. Psychiatric/Behavioral: Negative for agitation and confusion. The patient is not nervous/anxious. /60   Pulse 72   Resp 16   Wt 157 lb 6.4 oz (71.4 kg)   BMI 23.93 kg/m²     Physical Exam  Vitals and nursing note reviewed. Constitutional:       Appearance: He is well-developed. HENT:      Head: Normocephalic and atraumatic. Right Ear: Hearing, tympanic membrane, ear canal and external ear normal.      Left Ear: Hearing, tympanic membrane, ear canal and external ear normal.      Nose:      Right Sinus: No maxillary sinus tenderness or frontal sinus tenderness. Left Sinus: No maxillary sinus tenderness or frontal sinus tenderness. Eyes:      General:         Right eye: No discharge. Left eye: No discharge. Conjunctiva/sclera: Conjunctivae normal.      Pupils: Pupils are equal, round, and reactive to light. Neck:      Vascular: No JVD. Cardiovascular:      Rate and Rhythm: Normal rate and regular rhythm. Heart sounds: Normal heart sounds. No murmur heard. Pulmonary:      Effort: Pulmonary effort is normal. No tachypnea. Breath sounds: Normal breath sounds. No stridor. No wheezing. Abdominal:      General: There is no distension. Tenderness: There is no abdominal tenderness. Musculoskeletal:         General: No deformity. Cervical back: Normal range of motion. Comments: ROM in all extremities WNL. No deformities. Lymphadenopathy:      Head:      Right side of head: No submental or submandibular adenopathy. Left side of head: No submental or submandibular adenopathy. Skin:     General: Skin is warm and dry. Capillary Refill: Capillary refill takes less than 2 seconds. Findings: Rash present. Neurological:      Mental Status: He is alert and oriented to person, place, and time. Coordination: Coordination normal.   Psychiatric:         Mood and Affect: Mood normal.         Behavior: Behavior normal.         Thought Content: Thought content normal.         Judgment: Judgment normal.           Labs Reviewed 10/21/2021:    Lab Results   Component Value Date    WBC 9.3 10/18/2021    HGB 9.5 (L) 10/18/2021    HCT 33.0 (L) 10/18/2021     10/18/2021    CHOL 137 08/10/2021    TRIG 88 08/10/2021    HDL 50 08/10/2021    LDLDIRECT 83 09/30/2014    ALT 8 (L) 01/05/2021    AST 19 01/05/2021     09/30/2021    K 5.0 09/30/2021     09/30/2021    CREATININE 1.0 09/30/2021    BUN 11 09/30/2021    CO2 25 09/30/2021    TSH 1.460 03/17/2021    INR 1.02 08/10/2021    LABA1C 5.4 05/03/2018       On this date 10/21/2021 I have spent 30 minutes reviewing previous notes, test results and face to face with the patient discussing the diagnosis and importance of compliance with the treatment plan as well as documenting on the day of the visit. Patient given educational materials - see patient instructions. Discussed use, benefit, and side effects of prescribed medications. All patient questions answered. Pt voiced understanding. Reviewed health maintenance.        Electronically signed by LEWIS Jeffrey CNP on 10/21/2021 at 9:31 AM CHARLI

## 2021-10-21 NOTE — PATIENT INSTRUCTIONS
Patient Education        High Cholesterol: Care Instructions  Overview     Cholesterol is a type of fat in your blood. It is needed for many body functions, such as making new cells. Cholesterol is made by your body. It also comes from food you eat. High cholesterol means that you have too much of the fat in your blood. This raises your risk of a heart attack and stroke. LDL and HDL are part of your total cholesterol. LDL is the \"bad\" cholesterol. High LDL can raise your risk for coronary artery disease, heart attack, and stroke. HDL is the \"good\" cholesterol. It helps clear bad cholesterol from the body. High HDL is linked with a lower risk of coronary artery disease, heart attack, and stroke. Your cholesterol levels help your doctor find out your risk for having a heart attack or stroke. You and your doctor can talk about whether you need to lower your risk and what treatment is best for you. Treatment options include a heart-healthy lifestyle and medicine. Both options can help lower your cholesterol and your risk. The way you choose to lower your risk will depend on how high your risk is for heart attack and stroke. It will also depend on how you feel about taking medicines. Follow-up care is a key part of your treatment and safety. Be sure to make and go to all appointments, and call your doctor if you are having problems. It's also a good idea to know your test results and keep a list of the medicines you take. How can you care for yourself at home? · Eat heart-healthy foods. ? Eat fruits, vegetables, whole grains, beans, and other high-fiber foods. ? Eat lean proteins, such as seafood, lean meats, beans, nuts, and soy products. ? Eat healthy fats, such as canola and olive oil. ? Choose foods that are low in saturated fat. ? Limit sodium and alcohol. ? Limit drinks and foods with added sugar. · Be physically active. Try to do moderate activity at least 2½ hours a week.  Or try to do vigorous activity at least 1¼ hours a week. You may want to walk or try other activities, such as running, swimming, cycling, or playing tennis or team sports. · Stay at a healthy weight or lose weight by making the changes in eating and physical activity listed above. Losing just a small amount of weight, even 5 to 10 pounds, can help reduce your risk for having a heart attack or stroke. · Do not smoke. · Manage other health problems. These include diabetes and high blood pressure. If you think you may have a problem with alcohol or drug use, talk to your doctor. · If you take medicine, take it exactly as prescribed. Call your doctor if you think you are having a problem with your medicine. · Check with your doctor or pharmacist before you use any other medicines, including over-the-counter medicines. Make sure your doctor knows all of the medicines, vitamins, herbal products, and supplements you take. Taking some medicines together can cause problems. When should you call for help? Watch closely for changes in your health, and be sure to contact your doctor if:    · You need help making lifestyle changes.     · You have questions about your medicine. Where can you learn more? Go to https://LabNowpeMaximuseb.Death by Party. org and sign in to your [employer] account. Enter G264 in the [facility] box to learn more about \"High Cholesterol: Care Instructions. \"     If you do not have an account, please click on the \"Sign Up Now\" link. Current as of: April 29, 2021               Content Version: 13.0  © 2006-2021 [employer]. Care instructions adapted under license by [facility] ([facility]). If you have questions about a medical condition or this instruction, always ask your healthcare professional. [place] [address] any warranty or liability for your use of this information.          Patient Education        Dry Skin: Care Instructions  Your Care Instructions  Dry skin is a common problem, especially in areas where the air is very dry. Dry skin can also become a problem as you get older and lose natural oils that keep your skin moist.  A tendency toward dry, itchy skin may run in families. Some problems with the body's defenses (immune system), allergies, or an infection with a fungus may also cause patches of dry skin. An over-the-counter cream may help your dry skin. If your skin problem does not get better with home treatment, your doctor may prescribe ointment. You may need antibiotics if you have a skin infection. Follow-up care is a key part of your treatment and safety. Be sure to make and go to all appointments, and call your doctor if you are having problems. It's also a good idea to know your test results and keep a list of the medicines you take. How can you care for yourself at home? Showers and baths  · Keep showers and baths short, and use warm or lukewarm water. Don't use hot water. It takes off more of your skin's natural oils. · Choose a mild skin cleanser like Aquanil or Cetaphil. · If you are taking a bath, use a skin cleanser at the very end. Then rinse off with fresh water. Gently pat your skin dry with a towel. Skin creams and moisturizers  · Apply moisturizer or skin cream right away (within 3 minutes) after a bath or shower. Use a moisturizer at other times too, as often as you need it. · Moisturizing creams are better than lotions. Try brands like CeraVe cream, Cetaphil cream, or Eucerin cream.  Other tips  · When washing clothes, use a small amount of detergent. Don't use fabric softeners or dryer sheets. · For small areas of itchy skin, try an over-the-counter 1% hydrocortisone cream.  · If you have very dry hands, spread petroleum jelly (such as Vaseline) on your hands before bed. Wear thin cotton gloves while you sleep. If your feet are dry, spread Vaseline on them and wear socks while you sleep. When should you call for help?    Call your doctor now or seek immediate medical care if:    · You have signs of infection, such as:  ? Pain, warmth, or swelling in the skin. ? Red streaks near a wound in the skin. ? Pus coming from a wound in your skin. ? A fever. Watch closely for changes in your health, and be sure to contact your doctor if:    · You do not get better as expected. Where can you learn more? Go to https://PercuVisionpepiceweb.[employer]ium International. org and sign in to your [employer] account. Enter W519 in the [employer] box to learn more about \"Dry Skin: Care Instructions. \"     If you do not have an account, please click on the \"Sign Up Now\" link. Current as of: March 3, 2021               Content Version: 13.0  © 2006-2021 Mayi [employer]. Care instructions adapted under license by [facility] [address] If you have questions about a medical condition or this instruction, always ask your healthcare professional. [place] [address] any warranty or liability for your use of this information. Patient Education        Iron Deficiency Anemia: Care Instructions  Your Care Instructions     Anemia means that you don't have enough red blood cells. Red blood cells carry oxygen around your body. When you have anemia, it can make you pale, weak, and tired. Many things can cause anemia. The most common cause is loss of blood. This can happen if you have heavy menstrual periods. It can also happen if you have bleeding in your stomach or bowel. You can also get anemia if you don't have enough iron in your diet or if it's hard for your body to absorb iron. In some cases, pregnancy causes anemia. That's because a pregnant woman needs more iron. Your doctor may do more tests to find the cause of your anemia. If a disease or other health problem is causing it, your doctor will treat that problem.   It's important to follow up with your doctor to make sure that your iron level returns to normal.  Follow-up care is a key part of your treatment and safety. Be sure to make and go to all appointments, and call your doctor if you are having problems. It's also a good idea to know your test results and keep a list of the medicines you take. How can you care for yourself at home? · If your doctor recommended iron pills, take them as directed. ? Try to take the pills on an empty stomach. You can do this about 1 hour before or 2 hours after meals. But you may need to take iron with food to avoid an upset stomach. ? Do not take antacids or drink milk or anything with caffeine within 2 hours of when you take your iron. They can keep your body from absorbing the iron well. ? Vitamin C helps your body absorb iron. You may want to take iron pills with a glass of orange juice or some other food high in vitamin C.  ? Iron pills may cause stomach problems. These include heartburn, nausea, diarrhea, constipation, and cramps. It can help to drink plenty of fluids and include fruits, vegetables, and fiber in your diet. ? It's normal for iron pills to make your stool a greenish or grayish black. But internal bleeding can also cause dark stool. So it's important to tell your doctor about any color changes. ? Call your doctor if you think you are having a problem with your iron pills. Even after you start to feel better, it will take several months for your body to build up its supply of iron. ? If you miss a pill, don't take a double dose. ? Keep iron pills out of the reach of small children. Too much iron can be very dangerous. · Eat foods with a lot of iron. These include red meat, shellfish, poultry, and eggs. They also include beans, raisins, whole-grain bread, and leafy green vegetables. · Steam your vegetables. This is the best way to prepare them if you want to get as much iron as possible. · Be safe with medicines. Do not take nonsteroidal anti-inflammatory pain relievers unless your doctor tells you to.  These include aspirin, naproxen (Aleve), and ibuprofen (Advil, Motrin). · Liquid iron can stain your teeth. But you can mix it with water or juice and drink it with a straw. Then it won't get on your teeth. When should you call for help? Call 911 anytime you think you may need emergency care. For example, call if:    · You passed out (lost consciousness). Call your doctor now or seek immediate medical care if:    · You are short of breath.     · You are dizzy or light-headed, or you feel like you may faint.     · You have new or worse bleeding. Watch closely for changes in your health, and be sure to contact your doctor if:    · You feel weaker or more tired than usual.     · You do not get better as expected. Where can you learn more? Go to https://Hitwise.SlamData. org and sign in to your [employer] account. Enter T151 in the [employer] box to learn more about \"Iron Deficiency Anemia: Care Instructions. \"     If you do not have an account, please click on the \"Sign Up Now\" link. Current as of: April 29, 2021               Content Version: 13.0  © 4767-1793 AmpliSense. Care instructions adapted under license by [facility] ([facility]). If you have questions about a medical condition or this instruction, always ask your healthcare professional. [place] [address] any warranty or liability for your use of this information. Patient Education        Anemia From Heavy Bleeding: Care Instructions  Your Care Instructions     Anemia means that your body does not have enough red blood cells. Red blood cells carry oxygen around the body. When you have anemia, you may feel dizzy, tired, and weak. You may also feel your heart pounding. For some people, it's hard to focus and think clearly. One common cause of anemia is bleeding. Bleeding from ulcers, hemorrhoids, cancer, or other problems can cause anemia. It may also be caused by heavy menstrual periods. Your treatment may include iron pills.  Iron helps your body make hemoglobin. Hemoglobin is the part of the red blood cell that carries oxygen. If you have severe anemia, you may need a blood transfusion to give you red blood cells as quickly as possible. Sometimes it takes several months to get iron levels back to normal.  Follow-up care is a key part of your treatment and safety. Be sure to make and go to all appointments, and call your doctor if you are having problems. It's also a good idea to know your test results and keep a list of the medicines you take. How can you care for yourself at home? · Be safe with medicines. Take your medicines exactly as prescribed. Call your doctor if you think you are having a problem with your medicine. · Follow your doctor's advice about eating foods that have a lot of iron in them. These include red meat, shellfish, poultry, and eggs. They also include beans, raisins, whole-grain bread, and leafy green vegetables. · Steam your vegetables. This is the best way to prepare them if you want to get as much iron as possible. · Iron pills can cause constipation. If you take them, there are things you can do to avoid constipation. Drink plenty of fluids, eat foods with a lot of fiber, and exercise every day. When should you call for help? Call 911 anytime you think you may need emergency care. For example, call if:    · You passed out (lost consciousness).     · Your stools are maroon or very bloody. Call your doctor now or seek immediate medical care if:    · You are short of breath.     · You have new or worse bleeding.     · You are dizzy or light-headed, or you feel like you may faint. Watch closely for changes in your health, and be sure to contact your doctor if:    · You feel weaker or more tired than usual.     · You do not get better as expected. Where can you learn more? Go to https://chrey.Efficient Cloud. org and sign in to your [employer]) account.  Enter D888 in the [employer] box to learn more about \"Anemia From Heavy Bleeding: Care Instructions. \"     If you do not have an account, please click on the \"Sign Up Now\" link. Current as of: April 29, 2021               Content Version: 13.0  © 1380-2772 Healthwise, Incorporated. Care instructions adapted under license by [facility] ([facility]). If you have questions about a medical condition or this instruction, always ask your healthcare professional. [address] any warranty or liability for your use of this information.

## 2021-10-25 ENCOUNTER — TELEPHONE (OUTPATIENT)
Dept: CARDIOLOGY CLINIC | Age: 81
End: 2021-10-25

## 2021-10-26 ENCOUNTER — TELEPHONE (OUTPATIENT)
Dept: CARDIOLOGY CLINIC | Age: 81
End: 2021-10-26

## 2021-10-26 DIAGNOSIS — I48.0 PAROXYSMAL ATRIAL FIBRILLATION (HCC): ICD-10-CM

## 2021-10-26 DIAGNOSIS — Z95.818 PRESENCE OF WATCHMAN LEFT ATRIAL APPENDAGE CLOSURE DEVICE: Primary | ICD-10-CM

## 2021-10-26 NOTE — TELEPHONE ENCOUNTER
PEGGY scheduled with Dr. Leonie Antony-  [phone]  Leonie Antony Time 230pm  No anesthesia   Endo   Scheduled with Kwadwo Perez

## 2021-10-26 NOTE — TELEPHONE ENCOUNTER
Pt wife states that she was at GI today, and they think they found where the bleeding was coming from in the small intestines and it looks like it is starting to heal.

## 2021-10-27 NOTE — TELEPHONE ENCOUNTER
Spoke with patient's wife, who is on HIPAA, and informed her to have rakesh hold the blood thinners for now. She voiced understanding.

## 2021-11-09 ENCOUNTER — HOSPITAL ENCOUNTER (OUTPATIENT)
Age: 81
Discharge: HOME | End: 2021-11-09
Payer: MEDICARE

## 2021-11-09 ENCOUNTER — OFFICE VISIT (OUTPATIENT)
Dept: FAMILY MEDICINE CLINIC | Age: 81
End: 2021-11-09
Payer: MEDICARE

## 2021-11-09 VITALS
TEMPERATURE: 97.2 F | WEIGHT: 157.2 LBS | HEIGHT: 68 IN | OXYGEN SATURATION: 92 % | RESPIRATION RATE: 18 BRPM | BODY MASS INDEX: 23.82 KG/M2 | DIASTOLIC BLOOD PRESSURE: 62 MMHG | HEART RATE: 83 BPM | SYSTOLIC BLOOD PRESSURE: 118 MMHG

## 2021-11-09 DIAGNOSIS — L03.115 CELLULITIS OF RIGHT LOWER EXTREMITY: Primary | ICD-10-CM

## 2021-11-09 LAB
ERYTHROCYTE [DISTWIDTH] IN BLOOD BY AUTOMATED COUNT: 24.6 % (ref 11.5–14.5)
ERYTHROCYTE [DISTWIDTH] IN BLOOD BY AUTOMATED COUNT: 70.3 FL (ref 35–45)
HCT VFR BLD CALC: 40.8 % (ref 42–52)
HEMOGLOBIN: 12.5 GM/DL (ref 14–18)
IRON SATURATION: 18 % (ref 20–50)
IRON: 50 UG/DL (ref 65–195)
MCH RBC QN AUTO: 25.6 PG (ref 26–33)
MCHC RBC AUTO-ENTMCNC: 30.6 GM/DL (ref 32.2–35.5)
MCV RBC AUTO: 83.6 FL (ref 80–94)
PLATELET # BLD: 262 THOU/MM3 (ref 130–400)
PMV BLD AUTO: 9 FL (ref 9.4–12.4)
RBC # BLD: 4.88 MILL/MM3 (ref 4.7–6.1)
TOTAL IRON BINDING CAPACITY: 279 UG/DL (ref 171–450)
WBC # BLD: 10.7 THOU/MM3 (ref 4.8–10.8)

## 2021-11-09 PROCEDURE — 85027 COMPLETE CBC AUTOMATED: CPT

## 2021-11-09 PROCEDURE — 1123F ACP DISCUSS/DSCN MKR DOCD: CPT | Performed by: FAMILY MEDICINE

## 2021-11-09 PROCEDURE — G8484 FLU IMMUNIZE NO ADMIN: HCPCS | Performed by: FAMILY MEDICINE

## 2021-11-09 PROCEDURE — 99213 OFFICE O/P EST LOW 20 MIN: CPT | Performed by: FAMILY MEDICINE

## 2021-11-09 PROCEDURE — 4040F PNEUMOC VAC/ADMIN/RCVD: CPT | Performed by: FAMILY MEDICINE

## 2021-11-09 PROCEDURE — 83550 IRON BINDING TEST: CPT

## 2021-11-09 PROCEDURE — 1036F TOBACCO NON-USER: CPT | Performed by: FAMILY MEDICINE

## 2021-11-09 PROCEDURE — G8427 DOCREV CUR MEDS BY ELIG CLIN: HCPCS | Performed by: FAMILY MEDICINE

## 2021-11-09 PROCEDURE — G8420 CALC BMI NORM PARAMETERS: HCPCS | Performed by: FAMILY MEDICINE

## 2021-11-09 PROCEDURE — 36415 COLL VENOUS BLD VENIPUNCTURE: CPT

## 2021-11-09 PROCEDURE — 83540 ASSAY OF IRON: CPT

## 2021-11-09 RX ORDER — SULFAMETHOXAZOLE AND TRIMETHOPRIM 800; 160 MG/1; MG/1
1 TABLET ORAL 2 TIMES DAILY
Qty: 20 TABLET | Refills: 0 | Status: SHIPPED | OUTPATIENT
Start: 2021-11-09 | End: 2021-11-16 | Stop reason: SDUPTHER

## 2021-11-09 RX ORDER — CARVEDILOL 6.25 MG/1
TABLET ORAL
COMMUNITY
Start: 2021-11-09 | End: 2021-11-16

## 2021-11-09 ASSESSMENT — ENCOUNTER SYMPTOMS: COLOR CHANGE: 1

## 2021-11-09 NOTE — PROGRESS NOTES
[facility] PROFESSIONAL SERVS  [facility] MEDICINE  [address] [facility]  Dept: [phone]  Dept Fax: [phone]  Loc: [phone]  PROGRESS NOTE      Visit Date: 11/9/2021    Harry Zhang is a 80 y.o. male who presents today for:  Chief Complaint   Patient presents with    Skin Problem     Sore on right lower calf; \"I itch all over\"       Subjective:  HPI    Sore on right lower calf. Itching for over 1 year. Putting aveeno on it. No pain. Has drainage. Just returned from [facility]. Wife is present    Review of Systems   Constitutional: Negative for chills and fever. Skin: Positive for color change, rash and wound. 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 Lowest de-sat 77%.     Osteoarthritis     Osteopenia     Osteopenia     PAD (peripheral artery disease) ([facility])     Polio 1948    Rheumatic fever 1948      Current Outpatient Medications   Medication Sig Dispense Refill    carvedilol (COREG) 6.25 MG tablet       ferrous sulfate (IRON 325) 325 (65 Fe) MG tablet Take 325 mg by mouth daily (with breakfast)      hydrOXYzine (ATARAX) 25 MG tablet Take 1 tablet by mouth every 8 hours as needed for Itching 180 tablet 1    lovastatin (MEVACOR) 20 MG tablet Take 1 tablet by mouth daily 90 tablet 1    pantoprazole (PROTONIX) 40 MG tablet Take 1 tablet by mouth every morning (before breakfast) 90 tablet 1    umeclidinium-vilanterol (ANORO ELLIPTA) 62.5-25 MCG/INH AEPB inhaler Inhale 1 puff into the lungs daily 90 puff 3    aspirin 81 MG EC tablet Take 81 mg by mouth daily Stop 4 weeks post peripheral intervention  9/11/21      potassium chloride (KLOR-CON M) 20 MEQ extended release tablet Take 1 tablet by mouth daily 90 tablet 1    OXYGEN Please provide patient with 3LPM of oxygen at night time for nocturnal hypoxia. Patient to have repeat pulse oximetry testing done on oxygen 3 L 0    nitroGLYCERIN (NITROSTAT) 0.4 MG SL tablet up to max of 3 total doses. If no relief after 1 dose, call 911. 25 tablet 1     No current facility-administered medications for this visit. Allergies   Allergen Reactions    Brilinta [Ticagrelor] Shortness Of Breath    Keflex [Cephalexin] Rash       Objective:     /62 (Site: Left Upper Arm, Position: Sitting, Cuff Size: Medium Adult)   Pulse 83   Temp 97.2 °F (36.2 °C) (Temporal)   Resp 18   Ht 5' 8\" (1.727 m)   Wt 157 lb 3.2 oz (71.3 kg)   SpO2 92%   BMI 23.90 kg/m²   Physical Exam  Vitals reviewed. Constitutional:       General: He is not in acute distress. Appearance: He is not ill-appearing. Neurological:      Mental Status: He is alert. Mental status is at baseline. Psychiatric:         Mood and Affect: Mood normal.         Behavior: Behavior normal.       Right lower leg with erythema, serous drainage, and honey crusting. No fluctuance or induration            Impression/Plan:  1. Cellulitis of right lower extremity  New problem. Possible cellulitis. Treat with Bactrim. - sulfamethoxazole-trimethoprim (BACTRIM DS;SEPTRA DS) 800-160 MG per tablet; Take 1 tablet by mouth 2 times daily for 10 days  Dispense: 20 tablet; Refill: 0        They voiced understanding. All questions answered. They agreed with treatment plan. See patient instructions for any educational materials that may have been given. Discussed use, benefit, and side effects of prescribed medications.        (Please note that portions of this note may have been completed with a voice recognition program.  Efforts were made to edit the dictation but occasionally words are mis-transcribed.)    Return in about 1 week (around 11/16/2021) for rash on leg.        Electronically signed by Tony Cantu MD on 11/9/2021 at 2:06 PM

## 2021-11-16 ENCOUNTER — HOSPITAL ENCOUNTER (OUTPATIENT)
Age: 81
Discharge: HOME | End: 2021-11-16
Payer: MEDICARE

## 2021-11-16 ENCOUNTER — OFFICE VISIT (OUTPATIENT)
Dept: FAMILY MEDICINE CLINIC | Age: 81
End: 2021-11-16
Payer: MEDICARE

## 2021-11-16 VITALS
OXYGEN SATURATION: 92 % | SYSTOLIC BLOOD PRESSURE: 100 MMHG | HEIGHT: 68 IN | BODY MASS INDEX: 23.55 KG/M2 | TEMPERATURE: 97.7 F | HEART RATE: 85 BPM | RESPIRATION RATE: 18 BRPM | DIASTOLIC BLOOD PRESSURE: 64 MMHG | WEIGHT: 155.4 LBS

## 2021-11-16 DIAGNOSIS — L29.9 ITCHING: ICD-10-CM

## 2021-11-16 DIAGNOSIS — L03.115 CELLULITIS OF RIGHT LOWER EXTREMITY: Primary | ICD-10-CM

## 2021-11-16 LAB
ALBUMIN SERPL-MCNC: 4.5 G/DL (ref 3.5–5.1)
ALP BLD-CCNC: 117 U/L (ref 38–126)
ALT SERPL-CCNC: 9 U/L (ref 11–66)
AST SERPL-CCNC: 23 U/L (ref 5–40)
BILIRUB SERPL-MCNC: 0.2 MG/DL (ref 0.3–1.2)
BILIRUBIN DIRECT: < 0.2 MG/DL (ref 0–0.3)
TOTAL PROTEIN: 7.7 G/DL (ref 6.1–8)

## 2021-11-16 PROCEDURE — 4040F PNEUMOC VAC/ADMIN/RCVD: CPT | Performed by: FAMILY MEDICINE

## 2021-11-16 PROCEDURE — G8427 DOCREV CUR MEDS BY ELIG CLIN: HCPCS | Performed by: FAMILY MEDICINE

## 2021-11-16 PROCEDURE — 1123F ACP DISCUSS/DSCN MKR DOCD: CPT | Performed by: FAMILY MEDICINE

## 2021-11-16 PROCEDURE — G8420 CALC BMI NORM PARAMETERS: HCPCS | Performed by: FAMILY MEDICINE

## 2021-11-16 PROCEDURE — 1036F TOBACCO NON-USER: CPT | Performed by: FAMILY MEDICINE

## 2021-11-16 PROCEDURE — 36415 COLL VENOUS BLD VENIPUNCTURE: CPT

## 2021-11-16 PROCEDURE — G8484 FLU IMMUNIZE NO ADMIN: HCPCS | Performed by: FAMILY MEDICINE

## 2021-11-16 PROCEDURE — 99213 OFFICE O/P EST LOW 20 MIN: CPT | Performed by: FAMILY MEDICINE

## 2021-11-16 PROCEDURE — 80076 HEPATIC FUNCTION PANEL: CPT

## 2021-11-16 RX ORDER — SULFAMETHOXAZOLE AND TRIMETHOPRIM 800; 160 MG/1; MG/1
1 TABLET ORAL 2 TIMES DAILY
Qty: 20 TABLET | Refills: 0 | Status: SHIPPED | OUTPATIENT
Start: 2021-11-16 | End: 2021-11-29 | Stop reason: SDUPTHER

## 2021-11-16 NOTE — PROGRESS NOTES
[facility]  [address] [facility]  Dept: [phone]  Dept Fax: [phone]  Loc: [phone]  PROGRESS NOTE      Visit Date: 11/16/2021    Glenroy Cardona is a 80 y.o. male who presents today for:  Chief Complaint   Patient presents with    Follow-up     1 week follow up; rash on right lower leg       Subjective:  HPI    1 Week follow-up for rash on right lower leg that was treated with Bactrim twice a day x10 days and this is about day 7. He reports generalized itching for over 1 year. unclear etiology. Intermittent. Hypotension. Does not check BP at home. Not on any BP meds. Wife is present    Review of Systems   Constitutional: Negative for chills and fever. Skin: Positive for rash.      Patient Active Problem List   Diagnosis    Elevated glucose    Osteoarthritis    ASCVD (arteriosclerotic cardiovascular disease)    COPD, moderate ([address]    Aortic valve disorder    Abnormal ankle brachial index (VONDA)    Hypertension secondary to other renal disorders (CODE)    Coronary artery disease due to lipid rich plaque    Claudication (HCC)    S/P coronary angioplasty    S/P cardiac cath    S/P percutaneous transluminal angioplasty (PTA) with stent placement    Severe aortic stenosis    Essential hypertension    Hyperlipidemia LDL goal <70    S/P TAVR (transcatheter aortic valve replacement)    Nocturnal hypoxia    Chronic respiratory failure with hypoxia (HCC)    Hypersomnia    Other emphysema (HCC)    Afib (HCC)    Presence of Watchman left atrial appendage closure device    Urticaria    Other atopic dermatitis    PAD (peripheral artery disease) (HCC)    Abnormal ultrasound    Iron deficiency anemia, unspecified     Past Medical History:   Diagnosis Date    ASCVD (arteriosclerotic cardiovascular disease)     Atrial fibrillation ([address]     CAD (coronary artery disease)     Cerebral artery occlusion with cerebral infarction [facility])     TIA    COPD (chronic obstructive pulmonary disease) ([facility])     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 Lowest de-sat 77%.  Osteoarthritis     Osteopenia     Osteopenia     PAD (peripheral artery disease) ([facility])     Polio 1948    Rheumatic fever       Past Surgical History:   Procedure Laterality Date    AORTIC VALVE REPAIR  2020    TAVR    BALLOON ANGIOPLASTY, ARTERY  2018    s/p Left CFA, SFA and popliteal intervention    FACIAL COSMETIC SURGERY      as a kid    HAND SURGERY Right     carpal tunnel     OTHER SURGICAL HISTORY  2021    Left Atrial Appenadage closure/watchman    PTCA  2020    stent LAD    TONSILLECTOMY AND ADENOIDECTOMY       Family History   Problem Relation Age of Onset    Heart Disease Father     Arthritis Maternal Grandmother      Social History     Tobacco Use    Smoking status: Former Smoker     Packs/day: 1.50     Years: 50.00     Pack years: 75.00     Types: Cigarettes     Quit date: 10/9/2004     Years since quittin.1    Smokeless tobacco: Never Used   Substance Use Topics    Alcohol use:  Yes     Alcohol/week: 30.0 standard drinks     Types: 30 Cans of beer per week     Comment: 3 to 4 a week       Current Outpatient Medications   Medication Sig Dispense Refill    carvedilol (COREG) 6.25 MG tablet       sulfamethoxazole-trimethoprim (BACTRIM DS;SEPTRA DS) 800-160 MG per tablet Take 1 tablet by mouth 2 times daily for 10 days 20 tablet 0    ferrous sulfate (IRON 325) 325 (65 Fe) MG tablet Take 325 mg by mouth daily (with breakfast)      hydrOXYzine (ATARAX) 25 MG tablet Take 1 tablet by mouth every 8 hours as needed for Itching 180 tablet 1    lovastatin (MEVACOR) 20 MG tablet Take 1 tablet by mouth daily 90 tablet 1    pantoprazole (PROTONIX) 40 MG tablet Take 1 tablet by mouth every morning (before breakfast) 90 tablet 1    umeclidinium-vilanterol (ANORO ELLIPTA) 62.5-25 MCG/INH AEPB inhaler Inhale 1 puff into the lungs daily 90 puff 3    aspirin 81 MG EC tablet Take 81 mg by mouth daily Stop 4 weeks post peripheral intervention  9/11/21      potassium chloride (KLOR-CON M) 20 MEQ extended release tablet Take 1 tablet by mouth daily 90 tablet 1    OXYGEN Please provide patient with 3LPM of oxygen at night time for nocturnal hypoxia. Patient to have repeat pulse oximetry testing done on oxygen 3 L 0    nitroGLYCERIN (NITROSTAT) 0.4 MG SL tablet up to max of 3 total doses. If no relief after 1 dose, call 911. 25 tablet 1     No current facility-administered medications for this visit. Allergies   Allergen Reactions    Brilinta [Ticagrelor] Shortness Of Breath    Keflex [Cephalexin] Rash     Health Maintenance   Topic Date Due    Hepatitis C screen  Never done    DTaP/Tdap/Td vaccine (1 - Tdap) Never done    COVID-19 Vaccine (3 - Booster for Suarez Peter series) 08/25/2021    Annual Wellness Visit (AWV)  04/21/2022    Lipid screen  08/10/2022    Potassium monitoring  09/30/2022    Creatinine monitoring  09/30/2022    Flu vaccine  Completed    Shingles Vaccine  Completed    Pneumococcal 65+ years Vaccine  Completed    Hepatitis A vaccine  Aged Out    Hepatitis B vaccine  Aged Out    Hib vaccine  Aged Out    Meningococcal (ACWY) vaccine  Aged Out       Objective:  /64 (Site: Left Upper Arm, Position: Sitting, Cuff Size: Medium Adult)   Pulse 85   Temp 97.7 °F (36.5 °C) (Temporal)   Resp 18   Ht 5' 8\" (1.727 m)   Wt 155 lb 6.4 oz (70.5 kg)   SpO2 92%   BMI 23.63 kg/m²   Physical Exam  Vitals reviewed. Constitutional:       General: He is not in acute distress. Appearance: He is not ill-appearing. Eyes:      General: No scleral icterus.   Neurological:      Mental Status: He is alert. Posterior lower leg with healing erythematous rash. No drainage. No bleeding. Scaling is almost resolved. No fluctuance. No induration      Impression/Plan:  1. Cellulitis of right lower extremity  Acute problem. Improving. Will need additional 10 days of Bactrim I feel to hopefully resolve his cellulitis  - sulfamethoxazole-trimethoprim (BACTRIM DS;SEPTRA DS) 800-160 MG per tablet; Take 1 tablet by mouth 2 times daily for 10 days  Dispense: 20 tablet; Refill: 0    2. Itching  Chronic. Uncontrolled. Unclear etiology. Intermittent symptoms. Will refer to dermatology to further evaluate. check liver enzymes to rule out elevated bilirubin  - Hepatic Function Panel; Future  - [facility] - [place] MD, Dermatology, [facility]IS [address]      They voiced understanding. All questions answered. They agreed with treatment plan. See patient instructions for any educational materials that may have been given. Discussed use, benefit, and side effects of prescribed medications. Reviewed health maintenance. (Please note that portions of this note may have been completed with a voice recognition program.  Efforts were made to edit the dictation but occasionally words are mis-transcribed.)    Return if symptoms worsen or fail to improve.       Electronically signed by Ziyad Sunshine MD on 11/16/2021 at 8:41 AM

## 2021-11-26 ENCOUNTER — HOSPITAL ENCOUNTER (OUTPATIENT)
Age: 81
Discharge: HOME | End: 2021-11-26
Payer: MEDICARE

## 2021-11-26 LAB
ERYTHROCYTE [DISTWIDTH] IN BLOOD BY AUTOMATED COUNT: 23.5 % (ref 11.5–14.5)
ERYTHROCYTE [DISTWIDTH] IN BLOOD BY AUTOMATED COUNT: 68.3 FL (ref 35–45)
HCT VFR BLD CALC: 44 % (ref 42–52)
HEMOGLOBIN: 13.6 GM/DL (ref 14–18)
IRON SATURATION: 33 % (ref 20–50)
IRON: 98 UG/DL (ref 65–195)
MCH RBC QN AUTO: 26.2 PG (ref 26–33)
MCHC RBC AUTO-ENTMCNC: 30.9 GM/DL (ref 32.2–35.5)
MCV RBC AUTO: 84.6 FL (ref 80–94)
PLATELET # BLD: 287 THOU/MM3 (ref 130–400)
PMV BLD AUTO: 9.3 FL (ref 9.4–12.4)
RBC # BLD: 5.2 MILL/MM3 (ref 4.7–6.1)
TOTAL IRON BINDING CAPACITY: 293 UG/DL (ref 171–450)
WBC # BLD: 8 THOU/MM3 (ref 4.8–10.8)

## 2021-11-26 PROCEDURE — 83550 IRON BINDING TEST: CPT

## 2021-11-26 PROCEDURE — 36415 COLL VENOUS BLD VENIPUNCTURE: CPT

## 2021-11-26 PROCEDURE — 85027 COMPLETE CBC AUTOMATED: CPT

## 2021-11-26 PROCEDURE — 83540 ASSAY OF IRON: CPT

## 2021-11-29 DIAGNOSIS — L03.115 CELLULITIS OF RIGHT LOWER EXTREMITY: ICD-10-CM

## 2021-11-29 RX ORDER — SULFAMETHOXAZOLE AND TRIMETHOPRIM 800; 160 MG/1; MG/1
1 TABLET ORAL 2 TIMES DAILY
Qty: 20 TABLET | Refills: 0 | Status: SHIPPED | OUTPATIENT
Start: 2021-11-29 | End: 2021-11-30

## 2021-11-29 NOTE — TELEPHONE ENCOUNTER
Jason Palmer came into the office to request a refill on the abx prescribed to \"get him through\" to his dermatologist appointment on 12/16/2021. Writer advised refill may not be honored, but would follow up with phone call with provider advice. Britnikatie Estela states wound has not healed and is still seeping.

## 2021-11-29 NOTE — TELEPHONE ENCOUNTER
Left a message with Mau Steele to let her know the Bactrim Rx is put into the pharmacy and to give us a call back if Uche Daniels would like to try an alternative abx.

## 2021-11-29 NOTE — TELEPHONE ENCOUNTER
Maybe he needs a different antibiotic. I did send bactrim in but would be willing to try something else (such as doxycycline) if he wants to. Please advise patient.   Bertha Chau MD

## 2021-11-30 ENCOUNTER — TELEPHONE (OUTPATIENT)
Dept: FAMILY MEDICINE CLINIC | Age: 81
End: 2021-11-30

## 2021-11-30 DIAGNOSIS — L03.115 CELLULITIS OF RIGHT LOWER EXTREMITY: Primary | ICD-10-CM

## 2021-11-30 RX ORDER — DOXYCYCLINE HYCLATE 100 MG
100 TABLET ORAL 2 TIMES DAILY
Qty: 20 TABLET | Refills: 0 | Status: SHIPPED | OUTPATIENT
Start: 2021-11-30 | End: 2021-12-10

## 2021-12-09 ENCOUNTER — OFFICE VISIT (OUTPATIENT)
Dept: FAMILY MEDICINE CLINIC | Age: 81
End: 2021-12-09
Payer: MEDICARE

## 2021-12-09 ENCOUNTER — TELEPHONE (OUTPATIENT)
Dept: FAMILY MEDICINE CLINIC | Age: 81
End: 2021-12-09

## 2021-12-09 VITALS
HEART RATE: 71 BPM | WEIGHT: 163 LBS | OXYGEN SATURATION: 94 % | HEIGHT: 68 IN | SYSTOLIC BLOOD PRESSURE: 128 MMHG | DIASTOLIC BLOOD PRESSURE: 68 MMHG | BODY MASS INDEX: 24.71 KG/M2 | TEMPERATURE: 97.9 F

## 2021-12-09 DIAGNOSIS — L03.115 CELLULITIS OF RIGHT LOWER EXTREMITY: Primary | ICD-10-CM

## 2021-12-09 PROCEDURE — 4040F PNEUMOC VAC/ADMIN/RCVD: CPT | Performed by: NURSE PRACTITIONER

## 2021-12-09 PROCEDURE — G8420 CALC BMI NORM PARAMETERS: HCPCS | Performed by: NURSE PRACTITIONER

## 2021-12-09 PROCEDURE — 1123F ACP DISCUSS/DSCN MKR DOCD: CPT | Performed by: NURSE PRACTITIONER

## 2021-12-09 PROCEDURE — G8427 DOCREV CUR MEDS BY ELIG CLIN: HCPCS | Performed by: NURSE PRACTITIONER

## 2021-12-09 PROCEDURE — 1036F TOBACCO NON-USER: CPT | Performed by: NURSE PRACTITIONER

## 2021-12-09 PROCEDURE — 99213 OFFICE O/P EST LOW 20 MIN: CPT | Performed by: NURSE PRACTITIONER

## 2021-12-09 PROCEDURE — G8484 FLU IMMUNIZE NO ADMIN: HCPCS | Performed by: NURSE PRACTITIONER

## 2021-12-09 RX ORDER — AMOXICILLIN 500 MG/1
500 TABLET, FILM COATED ORAL 3 TIMES DAILY
Qty: 30 TABLET | Refills: 0 | Status: SHIPPED | OUTPATIENT
Start: 2021-12-09 | End: 2021-12-19

## 2021-12-09 ASSESSMENT — ENCOUNTER SYMPTOMS: VOMITING: 0

## 2021-12-09 NOTE — PATIENT INSTRUCTIONS
Patient Education        Cellulitis: Care Instructions  Your Care Instructions     Cellulitis is a skin infection caused by bacteria, most often strep or staph. It often occurs after a break in the skin from a scrape, cut, bite, or puncture, or after a rash. Cellulitis may be treated without doing tests to find out what caused it. But your doctor may do tests, if needed, to look for a specific bacteria, like methicillin-resistant Staphylococcus aureus (MRSA). The doctor has checked you carefully, but problems can develop later. If you notice any problems or new symptoms, get medical treatment right away. Follow-up care is a key part of your treatment and safety. Be sure to make and go to all appointments, and call your doctor if you are having problems. It's also a good idea to know your test results and keep a list of the medicines you take. How can you care for yourself at home? · Take your antibiotics as directed. Do not stop taking them just because you feel better. You need to take the full course of antibiotics. · Prop up the infected area on pillows to reduce pain and swelling. Try to keep the area above the level of your heart as often as you can. · If your doctor told you how to care for your wound, follow your doctor's instructions. If you did not get instructions, follow this general advice:  ? Wash the wound with clean water 2 times a day. Don't use hydrogen peroxide or alcohol, which can slow healing. ? You may cover the wound with a thin layer of petroleum jelly, such as Vaseline, and a nonstick bandage. ? Apply more petroleum jelly and replace the bandage as needed. · Be safe with medicines. Take pain medicines exactly as directed. ? If the doctor gave you a prescription medicine for pain, take it as prescribed. ? If you are not taking a prescription pain medicine, ask your doctor if you can take an over-the-counter medicine.   To prevent cellulitis in the future  · Try to prevent cuts, scrapes, or other injuries to your skin. Cellulitis most often occurs where there is a break in the skin. · If you get a scrape, cut, mild burn, or bite, wash the wound with clean water as soon as you can to help avoid infection. Don't use hydrogen peroxide or alcohol, which can slow healing. · If you have swelling in your legs (edema), support stockings and good skin care may help prevent leg sores and cellulitis. · Take care of your feet, especially if you have diabetes or other conditions that increase the risk of infection. Wear shoes and socks. Do not go barefoot. If you have athlete's foot or other skin problems on your feet, talk to your doctor about how to treat them. When should you call for help? Call your doctor now or seek immediate medical care if:    · You have signs that your infection is getting worse, such as:  ? Increased pain, swelling, warmth, or redness. ? Red streaks leading from the area. ? Pus draining from the area. ? A fever.     · You get a rash. Watch closely for changes in your health, and be sure to contact your doctor if:    · You do not get better as expected. Where can you learn more? Go to https://Bad[employer]. org and sign in to your [email or web address] account. Enter I733 in the [facility] box to learn more about \"Cellulitis: Care Instructions. \"     If you do not have an account, please click on the \"Sign Up Now\" link. Current as of: March 3, 2021               Content Version: 13.0  © 2006-2021 Healthwise, [facility]. Care instructions adapted under license by [facility] ([facility]). If you have questions about a medical condition or this instruction, always ask your healthcare professional. [address] any warranty or liability for your use of this information.

## 2021-12-09 NOTE — PROGRESS NOTES
Griselda Austin (:  1940) is a 80 y.o. male,Established patient, here for evaluation of the following chief complaint(s):  Leg Injury (lower calf, wound seeping brownish liquid, itchy really wants the itching to go away.) and Other (there is another spot on his back and belly )         ASSESSMENT/PLAN:  1. Cellulitis of right lower extremity  - Acute  - Start amoxicillin  - Stop neosporin, potential allergy?  - Wound care education provided  - Follow up with dermatology  - Failed treatment with bactrim and doxycycline  -     Amoxicillin 500 MG TABS; Take 500 mg by mouth 3 times daily for 10 days, Disp-30 tablet, R-0Normal      Return if symptoms worsen or fail to improve. Subjective   SUBJECTIVE/OBJECTIVE:  Leg Injury  This is a new problem. The current episode started more than 1 month ago. The problem has been gradually worsening. Pertinent negatives include no fever or vomiting. Nothing aggravates the symptoms. Treatments tried: bactrim, doxycycline and neosporin. Review of Systems   Constitutional: Negative for fever. Gastrointestinal: Negative for vomiting. Skin: Positive for wound. Objective   Physical Exam  Vitals and nursing note reviewed. Constitutional:       Appearance: He is well-developed. HENT:      Head: Normocephalic and atraumatic. Right Ear: Hearing and external ear normal.      Left Ear: Hearing and external ear normal.   Eyes:      General:         Right eye: No discharge. Left eye: No discharge. Conjunctiva/sclera: Conjunctivae normal.      Pupils: Pupils are equal, round, and reactive to light. Neck:      Vascular: No JVD. Cardiovascular:      Rate and Rhythm: Normal rate and regular rhythm. Heart sounds: Normal heart sounds. No murmur heard. Pulmonary:      Effort: Pulmonary effort is normal. No tachypnea. Breath sounds: Normal breath sounds. No stridor. No wheezing.    Abdominal:      General: There is no distension. Tenderness: There is no abdominal tenderness. Musculoskeletal:         General: No deformity. Cervical back: Normal range of motion. Comments: ROM in all extremities WNL. No deformities. Lymphadenopathy:      Head:      Right side of head: No submental or submandibular adenopathy. Left side of head: No submental or submandibular adenopathy. Skin:     General: Skin is warm and dry. Capillary Refill: Capillary refill takes less than 2 seconds. Findings: Wound present. No rash. Neurological:      Mental Status: He is alert and oriented to person, place, and time. Coordination: Coordination normal.   Psychiatric:         Mood and Affect: Mood normal.         Behavior: Behavior normal.         Thought Content: Thought content normal.         Judgment: Judgment normal.                  An electronic signature was used to authenticate this note.     --LEWIS Melo - CNP

## 2021-12-09 NOTE — TELEPHONE ENCOUNTER
Spouse calls in , she says Brenton's legs are not any better, he has completed 2 rounds of antibodics. Appointment made.

## 2022-01-07 ENCOUNTER — PREP FOR PROCEDURE (OUTPATIENT)
Dept: CARDIOLOGY | Age: 82
End: 2022-01-07

## 2022-01-07 RX ORDER — SODIUM CHLORIDE 9 MG/ML
25 INJECTION, SOLUTION INTRAVENOUS PRN
Status: CANCELLED | OUTPATIENT
Start: 2022-01-07

## 2022-01-07 RX ORDER — SODIUM CHLORIDE 0.9 % (FLUSH) 0.9 %
5-40 SYRINGE (ML) INJECTION PRN
Status: CANCELLED | OUTPATIENT
Start: 2022-01-07

## 2022-01-07 RX ORDER — SODIUM CHLORIDE 0.9 % (FLUSH) 0.9 %
5-40 SYRINGE (ML) INJECTION EVERY 12 HOURS SCHEDULED
Status: CANCELLED | OUTPATIENT
Start: 2022-01-07

## 2022-01-10 ENCOUNTER — HOSPITAL ENCOUNTER (OUTPATIENT)
Age: 82
Discharge: HOME | End: 2022-01-10
Payer: MEDICARE

## 2022-01-10 ENCOUNTER — HOSPITAL ENCOUNTER (OUTPATIENT)
Age: 82
Setting detail: OUTPATIENT SURGERY
Discharge: HOME | End: 2022-01-10
Attending: NUCLEAR MEDICINE | Admitting: NUCLEAR MEDICINE
Payer: MEDICARE

## 2022-01-10 VITALS
DIASTOLIC BLOOD PRESSURE: 74 MMHG | BODY MASS INDEX: 23.79 KG/M2 | TEMPERATURE: 97.3 F | HEIGHT: 68 IN | RESPIRATION RATE: 16 BRPM | WEIGHT: 157 LBS | OXYGEN SATURATION: 97 % | SYSTOLIC BLOOD PRESSURE: 171 MMHG | HEART RATE: 63 BPM

## 2022-01-10 DIAGNOSIS — Z95.818 PRESENCE OF WATCHMAN LEFT ATRIAL APPENDAGE CLOSURE DEVICE: ICD-10-CM

## 2022-01-10 DIAGNOSIS — I48.0 PAROXYSMAL ATRIAL FIBRILLATION (HCC): ICD-10-CM

## 2022-01-10 LAB
ANION GAP SERPL CALCULATED.3IONS-SCNC: 11 MEQ/L (ref 8–16)
BUN BLDV-MCNC: 18 MG/DL (ref 7–22)
CALCIUM SERPL-MCNC: 9.5 MG/DL (ref 8.5–10.5)
CHLORIDE BLD-SCNC: 101 MEQ/L (ref 98–111)
CO2: 27 MEQ/L (ref 23–33)
CREAT SERPL-MCNC: 1.1 MG/DL (ref 0.4–1.2)
ERYTHROCYTE [DISTWIDTH] IN BLOOD BY AUTOMATED COUNT: 16.5 % (ref 11.5–14.5)
ERYTHROCYTE [DISTWIDTH] IN BLOOD BY AUTOMATED COUNT: 47.9 FL (ref 35–45)
GFR SERPL CREATININE-BSD FRML MDRD: 64 ML/MIN/1.73M2
GLUCOSE BLD-MCNC: 86 MG/DL (ref 70–108)
HCT VFR BLD CALC: 44.9 % (ref 42–52)
HEMOGLOBIN: 13.8 GM/DL (ref 14–18)
LV EF: 50 %
LVEF MODALITY: NORMAL
MCH RBC QN AUTO: 27.2 PG (ref 26–33)
MCHC RBC AUTO-ENTMCNC: 30.7 GM/DL (ref 32.2–35.5)
MCV RBC AUTO: 88.4 FL (ref 80–94)
PLATELET # BLD: 209 THOU/MM3 (ref 130–400)
PMV BLD AUTO: 9.3 FL (ref 9.4–12.4)
POTASSIUM SERPL-SCNC: 5.1 MEQ/L (ref 3.5–5.2)
RBC # BLD: 5.08 MILL/MM3 (ref 4.7–6.1)
SODIUM BLD-SCNC: 139 MEQ/L (ref 135–145)
WBC # BLD: 9.2 THOU/MM3 (ref 4.8–10.8)

## 2022-01-10 PROCEDURE — 93312 ECHO TRANSESOPHAGEAL: CPT

## 2022-01-10 PROCEDURE — 7100000011 HC PHASE II RECOVERY - ADDTL 15 MIN: Performed by: NUCLEAR MEDICINE

## 2022-01-10 PROCEDURE — 7100000010 HC PHASE II RECOVERY - FIRST 15 MIN: Performed by: NUCLEAR MEDICINE

## 2022-01-10 PROCEDURE — 93320 DOPPLER ECHO COMPLETE: CPT

## 2022-01-10 PROCEDURE — 85027 COMPLETE CBC AUTOMATED: CPT

## 2022-01-10 PROCEDURE — 99152 MOD SED SAME PHYS/QHP 5/>YRS: CPT | Performed by: NUCLEAR MEDICINE

## 2022-01-10 PROCEDURE — 93325 DOPPLER ECHO COLOR FLOW MAPG: CPT

## 2022-01-10 PROCEDURE — 6360000002 HC RX W HCPCS: Performed by: NUCLEAR MEDICINE

## 2022-01-10 PROCEDURE — 36415 COLL VENOUS BLD VENIPUNCTURE: CPT

## 2022-01-10 PROCEDURE — 80048 BASIC METABOLIC PNL TOTAL CA: CPT

## 2022-01-10 RX ORDER — FENTANYL CITRATE 50 UG/ML
INJECTION, SOLUTION INTRAMUSCULAR; INTRAVENOUS PRN
Status: DISCONTINUED | OUTPATIENT
Start: 2022-01-10 | End: 2022-01-10 | Stop reason: ALTCHOICE

## 2022-01-10 RX ORDER — MIDAZOLAM HYDROCHLORIDE 1 MG/ML
INJECTION INTRAMUSCULAR; INTRAVENOUS PRN
Status: DISCONTINUED | OUTPATIENT
Start: 2022-01-10 | End: 2022-01-10 | Stop reason: ALTCHOICE

## 2022-01-10 RX ORDER — SODIUM CHLORIDE 0.9 % (FLUSH) 0.9 %
5-40 SYRINGE (ML) INJECTION EVERY 12 HOURS SCHEDULED
Status: DISCONTINUED | OUTPATIENT
Start: 2022-01-10 | End: 2022-01-10 | Stop reason: HOSPADM

## 2022-01-10 RX ORDER — SODIUM CHLORIDE 9 MG/ML
25 INJECTION, SOLUTION INTRAVENOUS PRN
Status: DISCONTINUED | OUTPATIENT
Start: 2022-01-10 | End: 2022-01-10 | Stop reason: HOSPADM

## 2022-01-10 RX ORDER — SODIUM CHLORIDE 0.9 % (FLUSH) 0.9 %
5-40 SYRINGE (ML) INJECTION PRN
Status: DISCONTINUED | OUTPATIENT
Start: 2022-01-10 | End: 2022-01-10 | Stop reason: HOSPADM

## 2022-01-10 ASSESSMENT — PAIN SCALES - GENERAL
PAINLEVEL_OUTOF10: 0
PAINLEVEL_OUTOF10: 0

## 2022-01-10 ASSESSMENT — PAIN - FUNCTIONAL ASSESSMENT: PAIN_FUNCTIONAL_ASSESSMENT: 0-10

## 2022-01-10 NOTE — H&P
[address] [address]  Sedation/Analgesia History & Physical    Pt Name: Syeda Muñiz  Account number: [de-identified]  MRN: [id number]  YOB: 1940  Provider Performing Procedure: Varinder Calixto MD MD [facility]  Primary Care Physician: Darrick Mcghee MD  Date: 1/10/2022    PRE-PROCEDURE    Code Status: FULL CODE  Brief History/Pre-Procedure Diagnosis:   Watchman      Consent: : I have discussed with the patient risks, benefits, and alternatives (and relevant risks, benefits, and side effects related to alternatives or not receiving care), and likelihood of the success. The patient and/or representative appear to understand and agree to proceed. The discussion encompasses risks, benefits, and side effects related to the alternatives and the risks related to not receiving the proposed care, treatment, and services. MEDICAL HISTORY  []ASHD/ANGINA/MI/CHF   [x]Hypertension  []Diabetes  []Hyperlipidemia  []Smoking  []Family Hx of ASHD  []Additional information:       has a past medical history of ASCVD (arteriosclerotic cardiovascular disease), Atrial fibrillation ([address] CAD (coronary artery disease), Cerebral artery occlusion with cerebral infarction ([address] COPD (chronic obstructive pulmonary disease) ([address] Dysplasia of vocal cord, Elevated glucose, Hyperlipidemia, Hypertension, Nocturnal hypoxia, Osteoarthritis, Osteopenia, Osteopenia, PAD (peripheral artery disease) ([address] Polio, and Rheumatic fever. 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   Additional information:       ALLERGIES   Allergies as of 10/26/2021 - Fully Reviewed 10/21/2021   Allergen Reaction Noted    Brilinta [ticagrelor] Shortness Of Breath 07/29/2020    Keflex [cephalexin] Rash 09/29/2014     Additional information:       MEDICATIONS   Aspirin  [x] 81 mg  [] 325 mg  [] None  Antiplatelet drug therapy use last 5 days  []No []Yes  Coumadin Use Last 5 Days []No []Yes  Other anticoagulant use last 5 days  []No []Yes    Current Facility-Administered Medications:     0.9 % sodium chloride infusion, 25 mL, IntraVENous, PRN, Ivana Raygoza PA-C    sodium chloride flush 0.9 % injection 5-40 mL, 5-40 mL, IntraVENous, 2 times per day, Ivana Raygoza PA-C    sodium chloride flush 0.9 % injection 5-40 mL, 5-40 mL, IntraVENous, PRN, Ivana Raygoza PA-C  Prior to Admission medications    Medication Sig Start Date End Date Taking? Authorizing Provider   ferrous sulfate (IRON 325) 325 (65 Fe) MG tablet Take 325 mg by mouth daily (with breakfast)   Yes Historical Provider, MD   hydrOXYzine (ATARAX) 25 MG tablet Take 1 tablet by mouth every 8 hours as needed for Itching 10/21/21 1/19/22 Yes Stefania Louis, LEWIS - CNP   lovastatin (MEVACOR) 20 MG tablet Take 1 tablet by mouth daily 10/21/21  Yes Stefania Louis, LEWIS - CNP   pantoprazole (PROTONIX) 40 MG tablet Take 1 tablet by mouth every morning (before breakfast) 8/18/21  Yes Criselda Partida MD   umeclidinium-vilanterol (ANORO ELLIPTA) 62.5-25 MCG/INH AEPB inhaler Inhale 1 puff into the lungs daily 10/29/21 10/29/22 Yes LEWIS Fuentes CNP   aspirin 81 MG EC tablet Take 81 mg by mouth daily Stop 4 weeks post peripheral intervention  9/11/21   Yes Historical Provider, MD   potassium chloride (KLOR-CON M) 20 MEQ extended release tablet Take 1 tablet by mouth daily 5/17/21  Yes Ministerio Billingsley MD   OXYGEN Please provide patient with 3LPM of oxygen at night time for nocturnal hypoxia. Patient to have repeat pulse oximetry testing done on oxygen 10/20/20  Yes LEWIS Fuentes CNP   nitroGLYCERIN (NITROSTAT) 0.4 MG SL tablet up to max of 3 total doses.  If no relief after 1 dose, call 911. 8/26/21   Edson Giron, APRN

## 2022-01-10 NOTE — PROGRESS NOTES
Recovery mode. Patient denies discomfort.  discussed findings with patient and . Discharge instructions provided and understanding verbalized.

## 2022-01-21 ENCOUNTER — HOSPITAL ENCOUNTER (OUTPATIENT)
Age: 82
Discharge: HOME | End: 2022-01-21
Payer: MEDICARE

## 2022-01-21 DIAGNOSIS — L29.9 GENERALIZED PRURITUS: ICD-10-CM

## 2022-01-21 LAB
ERYTHROCYTE [DISTWIDTH] IN BLOOD BY AUTOMATED COUNT: 15.4 % (ref 11.5–14.5)
ERYTHROCYTE [DISTWIDTH] IN BLOOD BY AUTOMATED COUNT: 47.5 FL (ref 35–45)
HCT VFR BLD CALC: 45.5 % (ref 42–52)
HEMOGLOBIN: 14.2 GM/DL (ref 14–18)
IRON SATURATION: 37 % (ref 20–50)
IRON: 114 UG/DL (ref 65–195)
MCH RBC QN AUTO: 28 PG (ref 26–33)
MCHC RBC AUTO-ENTMCNC: 31.2 GM/DL (ref 32.2–35.5)
MCV RBC AUTO: 89.7 FL (ref 80–94)
PLATELET # BLD: 271 THOU/MM3 (ref 130–400)
PMV BLD AUTO: 9 FL (ref 9.4–12.4)
RBC # BLD: 5.07 MILL/MM3 (ref 4.7–6.1)
TOTAL IRON BINDING CAPACITY: 312 UG/DL (ref 171–450)
WBC # BLD: 8.7 THOU/MM3 (ref 4.8–10.8)

## 2022-01-21 PROCEDURE — 83540 ASSAY OF IRON: CPT

## 2022-01-21 PROCEDURE — 85027 COMPLETE CBC AUTOMATED: CPT

## 2022-01-21 PROCEDURE — 36415 COLL VENOUS BLD VENIPUNCTURE: CPT

## 2022-01-21 PROCEDURE — 83550 IRON BINDING TEST: CPT

## 2022-01-21 RX ORDER — HYDROXYZINE HYDROCHLORIDE 25 MG/1
25 TABLET, FILM COATED ORAL EVERY 8 HOURS PRN
Qty: 180 TABLET | Refills: 1 | Status: SHIPPED | OUTPATIENT
Start: 2022-01-21 | End: 2022-04-12 | Stop reason: SDUPTHER

## 2022-01-21 NOTE — TELEPHONE ENCOUNTER
Patients wife came in stating that the patient wants a refill on his Hydroxyzine (ATARAX) 25 MG Tablet sent into [facility].

## 2022-01-24 ENCOUNTER — OFFICE VISIT (OUTPATIENT)
Dept: CARDIOLOGY CLINIC | Age: 82
End: 2022-01-24
Payer: MEDICARE

## 2022-01-24 VITALS
SYSTOLIC BLOOD PRESSURE: 161 MMHG | BODY MASS INDEX: 23.87 KG/M2 | WEIGHT: 157.5 LBS | HEART RATE: 72 BPM | DIASTOLIC BLOOD PRESSURE: 69 MMHG | HEIGHT: 68 IN

## 2022-01-24 DIAGNOSIS — I48.0 PAROXYSMAL ATRIAL FIBRILLATION (HCC): ICD-10-CM

## 2022-01-24 DIAGNOSIS — I10 ESSENTIAL HYPERTENSION: ICD-10-CM

## 2022-01-24 DIAGNOSIS — Z95.2 S/P TAVR (TRANSCATHETER AORTIC VALVE REPLACEMENT): ICD-10-CM

## 2022-01-24 DIAGNOSIS — Z95.818 PRESENCE OF WATCHMAN LEFT ATRIAL APPENDAGE CLOSURE DEVICE: Primary | ICD-10-CM

## 2022-01-24 DIAGNOSIS — I73.9 PAD (PERIPHERAL ARTERY DISEASE) (HCC): ICD-10-CM

## 2022-01-24 PROCEDURE — G8484 FLU IMMUNIZE NO ADMIN: HCPCS | Performed by: INTERNAL MEDICINE

## 2022-01-24 PROCEDURE — 1036F TOBACCO NON-USER: CPT | Performed by: INTERNAL MEDICINE

## 2022-01-24 PROCEDURE — G8427 DOCREV CUR MEDS BY ELIG CLIN: HCPCS | Performed by: INTERNAL MEDICINE

## 2022-01-24 PROCEDURE — 93000 ELECTROCARDIOGRAM COMPLETE: CPT | Performed by: INTERNAL MEDICINE

## 2022-01-24 PROCEDURE — 1123F ACP DISCUSS/DSCN MKR DOCD: CPT | Performed by: INTERNAL MEDICINE

## 2022-01-24 PROCEDURE — 4040F PNEUMOC VAC/ADMIN/RCVD: CPT | Performed by: INTERNAL MEDICINE

## 2022-01-24 PROCEDURE — G8420 CALC BMI NORM PARAMETERS: HCPCS | Performed by: INTERNAL MEDICINE

## 2022-01-24 PROCEDURE — 99214 OFFICE O/P EST MOD 30 MIN: CPT | Performed by: INTERNAL MEDICINE

## 2022-01-24 RX ORDER — TRIAMCINOLONE ACETONIDE 1 MG/G
CREAM TOPICAL
COMMUNITY
Start: 2022-01-07

## 2022-01-24 RX ORDER — CARVEDILOL 6.25 MG/1
6.25 TABLET ORAL 2 TIMES DAILY
Qty: 60 TABLET | Refills: 3 | Status: SHIPPED | OUTPATIENT
Start: 2022-01-24 | End: 2022-01-24 | Stop reason: SDUPTHER

## 2022-01-24 RX ORDER — CARVEDILOL 6.25 MG/1
6.25 TABLET ORAL 2 TIMES DAILY
Qty: 180 TABLET | Refills: 2 | Status: SHIPPED | OUTPATIENT
Start: 2022-01-24 | End: 2022-02-09 | Stop reason: ALTCHOICE

## 2022-01-24 NOTE — PROGRESS NOTES
Please provide patient with 3LPM of oxygen at night time for nocturnal hypoxia. Patient to have repeat pulse oximetry testing done on oxygen, Disp: 3 L, Rfl: 0    Past Medical History  Brenton SOTOMAYOR  has a past medical history of ASCVD (arteriosclerotic cardiovascular disease), Atrial fibrillation ([address] CAD (coronary artery disease), Cerebral artery occlusion with cerebral infarction ([address] COPD (chronic obstructive pulmonary disease) ([address] Dysplasia of vocal cord, Elevated glucose, Hyperlipidemia, Hypertension, Nocturnal hypoxia, Osteoarthritis, Osteopenia, Osteopenia, PAD (peripheral artery disease) ([address] Polio, and Rheumatic fever. Social History  Brenton SOTOMAYOR  reports that he quit smoking about 17 years ago. His smoking use included cigarettes. He has a 75.00 pack-year smoking history. He has never used smokeless tobacco. He reports current alcohol use of about 30.0 standard drinks of alcohol per week. He reports that he does not use drugs. Family History  Brenton SOTOMAYOR family history includes Arthritis in his maternal grandmother; Heart Disease in his father. There is no family history of bicuspid aortic valve, aneurysms, heart transplant, pacemakers, defibrillators, or sudden cardiac death. Past Surgical History   Past Surgical History:   Procedure Laterality Date    AORTIC VALVE REPAIR  09/2020    TAVR    BALLOON ANGIOPLASTY, ARTERY  07/2018    s/p Left CFA, SFA and popliteal intervention    FACIAL COSMETIC SURGERY      as a kid    HAND SURGERY Right     carpal tunnel     OTHER SURGICAL HISTORY  01/05/2021    Left Atrial Appenadage closure/watchman    PTCA  06/2020    stent LAD    TONSILLECTOMY AND ADENOIDECTOMY      TRANSESOPHAGEAL ECHOCARDIOGRAM N/A 1/10/2022    TRANSESOPHAGEAL ECHOCARDIOGRAM performed by Zhen King MD at [facility]       Review of Systems   Constitutional: Negative for chills and fever  HENT: Negative for congestion, sinus pressure, sneezing and sore throat. Eyes: Negative for pain, discharge, redness and itching. Respiratory: Negative for apnea, cough  Gastrointestinal: Negative for blood in stool, constipation, diarrhea   Endocrine: Negative for cold intolerance, heat intolerance, polydipsia. Genitourinary: Negative for dysuria, enuresis, flank pain and hematuria. Musculoskeletal: Negative for arthralgias, joint swelling and neck pain. Neurological: Negative for numbness and headaches. Psychiatric/Behavioral: Negative for agitation, confusion, decreased concentration and dysphoric mood. Objective:     BP (!) 161/69   Pulse 72   Ht 5' 8\" (1.727 m)   Wt 157 lb 8 oz (71.4 kg)   BMI 23.95 kg/m²     Wt Readings from Last 3 Encounters:   01/24/22 157 lb 8 oz (71.4 kg)   01/10/22 157 lb (71.2 kg)   12/09/21 163 lb (73.9 kg)     BP Readings from Last 3 Encounters:   01/24/22 (!) 161/69   01/10/22 (!) 171/74   12/09/21 128/68       Nursing note and vitals reviewed. Physical Exam   Constitutional: Oriented to person, place, and time. Appears well-developed and well-nourished. HENT:   Head: Normocephalic and atraumatic. Eyes: EOM are normal. Pupils are equal, round, and reactive to light. Neck: Normal range of motion. Neck supple. No JVD present. Cardiovascular: Normal rate, regular rhythm, normal heart sounds and intact distal pulses. No murmur heard. Pulmonary/Chest: Effort normal and breath sounds normal. No respiratory distress. No wheezes. No rales. Abdominal: Soft. Bowel sounds are normal. No distension. There is no tenderness. Musculoskeletal: Normal range of motion. No edema. Neurological: Alert and oriented to person, place, and time. No cranial nerve deficit. Coordination normal.   Skin: Skin is warm and dry. Psychiatric: Normal mood and affect.        Lab Results   Component Value Date    CKTOTAL 59 11/22/2020       Lab Results   Component Value Date    WBC 8.7 01/21/2022    RBC 5.07 01/21/2022    HGB 14.2 01/21/2022    HCT 45.5 01/21/2022    MCV 89.7 01/21/2022    MCH 28.0 01/21/2022    MCHC 31.2 01/21/2022     01/21/2022    MPV 9.0 01/21/2022       Lab Results   Component Value Date     01/10/2022    K 5.1 01/10/2022    K 4.3 08/10/2021     01/10/2022    CO2 27 01/10/2022    BUN 18 01/10/2022    LABALBU 4.5 11/16/2021    CREATININE 1.1 01/10/2022    CALCIUM 9.5 01/10/2022    LABGLOM 64 01/10/2022    GLUCOSE 86 01/10/2022    GLUCOSE 90 04/06/2017       Lab Results   Component Value Date    ALKPHOS 117 11/16/2021    ALT 9 11/16/2021    AST 23 11/16/2021    PROT 7.7 11/16/2021    BILITOT 0.2 11/16/2021    BILIDIR <0.2 11/16/2021    LABALBU 4.5 11/16/2021       No results found for: MG    Lab Results   Component Value Date    INR 1.02 08/10/2021    INR 2.51 (H) 02/19/2021    INR 1.90 (H) 02/17/2021         Lab Results   Component Value Date    LABA1C 5.4 05/03/2018       Lab Results   Component Value Date    TRIG 88 08/10/2021    HDL 50 08/10/2021    LDLCALC 69 08/10/2021    LDLDIRECT 83 09/30/2014    LABVLDL 17 04/06/2017       Lab Results   Component Value Date    TSH 1.460 03/17/2021         Testing Reviewed:      I have individually reviewed the cardiac test below:    ECHO: Results for orders placed during the hospital encounter of 09/30/21    ECHO Complete 2D W Doppler W Color    Narrative  Transthoracic Echocardiography Report (TTE)    Demographics    Patient Name   Swapna Salazar  Gender              Male  L    MR #           [id number]       Race                    Ethnicity    Account #      [de-identified]       Room Number    Accession      [id number]      Date of Study       09/30/2021  Number    Date of Birth  1940      Referring Physician Chastity Bentley MD    Age            80 year(s)      Melissa Welch MD  Physician    Procedure    Type of Study    TTE procedure:ECHOCARDIOGRAM COMPLETE 2D W DOPPLER W COLOR. Procedure Date  Date: 09/30/2021 Start: 10:12 AM    Study Location: Echo Lab  Technical Quality: Limited visualization due to poor acoustical window. Indications:Post TAVR 1 year. Additional Medical History: Former smoker, hypertension, hyperlipidemia,  COPD, coronary artery disease, stroke, history of rheumatic fever, polio,  atrial fibrillation    Patient Status: Routine    Height: 68 inches Weight: 155 pounds BSA: 1.83 m^2 BMI: 23.57 kg/m^2    BP: 90/40 mmHg    Allergies  - Other allergy:(Keflex). - Other allergy:(Brilinta and Keflex). - See Epic. Conclusions    Summary  Normal left ventricular size and systolic function. There were no regional wall motion abnormalities. Wall thickness was within normal limits. Ejection fraction was estimated at 55-60%. Doppler parameters were consistent with abnormal left ventricular  relaxation (grade 1 diastolic dysfunction). S/p TAVR. DOPPLER: Transaortic velocity was within the normal range with  no evidence of aortic stenosis (mean gradient 6 mmHg, V max 1.8 m/s, EOA  1.7 cm2). There was mild perivalvular aortic regurgitation. There was trace mitral regurgitation. IVC size is within normal limits with normal respiratory phasic changes. Signature    ----------------------------------------------------------------  Electronically signed by Corwin Monzon MD (Interpreting  physician) on 09/30/2021 at 02:05 PM  ----------------------------------------------------------------    Findings    Mitral Valve  The mitral valve structure was normal with normal leaflet separation. DOPPLER: The transmitral velocity was within the normal range with no  evidence for mitral stenosis. There was trace mitral regurgitation. Aortic Valve  S/p TAVR. DOPPLER: Transaortic velocity was within the normal range with  no evidence of aortic stenosis (mean gradient 6 mmHg, V max 1.8 m/s, EOA  1.7 cm2). There was mild perivalvular aortic regurgitation.     Tricuspid Valve  The tricuspid valve structure was normal with normal leaflet separation. DOPPLER: There was no evidence of tricuspid stenosis. There was no  evidence of tricuspid regurgitation. Pulmonic Valve  The pulmonic valve leaflets were not well seen. DOPPLER: The transpulmonic  velocity was within the normal range with no evidence for regurgitation. Left Atrium  Left atrial size was normal.    Left Ventricle  Normal left ventricular size and systolic function. There were no regional wall motion abnormalities. Wall thickness was within normal limits. Ejection fraction was estimated at 55-60%. Doppler parameters were consistent with abnormal left ventricular  relaxation (grade 1 diastolic dysfunction). Right Atrium  Right atrial size was normal.    Right Ventricle  The right ventricular size was normal with normal systolic function and  wall thickness. Pericardial Effusion  The pericardium was normal in appearance with no evidence of a pericardial  effusion. Pleural Effusion  No evidence of pleural effusion. Aorta / Great Vessels  IVC size is within normal limits with normal respiratory phasic changes.     M-Mode/2D Measurements & Calculations    LV Diastolic     LV Systolic Dimension: 3.4   LA Dimension: 4.2 cmAO Root  Dimension: 4.9   cm                           Dimension: 3.3 cmLA Area: 19  cm               LV Volume Diastolic: 948 ml  cm^2  LV FS:30.6 %     LV Volume Systolic: 21.4 ml  LV PW Diastolic: LV EDV/LV EDV Index: 118  1 cm             ml/64 m^2LV ESV/LV ESV  Septum           Index: 39.3 ml/21 m^2        RV Diastolic Dimension: 3.1  Diastolic: 1 cm  EF Calculated: 66.7 %        cm    LA/Aorta: 1.27  Ascending Aorta: 4 cm  LVOT: 2.1 cm                 LA volume/Index: 53.8 ml  /29m^2    Doppler Measurements & Calculations    MV Peak E-Wave: 82.3 AV Peak Velocity: 180    LVOT Peak Velocity: 77 cm/s  cm/s                 cm/s                     LVOT Mean Velocity: 51.1  MV Peak A-Wave: 96   AV Peak Gradient: 12.96  cm/s  cm/s                 mmHg                     LVOT Peak Gradient: 2  MV E/A Ratio: 0.86   AV Mean Velocity: 112    mmHgLVOT Mean Gradient: 1  MV Peak Gradient:    cm/s                     mmHg  2.71 mmHg            AV Mean Gradient: 6 mmHg  AV VTI: 34.9 cm          TV Peak E-Wave: 47.6 cm/s  MV Deceleration      AV Area                  TV Peak A-Wave: 33.4 cm/s  Time: 197 msec       (Continuity):1.75 cm^2  MV P1/2t: 58 msec                             TV Peak Gradient: 0.91 mmHg  MVA by PHT:3.79 cm^2 LVOT VTI: 17.6 cm  AV P1/2t: 377 msec       PV Peak Velocity: 56.1 cm/s  MV E' Septal         IVRT: 85 msec            PV Peak Gradient: 1.26 mmHg  Velocity: 4.9 cm/s  MV A' Septal  Velocity: 8.9 cm/s   AV DVI (VTI): 0.5AV DVI  MV E' Lateral        (Vmax):0.43  Velocity: 7 cm/s  MV A' Lateral  Velocity: 10.4 cm/s  E/E' septal: 16.8  E/E' lateral: 11.76  MR Velocity: 545  cm/s    [email or web address] [id number]       Assessment/Plan   HTN  PAD w/ L SFA  s/p SJ 8/10/21  Paroxysmal non-valvular atrial fibrillation - SBH6SO1-FUKA score: 5, HASBLED = 4 s/p WATCHMAN  LLE PAD s/p Supera implant 5.5 x 60  S/p TAVR - S329 - +4 cc- mild to mod PVL  S/p LAD PCI, 6/4379  Chronic diastolic CHF, NYHA II  Chronic oxygen use at night  EF 55%  No issues with WATCHMAN, uses treadmill daily. Add back Coreg 6.25 mg BID. Monitor BP. Continue ASA. Anemia is resolved. Continue to monitor symptoms, if progressive CHF, would need to consider PVL closure if becomes severe, but no signs of this at this time. The patient was advised on risk/benefits of the new Rx and he agreed to proceed with the medication(s).     Discussed diet/exercise/BP/weight loss/health lifestyle choices/lipids; the patient understands the goals and will try to comply.       Disposition:  6 month         Electronically signed by Cristino Boyer MD   1/24/2022 at 1:25 PM EST

## 2022-02-01 DIAGNOSIS — Z79.01 ANTICOAGULATED: ICD-10-CM

## 2022-02-01 DIAGNOSIS — I73.9 PAD (PERIPHERAL ARTERY DISEASE) (HCC): ICD-10-CM

## 2022-02-01 RX ORDER — PANTOPRAZOLE SODIUM 40 MG/1
TABLET, DELAYED RELEASE ORAL
Qty: 90 TABLET | Refills: 1 | Status: SHIPPED | OUTPATIENT
Start: 2022-02-01 | End: 2022-04-22 | Stop reason: SDUPTHER

## 2022-02-01 NOTE — TELEPHONE ENCOUNTER
Rafiq Bond is requesting a refill on the following medications:  Requested Prescriptions     Pending Prescriptions Disp Refills    pantoprazole (PROTONIX) 40 MG tablet [Pharmacy Med Name: PANTOPRAZOLE TAB 40MG DR] 90 tablet 1     Sig: TAKE 1 TABLET EVERY [id number] Genesis Martinez       Date of last visit: 12/9/2021  Date of next visit (if applicable):4/22/2022  Date of last refill: 8/18/2021  Pharmacy Name: [facility]

## 2022-02-09 RX ORDER — AMLODIPINE BESYLATE 5 MG/1
5 TABLET ORAL DAILY
COMMUNITY
End: 2022-02-09 | Stop reason: SDUPTHER

## 2022-02-09 RX ORDER — AMLODIPINE BESYLATE 5 MG/1
5 TABLET ORAL DAILY
Qty: 90 TABLET | Refills: 3 | Status: SHIPPED | OUTPATIENT
Start: 2022-02-09 | End: 2022-02-17 | Stop reason: SDUPTHER

## 2022-02-17 RX ORDER — AMLODIPINE BESYLATE 5 MG/1
5 TABLET ORAL DAILY
Qty: 90 TABLET | Refills: 1 | Status: SHIPPED | OUTPATIENT
Start: 2022-02-17 | End: 2022-07-14 | Stop reason: SDUPTHER

## 2022-02-21 ENCOUNTER — HOSPITAL ENCOUNTER (OUTPATIENT)
Dept: INTERVENTIONAL RADIOLOGY/VASCULAR | Age: 82
Discharge: HOME | End: 2022-02-21
Payer: MEDICARE

## 2022-02-21 DIAGNOSIS — I73.9 PAD (PERIPHERAL ARTERY DISEASE) (HCC): ICD-10-CM

## 2022-02-21 DIAGNOSIS — Z95.820 S/P PERIPHERAL ARTERY ANGIOPLASTY WITH STENT PLACEMENT: ICD-10-CM

## 2022-02-21 DIAGNOSIS — I10 ESSENTIAL HYPERTENSION: ICD-10-CM

## 2022-02-21 PROCEDURE — 93926 LOWER EXTREMITY STUDY: CPT

## 2022-02-22 ENCOUNTER — TELEPHONE (OUTPATIENT)
Dept: CARDIOLOGY CLINIC | Age: 82
End: 2022-02-22

## 2022-02-22 NOTE — TELEPHONE ENCOUNTER
Results lower extremity duplex    Vascular stents in the left lower extremity are patent. However, there appears to be greater than 50% stenosis transitioning from the distal common femoral artery stent into the proximal superficial femoral artery.     SFA PCI on 8/10/2021

## 2022-03-03 RX ORDER — POTASSIUM CHLORIDE 20 MEQ/1
20 TABLET, EXTENDED RELEASE ORAL DAILY
Qty: 90 TABLET | Refills: 1 | Status: SHIPPED | OUTPATIENT
Start: 2022-03-03 | End: 2022-10-25 | Stop reason: SDUPTHER

## 2022-03-03 NOTE — TELEPHONE ENCOUNTER
Lupe Walker called requesting a refill on the following medications:  Requested Prescriptions     Pending Prescriptions Disp Refills    potassium chloride (KLOR-CON M) 20 MEQ extended release tablet 90 tablet 1     Sig: Take 1 tablet by mouth daily     Pharmacy verified:[facility]      Date of last visit: 1-  Date of next visit (if applicable): 6/24/4050

## 2022-03-04 ENCOUNTER — TELEPHONE (OUTPATIENT)
Dept: FAMILY MEDICINE CLINIC | Age: 82
End: 2022-03-04

## 2022-03-04 DIAGNOSIS — R04.0 EPISTAXIS: Primary | ICD-10-CM

## 2022-03-04 NOTE — TELEPHONE ENCOUNTER
Called and spoke with patients wife about referral. She states she does not have a preferance of provider. She will have him see Dr José Antonio Dhaliwal prefers.

## 2022-03-04 NOTE — TELEPHONE ENCOUNTER
Abigail Chung called with concern that Barby Plaza has had several episodes of epistaxis this week. She states in the past, he has had this issue and they ended up going to an ENT and having cauterization done in the one nostril, but this is the opposite nostril.   She is wanting to know if they can get an ENT referral or if they need to schedule OV in our office first.

## 2022-03-07 NOTE — TELEPHONE ENCOUNTER
Called patient's wife to notify her of referral being placed and she stated they have an appointment on 3/24/2022 to ENT.

## 2022-03-08 RX ORDER — NITROGLYCERIN 0.4 MG/1
TABLET SUBLINGUAL
Qty: 25 TABLET | Refills: 2 | Status: SHIPPED | OUTPATIENT
Start: 2022-03-08 | End: 2022-05-13

## 2022-03-10 ENCOUNTER — OFFICE VISIT (OUTPATIENT)
Dept: ENT CLINIC | Age: 82
End: 2022-03-10
Payer: MEDICARE

## 2022-03-10 VITALS
DIASTOLIC BLOOD PRESSURE: 72 MMHG | RESPIRATION RATE: 16 BRPM | BODY MASS INDEX: 24.74 KG/M2 | HEART RATE: 80 BPM | SYSTOLIC BLOOD PRESSURE: 126 MMHG | OXYGEN SATURATION: 90 % | WEIGHT: 163.2 LBS | HEIGHT: 68 IN | TEMPERATURE: 98.4 F

## 2022-03-10 DIAGNOSIS — Z87.81 HISTORY OF BROKEN NOSE: ICD-10-CM

## 2022-03-10 DIAGNOSIS — R04.0 EPISTAXIS: Primary | ICD-10-CM

## 2022-03-10 PROCEDURE — 30901 CONTROL OF NOSEBLEED: CPT | Performed by: OTOLARYNGOLOGY

## 2022-03-10 PROCEDURE — G8484 FLU IMMUNIZE NO ADMIN: HCPCS | Performed by: OTOLARYNGOLOGY

## 2022-03-10 PROCEDURE — 1123F ACP DISCUSS/DSCN MKR DOCD: CPT | Performed by: OTOLARYNGOLOGY

## 2022-03-10 PROCEDURE — G8427 DOCREV CUR MEDS BY ELIG CLIN: HCPCS | Performed by: OTOLARYNGOLOGY

## 2022-03-10 PROCEDURE — 4040F PNEUMOC VAC/ADMIN/RCVD: CPT | Performed by: OTOLARYNGOLOGY

## 2022-03-10 PROCEDURE — 99203 OFFICE O/P NEW LOW 30 MIN: CPT | Performed by: OTOLARYNGOLOGY

## 2022-03-10 PROCEDURE — 1036F TOBACCO NON-USER: CPT | Performed by: OTOLARYNGOLOGY

## 2022-03-10 PROCEDURE — G8420 CALC BMI NORM PARAMETERS: HCPCS | Performed by: OTOLARYNGOLOGY

## 2022-03-10 NOTE — PROGRESS NOTES
[facility] [place] [facility]  [facility] EAR, NOSE AND THROAT  [facility]  Dept: [phone]  Dept Fax: [phone]  Loc: [phone]    Missy Valencia is a 80 y.o. male who was referred Holli Majano MD for:  Chief Complaint   Patient presents with    Epistaxis     New patient here for evaluation of his epistaxis. Has had several bleeds since last week. Tuesday had 3 different times. Most of the time it is on the left. He is on 81 mg Aspirin daily and uses oxygen at night. Referred by Dr Cecile Guajardo. Waleska Kid HPI:     Missy Valencia is a 80 y.o. male who presents today for evaluation of his epistaxis. Has had several nosebleeds since last week. Tuesday had 3 different times. It's usually on the left side. Taking 81 mg Aspirin daily and uses oxygen at night. Referring Dr. Cecile Gujaardo. He takes a low dose of aspirin and having trouble with nose bleeds. Had nosebleeds before then started back up again. Had nosebleeds on left side. Has a blue clam that he puts it on his nose. 5 years ago when he was in [place] he was bleeding on the left side and had it cauterized. His last bleed was yesterday, Saturday and Tuesday. He had some issues when he was 37-he had some trauma and had reconstructive surgery. Went between lip and bone and mouth. Eye socket was smashed and jaw was messed up. He was playing 2 handed touch football. He was knocked out for a little bit. His head was swelled up. Hasn't had any follow up X-rays to see if things are fine. He broke his nose 3-4 times. History:      Allergies   Allergen Reactions    Brilinta [Ticagrelor] Shortness Of Breath    Cephalexin Rash and Hives     Current Outpatient Medications   Medication Sig Dispense Refill    nitroGLYCERIN (NITROSTAT) 0.4 MG SL tablet PLACE 1 TABLET UNDER THE TONGUE AND ALLOW TO DISSOLVE FOR UP TO A MAXIMUM OF 3 DOSES; IF NO RELIEF AFTER 1 DOSE, CALL 911 25 tablet 2    potassium chloride (KLOR-CON M) 20 MEQ extended release tablet Take 1 tablet by mouth daily 90 tablet 1    amLODIPine (NORVASC) 5 MG tablet Take 1 tablet by mouth daily 90 tablet 1    pantoprazole (PROTONIX) 40 MG tablet TAKE 1 TABLET EVERY MORNINGBEFORE BREAKFAST 90 tablet 1    NONFORMULARY ginko biloba      triamcinolone (KENALOG) 0.1 % cream       hydrOXYzine (ATARAX) 25 MG tablet Take 1 tablet by mouth every 8 hours as needed for Itching 180 tablet 1    umeclidinium-vilanterol (ANORO ELLIPTA) 62.5-25 MCG/INH AEPB inhaler Inhale 1 puff into the lungs daily 90 puff 3    aspirin 81 MG EC tablet Take 81 mg by mouth daily Stop 4 weeks post peripheral intervention  9/11/21      OXYGEN Please provide patient with 3LPM of oxygen at night time for nocturnal hypoxia. Patient to have repeat pulse oximetry testing done on oxygen 3 L 0    lovastatin (MEVACOR) 20 MG tablet TAKE 1 TABLET DAILY 90 tablet 1     No current facility-administered medications for this visit. Past Medical History:   Diagnosis Date    ASCVD (arteriosclerotic cardiovascular disease)     Atrial fibrillation (HCC)     CAD (coronary artery disease)     Cerebral artery occlusion with cerebral infarction (HCC)     TIA    COPD (chronic obstructive pulmonary disease) (HCC)     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 Lowest de-sat 77%.     Osteoarthritis     Osteopenia     Osteopenia     P[facility]peripheral artery disease) ([facility] [address]     Polio 1946    Rheumatic fever 1946      Past Surgical History:   Procedure Laterality Date    AORTIC VALVE REPAIR  09/2020    TAVR    BALLOON ANGIOPLASTY, ARTERY  07/2018    s/p Left CFA, SFA and popliteal intervention    FACIAL COSMETIC SURGERY      as a kid    HAND SURGERY Right     carpal tunnel  OTHER SURGICAL HISTORY  2021    Left Atrial Appenadage closure/watchman    PTCA  2020    stent LAD    TONSILLECTOMY AND ADENOIDECTOMY      TRANSESOPHAGEAL ECHOCARDIOGRAM N/A 1/10/2022    TRANSESOPHAGEAL ECHOCARDIOGRAM performed by John Dave MD at [facility]     Family History   Problem Relation Age of Onset    Heart Disease Father     Arthritis Maternal Grandmother      Social History     Tobacco Use    Smoking status: Former Smoker     Packs/day: 1.50     Years: 50.00     Pack years: 75.00     Types: Cigarettes     Quit date: 10/9/2004     Years since quittin.4    Smokeless tobacco: Never Used   Substance Use Topics    Alcohol use: Yes     Alcohol/week: 30.0 standard drinks     Types: 30 Cans of beer per week     Comment: 3 to 4 a week        Subjective:       Review of Systems   HENT: Positive for nosebleeds. Objective:     /72 (Site: Left Upper Arm, Position: Sitting)   Pulse 80   Temp 98.4 °F (36.9 °C) (Infrared)   Resp 16   Ht 5' 8\" (1.727 m)   Wt 163 lb 3.2 oz (74 kg)   SpO2 90%   BMI 24.81 kg/m²     Physical Exam  Vitals and nursing note reviewed. Constitutional:       Appearance: He is well-developed. HENT:      Head: Normocephalic and atraumatic. No laceration. Salivary Glands: Right salivary gland is not diffusely enlarged or tender. Left salivary gland is not diffusely enlarged or tender. Comments:        Right Ear: Hearing, tympanic membrane, ear canal and external ear normal. No drainage or swelling. No middle ear effusion. Tympanic membrane is not perforated or erythematous. Left Ear: Hearing, tympanic membrane, ear canal and external ear normal. No drainage or swelling. No middle ear effusion. Tympanic membrane is not perforated or erythematous. Nose: Nose normal. No septal deviation, mucosal edema or rhinorrhea.       Comments: Prominent vessels, presumed bleeding point anterior left nasal septum     Mouth/Throat: Mouth: Mucous membranes are moist. Mucous membranes are not pale and not dry. No oral lesions. Tongue: No lesions. Pharynx: Oropharynx is clear. Uvula midline. No oropharyngeal exudate or posterior oropharyngeal erythema. Comments: LIps: lips normal     Mallampati 1  Base of tongue: symmetric  Mirror exam deferred due to gag reflex. Eyes:      Extraocular Movements: Extraocular movements intact. Comments: Conjugate gaze   Neck:      Thyroid: No thyroid mass or thyromegaly. Trachea: Phonation normal. No tracheal deviation. Comments:     Pulmonary:      Effort: Pulmonary effort is normal. No retractions. Breath sounds: No stridor. Musculoskeletal:      Cervical back: Normal range of motion and neck supple. Lymphadenopathy:      Cervical: No cervical adenopathy. Neurological:      Mental Status: He is alert and oriented to person, place, and time. Cranial Nerves: Cranial nerves are intact. Cranial nerve deficit: VIIth N function intact bilat. Psychiatric:         Mood and Affect: Mood and affect normal.         Behavior: Behavior is cooperative. PROCEDURE: CONTROL ANTERIOR EPISTAXIS    After an adequate level of topical anesthesia was obtained using a pledget impregnated with Afrin and lidocaine, Silver Nitrate cautery was applied to the bleeding point on the Left side. Excess Silver Nitrate was removed with a moistened Q-tip. No packing was needed. Patient tolerated the procedure well. Data:  All of the past medical history, past surgical history, family history,social history, allergies and current medications were reviewed with the patient. Assessment & Plan   Diagnoses and all orders for this visit:     Diagnosis Orders   1. Epistaxis  ID CTRL NOSEBLEED,ANTER,SIMPLE   2. History of broken nose 3-4 times         The findings were explained and his questions were answered. Options were discussed including 3 week follow up.   If no bleeding in 3 weeks may cancel. He agreed. I, Jose Leblanc CMA ([facility]), am scribing for, and in the presence of Dr. Che Lyons. Electronically signed by Lena Harp CMA ([facility]) on 3/10/22 at 3:19 PM EST. (Please note that portions of this note were completed with a voice recognition program. Efforts were made to edit the dictations butoccasionally words are mis-transcribed.)    I agree to the above documentation placed by my scribe. I have personally evaluated this patient. Additional findings are as noted. I reviewed the scribe's note and agree with the documented findings and plan of care. Any areas of disagreement are corrected. I agree with the chief complaint, past medical history, past surgical history, allergies, medications, social and family history as documented unless otherwise noted below.      Electronically signed by Rolando Boyd MD on 3/28/2022 at 9:04 PM

## 2022-03-14 ENCOUNTER — OFFICE VISIT (OUTPATIENT)
Dept: ENT CLINIC | Age: 82
End: 2022-03-14
Payer: MEDICARE

## 2022-03-14 ENCOUNTER — TELEPHONE (OUTPATIENT)
Dept: ENT CLINIC | Age: 82
End: 2022-03-14

## 2022-03-14 VITALS
BODY MASS INDEX: 24.78 KG/M2 | WEIGHT: 163 LBS | SYSTOLIC BLOOD PRESSURE: 144 MMHG | DIASTOLIC BLOOD PRESSURE: 64 MMHG | RESPIRATION RATE: 14 BRPM | OXYGEN SATURATION: 93 % | TEMPERATURE: 97.4 F | HEART RATE: 77 BPM

## 2022-03-14 DIAGNOSIS — Z79.82 ASPIRIN LONG-TERM USE: ICD-10-CM

## 2022-03-14 DIAGNOSIS — Z87.81 HISTORY OF BROKEN NOSE: ICD-10-CM

## 2022-03-14 DIAGNOSIS — J34.2 NASAL SEPTAL DEVIATION: ICD-10-CM

## 2022-03-14 DIAGNOSIS — R04.0 EPISTAXIS: Primary | ICD-10-CM

## 2022-03-14 PROCEDURE — 4040F PNEUMOC VAC/ADMIN/RCVD: CPT | Performed by: PHYSICIAN ASSISTANT

## 2022-03-14 PROCEDURE — G8427 DOCREV CUR MEDS BY ELIG CLIN: HCPCS | Performed by: PHYSICIAN ASSISTANT

## 2022-03-14 PROCEDURE — G8484 FLU IMMUNIZE NO ADMIN: HCPCS | Performed by: PHYSICIAN ASSISTANT

## 2022-03-14 PROCEDURE — 1123F ACP DISCUSS/DSCN MKR DOCD: CPT | Performed by: PHYSICIAN ASSISTANT

## 2022-03-14 PROCEDURE — 1036F TOBACCO NON-USER: CPT | Performed by: PHYSICIAN ASSISTANT

## 2022-03-14 PROCEDURE — 30901 CONTROL OF NOSEBLEED: CPT | Performed by: PHYSICIAN ASSISTANT

## 2022-03-14 PROCEDURE — G8420 CALC BMI NORM PARAMETERS: HCPCS | Performed by: PHYSICIAN ASSISTANT

## 2022-03-14 PROCEDURE — 99213 OFFICE O/P EST LOW 20 MIN: CPT | Performed by: PHYSICIAN ASSISTANT

## 2022-03-14 NOTE — TELEPHONE ENCOUNTER
Spoke with Hiren Tidwell and he will see patient today.    Called patient wife and they will be here by

## 2022-03-14 NOTE — TELEPHONE ENCOUNTER
Patient wife called and stated that patient was seen on Thursday for epistaxis. She stated that patient was good until Saturday he had a full on nosebleed. But they did get it to stop. However ever since Saturday he has spit up blood with clots off and on, and you can see it in the back of the throat. She stated that patient stated he is worried if he would try and blow his nose at all it would start the nosebleed ago. Asked wife if Dr. Fartun Jama advised to use afrin for nosebleeds and she stated that she doesn't remember any statement of using afrin. She stated that Dr. Fartun Jama just told them to come back in 3 weeks if nosebleeds continue if not to cancel.

## 2022-03-14 NOTE — PROGRESS NOTES
[facility] [place] [facility]  [facility] EAR, NOSE AND THROAT  [facility]  Dept: [phone]  Dept Fax: [phone]  Loc: Angie Campos is a 80 y.o. male who was referred by No ref. provider found for:  Chief Complaint   Patient presents with    Epistaxis     pt is here for nosebleed. states saturday night he had a gusher. Huong Rued HPI:     Patient presents for evaluation of recurrent epistaxis. Patient was seen on 3/10/2022 by Dr. Edison Otto for epistaxis. Patient reports that he had an area in his left nostril that was cauterized. He reports he did well initially after cautery until 3/12/2022 when he had a recurrence of epistaxis from his left nare. He reports the bleeding would come out anteriorly until he leans his head back or puts the nasal clamp on. He reports that he has been having intermittent bleeding since then. The patient is on 81 mg aspirin daily as well as supplemental oxygen that he uses at night and with exertion. No reports of dizziness/lightheadedness. He denies any other symptoms or concerns at this time     Subjective:      REVIEW OF SYSTEMS:    A complete multi-organ review of systems was performed using a new patient questionnaire, and reviewed by me.   ENT:  negative except as noted in HPI  CONSTITUTIONAL:  negative except as noted in HPI  EYES:  negative except as noted in HPI  RESPIRATORY:  negative except as noted in HPI  CARDIOVASCULAR:  negative except as noted in HPI  GASTROINTESTINAL:  negative except as noted in HPI  GENITOURINARY:  negative except as noted in HPI  MUSCULOSKELETAL:  negative except as noted in HPI  SKIN:  negative except as noted in HPI  ENDOCRINE/METABOLIC: negative except as noted in HPI  HEMATOLOGIC/LYMPHATIC:  negative except as noted in HPI  ALLERGY/IMMUN: negative except as noted in HPI  NEUROLOGICAL:  negative except as noted in HPI  BEHAVIOR/PSYCH:  negative except as noted in HPI    Past Medical History:  Past Medical History:   Diagnosis Date    ASCVD (arteriosclerotic cardiovascular disease)     Atrial fibrillation ([facility] [address]     CAD (coronary artery disease)     Cerebral artery occlusion with cerebral infarction (HCC)     TIA    COPD (chronic obstructive pulmonary disease) (HCC)     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 Lowest de-sat 77%.  Osteoarthritis     Osteopenia     Osteopenia     PAD (peripheral artery disease) ([address]     Polio 1948    Rheumatic fever 1948       Social History:    TOBACCO:   reports that he quit smoking about 17 years ago. His smoking use included cigarettes. He has a 75.00 pack-year smoking history. He has never used smokeless tobacco.  ETOH:   reports current alcohol use of about 30.0 standard drinks of alcohol per week. DRUGS:   reports no history of drug use. Family History:       Problem Relation Age of Onset    Heart Disease Father     Arthritis Maternal Grandmother        Surgical History:  Past Surgical History:   Procedure Laterality Date    AORTIC VALVE REPAIR  09/2020    TAVR    BALLOON ANGIOPLASTY, ARTERY  07/2018    s/p Left CFA, SFA and popliteal intervention    FACIAL COSMETIC SURGERY      as a kid    HAND SURGERY Right     carpal tunnel     OTHER SURGICAL HISTORY  01/05/2021    Left Atrial Appenadage closure/watchman    PTCA  06/2020    stent LAD    TONSILLECTOMY AND ADENOIDECTOMY      TRANSESOPHAGEAL ECHOCARDIOGRAM N/A 1/10/2022    TRANSESOPHAGEAL ECHOCARDIOGRAM performed by Crissy Maldonado MD at [facility]        Objective: This is a 80 y.o. male. Patient is alert and oriented to person, place and time. Patient appears well developed, well nourished. Mood is happy with normal affect.  Not obviously hearing impaired. No abnormality in speech noted. BP (!) 144/64 (Site: Right Upper Arm, Position: Sitting)   Pulse 77   Temp 97.4 °F (36.3 °C)   Resp 14   Wt 163 lb (73.9 kg)   SpO2 93%   BMI 24.78 kg/m²     Head:   Normocephalic, atraumatic. No obvious masses or lesions noted. Nose:    External nose: Appears midline. No obvious deformity or masses. Septum:  deviated. No septal hematoma. No perforation. Mucosa:  Small area on the left anterior septum that appears to be healing mucosa and has appearance of recent cautery. There is a larger and raw appearing area of mucosa just posterior to the area of healing mucosa. Turbinates: hypertrophic and congested            Discharge:  bloody    Mouth/Throat:  Lips, tongue and oral cavity: Normal. No masses or lesions noted   Dentition: upper and lower dentures, no malocclusion  Oral mucosa: moist  Oropharynx: Initially had moderate amount of partially clotted blood with scant amount of bright red blood initially  in oropharynx. Management of epistaxis was performed and there was no further evidence of bright red blood, partially clotted blood. Hard and soft palates: symmetrical and intact. Uvula: midline, no obvious lesions     Lungs: Normal effort of breathing, not obviously distressed. Neuro: Cranial nerves II-XII grossly intact. Extremities: No clubbing, edema, or cyanosis noted. Procedure: Scabbed area on the left septum. There is a small area of mucosa on the left anterior septum that appears to be in the process of healing. There is an area of scabbing of fresh blood just posterior to the healing mucosa. I used suction to remove the clot and visualized raw appearing mucosa that was very mildly oozing. I instilled Afrin and 4% topical lidocaine to the nare. Once adequate topical anesthesia was achieved, I lightly catuerized the raw mucosa. Bleeding resolved and was rechecked several times.  I recommended placement of nasopore packing for some additional pressure to the cautery in hopes of preventing recurrence. He was agreeable. I cut a nasopore in half, saturated with Afrin and placed in the left nare overlying cauterized area. Patient tolerated procedure well without evidence of complication. Assessment/Plan:     Diagnosis Orders   1. Epistaxis     2. History of broken nose 3-4 times     3. Nasal septal deviation     4. Aspirin long-term use         The patient is a 80 y.o. male that presents for evaluation and management of epistaxis. Patient had an area of raw mucosa just posterior to the likely previously cauterized area by Dr. Hanna Childers. I managed the likely source of recurrent epistaxis as described above. He tolerated this well without immediate evidence of complication and no evidence of recurrence of epistaxis at completion of visit. I recommended use of Afrin for a total of 3 days including today. He will then transition to twice daily and as needed nasal saline to help keep the mucosa moist bilaterally as well as helping with disintegration of the packing. He will follow up in 1 week to recheck his nose and remove any retained nasopore. Discussed management of acute epistaxis at home. He will contact the office in the meantime with new/worsening symptoms or other concerns.      (Please note that portions of this note may have been completed with a voice recognition program.  Efforts were made to edit the dictation but occasionally words are mis-transcribed.)    Electronically signed by MAUDE Zamudio on 3/15/2022 at 1:47 PM

## 2022-03-14 NOTE — PATIENT INSTRUCTIONS
-Use Afrin (oxymetazoline) 2 sprays to the left nostril three times daily. You received the first dose in the office today, so you should use it twice more today. Then three times daily 3/15/22 and 3/16/22. -After 3 days switch to nasal saline. Use nasal saline at least twice daily, but can use it more often if it helps with congestion and keeping your nose moist  -Avoid blowing/picking your nose    If you have a nosebleed at home, you should try the followin. Lightly blow your nose in attempts to dislodge old blood and blood clots  2. Spray 3 sprays of Afrin (oxymetazoline) to the bleeding nostril. If unsure which nostril is bleeding, spray both nostrils  3. Apply direct pressure to the nose, compressing both nostrils completely shut. You can use your fingers or nasal clamp  4. Hold pressure with your fingers/nasal clamp for at least 15 minutes. Do not release pressure to check if bleeding has resolved before that. 5. If bleeding has not resolved after this, you can repeat steps 1-4. You can do this up to 3 times consecutively, but if bleeding still persists you should consider being evaluated in the ER or ENT office.

## 2022-03-21 ENCOUNTER — OFFICE VISIT (OUTPATIENT)
Dept: ENT CLINIC | Age: 82
End: 2022-03-21
Payer: MEDICARE

## 2022-03-21 VITALS
TEMPERATURE: 98.1 F | RESPIRATION RATE: 14 BRPM | DIASTOLIC BLOOD PRESSURE: 70 MMHG | SYSTOLIC BLOOD PRESSURE: 120 MMHG | OXYGEN SATURATION: 90 % | WEIGHT: 163 LBS | BODY MASS INDEX: 24.78 KG/M2 | HEART RATE: 71 BPM

## 2022-03-21 DIAGNOSIS — J34.2 NASAL SEPTAL DEVIATION: ICD-10-CM

## 2022-03-21 DIAGNOSIS — R04.0 EPISTAXIS: Primary | ICD-10-CM

## 2022-03-21 PROCEDURE — G8427 DOCREV CUR MEDS BY ELIG CLIN: HCPCS | Performed by: PHYSICIAN ASSISTANT

## 2022-03-21 PROCEDURE — 1036F TOBACCO NON-USER: CPT | Performed by: PHYSICIAN ASSISTANT

## 2022-03-21 PROCEDURE — G8420 CALC BMI NORM PARAMETERS: HCPCS | Performed by: PHYSICIAN ASSISTANT

## 2022-03-21 PROCEDURE — 4040F PNEUMOC VAC/ADMIN/RCVD: CPT | Performed by: PHYSICIAN ASSISTANT

## 2022-03-21 PROCEDURE — 1123F ACP DISCUSS/DSCN MKR DOCD: CPT | Performed by: PHYSICIAN ASSISTANT

## 2022-03-21 PROCEDURE — 99212 OFFICE O/P EST SF 10 MIN: CPT | Performed by: PHYSICIAN ASSISTANT

## 2022-03-21 PROCEDURE — G8484 FLU IMMUNIZE NO ADMIN: HCPCS | Performed by: PHYSICIAN ASSISTANT

## 2022-03-21 NOTE — PROGRESS NOTES
[facility] [place] [facility]  [facility] EAR, NOSE AND THROAT  [facility]  Dept: [phone]  Dept Fax: [phone]  Loc: Dorene Maurice is a 80 y.o. male who was referred by No ref. provider found for:  Chief Complaint   Patient presents with    Follow-up     pt is here for 1wk f/u.hasnt had nosebleed since he was here last    Epistaxis   . HPI:     Current visit HPI- The patient presents for follow up regarding epistaxis. He reports that he has been doing well since he was last seen. He states that he hasn't experienced any epistaxis since he was last seen. He thinks that the remainder of the nasopore packing fell out yesterday. He has been using nasal saline a couple of times per day. He denies any fevers, chills, dizziness/lightheadedness. No other symptoms or concerns at this time. Previous note HPI 3/14/22- Patient presents for evaluation of recurrent epistaxis. Patient was seen on 3/10/2022 by Dr. Hanna Childers for epistaxis. Patient reports that he had an area in his left nostril that was cauterized. He reports he did well initially after cautery until 3/12/2022 when he had a recurrence of epistaxis from his left nare. He reports the bleeding would come out anteriorly until he leans his head back or puts the nasal clamp on. He reports that he has been having intermittent bleeding since then. The patient is on 81 mg aspirin daily as well as supplemental oxygen that he uses at night and with exertion. No reports of dizziness/lightheadedness. He denies any other symptoms or concerns at this time     Subjective:      REVIEW OF SYSTEMS:    A complete multi-organ review of systems was performed using a new patient questionnaire, and reviewed by me.   ENT:  negative except as noted in HPI  CONSTITUTIONAL:  negative except as noted in HPI  EYES:  negative except as noted in HPI  RESPIRATORY:  negative except as noted in HPI  CARDIOVASCULAR:  negative except as noted in HPI  GASTROINTESTINAL:  negative except as noted in HPI  GENITOURINARY:  negative except as noted in HPI  MUSCULOSKELETAL:  negative except as noted in HPI  SKIN:  negative except as noted in HPI  ENDOCRINE/METABOLIC: negative except as noted in HPI  HEMATOLOGIC/LYMPHATIC:  negative except as noted in HPI  ALLERGY/IMMUN: negative except as noted in HPI  NEUROLOGICAL:  negative except as noted in HPI  BEHAVIOR/PSYCH:  negative except as noted in HPI    Past Medical History:  Past Medical History:   Diagnosis Date    ASCVD (arteriosclerotic cardiovascular disease)     Atrial fibrillation ([address]     CAD (coronary artery disease)     Cerebral artery occlusion with cerebral infarction ([address]     TIA    COPD (chronic obstructive pulmonary disease) ([address]     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 Lowest de-sat 77%.  Osteoarthritis     Osteopenia     Osteopenia     PAD (peripheral artery disease) ([address]     Polio 1948    Rheumatic fever 1948       Social History:    TOBACCO:   reports that he quit smoking about 17 years ago. His smoking use included cigarettes. He has a 75.00 pack-year smoking history. He has never used smokeless tobacco.  ETOH:   reports current alcohol use of about 30.0 standard drinks of alcohol per week. DRUGS:   reports no history of drug use.     Family History:       Problem Relation Age of Onset    Heart Disease Father     Arthritis Maternal Grandmother        Surgical History:  Past Surgical History:   Procedure Laterality Date    AORTIC VALVE REPAIR  09/2020    TAVR    BALLOON ANGIOPLASTY, ARTERY  07/2018    s/p Left CFA, SFA and popliteal intervention    FACIAL COSMETIC SURGERY      as a kid    HAND SURGERY Right     carpal tunnel     OTHER SURGICAL HISTORY  01/05/2021    Left Atrial Appenadage closure/watchman    PTCA  06/2020    stent LAD    TONSILLECTOMY AND ADENOIDECTOMY      TRANSESOPHAGEAL ECHOCARDIOGRAM N/A 1/10/2022    TRANSESOPHAGEAL ECHOCARDIOGRAM performed by Melanie Noguera MD at [facility]        Objective: This is a 80 y.o. male. Patient is alert and oriented to person, place and time. Patient appears well developed, well nourished. Mood is happy with normal affect. Not obviously hearing impaired. No abnormality in speech noted. /70 (Site: Right Upper Arm, Position: Sitting)   Pulse 71   Temp 98.1 °F (36.7 °C)   Resp 14   Wt 163 lb (73.9 kg)   SpO2 90%   BMI 24.78 kg/m²     Head:   Normocephalic, atraumatic. No obvious masses or lesions noted. Nose:    External nose: Appears midline. No obvious deformity or masses. Septum:  deviated. No septal hematoma. No perforation. Mucosa:  Healing mucosa on the left anterior septum in area of previous cautery. No evidence of recent/active epistaxis. The patient also has area of healing mucosa on the right anterior septum without evidence of bleeding  Turbinates: hypertrophic and congested            Discharge:  clear    Mouth/Throat:  Lips, tongue and oral cavity: Normal. No masses or lesions noted   Dentition: upper and lower dentures, no malocclusion  Oral mucosa: moist  Oropharynx: normal-appearing mucosa. No bloody post nasal drainage is noted. Hard and soft palates: symmetrical and intact. Salivary glands: not enlarged and no tenderness to palpation. Uvula: midline, no obvious lesions     Eyes: NARA, EOM intact. Conjunctiva moist without discharge. Lungs: Normal effort of breathing, not obviously distressed. Neuro: Cranial nerves II-XII grossly intact. Assessment/Plan:     Diagnosis Orders   1. Epistaxis     2. Nasal septal deviation         The patient is a 80 y.o. male that presents for follow up regarding epistaxis.  There is no evidence of retained nasopore packing and the mucosa appears to be healing. I recommended the patient continue nasal saline at least twice daily and limit/avoid blowing his nose for the next 2 weeks to allow the mucosa to continue to heal. I also advised to trim the prongs on his nasal cannula down to help limit chances of recurrent trauma to the areas on his septum. He will follow up PRN for recurrence of epistaxis or other symptoms/concerns. He expresses understanding of the plan and thanked me.      (Please note that portions of this note may have been completed with a voice recognition program.  Efforts were made to edit the dictation but occasionally words are mis-transcribed.)    Electronically signed by MAUDE Saunders on 3/21/2022 at 9:15 AM

## 2022-03-28 DIAGNOSIS — E78.2 MIXED HYPERLIPIDEMIA: ICD-10-CM

## 2022-03-28 RX ORDER — LOVASTATIN 20 MG/1
TABLET ORAL
Qty: 90 TABLET | Refills: 1 | Status: SHIPPED | OUTPATIENT
Start: 2022-03-28 | End: 2022-10-03

## 2022-03-28 NOTE — TELEPHONE ENCOUNTER
Timmy Naidu is requesting a refill on the following medications:  Requested Prescriptions     Pending Prescriptions Disp Refills    lovastatin (MEVACOR) 20 MG tablet [Pharmacy Med Name: LOVASTATIN TAB 20MG] 90 tablet 1     Sig: TAKE 1 TABLET DAILY       Date of last visit: 12/9/2021  Date of next visit (if applicable):4/22/2022  Date of last refill: 10/21/2021  Pharmacy Name: Kenan Knapp LPN

## 2022-03-31 ENCOUNTER — HOSPITAL ENCOUNTER (OUTPATIENT)
Dept: PULMONOLOGY | Age: 82
Discharge: HOME | End: 2022-03-31
Payer: MEDICARE

## 2022-03-31 DIAGNOSIS — J96.11 CHRONIC RESPIRATORY FAILURE WITH HYPOXIA (HCC): ICD-10-CM

## 2022-03-31 DIAGNOSIS — J43.9 PULMONARY EMPHYSEMA, UNSPECIFIED EMPHYSEMA TYPE (HCC): ICD-10-CM

## 2022-03-31 PROCEDURE — 94060 EVALUATION OF WHEEZING: CPT

## 2022-03-31 PROCEDURE — 94729 DIFFUSING CAPACITY: CPT

## 2022-03-31 PROCEDURE — 94726 PLETHYSMOGRAPHY LUNG VOLUMES: CPT

## 2022-03-31 NOTE — PROGRESS NOTES
Prescreening performed prior to testing. The following symptoms may indicate COVID-19 infection:        One of the following criteria:   Temperature taken, patient temperature was 98.4 F. Fever greater 100.0 F -no  New onset cough -  no  New onset shortness of breath -no  New onset difficulty breathing -no        And/or   Two or more of the following criteria:  New onset muscle aches -no  New onset headache -no  New onset sore throat -no  New onset loss of smell/taste -no  New onset diarrhea -no    Patient's screening was negative. PFT will be performed.

## 2022-04-12 DIAGNOSIS — L29.9 GENERALIZED PRURITUS: ICD-10-CM

## 2022-04-12 RX ORDER — HYDROXYZINE HYDROCHLORIDE 25 MG/1
25 TABLET, FILM COATED ORAL EVERY 8 HOURS PRN
Qty: 180 TABLET | Refills: 1 | Status: SHIPPED | OUTPATIENT
Start: 2022-04-12 | End: 2022-07-11

## 2022-04-12 NOTE — TELEPHONE ENCOUNTER
Jenae Tripp called requesting a refill on the following medications:  Requested Prescriptions     Pending Prescriptions Disp Refills    hydrOXYzine (ATARAX) 25 MG tablet 180 tablet 1     Sig: Take 1 tablet by mouth every 8 hours as needed for Itching       Date of last visit: 12/9/2021  Date of next visit (if applicable):Visit date not found  Date of last refill: 01/21/22  Pharmacy Name: [facility]      Thanks,  Fifi Rodgers LPN

## 2022-04-19 ENCOUNTER — HOSPITAL ENCOUNTER (OUTPATIENT)
Age: 82
Discharge: HOME | End: 2022-04-19
Payer: MEDICARE

## 2022-04-19 LAB
ERYTHROCYTE [DISTWIDTH] IN BLOOD BY AUTOMATED COUNT: 13.3 % (ref 11.5–14.5)
ERYTHROCYTE [DISTWIDTH] IN BLOOD BY AUTOMATED COUNT: 45.1 FL (ref 35–45)
FERRITIN: 56 NG/ML (ref 22–322)
HCT VFR BLD CALC: 43.2 % (ref 42–52)
HEMOGLOBIN: 13.7 GM/DL (ref 14–18)
IRON: 61 UG/DL (ref 65–195)
MCH RBC QN AUTO: 29.1 PG (ref 26–33)
MCHC RBC AUTO-ENTMCNC: 31.7 GM/DL (ref 32.2–35.5)
MCV RBC AUTO: 91.7 FL (ref 80–94)
PLATELET # BLD: 286 THOU/MM3 (ref 130–400)
PMV BLD AUTO: 9.3 FL (ref 9.4–12.4)
RBC # BLD: 4.71 MILL/MM3 (ref 4.7–6.1)
TOTAL IRON BINDING CAPACITY: 329 UG/DL (ref 171–450)
WBC # BLD: 10.3 THOU/MM3 (ref 4.8–10.8)

## 2022-04-19 PROCEDURE — 85027 COMPLETE CBC AUTOMATED: CPT

## 2022-04-19 PROCEDURE — 83540 ASSAY OF IRON: CPT

## 2022-04-19 PROCEDURE — 36415 COLL VENOUS BLD VENIPUNCTURE: CPT

## 2022-04-19 PROCEDURE — 83550 IRON BINDING TEST: CPT

## 2022-04-19 PROCEDURE — 82728 ASSAY OF FERRITIN: CPT

## 2022-04-21 ENCOUNTER — TELEPHONE (OUTPATIENT)
Dept: ENT CLINIC | Age: 82
End: 2022-04-21

## 2022-04-21 NOTE — TELEPHONE ENCOUNTER
Patients wife called and stated he has had another bloody nose,left side, last night and again this morning, she stated that it lasted about 45 minutes.  He had that side cauterized back 3/21/22    Please advise

## 2022-04-22 ENCOUNTER — TELEPHONE (OUTPATIENT)
Dept: FAMILY MEDICINE CLINIC | Age: 82
End: 2022-04-22

## 2022-04-22 ENCOUNTER — OFFICE VISIT (OUTPATIENT)
Dept: FAMILY MEDICINE CLINIC | Age: 82
End: 2022-04-22
Payer: MEDICARE

## 2022-04-22 VITALS
HEART RATE: 75 BPM | BODY MASS INDEX: 25.49 KG/M2 | OXYGEN SATURATION: 95 % | TEMPERATURE: 97.2 F | WEIGHT: 168.2 LBS | RESPIRATION RATE: 16 BRPM | HEIGHT: 68 IN | SYSTOLIC BLOOD PRESSURE: 136 MMHG | DIASTOLIC BLOOD PRESSURE: 72 MMHG

## 2022-04-22 DIAGNOSIS — R42 LIGHTHEADEDNESS: ICD-10-CM

## 2022-04-22 DIAGNOSIS — I10 ESSENTIAL HYPERTENSION: Primary | ICD-10-CM

## 2022-04-22 DIAGNOSIS — J44.9 COPD, MODERATE (HCC): ICD-10-CM

## 2022-04-22 DIAGNOSIS — I65.23 BILATERAL CAROTID ARTERY STENOSIS: ICD-10-CM

## 2022-04-22 DIAGNOSIS — E78.2 MIXED HYPERLIPIDEMIA: ICD-10-CM

## 2022-04-22 DIAGNOSIS — I73.9 PAD (PERIPHERAL ARTERY DISEASE) (HCC): ICD-10-CM

## 2022-04-22 DIAGNOSIS — I25.10 ASCVD (ARTERIOSCLEROTIC CARDIOVASCULAR DISEASE): ICD-10-CM

## 2022-04-22 PROCEDURE — 1036F TOBACCO NON-USER: CPT | Performed by: FAMILY MEDICINE

## 2022-04-22 PROCEDURE — 3288F FALL RISK ASSESSMENT DOCD: CPT | Performed by: FAMILY MEDICINE

## 2022-04-22 PROCEDURE — 99214 OFFICE O/P EST MOD 30 MIN: CPT | Performed by: FAMILY MEDICINE

## 2022-04-22 PROCEDURE — 4040F PNEUMOC VAC/ADMIN/RCVD: CPT | Performed by: FAMILY MEDICINE

## 2022-04-22 PROCEDURE — 1123F ACP DISCUSS/DSCN MKR DOCD: CPT | Performed by: FAMILY MEDICINE

## 2022-04-22 PROCEDURE — G8417 CALC BMI ABV UP PARAM F/U: HCPCS | Performed by: FAMILY MEDICINE

## 2022-04-22 PROCEDURE — G8427 DOCREV CUR MEDS BY ELIG CLIN: HCPCS | Performed by: FAMILY MEDICINE

## 2022-04-22 PROCEDURE — 3023F SPIROM DOC REV: CPT | Performed by: FAMILY MEDICINE

## 2022-04-22 RX ORDER — PANTOPRAZOLE SODIUM 40 MG/1
40 TABLET, DELAYED RELEASE ORAL DAILY
Qty: 90 TABLET | Refills: 3 | Status: SHIPPED | OUTPATIENT
Start: 2022-04-22

## 2022-04-22 ASSESSMENT — ENCOUNTER SYMPTOMS
SHORTNESS OF BREATH: 0
COUGH: 1
WHEEZING: 0

## 2022-04-22 ASSESSMENT — PATIENT HEALTH QUESTIONNAIRE - PHQ9
2. FEELING DOWN, DEPRESSED OR HOPELESS: 0
SUM OF ALL RESPONSES TO PHQ QUESTIONS 1-9: 0
SUM OF ALL RESPONSES TO PHQ9 QUESTIONS 1 & 2: 0
SUM OF ALL RESPONSES TO PHQ QUESTIONS 1-9: 0
SUM OF ALL RESPONSES TO PHQ QUESTIONS 1-9: 0
1. LITTLE INTEREST OR PLEASURE IN DOING THINGS: 0
SUM OF ALL RESPONSES TO PHQ QUESTIONS 1-9: 0

## 2022-04-22 NOTE — PROGRESS NOTES
[facility]  [facility]  [address] [facility]  Dept: [phone]  Dept Fax: [phone]  Loc: [phone]  PROGRESS NOTE      Visit Date: 4/22/2022    Mary Jane Li is a [de-identified] y.o. male who presents today for:  Chief Complaint   Patient presents with    6 Month Follow-Up    Hypertension    Dizziness     has been noticing the past few months he gets dizzy spells       Subjective:  HPI     6 month f/u    HTN:  On norvasc. Gets intermittent pre-syncope that lasts about 15 seconds. Not associated with changing positions. Going on for a few months. ASCVD and PAD. On lovastatin and aspirin. S/p left SFA  8/2021. COPD:  On anoro. On supplemental oxygen at night and walking on treadmill. A. Fib. Hx of watchman. Hx of TAVR. Hx of GI bleed. On protonix. Getting epistaxis. Has been seen at ENT. Wife is present    Review of Systems   Constitutional: Negative for chills and fever. Respiratory: Positive for cough. Negative for shortness of breath and wheezing. Cardiovascular: Negative for chest pain and leg swelling. Neurological: Positive for light-headedness. Negative for syncope.      Patient Active Problem List   Diagnosis    Elevated glucose    Osteoarthritis    ASCVD (arteriosclerotic cardiovascular disease)    COPD, moderate ([address]    Aortic valve disorder    Abnormal ankle brachial index (VONDA)    Hypertension secondary to other renal disorders (CODE)    Coronary artery disease due to lipid rich plaque    Claudication (HCC)    S/P coronary angioplasty    S/P cardiac cath    S/P percutaneous transluminal angioplasty (PTA) with stent placement    Severe aortic stenosis    Essential hypertension    Hyperlipidemia LDL goal <70    S/P TAVR (transcatheter aortic valve replacement)    Nocturnal hypoxia    Chronic respiratory failure with hypoxia (HCC)    Hypersomnia    Other emphysema ([address]  Afib ([address]    Presence of Watchman left atrial appendage closure device    Urticaria    Other atopic dermatitis    [facility]AD [facility]per[facility]) (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 Lowest de-sat 77%.  Osteoarthritis     Osteopenia     Osteopenia     [facility]per[facility] disease) [facility]     Polio 1948    Rheumatic fever       Past Surgical History:   Procedure Laterality Date    AORTIC VALVE REPAIR  2020    TAVR    BALLOON ANGIOPLASTY, ARTERY  2018    s/p Left CFA, SFA and popliteal intervention    FACIAL COSMETIC SURGERY      as a kid    HAND SURGERY Right     carpal tunnel     OTHER SURGICAL HISTORY  2021    Left Atrial Appenadage closure/watchman    PTCA  2020    stent LAD    TONSILLECTOMY AND ADENOIDECTOMY      TRANSESOPHAGEAL ECHOCARDIOGRAM N/A 1/10/2022    TRANSESOPHAGEAL ECHOCARDIOGRAM performed by Jazmyne Sousa MD at [facility]     Family History   Problem Relation Age of Onset    Heart Disease Father     Arthritis Maternal Grandmother      Social History     Tobacco Use    Smoking status: Former Smoker     Packs/day: 1.50     Years: 50.00     Pack years: 75.00     Types: Cigarettes     Quit date: 10/9/2004     Years since quittin.5    Smokeless tobacco: Never Used   Substance Use Topics    Alcohol use:  Yes     Alcohol/week: 30.0 standard drinks     Types: 30 Cans of beer per week Comment: 3 to 4 a week       Current Outpatient Medications   Medication Sig Dispense Refill    hydrOXYzine (ATARAX) 25 MG tablet Take 1 tablet by mouth every 8 hours as needed for Itching 180 tablet 1    lovastatin (MEVACOR) 20 MG tablet TAKE 1 TABLET DAILY 90 tablet 1    nitroGLYCERIN (NITROSTAT) 0.4 MG SL tablet PLACE 1 TABLET UNDER THE TONGUE AND ALLOW TO DISSOLVE FOR UP TO A MAXIMUM OF 3 DOSES; IF NO RELIEF AFTER 1 DOSE, CALL 911 25 tablet 2    potassium chloride (KLOR-CON M) 20 MEQ extended release tablet Take 1 tablet by mouth daily 90 tablet 1    amLODIPine (NORVASC) 5 MG tablet Take 1 tablet by mouth daily 90 tablet 1    pantoprazole (PROTONIX) 40 MG tablet TAKE 1 TABLET EVERY MORNINGBEFORE BREAKFAST 90 tablet 1    NONFORMULARY ginko biloba      triamcinolone (KENALOG) 0.1 % cream       umeclidinium-vilanterol (ANORO ELLIPTA) 62.5-25 MCG/INH AEPB inhaler Inhale 1 puff into the lungs daily 90 puff 3    aspirin 81 MG EC tablet Take 81 mg by mouth daily Stop 4 weeks post peripheral intervention  9/11/21      OXYGEN Please provide patient with 3LPM of oxygen at night time for nocturnal hypoxia. Patient to have repeat pulse oximetry testing done on oxygen 3 L 0     No current facility-administered medications for this visit.      Allergies   Allergen Reactions    Brilinta [Ticagrelor] Shortness Of Breath    Cephalexin Rash and Hives     Health Maintenance   Topic Date Due    DTaP/Tdap/Td vaccine (1 - Tdap) Never done    Depression Screen  04/20/2022    Annual Wellness Visit (AWV)  04/21/2022    Lipids  08/10/2022    Potassium  01/10/2023    Creatinine  01/10/2023    Flu vaccine  Completed    Shingles Vaccine  Completed    Pneumococcal 65+ years Vaccine  Completed    COVID-19 Vaccine  Completed    Hepatitis A vaccine  Aged Out    Hepatitis B vaccine  Aged Out    Hib vaccine  Aged Out    Meningococcal (ACWY) vaccine  Aged Out       Objective:  /72 (Site: Left Upper Arm, Position: Sitting)   Pulse 75   Temp 97.2 °F (36.2 °C)   Resp 16   Ht 5' 8\" (1.727 m)   Wt 168 lb 3.2 oz (76.3 kg)   SpO2 95%   BMI 25.57 kg/m²   Physical Exam  Vitals reviewed. Constitutional:       General: He is not in acute distress. Appearance: He is not ill-appearing. Neck:      Vascular: No carotid bruit. Cardiovascular:      Rate and Rhythm: Normal rate and regular rhythm. Pulmonary:      Effort: Pulmonary effort is normal. No respiratory distress. Breath sounds: Normal breath sounds. No wheezing. Musculoskeletal:      Right lower leg: No edema. Left lower leg: No edema. Neurological:      Mental Status: He is alert. Mental status is at baseline. Psychiatric:         Mood and Affect: Mood normal.         Behavior: Behavior normal.       US carotid June 2020     1. Large amount of atherosclerotic plaque in the left carotid bulb resulting in 50-69% stenosis of the internal carotid artery. 2. There is a moderate amount of atherosclerotic plaque in the right carotid bulb resulting in less than 50% stenosis. Impression/Plan:  1. Essential hypertension  Chronic. Controlled. Continue amlodipine. 2. PAD (peripheral artery disease) (HCC)  Chronic. Controlled. Refill  - pantoprazole (PROTONIX) 40 MG tablet; Take 1 tablet by mouth daily  Dispense: 90 tablet; Refill: 3    3. COPD, moderate ([address]  Chronic. Controlled. Continue nighttime supplemental oxygen, and Anoro Ellipta. 4. Mixed hyperlipidemia  Chronic. Continue lovastatin    5. ASCVD (arteriosclerotic cardiovascular disease)  Chronic. Continue medical management with statin and aspirin. 6. Lightheadedness  New problem. Discussed multiple etiologies. Given intermittent symptoms, unlikely stroke. I am concerned for worsening carotid artery stenosis. We will proceed with CTA of carotid arteries  - CTA NECK W WO CONTRAST; Future    7. Bilateral carotid artery stenosis  - CTA NECK W WO CONTRAST;  Future      They voiced understanding. All questions answered. They agreed with treatment plan. See patient instructions for any educational materials that may have been given. Discussed use, benefit, and side effects of prescribed medications. Reviewed health maintenance. (Please note that portions of this note may have been completed with a voice recognition program.  Efforts were made to edit the dictation but occasionally words are mis-transcribed.)    Return in about 4 weeks (around 5/20/2022) for medicare wellness.       Electronically signed by Aravind Liang MD on 4/22/2022 at 8:24 AM

## 2022-04-22 NOTE — TELEPHONE ENCOUNTER
Got pt scheduled for the CTA. Aly from central scheduling stats pt needs a creatinine done in the past month for him to get it done. Pt had one done on 1/10/2022. Pt can't do the CTA until creatinine is done. I have the creatinine order pended.

## 2022-04-25 ENCOUNTER — HOSPITAL ENCOUNTER (OUTPATIENT)
Age: 82
Discharge: HOME | End: 2022-04-25
Payer: MEDICARE

## 2022-04-25 DIAGNOSIS — I10 ESSENTIAL HYPERTENSION: ICD-10-CM

## 2022-04-25 LAB
CREAT SERPL-MCNC: 1.1 MG/DL (ref 0.4–1.2)
GFR SERPL CREATININE-BSD FRML MDRD: 64 ML/MIN/1.73M2

## 2022-04-25 PROCEDURE — 82565 ASSAY OF CREATININE: CPT

## 2022-04-25 PROCEDURE — 36415 COLL VENOUS BLD VENIPUNCTURE: CPT

## 2022-04-25 NOTE — TELEPHONE ENCOUNTER
Balaji Blancas called back. This nurse informed of creatinine order, and she states she will take Brenton to [facility] this week to have ordered labs drawn. 75

## 2022-04-25 NOTE — TELEPHONE ENCOUNTER
Tried calling pt but mobile phone was wife's number. Wife is on HIPAA. Left  for Brenton to give us a call back.

## 2022-05-09 ENCOUNTER — HOSPITAL ENCOUNTER (OUTPATIENT)
Dept: CT IMAGING | Age: 82
Discharge: HOME | End: 2022-05-09
Payer: MEDICARE

## 2022-05-09 ENCOUNTER — TELEPHONE (OUTPATIENT)
Dept: FAMILY MEDICINE CLINIC | Age: 82
End: 2022-05-09

## 2022-05-09 DIAGNOSIS — R42 LIGHTHEADEDNESS: ICD-10-CM

## 2022-05-09 DIAGNOSIS — I65.23 BILATERAL CAROTID ARTERY STENOSIS: Primary | ICD-10-CM

## 2022-05-09 DIAGNOSIS — I65.23 BILATERAL CAROTID ARTERY STENOSIS: ICD-10-CM

## 2022-05-09 PROCEDURE — 70498 CT ANGIOGRAPHY NECK: CPT

## 2022-05-09 PROCEDURE — 6360000004 HC RX CONTRAST MEDICATION: Performed by: FAMILY MEDICINE

## 2022-05-09 RX ADMIN — IOPAMIDOL 80 ML: 755 INJECTION, SOLUTION INTRAVENOUS at 06:52

## 2022-05-09 NOTE — TELEPHONE ENCOUNTER
----- Message from Katelyn Quarles MD sent at 5/9/2022 10:00 AM EDT -----  Significant severe bilateral carotid artery stenosis. Recommend referral to neuro interventionist for further management. Is he agreeable? Please advise patient.   Katelyn Quarles MD

## 2022-05-09 NOTE — TELEPHONE ENCOUNTER
----- Message from Sherran Cranker sent at 5/9/2022  9:30 AM EDT -----  Subject: Appointment Request    Reason for Call: Routine Medicare AWV    QUESTIONS  Type of Appointment? Established Patient  Reason for appointment request? No appointments available during search  Additional Information for Provider? RESCHEDULE  AWV  Screened GREEN  ---------------------------------------------------------------------------  --------------  CALL BACK INFO  What is the best way for the office to contact you? OK to leave message on   voicemail  Preferred Call Back Phone Number? [phone]  ---------------------------------------------------------------------------  --------------  SCRIPT ANSWERS  Relationship to Patient? Other  Representative Name? Ander Rust  Additional information verified (besides Name and Date of Birth)? Phone   Number  Have your symptoms changed? No  (If the patient has Medicare as their primary insurance coverage ask this   question) Are you requesting a Medicare Annual Wellness Visit? Yes   (Is the patient requesting a pap smear with their physical exam?)? No  (Is the patient requesting their annual physical and does not need PAP or   AWV per above?)? No  Have you been diagnosed with, awaiting test results for, or told that you   are suspected of having COVID-19 (Coronavirus)? (If patient has tested   negative or was tested as a requirement for work, school, or travel and   not based on symptoms, answer no)? No  Within the past 10 days have you developed any of the following symptoms   (answer no if symptoms have been present longer than 10 days or began   more than 10 days ago)? Fever or Chills, Cough, Shortness of breath or   difficulty breathing, Loss of taste or smell, Sore throat, Nasal   congestion, Sneezing or runny nose, Fatigue or generalized body aches   (answer no if pain is specific to a body part e.g. back pain), Diarrhea,   Headache?  No  Have you had close contact with someone with COVID-19 in the last 7 days? No  (Service Expert  click yes below to proceed with [employer] As Usual   Scheduling)?  Yes

## 2022-05-09 NOTE — TELEPHONE ENCOUNTER
Called and spoke with Esthela Juarez wife, and she would like to speak with her daughters before referral is placed, as she is unsure which provider would be preferred. Bipin will call our office back to notify of referral preference.

## 2022-05-13 RX ORDER — NITROGLYCERIN 0.4 MG/1
TABLET SUBLINGUAL
Qty: 25 TABLET | Refills: 0 | Status: SHIPPED | OUTPATIENT
Start: 2022-05-13 | End: 2022-05-24 | Stop reason: SDUPTHER

## 2022-05-16 ENCOUNTER — OFFICE VISIT (OUTPATIENT)
Dept: PULMONOLOGY | Age: 82
End: 2022-05-16
Payer: MEDICARE

## 2022-05-16 VITALS
OXYGEN SATURATION: 97 % | SYSTOLIC BLOOD PRESSURE: 134 MMHG | BODY MASS INDEX: 25.34 KG/M2 | HEIGHT: 68 IN | HEART RATE: 67 BPM | WEIGHT: 167.2 LBS | TEMPERATURE: 96.8 F | DIASTOLIC BLOOD PRESSURE: 60 MMHG

## 2022-05-16 DIAGNOSIS — Z87.891 FORMER SMOKER, STOPPED SMOKING IN DISTANT PAST: ICD-10-CM

## 2022-05-16 DIAGNOSIS — J43.9 PULMONARY EMPHYSEMA, UNSPECIFIED EMPHYSEMA TYPE (HCC): Primary | ICD-10-CM

## 2022-05-16 DIAGNOSIS — I65.23 BILATERAL CAROTID ARTERY STENOSIS: ICD-10-CM

## 2022-05-16 DIAGNOSIS — J96.11 CHRONIC RESPIRATORY FAILURE WITH HYPOXIA (HCC): ICD-10-CM

## 2022-05-16 PROCEDURE — G8427 DOCREV CUR MEDS BY ELIG CLIN: HCPCS | Performed by: NURSE PRACTITIONER

## 2022-05-16 PROCEDURE — 4040F PNEUMOC VAC/ADMIN/RCVD: CPT | Performed by: NURSE PRACTITIONER

## 2022-05-16 PROCEDURE — 3023F SPIROM DOC REV: CPT | Performed by: NURSE PRACTITIONER

## 2022-05-16 PROCEDURE — 99214 OFFICE O/P EST MOD 30 MIN: CPT | Performed by: NURSE PRACTITIONER

## 2022-05-16 PROCEDURE — G8417 CALC BMI ABV UP PARAM F/U: HCPCS | Performed by: NURSE PRACTITIONER

## 2022-05-16 PROCEDURE — 1123F ACP DISCUSS/DSCN MKR DOCD: CPT | Performed by: NURSE PRACTITIONER

## 2022-05-16 PROCEDURE — 1036F TOBACCO NON-USER: CPT | Performed by: NURSE PRACTITIONER

## 2022-05-16 RX ORDER — UMECLIDINIUM BROMIDE AND VILANTEROL TRIFENATATE 62.5; 25 UG/1; UG/1
1 POWDER RESPIRATORY (INHALATION) DAILY
Qty: 90 EACH | Refills: 3 | Status: SHIPPED | OUTPATIENT
Start: 2022-05-16 | End: 2023-05-16

## 2022-05-16 ASSESSMENT — ENCOUNTER SYMPTOMS
COUGH: 1
WHEEZING: 0
BACK PAIN: 1
DIARRHEA: 0
NAUSEA: 0
SHORTNESS OF BREATH: 1
ABDOMINAL PAIN: 0
VOMITING: 0

## 2022-05-16 NOTE — PROGRESS NOTES
[facility]     Patient: Albin Marcial, [de-identified] y.o.   : 1940  2022    Pt of Dr. Nicole Mann   Patient presents with    Follow-up     COPD 9 months f/u pulm after PFT @ [facility]        HPI  Brenton SOTOMAYOR is here for 1 year follow up for COPD with spirometry   Current Inhalers:  Anoro     chronic dyspnea  With strenuous activity - stable at baseline  Chronic occ. Cough and slight wheezing - these symptoms are Improved  Using 2LPM- 2. 5LPM  with activity and sleep   Current order : 3LPM w/ activity, 4LPM with sleep   \" I have never used 4LPM \"   Does sanders home spot checks with home pulse oximeter. If Oxygen ever goes below 88% (which rare), he will increase his oxygen   Has never completed pulmonary rehab   Symptoms are stable    Still getting intermittent epistaxis, if following with ENT   Recent dx carotid artery stenosis   Has watchman , history  maker   Any recent exacerbations that have required oral atb or steroids No  Any new medical issues since last visit : No    Good appetite. SOCIAL HISTORY:  Social History     Tobacco Use    Smoking status: Former Smoker     Packs/day: 1.50     Years: 50.00     Pack years: 75.00     Types: Cigarettes     Quit date: 10/9/2004     Years since quittin.6    Smokeless tobacco: Never Used   Vaping Use    Vaping Use: Never used   Substance Use Topics    Alcohol use:  Yes     Alcohol/week: 30.0 standard drinks     Types: 30 Cans of beer per week     Comment: 3 to 4 a week     Drug use: No         CURRENT MEDICATIONS:  Current Outpatient Medications   Medication Sig Dispense Refill    Cephalexin (KEFLEX PO) Take by mouth      umeclidinium-vilanterol (ANORO ELLIPTA) 62.5-25 MCG/INH AEPB inhaler Inhale 1 puff into the lungs daily 90 each 3    nitroGLYCERIN (NITROSTAT) 0.4 MG SL tablet PLACE 1 TABLET UNDER THE TONGUE AND ALLOW TO DISSOLVE FOR UP TO A MAXIMUM OF 3 DOSES; IF NO RELIEF AFTER 1 DOSE, CALL 911 25 tablet 0    pantoprazole (PROTONIX) 40 MG tablet Take 1 tablet by mouth daily 90 tablet 3    hydrOXYzine (ATARAX) 25 MG tablet Take 1 tablet by mouth every 8 hours as needed for Itching 180 tablet 1    lovastatin (MEVACOR) 20 MG tablet TAKE 1 TABLET DAILY 90 tablet 1    potassium chloride (KLOR-CON M) 20 MEQ extended release tablet Take 1 tablet by mouth daily 90 tablet 1    amLODIPine (NORVASC) 5 MG tablet Take 1 tablet by mouth daily 90 tablet 1    NONFORMULARY ginko biloba      triamcinolone (KENALOG) 0.1 % cream       aspirin 81 MG EC tablet Take 81 mg by mouth daily Stop 4 weeks post peripheral intervention  9/11/21      OXYGEN Please provide patient with 3LPM of oxygen at night time for nocturnal hypoxia. Patient to have repeat pulse oximetry testing done on oxygen 3 L 0     No current facility-administered medications for this visit. Hina DUMAS   Review of Systems   Constitutional: Positive for fatigue. Negative for activity change, appetite change, chills, fever and unexpected weight change. HENT: Negative. Eyes: Positive for visual disturbance (floaters ). Respiratory: Positive for cough and shortness of breath. Negative for wheezing. Cardiovascular: Negative for chest pain, palpitations and leg swelling. Gastrointestinal: Negative for abdominal pain, diarrhea, nausea and vomiting. Genitourinary: Negative. Musculoskeletal: Positive for back pain. Skin: Negative. Neurological: Negative. Hematological: Negative. Psychiatric/Behavioral: Negative.          Physical exam   /60 (Site: Left Upper Arm, Position: Sitting, Cuff Size: Medium Adult)   Pulse 67   Temp 96.8 °F (36 °C) (Temporal)   Ht 5' 8\" (1.727 m)   Wt 167 lb 3.2 oz (75.8 kg)   SpO2 97% Comment: on ra  BMI 25.42 kg/m²      Wt Readings from Last 3 Encounters:   05/16/22 167 lb 3.2 oz (75.8 kg)   04/22/22 168 lb 3.2 oz (76.3 kg)   03/21/22 163 lb (73.9 kg)     Physical Exam  Vitals and nursing note reviewed. Constitutional:       General: He is not in acute distress. Appearance: He is well-developed and normal weight. HENT:      Mouth/Throat:      Lips: Pink. Mouth: Mucous membranes are moist.      Pharynx: Oropharynx is clear. No oropharyngeal exudate or posterior oropharyngeal erythema. Eyes:      Conjunctiva/sclera: Conjunctivae normal.   Neck:      Vascular: No JVD. Cardiovascular:      Rate and Rhythm: Normal rate and regular rhythm. Heart sounds: No murmur heard. No friction rub. Pulmonary:      Effort: Pulmonary effort is normal. No accessory muscle usage or respiratory distress. Breath sounds: Normal breath sounds. No wheezing, rhonchi or rales. Chest:      Chest wall: No tenderness. Musculoskeletal:      Right lower leg: No edema. Left lower leg: No edema. Skin:     General: Skin is warm and dry. Capillary Refill: Capillary refill takes less than 2 seconds. Nails: There is no clubbing. Neurological:      Mental Status: He is alert and oriented to person, place, and time. Psychiatric:         Mood and Affect: Mood normal.         Behavior: Behavior normal.         Thought Content: Thought content normal.         Judgment: Judgment normal.          Test results   Lung Nodule Screening     [] Qualifies    [x]Does not qualify   [] Declined    [] Completed    Slightly worse air trapping compared to 2020      Assessment      Diagnosis Orders   1. Pulmonary emphysema, unspecified emphysema type ([address]     2. Chronic respiratory failure with hypoxia (HCC)     3. Former smoker, stopped smoking in distant past            Plan    -continue Anoro ellipta daily, refills escribed for 90 day supply  -continue to monitor SPO2 at home.   Ok to stay at 2LPM w/ activity and sleep - increase to 4lPM as needed with activity   -avoid ill contacts  -keep UTD on recommended vaccinations  -spirometry discussed, slight increased air trapping otherwise stable     Will see Florencia Rodriges in: 1 year  billing based on time: total time on encounter 30 min   Electronically signed by LEWIS Baez CNP on 5/16/2022 at 10:05 AM

## 2022-05-17 ENCOUNTER — OFFICE VISIT (OUTPATIENT)
Dept: ENT CLINIC | Age: 82
End: 2022-05-17
Payer: MEDICARE

## 2022-05-17 VITALS
SYSTOLIC BLOOD PRESSURE: 122 MMHG | WEIGHT: 165.5 LBS | TEMPERATURE: 99 F | HEART RATE: 76 BPM | HEIGHT: 68 IN | BODY MASS INDEX: 25.08 KG/M2 | DIASTOLIC BLOOD PRESSURE: 70 MMHG | OXYGEN SATURATION: 94 %

## 2022-05-17 DIAGNOSIS — R04.0 EPISTAXIS: Primary | ICD-10-CM

## 2022-05-17 PROCEDURE — G8417 CALC BMI ABV UP PARAM F/U: HCPCS | Performed by: OTOLARYNGOLOGY

## 2022-05-17 PROCEDURE — 1036F TOBACCO NON-USER: CPT | Performed by: OTOLARYNGOLOGY

## 2022-05-17 PROCEDURE — 1123F ACP DISCUSS/DSCN MKR DOCD: CPT | Performed by: OTOLARYNGOLOGY

## 2022-05-17 PROCEDURE — G8427 DOCREV CUR MEDS BY ELIG CLIN: HCPCS | Performed by: OTOLARYNGOLOGY

## 2022-05-17 PROCEDURE — 30903 CONTROL OF NOSEBLEED: CPT | Performed by: OTOLARYNGOLOGY

## 2022-05-17 NOTE — PROGRESS NOTES
[facility] [place] [facility]  [facility]  [facility]  Dept: [phone]  Dept Fax: [phone]  Loc: [phone]    Casey Morocho is a [de-identified] y.o. male who was referred byNo ref. provider found for:  Chief Complaint   Patient presents with    Follow-up     Patient is here for epistaxis. He is having nosebleeds atleast weekly. He said that sometimes they last 1- 1/2 hours. He said that it is very uncomfortable. Shruti Martinez HPI:     Casey Morocho is a [de-identified] y.o. male who presents today for ***. History: Allergies   Allergen Reactions    Brilinta [Ticagrelor] Shortness Of Breath    Cephalexin Rash and Hives     Current Outpatient Medications   Medication Sig Dispense Refill    Cephalexin (KEFLEX PO) Take by mouth      umeclidinium-vilanterol (ANORO ELLIPTA) 62.5-25 MCG/INH AEPB inhaler Inhale 1 puff into the lungs daily 90 each 3    nitroGLYCERIN (NITROSTAT) 0.4 MG SL tablet PLACE 1 TABLET UNDER THE TONGUE AND ALLOW TO DISSOLVE FOR UP TO A MAXIMUM OF 3 DOSES; IF NO RELIEF AFTER 1 DOSE, CALL 911 25 tablet 0    pantoprazole (PROTONIX) 40 MG tablet Take 1 tablet by mouth daily 90 tablet 3    hydrOXYzine (ATARAX) 25 MG tablet Take 1 tablet by mouth every 8 hours as needed for Itching 180 tablet 1    lovastatin (MEVACOR) 20 MG tablet TAKE 1 TABLET DAILY 90 tablet 1    potassium chloride (KLOR-CON M) 20 MEQ extended release tablet Take 1 tablet by mouth daily 90 tablet 1    amLODIPine (NORVASC) 5 MG tablet Take 1 tablet by mouth daily 90 tablet 1    NONFORMULARY ginko biloba      triamcinolone (KENALOG) 0.1 % cream       aspirin 81 MG EC tablet Take 81 mg by mouth daily Stop 4 weeks post peripheral intervention  9/11/21      OXYGEN Please provide patient with 3LPM of oxygen at night time for nocturnal hypoxia.  Patient to have repeat pulse oximetry testing done on oxygen 3 L 0     No current facility-administered medications for this visit. 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 Lowest de-sat 77%.  Osteoarthritis     Osteopenia     Osteopenia     PAD (peripheral artery disease) ([address]     Polio 1948    Rheumatic fever       Past Surgical History:   Procedure Laterality Date    AORTIC VALVE REPAIR  2020    TAVR    BALLOON ANGIOPLASTY, ARTERY  2018    s/p Left CFA, SFA and popliteal intervention    FACIAL COSMETIC SURGERY      as a kid    HAND SURGERY Right     carpal tunnel     OTHER SURGICAL HISTORY  2021    Left Atrial Appenadage closure/watchman    PTCA  2020    stent LAD    TONSILLECTOMY AND ADENOIDECTOMY      TRANSESOPHAGEAL ECHOCARDIOGRAM N/A 1/10/2022    TRANSESOPHAGEAL ECHOCARDIOGRAM performed by Sandrita Dinero MD at [facility]     Family History   Problem Relation Age of Onset    Heart Disease Father     Arthritis Maternal Grandmother      Social History     Tobacco Use    Smoking status: Former Smoker     Packs/day: 1.50     Years: 50.00     Pack years: 75.00     Types: Cigarettes     Quit date: 10/9/2004     Years since quittin.6    Smokeless tobacco: Never Used   Substance Use Topics    Alcohol use: Yes     Alcohol/week: 30.0 standard drinks     Types: 30 Cans of beer per week     Comment: 3 to 4 a week        Subjective:      Review of Systems   HENT: Positive for nosebleeds.         Objective:   /70 (Site: Right Upper Arm, Position: Sitting)   Pulse 76   Temp 99 °F (37.2 °C) (Infrared)   Ht 5' 8\" (1.727 m)   Wt 165 lb 8 oz (75.1 kg)   SpO2 94%   BMI 25.16 kg/m²     Physical Exam    Data:  All of the past medical history, past surgical history, family history,social history, allergies and current medications were reviewed with the patient. Assessment & Plan   Diagnoses and all orders for this visit:    {No diagnosis found. (Refresh or delete this SmartLink)}    The findings were explained and his questions were answered. No follow-ups on file. Adolfo Molina. Evie Pinto MD    **This report has been created using voice recognition software. It may contain minor errors which are inherent in voicerecognition technology. **

## 2022-05-24 ENCOUNTER — OFFICE VISIT (OUTPATIENT)
Dept: ENT CLINIC | Age: 82
End: 2022-05-24
Payer: MEDICARE

## 2022-05-24 VITALS
HEART RATE: 67 BPM | SYSTOLIC BLOOD PRESSURE: 122 MMHG | BODY MASS INDEX: 25.16 KG/M2 | OXYGEN SATURATION: 94 % | WEIGHT: 166 LBS | TEMPERATURE: 98.1 F | HEIGHT: 68 IN | DIASTOLIC BLOOD PRESSURE: 60 MMHG

## 2022-05-24 DIAGNOSIS — Z87.81 HISTORY OF BROKEN NOSE: ICD-10-CM

## 2022-05-24 DIAGNOSIS — R04.0 EPISTAXIS: Primary | ICD-10-CM

## 2022-05-24 DIAGNOSIS — Z79.82 ASPIRIN LONG-TERM USE: ICD-10-CM

## 2022-05-24 DIAGNOSIS — J34.2 NASAL SEPTAL DEVIATION: ICD-10-CM

## 2022-05-24 PROCEDURE — G8427 DOCREV CUR MEDS BY ELIG CLIN: HCPCS | Performed by: OTOLARYNGOLOGY

## 2022-05-24 PROCEDURE — G8417 CALC BMI ABV UP PARAM F/U: HCPCS | Performed by: OTOLARYNGOLOGY

## 2022-05-24 PROCEDURE — 1123F ACP DISCUSS/DSCN MKR DOCD: CPT | Performed by: OTOLARYNGOLOGY

## 2022-05-24 PROCEDURE — 1036F TOBACCO NON-USER: CPT | Performed by: OTOLARYNGOLOGY

## 2022-05-24 PROCEDURE — 99212 OFFICE O/P EST SF 10 MIN: CPT | Performed by: OTOLARYNGOLOGY

## 2022-05-24 RX ORDER — NITROGLYCERIN 0.4 MG/1
TABLET SUBLINGUAL
Qty: 25 TABLET | Refills: 0 | Status: SHIPPED | OUTPATIENT
Start: 2022-05-24

## 2022-05-24 NOTE — PROGRESS NOTES
[facility] [place] [facility]  [facility] EAR, NOSE AND THROAT  [facility] [place]  Dept: [phone]  Dept Fax: [phone]  Loc: [phone]    Priyanka Kuhn is a [de-identified] y.o. male who was referred byNo ref. provider found for:  Chief Complaint   Patient presents with    Follow-up     Patient is here for 1 week f/u for epistaxis. He has not had any nose bleeds since the last visit. Rosaura Kelly HPI:     Priyanka Kuhn is a [de-identified] y.o. male who presents today for follow-up on epistaxis. He has had no further bleeding. History: Allergies   Allergen Reactions    Brilinta [Ticagrelor] Shortness Of Breath    Cephalexin Rash and Hives     Current Outpatient Medications   Medication Sig Dispense Refill    nitroGLYCERIN (NITROSTAT) 0.4 MG SL tablet up to max of 3 total doses. If no relief after 1 dose, call 911. 25 tablet 0    Cephalexin (KEFLEX PO) Take by mouth      umeclidinium-vilanterol (ANORO ELLIPTA) 62.5-25 MCG/INH AEPB inhaler Inhale 1 puff into the lungs daily 90 each 3    pantoprazole (PROTONIX) 40 MG tablet Take 1 tablet by mouth daily 90 tablet 3    hydrOXYzine (ATARAX) 25 MG tablet Take 1 tablet by mouth every 8 hours as needed for Itching 180 tablet 1    lovastatin (MEVACOR) 20 MG tablet TAKE 1 TABLET DAILY 90 tablet 1    potassium chloride (KLOR-CON M) 20 MEQ extended release tablet Take 1 tablet by mouth daily 90 tablet 1    amLODIPine (NORVASC) 5 MG tablet Take 1 tablet by mouth daily 90 tablet 1    NONFORMULARY ginko biloba      triamcinolone (KENALOG) 0.1 % cream       aspirin 81 MG EC tablet Take 81 mg by mouth daily Stop 4 weeks post peripheral intervention  9/11/21      OXYGEN Please provide patient with 3LPM of oxygen at night time for nocturnal hypoxia. Patient to have repeat pulse oximetry testing done on oxygen 3 L 0     No current facility-administered medications for this visit.      Past Medical History:   Diagnosis Date    ASCVD (arteriosclerotic cardiovascular disease)     [facility]rial 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 Lowest de-sat 77%.  Osteoarthritis     Osteopenia     Osteopenia     PAD (peripheral artery disease) (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    OTHER SURGICAL HISTORY  2021    Left Atrial Appenadage closure/watchman    PTCA  2020    stent LAD    TONSILLECTOMY AND ADENOIDECTOMY      TRANSESOPHAGEAL ECHOCARDIOGRAM N/A 1/10/2022    TRANSESOPHAGEAL ECHOCARDIOGRAM performed by Sloan Apley, MD at [facility]     Family History   Problem Relation Age of Onset    Heart Disease Father     Arthritis Maternal Grandmother      Social History     Tobacco Use    Smoking status: Former Smoker     Packs/day: 1.50     Years: 50.00     Pack years: 75.00     Types: Cigarettes     Quit date: 10/9/2004     Years since quittin.7    Smokeless tobacco: Never Used   Substance Use Topics    Alcohol use:  Yes     Alcohol/week: 30.0 standard drinks     Types: 30 Cans of beer per week     Comment: 3 to 4 a week        Subjective:      Review of Systems    Objective:   /60 (Site: Right Upper Arm, Position: Sitting)   Pulse 67   Temp 98.1 °F (36.7 °C) (Infrared)   Ht 5' 8\" (1.727 m)   Wt 166 lb (75.3 kg)   SpO2 94%   BMI 25.24 kg/m²     Physical Exam  Nose: Nasal fossa is decongested and anesthetized. Suctioning of some of the Nasopore was needed. No active bleeding. Data:  All of the past medical history, past surgical history, family history,social history, allergies and current medications were reviewed with the patient. Assessment & Plan   Diagnoses and all orders for this visit:     Diagnosis Orders   1. Epistaxis, left anterior     2. Nasal septal deviation     3. History of broken nose 3-4 times     4. Aspirin long-term use         The findings were explained and his questions were answered. Continue his aspirin use. Suggested he leave his nose alone is much as possible for another week. Call or return for future episodes of epistaxis. Otherwise return as needed. Aleena Mcallister. Shailesh , MD    **This report has been created using voice recognition software. It may contain minor errors which are inherent in voicerecognition technology. **

## 2022-05-25 ENCOUNTER — OFFICE VISIT (OUTPATIENT)
Dept: FAMILY MEDICINE CLINIC | Age: 82
End: 2022-05-25
Payer: MEDICARE

## 2022-05-25 VITALS
HEART RATE: 63 BPM | WEIGHT: 167.4 LBS | DIASTOLIC BLOOD PRESSURE: 64 MMHG | BODY MASS INDEX: 25.37 KG/M2 | HEIGHT: 68 IN | SYSTOLIC BLOOD PRESSURE: 134 MMHG | RESPIRATION RATE: 16 BRPM | TEMPERATURE: 97.3 F | OXYGEN SATURATION: 93 %

## 2022-05-25 DIAGNOSIS — Z00.00 MEDICARE ANNUAL WELLNESS VISIT, SUBSEQUENT: Primary | ICD-10-CM

## 2022-05-25 DIAGNOSIS — I65.23 BILATERAL CAROTID ARTERY STENOSIS: ICD-10-CM

## 2022-05-25 PROCEDURE — 1123F ACP DISCUSS/DSCN MKR DOCD: CPT | Performed by: FAMILY MEDICINE

## 2022-05-25 PROCEDURE — G0439 PPPS, SUBSEQ VISIT: HCPCS | Performed by: FAMILY MEDICINE

## 2022-05-25 ASSESSMENT — LIFESTYLE VARIABLES
HOW OFTEN DO YOU HAVE A DRINK CONTAINING ALCOHOL: MONTHLY OR LESS
HOW MANY STANDARD DRINKS CONTAINING ALCOHOL DO YOU HAVE ON A TYPICAL DAY: 1 OR 2

## 2022-05-25 ASSESSMENT — PATIENT HEALTH QUESTIONNAIRE - PHQ9
SUM OF ALL RESPONSES TO PHQ QUESTIONS 1-9: 0
SUM OF ALL RESPONSES TO PHQ9 QUESTIONS 1 & 2: 0
2. FEELING DOWN, DEPRESSED OR HOPELESS: 0
1. LITTLE INTEREST OR PLEASURE IN DOING THINGS: 0

## 2022-05-25 NOTE — PROGRESS NOTES
Medicare Annual Wellness Visit    Ana Chavez is here for Medicare AWV    Assessment & Plan   Medicare annual wellness visit, subsequent  Bilateral carotid artery stenosis           Awaiting interventional neurology appointment for carotid artery stenosis    Recommendations for Preventive Services Due: see orders and patient instructions/AVS.  Recommended screening schedule for the next 5-10 years is provided to the patient in written form: see Patient Instructions/AVS.     Return in about 5 months (around 10/25/2022) for HTN; chronic conditions. Subjective       Intermittent epistaxis. Seeing ENT    Has bilateral carotid stenosis. Has been referred to neurology. Wife is present    Patient's complete Health Risk Assessment and screening values have been reviewed and are found in Flowsheets. The following problems were reviewed today and where indicated follow up appointments were made and/or referrals ordered.     Positive Risk Factor Screenings with Interventions:             General Health and ACP:  General  In general, how would you say your health is?: (!) Poor (has a lot of medical problems)  In the past 7 days, have you experienced any of the following: New or Increased Pain, New or Increased Fatigue, Loneliness, Social Isolation, Stress or Anger?: No  Do you get the social and emotional support that you need?: Yes  Do you have a Living Will?: Yes    Advance Directives     Power of  Living Will ACP-Advance Directive ACP-Power of     Not on File Mary Moreno on 08/08/18 Filed Not on File      General Health Risk Interventions:  · Poor self-assessment of health status: patient declines any further evaluation/treatment for this issue     Hearing/Vision:  Do you or your family notice any trouble with your hearing that hasn't been managed with hearing aids?: No  Do you have difficulty driving, watching TV, or doing any of your daily activities because of your eyesight?: No  Have you had an eye extended release tablet Take 1 tablet by mouth daily Yes Ar Seth MD   amLODIPine (NORVASC) 5 MG tablet Take 1 tablet by mouth daily Yes Sunshine Ortiz PA-C   NONFORMULARY ginko biloba Yes Historical Provider, MD   triamcinolone (KENALOG) 0.1 % cream  Yes Historical Provider, MD   aspirin 81 MG EC tablet Take 81 mg by mouth daily Stop 4 weeks post peripheral intervention  9/11/21 Yes Historical Provider, MD   OXYGEN Please provide patient with 3LPM of oxygen at night time for nocturnal hypoxia.  Patient to have repeat pulse oximetry testing done on oxygen Yes LEWIS Baez - CNP       CareTeam (Including outside providers/suppliers regularly involved in providing care):   Patient Care Team:  Jerica Britt MD as PCP - General (Family Medicine)  Jerica Britt MD as PCP [facility] Empaneled Provider     Reviewed and updated this visit:  Tobacco  Allergies  Meds  Problems  Med Hx  Surg Hx  Soc Hx  Fam Hx

## 2022-05-25 NOTE — PATIENT INSTRUCTIONS
Personalized Preventive Plan for Ana Chavez - 5/25/2022  Medicare offers a range of preventive health benefits. Some of the tests and screenings are paid in full while other may be subject to a deductible, co-insurance, and/or copay. Some of these benefits include a comprehensive review of your medical history including lifestyle, illnesses that may run in your family, and various assessments and screenings as appropriate. After reviewing your medical record and screening and assessments performed today your provider may have ordered immunizations, labs, imaging, and/or referrals for you. A list of these orders (if applicable) as well as your Preventive Care list are included within your After Visit Summary for your review. Other Preventive Recommendations:    · A preventive eye exam performed by an eye specialist is recommended every 1-2 years to screen for glaucoma; cataracts, macular degeneration, and other eye disorders. · A preventive dental visit is recommended every 6 months. · Try to get at least 150 minutes of exercise per week or 10,000 steps per day on a pedometer . · Order or download the FREE \"Exercise & Physical Activity: Your Everyday Guide\" from [employer]. Call 2-929.298.5420 or search [employer] online. · You need 5068-2251 mg of calcium and 4970-7111 IU of vitamin D per day. It is possible to meet your calcium requirement with diet alone, but a vitamin D supplement is usually necessary to meet this goal.  · When exposed to the sun, use a sunscreen that protects against both UVA and UVB radiation with an SPF of 30 or greater. Reapply every 2 to 3 hours or after sweating, drying off with a towel, or swimming. · Always wear a seat belt when traveling in a car. Always wear a helmet when riding a bicycle or motorcycle.

## 2022-06-09 ENCOUNTER — TELEPHONE (OUTPATIENT)
Dept: NEUROLOGY | Age: 82
End: 2022-06-09

## 2022-06-09 NOTE — TELEPHONE ENCOUNTER
Pt called and explained appt will be on 7-7-22 instead of 7-5-22 due to not having clinic on 7-5-22. Pt expressed understanding of new appt time. New appts mailed.

## 2022-06-17 NOTE — PROGRESS NOTES
[facility] [place] [facility]  [facility] EAR, NOSE AND THROAT  [facility] [place]  Dept: [phone]  Dept Fax: [phone]  Loc: [phone]    Sonal Deras is a [de-identified] y.o. male who was referred byNo ref. provider found for:  Chief Complaint   Patient presents with    Follow-up     Patient is here for epistaxis. He is having nosebleeds atleast weekly. He said that sometimes they last 1- 1/2 hours. He said that it is very uncomfortable. Erickson Garcia HPI:     Sonal Deras is a [de-identified] y.o. male who presents today for epistaxis, left side. Epistaxis: Patient has been having nosebleeds on the left months. Seen previously by Jonah Wallace who had to use Nasopore packing to control the bleeding. Medications include aspirin. He wears nasal prong O2 at least overnight    History: Allergies   Allergen Reactions    Brilinta [Ticagrelor] Shortness Of Breath    Cephalexin Rash and Hives     Current Outpatient Medications   Medication Sig Dispense Refill    Cephalexin (KEFLEX PO) Take by mouth      umeclidinium-vilanterol (ANORO ELLIPTA) 62.5-25 MCG/INH AEPB inhaler Inhale 1 puff into the lungs daily 90 each 3    pantoprazole (PROTONIX) 40 MG tablet Take 1 tablet by mouth daily 90 tablet 3    hydrOXYzine (ATARAX) 25 MG tablet Take 1 tablet by mouth every 8 hours as needed for Itching 180 tablet 1    lovastatin (MEVACOR) 20 MG tablet TAKE 1 TABLET DAILY 90 tablet 1    potassium chloride (KLOR-CON M) 20 MEQ extended release tablet Take 1 tablet by mouth daily 90 tablet 1    amLODIPine (NORVASC) 5 MG tablet Take 1 tablet by mouth daily 90 tablet 1    NONFORMULARY ginko biloba      triamcinolone (KENALOG) 0.1 % cream       aspirin 81 MG EC tablet Take 81 mg by mouth daily Stop 4 weeks post peripheral intervention  9/11/21      OXYGEN Please provide patient with 3LPM of oxygen at night time for nocturnal hypoxia.  Patient to have repeat pulse oximetry testing done on oxygen 3 L 0    nitroGLYCERIN (NITROSTAT) 0.4 MG SL tablet up to max of 3 total doses. If no relief after 1 dose, call 911. 25 tablet 0     No current facility-administered medications for this visit. 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 Lowest de-sat 77%.  Osteoarthritis     Osteopenia     Osteopenia     [facility]per[facility]) ([facility] [address]     Polio 194    Rheumatic fever       Past Surgical History:   Procedure Laterality Date    BALLOON ANGIOPLASTY, ARTERY  2018    s/p Left CFA, SFA and popliteal intervention    FACIAL COSMETIC SURGERY      as a kid    HAND SURGERY Right     carpal tunnel     MASS EXCISION  2020    TAVR    OTHER SURGICAL HISTORY  2021    Left Atrial Appenadage closure/watchman    PTCA  2020    stent LAD    TONSILLECTOMY AND ADENOIDECTOMY      TRANSESOPHAGEAL ECHOCARDIOGRAM N/A 1/10/2022    TRANSESOPHAGEAL ECHOCARDIOGRAM performed by Jazmyne Sousa MD at [facility]     Family History   Problem Relation Age of Onset    Heart Disease Father     Arthritis Maternal Grandmother      Social History     Tobacco Use    Smoking status: Former Smoker     Packs/day: 1.50     Years: 50.00     Pack years: 75.00     Types: Cigarettes     Quit date: 10/9/2004     Years since quittin.6    Smokeless tobacco: Never Used   Substance Use Topics    Alcohol use:  Yes     Alcohol/week: 30.0 standard drinks     Types: 30 Cans of beer per week     Comment: 3 to 4 a week Subjective:      Review of Systems    Objective:   /70 (Site: Right Upper Arm, Position: Sitting)   Pulse 76   Temp 99 °F (37.2 °C) (Infrared)   Ht 5' 8\" (1.727 m)   Wt 165 lb 8 oz (75.1 kg)   SpO2 94%   BMI 25.16 kg/m²     Physical Exam   Nose: There is a scab anteriorly on the left side that is the most likely candidate for the source of bleeding and correlates with his history. He agreed to allow me to cauterize that. PROCEDURE: CONTROL ANTERIOR EPISTAXIS    After an adequate level of topical anesthesia was obtained using a pledget impregnated with Afrin and lidocaine, Silver Nitrate cautery was applied to the bleeding point on the Left side. Excess Silver Nitrate was removed with a moistened Q-tip. Nasopore packing was needed. Patient tolerated the procedure well. Data:  All of the past medical history, past surgical history, family history,social history, allergies and current medications were reviewed with the patient. Assessment & Plan   Diagnoses and all orders for this visit:     Diagnosis Orders   1. Epistaxis, left anterior  MT CTRL NOSEBLEED,ANTER,COMPLEX       The findings were explained and his questions were answered. Recheck in 1 week. May use Afrin every 8 hours and at bedtime for 3 days. Aleena Mcallister. Shailesh Beckmankeeper, MD    **This report has been created using voice recognition software. It may contain minor errors which are inherent in voicerecognition technology. **

## 2022-06-29 ENCOUNTER — TELEPHONE (OUTPATIENT)
Dept: FAMILY MEDICINE CLINIC | Age: 82
End: 2022-06-29

## 2022-07-05 ENCOUNTER — HOSPITAL ENCOUNTER (OUTPATIENT)
Age: 82
Discharge: HOME | End: 2022-07-05
Payer: MEDICARE

## 2022-07-05 LAB
ERYTHROCYTE [DISTWIDTH] IN BLOOD BY AUTOMATED COUNT: 14 % (ref 11.5–14.5)
ERYTHROCYTE [DISTWIDTH] IN BLOOD BY AUTOMATED COUNT: 44.7 FL (ref 35–45)
HCT VFR BLD CALC: 44.4 % (ref 42–52)
HEMOGLOBIN: 14.1 GM/DL (ref 14–18)
MCH RBC QN AUTO: 28 PG (ref 26–33)
MCHC RBC AUTO-ENTMCNC: 31.8 GM/DL (ref 32.2–35.5)
MCV RBC AUTO: 88.1 FL (ref 80–94)
PLATELET # BLD: 285 THOU/MM3 (ref 130–400)
PMV BLD AUTO: 10.1 FL (ref 9.4–12.4)
RBC # BLD: 5.04 MILL/MM3 (ref 4.7–6.1)
WBC # BLD: 8.6 THOU/MM3 (ref 4.8–10.8)

## 2022-07-05 PROCEDURE — 83550 IRON BINDING TEST: CPT

## 2022-07-05 PROCEDURE — 85027 COMPLETE CBC AUTOMATED: CPT

## 2022-07-05 PROCEDURE — 36415 COLL VENOUS BLD VENIPUNCTURE: CPT

## 2022-07-05 PROCEDURE — 83540 ASSAY OF IRON: CPT

## 2022-07-05 PROCEDURE — 82728 ASSAY OF FERRITIN: CPT

## 2022-07-06 ENCOUNTER — TELEPHONE (OUTPATIENT)
Dept: NEUROLOGY | Age: 82
End: 2022-07-06

## 2022-07-06 LAB
FERRITIN: 44 NG/ML (ref 22–322)
IRON: 51 UG/DL (ref 65–195)
TOTAL IRON BINDING CAPACITY: 399 UG/DL (ref 171–450)

## 2022-07-07 ENCOUNTER — OFFICE VISIT (OUTPATIENT)
Dept: NEUROLOGY | Age: 82
End: 2022-07-07
Payer: MEDICARE

## 2022-07-07 VITALS
DIASTOLIC BLOOD PRESSURE: 58 MMHG | SYSTOLIC BLOOD PRESSURE: 130 MMHG | OXYGEN SATURATION: 92 % | HEIGHT: 68 IN | BODY MASS INDEX: 25.22 KG/M2 | HEART RATE: 72 BPM | WEIGHT: 166.4 LBS

## 2022-07-07 DIAGNOSIS — I65.23 BILATERAL CAROTID ARTERY STENOSIS: Primary | ICD-10-CM

## 2022-07-07 PROCEDURE — G8417 CALC BMI ABV UP PARAM F/U: HCPCS | Performed by: NURSE PRACTITIONER

## 2022-07-07 PROCEDURE — 99214 OFFICE O/P EST MOD 30 MIN: CPT | Performed by: NURSE PRACTITIONER

## 2022-07-07 PROCEDURE — 1036F TOBACCO NON-USER: CPT | Performed by: NURSE PRACTITIONER

## 2022-07-07 PROCEDURE — 1123F ACP DISCUSS/DSCN MKR DOCD: CPT | Performed by: NURSE PRACTITIONER

## 2022-07-07 PROCEDURE — G8427 DOCREV CUR MEDS BY ELIG CLIN: HCPCS | Performed by: NURSE PRACTITIONER

## 2022-07-07 ASSESSMENT — ENCOUNTER SYMPTOMS
COUGH: 0
RHINORRHEA: 0
NAUSEA: 0
SHORTNESS OF BREATH: 1
ABDOMINAL PAIN: 0
SORE THROAT: 0
VOMITING: 0

## 2022-07-07 NOTE — PROGRESS NOTES
Neurology Office Note    UZBP:4/9/1860        Patient Name:Brenton Sumner     YOB: 1940     Age:82 y.o. Requesting Physician: No att. providers found     Reason for referral: Bilateral carotid stenosis      Chief Complaint:   Chief Complaint   Patient presents with    New Patient     Bilateral carotid artery stenosis        Rita Jerome is a 82-year-old male with a past medical history of atrial fibrillation s/p watchman device, coronary artery disease s/p stent placement, hyperlipidemia, hypertension, peripheral artery disease, who presents to the neuro intervention clinic today as a referral from his primary care provider regarding bilateral carotid stenosis seen on a CT angiogram of his neck that was completed on 5/9/2022 for concerns of lightheadedness, intermittent vertigo. He reports that he will get dizzy, short of breath, and lightheaded occasionally when doing strenuous activity in which he has to sit down for a little while and then go back to what he was doing. He currently is only on a 81 mg of aspirin which she reports that he has been cutting in half for approximately the last 3 weeks due to easy bleeding and bruising. He does have a history of GI bleed, nosebleeds, and easy bruising and bleeding secondary to being on DAPT and Coumadin in the past.  He does report that his last nosebleed was approximately a month ago and that he has has had 3 cauterizations. He denies any headache, numbness, weakness, speech difficulty, or double or blurred vision. He denies any history of stroke or smoking. Review of Systems   Review of Systems   Constitutional: Negative for chills and fever. HENT: Negative for rhinorrhea and sore throat. Eyes: Positive for visual disturbance (Occasional floaters). Respiratory: Positive for shortness of breath. Negative for cough. Cardiovascular: Negative for chest pain and palpitations.    Gastrointestinal: Negative for abdominal pain, nausea and vomiting. Genitourinary: Negative for dysuria. Musculoskeletal: Negative for arthralgias and myalgias. Skin: Negative for rash. Neurological: Positive for dizziness and light-headedness. Negative for speech difficulty, weakness, numbness and headaches. Hematological: Bruises/bleeds easily. Psychiatric/Behavioral: Negative for confusion. The patient is not nervous/anxious. Medications   Home Meds:   Current Outpatient Medications on File Prior to Visit   Medication Sig Dispense Refill    nitroGLYCERIN (NITROSTAT) 0.4 MG SL tablet up to max of 3 total doses. If no relief after 1 dose, call 911. 25 tablet 0    Cephalexin (KEFLEX PO) Take by mouth      umeclidinium-vilanterol (ANORO ELLIPTA) 62.5-25 MCG/INH AEPB inhaler Inhale 1 puff into the lungs daily 90 each 3    pantoprazole (PROTONIX) 40 MG tablet Take 1 tablet by mouth daily 90 tablet 3    hydrOXYzine (ATARAX) 25 MG tablet Take 1 tablet by mouth every 8 hours as needed for Itching 180 tablet 1    lovastatin (MEVACOR) 20 MG tablet TAKE 1 TABLET DAILY 90 tablet 1    potassium chloride (KLOR-CON M) 20 MEQ extended release tablet Take 1 tablet by mouth daily 90 tablet 1    amLODIPine (NORVASC) 5 MG tablet Take 1 tablet by mouth daily 90 tablet 1    NONFORMULARY ginko biloba      triamcinolone (KENALOG) 0.1 % cream       aspirin 81 MG EC tablet Take 81 mg by mouth daily Stop 4 weeks post peripheral intervention  9/11/21      OXYGEN Please provide patient with 3LPM of oxygen at night time for nocturnal hypoxia. Patient to have repeat pulse oximetry testing done on oxygen 3 L 0     No current facility-administered medications on file prior to visit.       PRN Meds:     Past History    Past Medical History:   has a past medical history of ASCVD (arteriosclerotic cardiovascular disease), Atrial fibrillation ([facility] [address] CAD (coronary artery disease), Cerebral artery occlusion with cerebral infarction [facility]), COPD (chronic obstructive pulmonary disease) (HCC), Dysplasia of vocal cord, Elevated glucose, Hyperlipidemia, Hypertension, Nocturnal hypoxia, Osteoarthritis, Osteopenia, Osteopenia, PAD (peripheral artery disease) ([address] Polio, and Rheumatic fever. Social History:   reports that he quit smoking about 17 years ago. His smoking use included cigarettes. He has a 75.00 pack-year smoking history. He has never used smokeless tobacco. He reports current alcohol use of about 30.0 standard drinks of alcohol per week. He reports that he does not use drugs. Family History:   Family History   Problem Relation Age of Onset    Heart Disease Father     Arthritis Maternal Grandmother        Physical Examination      Vitals:  BP (!) 130/58 (Site: Left Upper Arm, Position: Sitting, Cuff Size: Large Adult)   Pulse 72   Ht 5' 7.99\" (1.727 m)   Wt 166 lb 6.4 oz (75.5 kg)   SpO2 92%   BMI 25.31 kg/m²   @TMAX(24)@      Physical Exam  Vitals reviewed. Constitutional:       General: He is not in acute distress. Appearance: Normal appearance. He is not ill-appearing. HENT:      Head: Normocephalic and atraumatic. Right Ear: External ear normal.      Left Ear: External ear normal.      Nose: Nose normal.      Mouth/Throat:      Mouth: Mucous membranes are moist.      Pharynx: No oropharyngeal exudate or posterior oropharyngeal erythema. Eyes:      Extraocular Movements: EOM normal.      Pupils: Pupils are equal, round, and reactive to light. Cardiovascular:      Rate and Rhythm: Normal rate and regular rhythm. Heart sounds: Normal heart sounds. No murmur heard. Pulmonary:      Effort: Pulmonary effort is normal. No respiratory distress. Breath sounds: Normal breath sounds. No wheezing. Abdominal:      General: Bowel sounds are normal.      Palpations: Abdomen is soft. Tenderness: There is no abdominal tenderness. Musculoskeletal:         General: Normal range of motion.       Right lower leg: No edema. Left lower leg: No edema. Skin:     General: Skin is warm. Findings: Bruising (Scattered, bandages on right arm, right leg secondary to bleeding) present. No rash. Neurological:      General: No focal deficit present. Mental Status: He is alert and oriented to person, place, and time. Coordination: Finger-Nose-Finger Test and Heel to [employer] normal.   Psychiatric:         Mood and Affect: Mood normal.         Speech: Speech normal.         Behavior: Behavior normal.       Neurologic Exam     Mental Status   Oriented to person, place, and time. Follows 2 step commands. Attention: normal. Concentration: normal.   Speech: speech is normal   Level of consciousness: alert  Knowledge: good. Cranial Nerves     CN II   Visual fields full to confrontation. CN III, IV, VI   Pupils are equal, round, and reactive to light. Extraocular motions are normal.   Right pupil: Size: 3 mm. Shape: regular. Reactivity: brisk. Left pupil: Size: 3 mm. Shape: regular. Reactivity: brisk. CN V   Facial sensation intact. CN VII   Facial expression full, symmetric. CN VIII   CN VIII normal.   Hearing: intact    CN IX, X   CN IX normal.   CN X normal.   Palate: symmetric    CN XI   CN XI normal.   Right trapezius strength: normal  Left trapezius strength: normal    CN XII   CN XII normal.   Tongue: not atrophic  Fasciculations: absent  Tongue deviation: none    Motor Exam   Muscle bulk: normal  Overall muscle tone: normal  Right arm pronator drift: absent  Left arm pronator drift: absent    BUE: 5/5  BLE: 5/5     Sensory Exam   Light touch normal.     Gait, Coordination, and Reflexes     Coordination   Finger to nose coordination: normal  Heel to shin coordination: normal    Tremor   Resting tremor: absent  Intention tremor: absent  Action tremor: absent       Labs/Imaging/Diagnostics   Labs:       Imaging:  No results found. Assessment and Plan:          1.  Bilateral carotid artery stenosis  · CT angiogram head and neck from 5/9/2022 demonstrates 84% stenosis of the right internal carotid artery and 80% stenosis of the left internal carotid artery, focal areas of severe stenosis in the nondominant left vertebral artery  · Continue aspirin 81 mg daily. Ideally would like for the patient to be on dual antiplatelets, but given his history of GI bleeding, nosebleeding, and easy bleeding in general we will hold off at this time until we further evaluate his stenosis via diagnostic cerebral angiogram  · Continue lovastatin 20 mg daily  · Patient will undergo a diagnostic cerebral angiogram on 7/13/22 at noon with Dr. Víctor Dockery to further evaluate bilateral carotid artery stenosis. Benefits and risks(kidney toxicity, dissection, hematoma, stroke, infection) of this procedure were explained to patient and wife and they agreed to proceed with the procedure without any further questions. · N.p.o. after midnight the day prior to procedure. Okay to take medications in the morning with a sip of water    This patient was seen and evaluated with Dr. Víctor Dockery and he is in agreement with the assessment and plan. Electronically signed by LEWIS Monte CNP on 7/7/22 at 3:54 PM EDT    I had a face-to-face encounter with this patient today where I evaluated the patient with Koki Ugarte CNP. The history and physical examination was completed by me as documented in the attached note. All relevant radiology images, labs and vitals signs were reviewed by me. I agree with the physical examination, assessment, and plan as documented which was formulated by me. The timing of this note filing does not necessarily correlate with the timing of when the patient was seen by me. [de-identified]year old man with bilateral carotid artery stenosis. As I discussed with him, we will need to complete a diagnostic cerebral angiogram to further delineate the angioarchitecture for potential future surgical planning.   The risks and benefits for diagnostic cerebral angiography was discussed with patient at length. Risks including death, stroke, hemorrhage, dissection, infection, hematoma and contrast nephropathy. The patient's questions were answered and concerns were addressed and consent was subsequently obtained. - Proceed with diagnostic cerebral angiography with moderate sedation.       Leoncio Mukherjee MD  Vascular and Interventional Neurology, Neurocritical Care  [address] [facility]

## 2022-07-13 ENCOUNTER — APPOINTMENT (OUTPATIENT)
Dept: INTERVENTIONAL RADIOLOGY/VASCULAR | Age: 82
End: 2022-07-13
Attending: RADIOLOGY
Payer: MEDICARE

## 2022-07-13 ENCOUNTER — HOSPITAL ENCOUNTER (OUTPATIENT)
Dept: INPATIENT UNIT | Age: 82
Discharge: HOME | End: 2022-07-13
Attending: RADIOLOGY | Admitting: RADIOLOGY
Payer: MEDICARE

## 2022-07-13 VITALS
OXYGEN SATURATION: 91 % | RESPIRATION RATE: 14 BRPM | WEIGHT: 163 LBS | TEMPERATURE: 97.5 F | SYSTOLIC BLOOD PRESSURE: 134 MMHG | DIASTOLIC BLOOD PRESSURE: 50 MMHG | HEART RATE: 81 BPM | HEIGHT: 68 IN | BODY MASS INDEX: 24.71 KG/M2

## 2022-07-13 LAB
ABO: NORMAL
ANION GAP SERPL CALCULATED.3IONS-SCNC: 11 MEQ/L (ref 8–16)
ANTIBODY SCREEN: NORMAL
APTT: 36.2 SECONDS (ref 22–38)
BUN BLDV-MCNC: 11 MG/DL (ref 7–22)
CALCIUM SERPL-MCNC: 9.5 MG/DL (ref 8.5–10.5)
CHLORIDE BLD-SCNC: 99 MEQ/L (ref 98–111)
CO2: 27 MEQ/L (ref 23–33)
CREAT SERPL-MCNC: 0.9 MG/DL (ref 0.4–1.2)
EKG ATRIAL RATE: 69 BPM
EKG P AXIS: 41 DEGREES
EKG P-R INTERVAL: 180 MS
EKG Q-T INTERVAL: 434 MS
EKG QRS DURATION: 102 MS
EKG QTC CALCULATION (BAZETT): 465 MS
EKG R AXIS: -11 DEGREES
EKG T AXIS: 47 DEGREES
EKG VENTRICULAR RATE: 69 BPM
ERYTHROCYTE [DISTWIDTH] IN BLOOD BY AUTOMATED COUNT: 14.1 % (ref 11.5–14.5)
ERYTHROCYTE [DISTWIDTH] IN BLOOD BY AUTOMATED COUNT: 44.3 FL (ref 35–45)
GFR SERPL CREATININE-BSD FRML MDRD: 81 ML/MIN/1.73M2
GLUCOSE BLD-MCNC: 96 MG/DL (ref 70–108)
HCT VFR BLD CALC: 44.1 % (ref 42–52)
HEMOGLOBIN: 14.4 GM/DL (ref 14–18)
INR BLD: 1 (ref 0.85–1.13)
MCH RBC QN AUTO: 28.4 PG (ref 26–33)
MCHC RBC AUTO-ENTMCNC: 32.7 GM/DL (ref 32.2–35.5)
MCV RBC AUTO: 87 FL (ref 80–94)
PLATELET # BLD: 282 THOU/MM3 (ref 130–400)
PMV BLD AUTO: 9.5 FL (ref 9.4–12.4)
POTASSIUM SERPL-SCNC: 5.1 MEQ/L (ref 3.5–5.2)
RBC # BLD: 5.07 MILL/MM3 (ref 4.7–6.1)
RH FACTOR: NORMAL
SODIUM BLD-SCNC: 137 MEQ/L (ref 135–145)
WBC # BLD: 8.7 THOU/MM3 (ref 4.8–10.8)

## 2022-07-13 PROCEDURE — 85027 COMPLETE CBC AUTOMATED: CPT

## 2022-07-13 PROCEDURE — 85610 PROTHROMBIN TIME: CPT

## 2022-07-13 PROCEDURE — 36225 PLACE CATH SUBCLAVIAN ART: CPT | Performed by: RADIOLOGY

## 2022-07-13 PROCEDURE — 93005 ELECTROCARDIOGRAM TRACING: CPT

## 2022-07-13 PROCEDURE — 6360000004 HC RX CONTRAST MEDICATION

## 2022-07-13 PROCEDURE — 86901 BLOOD TYPING SEROLOGIC RH(D): CPT

## 2022-07-13 PROCEDURE — 36225 PLACE CATH SUBCLAVIAN ART: CPT

## 2022-07-13 PROCEDURE — 86900 BLOOD TYPING SEROLOGIC ABO: CPT

## 2022-07-13 PROCEDURE — 36224 PLACE CATH CAROTD ART: CPT | Performed by: RADIOLOGY

## 2022-07-13 PROCEDURE — 85730 THROMBOPLASTIN TIME PARTIAL: CPT

## 2022-07-13 PROCEDURE — C1894 INTRO/SHEATH, NON-LASER: HCPCS

## 2022-07-13 PROCEDURE — 76937 US GUIDE VASCULAR ACCESS: CPT | Performed by: RADIOLOGY

## 2022-07-13 PROCEDURE — C1769 GUIDE WIRE: HCPCS

## 2022-07-13 PROCEDURE — 36415 COLL VENOUS BLD VENIPUNCTURE: CPT

## 2022-07-13 PROCEDURE — 93010 ELECTROCARDIOGRAM REPORT: CPT | Performed by: INTERNAL MEDICINE

## 2022-07-13 PROCEDURE — 6360000002 HC RX W HCPCS: Performed by: RADIOLOGY

## 2022-07-13 PROCEDURE — 80048 BASIC METABOLIC PNL TOTAL CA: CPT

## 2022-07-13 PROCEDURE — 36224 PLACE CATH CAROTD ART: CPT

## 2022-07-13 PROCEDURE — C1887 CATHETER, GUIDING: HCPCS

## 2022-07-13 PROCEDURE — 99152 MOD SED SAME PHYS/QHP 5/>YRS: CPT | Performed by: RADIOLOGY

## 2022-07-13 PROCEDURE — 99152 MOD SED SAME PHYS/QHP 5/>YRS: CPT

## 2022-07-13 PROCEDURE — 99153 MOD SED SAME PHYS/QHP EA: CPT

## 2022-07-13 PROCEDURE — 86850 RBC ANTIBODY SCREEN: CPT

## 2022-07-13 PROCEDURE — 2580000003 HC RX 258: Performed by: RADIOLOGY

## 2022-07-13 RX ORDER — SODIUM CHLORIDE 9 MG/ML
INJECTION, SOLUTION INTRAVENOUS PRN
Status: DISCONTINUED | OUTPATIENT
Start: 2022-07-13 | End: 2022-07-13 | Stop reason: SDUPTHER

## 2022-07-13 RX ORDER — SODIUM CHLORIDE 0.9 % (FLUSH) 0.9 %
5-40 SYRINGE (ML) INJECTION PRN
Status: DISCONTINUED | OUTPATIENT
Start: 2022-07-13 | End: 2022-07-13 | Stop reason: HOSPADM

## 2022-07-13 RX ORDER — SODIUM CHLORIDE 0.9 % (FLUSH) 0.9 %
5-40 SYRINGE (ML) INJECTION EVERY 12 HOURS SCHEDULED
Status: DISCONTINUED | OUTPATIENT
Start: 2022-07-13 | End: 2022-07-13 | Stop reason: SDUPTHER

## 2022-07-13 RX ORDER — SODIUM CHLORIDE 9 MG/ML
INJECTION, SOLUTION INTRAVENOUS PRN
Status: DISCONTINUED | OUTPATIENT
Start: 2022-07-13 | End: 2022-07-13 | Stop reason: HOSPADM

## 2022-07-13 RX ORDER — HYDRALAZINE HYDROCHLORIDE 20 MG/ML
5 INJECTION INTRAMUSCULAR; INTRAVENOUS ONCE
Status: COMPLETED | OUTPATIENT
Start: 2022-07-13 | End: 2022-07-13

## 2022-07-13 RX ORDER — MIDAZOLAM HYDROCHLORIDE 1 MG/ML
0.5 INJECTION INTRAMUSCULAR; INTRAVENOUS ONCE
Status: COMPLETED | OUTPATIENT
Start: 2022-07-13 | End: 2022-07-13

## 2022-07-13 RX ORDER — SODIUM CHLORIDE 0.9 % (FLUSH) 0.9 %
5-40 SYRINGE (ML) INJECTION EVERY 12 HOURS SCHEDULED
Status: DISCONTINUED | OUTPATIENT
Start: 2022-07-13 | End: 2022-07-13 | Stop reason: HOSPADM

## 2022-07-13 RX ORDER — FENTANYL CITRATE 50 UG/ML
25 INJECTION, SOLUTION INTRAMUSCULAR; INTRAVENOUS ONCE
Status: COMPLETED | OUTPATIENT
Start: 2022-07-13 | End: 2022-07-13

## 2022-07-13 RX ORDER — HYDROXYZINE HYDROCHLORIDE 25 MG/1
25 TABLET, FILM COATED ORAL EVERY 8 HOURS PRN
COMMUNITY
End: 2022-10-25 | Stop reason: SDUPTHER

## 2022-07-13 RX ORDER — HYDRALAZINE HYDROCHLORIDE 20 MG/ML
INJECTION INTRAMUSCULAR; INTRAVENOUS
Status: COMPLETED | OUTPATIENT
Start: 2022-07-13 | End: 2022-07-13

## 2022-07-13 RX ORDER — SODIUM CHLORIDE 0.9 % (FLUSH) 0.9 %
5-40 SYRINGE (ML) INJECTION PRN
Status: DISCONTINUED | OUTPATIENT
Start: 2022-07-13 | End: 2022-07-13 | Stop reason: SDUPTHER

## 2022-07-13 RX ORDER — ACETAMINOPHEN 325 MG/1
650 TABLET ORAL EVERY 4 HOURS PRN
Status: DISCONTINUED | OUTPATIENT
Start: 2022-07-13 | End: 2022-07-13 | Stop reason: HOSPADM

## 2022-07-13 RX ORDER — SODIUM CHLORIDE 9 MG/ML
INJECTION, SOLUTION INTRAVENOUS CONTINUOUS
Status: DISCONTINUED | OUTPATIENT
Start: 2022-07-13 | End: 2022-07-13 | Stop reason: HOSPADM

## 2022-07-13 RX ADMIN — SODIUM CHLORIDE: 9 INJECTION, SOLUTION INTRAVENOUS at 10:44

## 2022-07-13 RX ADMIN — HYDRALAZINE HYDROCHLORIDE 10 MG: 20 INJECTION, SOLUTION INTRAMUSCULAR; INTRAVENOUS at 12:31

## 2022-07-13 RX ADMIN — MIDAZOLAM 0.5 MG: 1 INJECTION INTRAMUSCULAR; INTRAVENOUS at 12:09

## 2022-07-13 RX ADMIN — HYDRALAZINE HYDROCHLORIDE 5 MG: 20 INJECTION, SOLUTION INTRAMUSCULAR; INTRAVENOUS at 12:23

## 2022-07-13 RX ADMIN — FENTANYL CITRATE 25 MCG: 50 INJECTION, SOLUTION INTRAMUSCULAR; INTRAVENOUS at 12:09

## 2022-07-13 RX ADMIN — MIDAZOLAM 0.5 MG: 1 INJECTION INTRAMUSCULAR; INTRAVENOUS at 12:23

## 2022-07-13 RX ADMIN — HYDRALAZINE HYDROCHLORIDE 5 MG: 20 INJECTION, SOLUTION INTRAMUSCULAR; INTRAVENOUS at 12:26

## 2022-07-13 ASSESSMENT — ENCOUNTER SYMPTOMS
SHORTNESS OF BREATH: 0
ABDOMINAL PAIN: 0
VOMITING: 0
COUGH: 0
NAUSEA: 0

## 2022-07-13 NOTE — PLAN OF CARE
Problem: Chronic Conditions and Co-morbidities  Goal: Patient's chronic conditions and co-morbidity symptoms are monitored and maintained or improved  Outcome: Adequate for Discharge     Problem: Discharge Planning  Goal: Discharge to home or other facility with appropriate resources  Outcome: Adequate for Discharge   Pt discharged home

## 2022-07-13 NOTE — PROGRESS NOTES
BRIEF POST-OPERATIVE PROCEDURE NOTE    (Full Note Pending)    Date of Procedure: 07/13/22    Surgeon(s): Chata Amezcua MD     Pre-operative Diagnosis:    Vertebral artery stenosis. Post-operative Diagnosis:   Vertebral artery stenosis. Procedure(s):  Diagnostic cerebral angiogram.    Anesthesia:  Moderate sedation x 46 minutes. Brief Findings:  Minimal non flow limiting stenosis at both carotid arteries. Severe flow limiting stenosis at the origin of the left Vertebral artery. Supplementary supply to the left vertebral artery arising from the ascending cervical trunk with connection to the distal V2 / proximal V3 segment of the left vertebral artery. Severe atherosclerotic disease within the origins of the great vessels of the chest.     .    Estimated Blood Loss:  Less than 10 ML. Specimens:  None. Complications:  None immediate. Flouro time: 4.9 Minutes. Contrast: 45 cc's of Isovue- 300. Access: Manual compression to the right CFA access site for 20 minutes with good hemostasis. Disposition: To -14.            Condition: stable    Surgeon:   Chata Amezcua MD;   Vascular and Interventional Neurology, Neurocritical Care  [address] [place] [address]  [phone]

## 2022-07-13 NOTE — PROGRESS NOTES
1145 Verify consent, H&P, and/or immediate pre-procedure note completed. Staff involved in the case: [employer] RN, Doreen Norris RT, Aime Morgan RT, Felicitas Valencia, NOEL, Dr Jb Suarez. 1150 Patient received in cath lab for procedure family taken to waiting room. Neuro assessment WNL. Pt alert and oriented x3. Calm and cooperative. Answers questions appropriately. 1155 Pre-procedure peripheral pulses: right pedal 2+, right posterior tibial 2+, left pedal 2+, left posterior tibial 2+  1202 Bilateral groins prepped for procedure. 1207 Procedure started with Dr. Jb Suarez. Timeout performed and the following information was verified: correct patient, correct procedure, correct procedure site, correct position, correct laterality (if applicable), procedure site marked and visible (if applicable), and consents verified. Allergies reviewed. Pertinent diagnostic and radiologic results present and relevant images available (if applicable). All special equipment or special requirements available as applicable. Prophylactic antibiotics given as applicable. Fire risk safety assessment completed, shared with team and appropriate interventions implemented. 1209 Sedation medications administered; charted per [facility]  1211 Lidocaine time; 10mls. 1213 Access obtained through right femoral artery with use of ultrasound. 1215 5 Wallisian 25cm sheath placed. 1217 Oxygen applied at 2LPM per NC.   1219 Right subclavian artery angiogram.  1224 Right common carotid artery angiogram.   1228 Right internal carotid artery angiogram.   1238 Left common carotid artery angiogram.   1242 Left internal carotid artery angiogram.   1243 Left subclavian artery angiogram.   1252 Catheters/wires removed. 1253 Angiogram of the right femoral artery access. 1255 Sheath-removed, intact. Manual pressure held with QuickClot. Procedure completed; patient tolerated well. Femoral site; area soft to touch with no bleeding noted.    [address] Post-procedure peripheral pulses: right pedal 2+, right posterior tibial 2+, left pedal 2+, left posterior tibial 2+  1305 Manual pressure discontinued. QuickClot in place and secured with tegaderm opsite. Right femoral dressing remains dry and intact with area soft. Neuro assessment WNL; unchanged from pre-procedure assessment. 1307 Patient on bed, patient exits procedural suite. 1309 Patient take to 2E. Report called to [facility].      Xray time- 14.9 minutes   442 mGy  Sedation time- 46 minutes (First dose until procedure end time)  Total contrast- 45 ml's

## 2022-07-13 NOTE — H&P
Interventional Neurology Pre-Procedural H&P    Date:7/13/2022        Patient Name:Brenton Sumner     YOB: 1940     Age:82 y.o. Physician: Darryle Mealy, MD     Procedure: Diagnostic Cerebral Angiogram    Chief Complaint: Bilateral carotid artery stenosis       Subjective     Lamont Carter is a 68-year-old male with a past medical history of atrial fibrillation s/p watchman device, coronary artery disease s/p stent placement, hyperlipidemia, hypertension, peripheral artery disease, who presents to the neuro intervention clinic today as a referral from his primary care provider regarding bilateral carotid stenosis seen on a CT angiogram of his neck that was completed on 5/9/2022 for concerns of lightheadedness, intermittent vertigo. He reports that he will get dizzy, short of breath, and lightheaded occasionally when doing strenuous activity in which he has to sit down for a little while and then go back to what he was doing. He currently is only on a 81 mg of aspirin which she reports that he has been cutting in half for approximately the last 3 weeks due to easy bleeding and bruising. He does have a history of GI bleed, nosebleeds, and easy bruising and bleeding secondary to being on DAPT and Coumadin in the past.  He does report that his last nosebleed was approximately a month ago and that he has has had 3 cauterizations. He denies any headache, numbness, weakness, speech difficulty, or double or blurred vision. He denies any history of stroke or smoking. Interval History 7/13/22:   No changes to medical history since last visit. Denying complaints today - denies headaches, visual changes, numbness/tingling, chest pain, sob, weakness. Does admit to poison ivy/\"chigger\" bites of left lower extremity as well as R inner, upper thigh. Diagnostic cerebral angiogram procedural incision site of R groin appears to be free of bite marks/poison ivy.      Review of Systems   Review of Systems Constitutional: Negative for chills and fever. Eyes: Positive for visual disturbance (Occasional floaters). Respiratory: Negative for cough and shortness of breath. Cardiovascular: Negative for chest pain and palpitations. Gastrointestinal: Negative for abdominal pain, nausea and vomiting. Genitourinary: Negative for dysuria. Musculoskeletal: Negative for arthralgias and myalgias. Skin: Positive for rash (poison ivy, bug bites of LLE, R upper, inner thigh). Neurological: Negative for dizziness, speech difficulty, weakness, light-headedness, numbness and headaches. Hematological: Bruises/bleeds easily. Psychiatric/Behavioral: Negative for confusion. The patient is not nervous/anxious. Medications   Home Meds:   No current facility-administered medications on file prior to encounter. Current Outpatient Medications on File Prior to Encounter   Medication Sig Dispense Refill    hydrOXYzine HCl (ATARAX) 25 MG tablet Take 25 mg by mouth every 8 hours as needed for Itching      Ginkgo Biloba 120 MG CAPS Take 240 mg by mouth daily      nitroGLYCERIN (NITROSTAT) 0.4 MG SL tablet up to max of 3 total doses. If no relief after 1 dose, call 911.  (Patient not taking: Reported on 7/11/2022) 25 tablet 0    umeclidinium-vilanterol (ANORO ELLIPTA) 62.5-25 MCG/INH AEPB inhaler Inhale 1 puff into the lungs daily 90 each 3    pantoprazole (PROTONIX) 40 MG tablet Take 1 tablet by mouth daily 90 tablet 3    lovastatin (MEVACOR) 20 MG tablet TAKE 1 TABLET DAILY 90 tablet 1    potassium chloride (KLOR-CON M) 20 MEQ extended release tablet Take 1 tablet by mouth daily 90 tablet 1    amLODIPine (NORVASC) 5 MG tablet Take 1 tablet by mouth daily 90 tablet 1    triamcinolone (KENALOG) 0.1 % cream Apply as needed to left leg      aspirin 81 MG EC tablet Take 81 mg by mouth daily Stop 4 weeks post peripheral intervention  9/11/21      OXYGEN Please provide patient with 3LPM of oxygen at night time for nocturnal hypoxia. Patient to have repeat pulse oximetry testing done on oxygen 3 L 0      PRN Meds:     Past History    Past Medical History:   has a past medical history of ASCVD (arteriosclerotic cardiovascular disease), Atrial fibrillation ([facility] [address] CAD (coronary artery disease), Cerebral artery occlusion with cerebral infarction ([facility] [address] COPD (chronic obstructive pulmonary disease) ([facility] [address] Difficult intubation, Dysplasia of vocal cord, Elevated glucose, Hyperlipidemia, Hypertension, Nocturnal hypoxia, Osteoarthritis, Osteopenia, Osteopenia, PAD (peripheral artery disease) ([facility] [address] Polio, and Rheumatic fever. Social History:   reports that he quit smoking about 17 years ago. His smoking use included cigarettes. He has a 75.00 pack-year smoking history. He has never used smokeless tobacco. He reports current alcohol use. He reports that he does not use drugs. Family History:   Family History   Problem Relation Age of Onset    Heart Disease Father     Arthritis Maternal Grandmother        Physical Examination      Vitals:  Ht 5' 8\" (1.727 m)   Wt 163 lb (73.9 kg)   BMI 24.78 kg/m²   @RUSL(14)@      Physical Exam  Vitals reviewed. Constitutional:       General: He is not in acute distress. Appearance: Normal appearance. He is not ill-appearing. HENT:      Head: Normocephalic and atraumatic. Right Ear: External ear normal.      Left Ear: External ear normal.      Nose: Nose normal.      Mouth/Throat:      Mouth: Mucous membranes are moist.      Pharynx: No oropharyngeal exudate or posterior oropharyngeal erythema. Eyes:      Extraocular Movements: Extraocular movements intact and EOM normal.      Pupils: Pupils are equal, round, and reactive to light. Cardiovascular:      Rate and Rhythm: Normal rate and regular rhythm. Pulses: Normal pulses. Heart sounds: Normal heart sounds. No murmur heard.        Comments: Radial/PT/DP pulses 2+ bilaterally  Pulmonary:      Effort: Pulmonary effort full, symmetric. Right facial weakness: none  Left facial weakness: none    CN VIII   CN VIII normal.   Hearing: intact    CN IX, X   CN IX normal.   CN X normal.   Palate: symmetric    CN XI   CN XI normal.   Right sternocleidomastoid strength: normal  Left sternocleidomastoid strength: normal  Right trapezius strength: normal  Left trapezius strength: normal    CN XII   CN XII normal.   Tongue: not atrophic  Fasciculations: absent  Tongue deviation: none    Motor Exam   Muscle bulk: normal  Overall muscle tone: normal  Right arm pronator drift: absent  Left arm pronator drift: absent    BUE: 5/5  BLE: 5/5     Sensory Exam   Light touch normal.     Gait, Coordination, and Reflexes     Coordination   Finger to nose coordination: normal  Heel to shin coordination: normal    Tremor   Resting tremor: absent  Intention tremor: absent  Action tremor: absent    Reflexes   Right brachioradialis: 2+  Left brachioradialis: 2+  Right biceps: 2+  Left biceps: 2+  Right patellar: 2+  Left patellar: 1+       Mallampati Score: 2          ASA Score: 3          Labs/Imaging/Diagnostics     Imaging:  CTA NECK W WO CONTRAST 05/09/2022    Impression:  1. Prominent calcified mural plaque in the bilateral carotid bulbs and proximal internal carotid arteries with 84% stenosis on the right and 80% stenosis on the left by NASCET criteria. 2. Moderate stenosis in the distal most segments of the common carotid arteries. 3. Mural calcifications in the intracranial segments of internal carotid arteries with focal areas of up to moderate stenosis. 4. Focal areas of up to severe stenosis in the nondominant left vertebral artery. Final report electronically signed by Dr. Florinda Hsu MD on 5/9/2022 9:47 AM.    Assessment and Plan:        1.  Bilateral carotid artery stenosis  · CT angiogram head and neck from 5/9/2022 demonstrates 84% stenosis of the right internal carotid artery and 80% stenosis of the left internal carotid artery, focal areas of severe stenosis in the nondominant left vertebral artery  · Continue aspirin 81 mg daily. Ideally would like for the patient to be on dual antiplatelets, but given his history of GI bleeding, nosebleeding, and easy bleeding in general we will hold off at this time until we further evaluate his stenosis via diagnostic cerebral angiogram.  · Continue lovastatin 20 mg daily  · Diagnostic Cerebral Angiogram procedure explained to patient and wife and written consent obtained to proceed with procedure. · Patient will undergo a diagnostic cerebral angiogram today with Dr. Alana Hernandez to further evaluate bilateral carotid artery stenosis. Benefits and risks (kidney toxicity, dissection, hematoma, stroke, infection, allergic reaction) of this procedure were explained to patient and wife and they agreed to proceed with the procedure without any further questions. · Remain NPO until procedure completed. Okay to take medications this morning with a sip of water. · Further recommendations to follow procedure. This patient was seen and evaluated in conjunction with Dr. Alana Hernandez and he is in agreement with the assessment and plan.     Electronically signed by Boogie Carter PA-C on 7/13/22 at 11:38 AM EDT

## 2022-07-13 NOTE — PROGRESS NOTES
1000  Pt admitted to  2E14 ambulatory for cerebral angiogram.  Pt NPO. Patient accompanied by wife. Vital signs obtained. Assessment and data collection initiated. Oriented to room. Policies and procedures for 2E explained   All questions answered with no further questions at this time. Fall prevention and safety precautions discussed with patient. 1147  Pt to cath lab per bed  1313  Pt ret'd to 2E14 post cerebral angiogram.  Right femerol site soft, occlusice drsg D/I. Pt denies any pain. Vd 300 ml clear yellow urine per urinal.  IV 0.9NS cont'd  Wife at bedside - awaiting Dr Sunny Coronado to review findings with pt and wife  46  Dr Sunny Coronado in to review procedural findings with pt and wife  [phone]  Bed placed in reverse trendelenburg,  HOB elevated 20 degrees. Pt taking diet/fluids - mandi well  1830  Bed taken out of reverse trendelenberg, HOB elevated 30 degrees. Pt vd 350     1915  Pt assisted to bathroom, vd with BM. Amb in leslie - mandi well. Pt denies any pain, dizziness or lightheadedness. Ret'd to room, procedure site stable. Resting in chair  1950  Procedure site remains stable. INT discont'd  Telemetry discont'd. Discharge instructions reviewed with pt and his wife - they voice understanding r/t f/u appointments, how to care for procedure site and activity restrictions.   2011  Pt discharged per w/c for transport home with sife via [facility]

## 2022-07-14 NOTE — TELEPHONE ENCOUNTER
Rene Rehman called requesting a refill on the following medications:  Requested Prescriptions     Pending Prescriptions Disp Refills    amLODIPine (NORVASC) 5 MG tablet 90 tablet 1     Sig: Take 1 tablet by mouth daily     Pharmacy verified:[facility] [id number] [address]  . pv      Date of last visit: 1/24/2022  Date of next visit (if applicable): 8/79/6531

## 2022-07-15 PROBLEM — I65.02 STENOSIS OF LEFT VERTEBRAL ARTERY: Status: ACTIVE | Noted: 2022-07-15

## 2022-07-15 RX ORDER — AMLODIPINE BESYLATE 5 MG/1
5 TABLET ORAL DAILY
Qty: 90 TABLET | Refills: 0 | Status: SHIPPED | OUTPATIENT
Start: 2022-07-15 | End: 2022-10-25 | Stop reason: SDUPTHER

## 2022-07-18 ENCOUNTER — HOSPITAL ENCOUNTER (OUTPATIENT)
Age: 82
Discharge: HOME | End: 2022-07-18
Payer: MEDICARE

## 2022-07-18 ENCOUNTER — OFFICE VISIT (OUTPATIENT)
Dept: FAMILY MEDICINE CLINIC | Age: 82
End: 2022-07-18
Payer: MEDICARE

## 2022-07-18 ENCOUNTER — HOSPITAL ENCOUNTER (OUTPATIENT)
Dept: GENERAL RADIOLOGY | Age: 82
Discharge: HOME | End: 2022-07-18
Payer: MEDICARE

## 2022-07-18 VITALS
TEMPERATURE: 97.6 F | HEART RATE: 75 BPM | WEIGHT: 166.8 LBS | HEIGHT: 68 IN | RESPIRATION RATE: 16 BRPM | OXYGEN SATURATION: 92 % | SYSTOLIC BLOOD PRESSURE: 132 MMHG | DIASTOLIC BLOOD PRESSURE: 72 MMHG | BODY MASS INDEX: 25.28 KG/M2

## 2022-07-18 DIAGNOSIS — G89.29 CHRONIC PAIN OF LEFT KNEE: ICD-10-CM

## 2022-07-18 DIAGNOSIS — M25.562 CHRONIC PAIN OF LEFT KNEE: ICD-10-CM

## 2022-07-18 DIAGNOSIS — G89.29 CHRONIC PAIN OF LEFT KNEE: Primary | ICD-10-CM

## 2022-07-18 DIAGNOSIS — M25.562 CHRONIC PAIN OF LEFT KNEE: Primary | ICD-10-CM

## 2022-07-18 PROCEDURE — 1036F TOBACCO NON-USER: CPT | Performed by: FAMILY MEDICINE

## 2022-07-18 PROCEDURE — G8427 DOCREV CUR MEDS BY ELIG CLIN: HCPCS | Performed by: FAMILY MEDICINE

## 2022-07-18 PROCEDURE — 1123F ACP DISCUSS/DSCN MKR DOCD: CPT | Performed by: FAMILY MEDICINE

## 2022-07-18 PROCEDURE — G8417 CALC BMI ABV UP PARAM F/U: HCPCS | Performed by: FAMILY MEDICINE

## 2022-07-18 PROCEDURE — 73564 X-RAY EXAM KNEE 4 OR MORE: CPT

## 2022-07-18 PROCEDURE — 99213 OFFICE O/P EST LOW 20 MIN: CPT | Performed by: FAMILY MEDICINE

## 2022-07-18 NOTE — PROGRESS NOTES
[facility]  [facility]  [address] [facility]  Dept: [phone]  Dept Fax: [phone]  Loc: [phone]  PROGRESS NOTE      Visit Date: 7/18/2022    Devaughn Llanos is a [de-identified] y.o. male who presents today for:  Chief Complaint   Patient presents with    Knee Pain     Left outer side of knee hurts, has a little kalyan hurts when he stands from sitting to standing and when he kneels        Subjective:  HPI    Left knee. Bump on anterior lateral joint line. Hurts when kneeling. Present for a awhile. Pain is not worsening. Pain for years. No treatments.      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)    Coronary artery disease due to lipid rich plaque    Claudication (HCC)    S/P coronary angioplasty    S/P cardiac cath    S/P percutaneous transluminal angioplasty (PTA) with stent placement    Severe aortic stenosis    Essential hypertension    Hyperlipidemia LDL goal <70    S/P TAVR (transcatheter aortic valve replacement)    Nocturnal hypoxia    Chronic respiratory failure with hypoxia (HCC)    Hypersomnia    Other emphysema (HCC)    Afib (HCC)    Presence of Watchman left atrial appendage closure device    Urticaria    Other atopic dermatitis    PAD (peripheral artery disease) (HCC)    Abnormal ultrasound    Iron deficiency anemia, unspecified    Bilateral carotid artery stenosis    Stenosis of left vertebral artery     Past Medical History:   Diagnosis Date    ASCVD (arteriosclerotic cardiovascular disease)     Atrial fibrillation (HCC)     CAD (coronary artery disease)     Cerebral artery occlusion with cerebral infarction ([address]     TIA no defiect    COPD (chronic obstructive pulmonary disease) (HCC)     Difficult intubation     got dysplasia of vocal cord    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 Lowest de-sat 77%. Osteoarthritis     Osteopenia     Osteopenia     PAD (peripheral artery disease) ([address]     Polio 1946    Rheumatic fever       Past Surgical History:   Procedure Laterality Date    AORTIC VALVE REPAIR  2020    TAVR    BALLOON ANGIOPLASTY, ARTERY  2018    s/p Left CFA, SFA and popliteal intervention    CARDIAC CATHETERIZATION N/A 07/15/2022    FACIAL COSMETIC SURGERY      29years old    HAND SURGERY Right     carpal tunnel     OTHER SURGICAL HISTORY  2021    Left Atrial Appenadage closure/watchman    PTCA  2020    stent LAD    TONSILLECTOMY AND ADENOIDECTOMY      TRANSESOPHAGEAL ECHOCARDIOGRAM N/A 01/10/2022    TRANSESOPHAGEAL ECHOCARDIOGRAM performed by Nicole Green MD at [facility]     Family History   Problem Relation Age of Onset    Heart Disease Father     Arthritis Maternal Grandmother      Social History     Tobacco Use    Smoking status: Former     Packs/day: 1.50     Years: 50.00     Pack years: 75.00     Types: Cigarettes     Quit date: 10/9/2004     Years since quittin.7    Smokeless tobacco: Never   Substance Use Topics    Alcohol use: Yes     Comment: 3 to 4 a week  weekends      Current Outpatient Medications   Medication Sig Dispense Refill    amLODIPine (NORVASC) 5 MG tablet Take 1 tablet by mouth in the morning.  90 tablet 0    hydrOXYzine HCl (ATARAX) 25 MG tablet Take 25 mg by mouth every 8 hours as needed for Itching      Ginkgo Biloba 120 MG CAPS Take 240 mg by mouth daily      umeclidinium-vilanterol (ANORO ELLIPTA) 62.5-25 MCG/INH AEPB inhaler Inhale 1 puff into the lungs daily 90 each 3    pantoprazole (PROTONIX) 40 MG tablet Take 1 tablet by mouth daily 90 tablet 3    lovastatin (MEVACOR) 20 MG tablet TAKE 1 TABLET DAILY 90 tablet 1    potassium chloride (KLOR-CON M) 20 MEQ extended release tablet Take 1 tablet by mouth daily 90 tablet 1    triamcinolone (KENALOG) 0.1 % cream Apply as needed to left leg      aspirin 81 MG EC tablet Take 81 mg by mouth daily Stop 4 weeks post peripheral intervention  9/11/21      OXYGEN Please provide patient with 3LPM of oxygen at night time for nocturnal hypoxia. Patient to have repeat pulse oximetry testing done on oxygen 3 L 0    nitroGLYCERIN (NITROSTAT) 0.4 MG SL tablet up to max of 3 total doses. If no relief after 1 dose, call 911. (Patient not taking: No sig reported) 25 tablet 0     No current facility-administered medications for this visit. Allergies   Allergen Reactions    Brilinta [Ticagrelor] Shortness Of Breath    Cephalexin Rash and Hives     Health Maintenance   Topic Date Due    DTaP/Tdap/Td vaccine (1 - Tdap) Never done    Lipids  08/10/2022    Flu vaccine (1) 09/01/2022    Depression Screen  05/25/2023    Annual Wellness Visit (AWV)  05/26/2023    Shingles vaccine  Completed    Pneumococcal 65+ years Vaccine  Completed    COVID-19 Vaccine  Completed    Hepatitis A vaccine  Aged Out    Hepatitis B vaccine  Aged Out    Hib vaccine  Aged Out    Meningococcal (ACWY) vaccine  Aged Out       Objective:  /72 (Site: Right Upper Arm, Position: Sitting)   Pulse 75   Temp 97.6 °F (36.4 °C)   Resp 16   Ht 5' 8\" (1.727 m)   Wt 166 lb 12.8 oz (75.7 kg)   SpO2 92%   BMI 25.36 kg/m²   Physical Exam  Constitutional:       General: He is not in acute distress. Appearance: He is not ill-appearing. Neurological:      Mental Status: He is alert. Psychiatric:         Mood and Affect: Mood normal.         Behavior: Behavior normal.     Left knee: No effusion. No erythema. positive grinding.   Visible and palpable lump of anterior joint space just lateral to the patellar tendon. Circular lump is about 1 cm diameter. Negative valgus and varus. Good range of motion from 0 to 105 degrees    Impression/Plan:  1. Chronic pain of left knee  Chronic left knee pain with ganglion or dermal cyst of the lateral anterior joint line. No evidence of infection. No evidence of abscess. Suspect he has OA. Will obtain x-ray. Recommend diclofenac gel over-the-counter and Tylenol. We discussed possibly returning for steroid injection of the knee joint and suspected cyst and he will think about this  - XR KNEE LEFT (MIN 4 VIEWS); Future    They voiced understanding. All questions answered. They agreed with treatment plan. See patient instructions for any educational materials that may have been given. Discussed use, benefit, and side effects of prescribed medications. Reviewed health maintenance. (Please note that portions of this note may have been completed with a voice recognition program.  Efforts were made to edit the dictation but occasionally words are mis-transcribed.)    Return if symptoms worsen or fail to improve.       Electronically signed by Timi Rodríguez MD on 7/18/2022 at 2:39 PM

## 2022-07-19 ENCOUNTER — TELEPHONE (OUTPATIENT)
Dept: FAMILY MEDICINE CLINIC | Age: 82
End: 2022-07-19

## 2022-07-19 DIAGNOSIS — I65.23 BILATERAL CAROTID ARTERY STENOSIS: Primary | ICD-10-CM

## 2022-07-19 NOTE — TELEPHONE ENCOUNTER
Called and spoke with patients wife Bipin. I have reviewed with her results of xray and patients treatment options suggested by provider. She has verbalized an understanding and will discuss with patient.

## 2022-07-19 NOTE — TELEPHONE ENCOUNTER
----- Message from Tony Cantu MD sent at 7/19/2022  9:46 AM EDT -----  No fracture. X-ray shows arthritis. X-ray also shows several loose bodies within the knee. Could consider referral to Ortho for possible arthroscopy to remove the loose bodies or in office steroid injection if not improving. Please advise patient.   Tony Cantu MD

## 2022-08-15 ENCOUNTER — OFFICE VISIT (OUTPATIENT)
Dept: CARDIOLOGY CLINIC | Age: 82
End: 2022-08-15
Payer: MEDICARE

## 2022-08-15 VITALS
HEIGHT: 68 IN | HEART RATE: 69 BPM | DIASTOLIC BLOOD PRESSURE: 60 MMHG | WEIGHT: 164.4 LBS | BODY MASS INDEX: 24.92 KG/M2 | SYSTOLIC BLOOD PRESSURE: 147 MMHG | OXYGEN SATURATION: 94 %

## 2022-08-15 DIAGNOSIS — Z95.818 PRESENCE OF WATCHMAN LEFT ATRIAL APPENDAGE CLOSURE DEVICE: ICD-10-CM

## 2022-08-15 DIAGNOSIS — I10 ESSENTIAL HYPERTENSION: ICD-10-CM

## 2022-08-15 DIAGNOSIS — I73.9 PAD (PERIPHERAL ARTERY DISEASE) (HCC): Primary | ICD-10-CM

## 2022-08-15 DIAGNOSIS — Z95.820 S/P PERIPHERAL ARTERY ANGIOPLASTY WITH STENT PLACEMENT: ICD-10-CM

## 2022-08-15 DIAGNOSIS — E78.5 DYSLIPIDEMIA: ICD-10-CM

## 2022-08-15 DIAGNOSIS — I48.91 ATRIAL FIBRILLATION, UNSPECIFIED TYPE (HCC): ICD-10-CM

## 2022-08-15 DIAGNOSIS — Z95.2 S/P TAVR (TRANSCATHETER AORTIC VALVE REPLACEMENT): ICD-10-CM

## 2022-08-15 DIAGNOSIS — J44.9 COPD, MODERATE (HCC): ICD-10-CM

## 2022-08-15 DIAGNOSIS — I25.10 CORONARY ARTERY DISEASE INVOLVING NATIVE CORONARY ARTERY OF NATIVE HEART WITHOUT ANGINA PECTORIS: ICD-10-CM

## 2022-08-15 PROCEDURE — 1036F TOBACCO NON-USER: CPT | Performed by: NURSE PRACTITIONER

## 2022-08-15 PROCEDURE — 99213 OFFICE O/P EST LOW 20 MIN: CPT | Performed by: NURSE PRACTITIONER

## 2022-08-15 PROCEDURE — 1123F ACP DISCUSS/DSCN MKR DOCD: CPT | Performed by: NURSE PRACTITIONER

## 2022-08-15 PROCEDURE — 3023F SPIROM DOC REV: CPT | Performed by: NURSE PRACTITIONER

## 2022-08-15 PROCEDURE — G8427 DOCREV CUR MEDS BY ELIG CLIN: HCPCS | Performed by: NURSE PRACTITIONER

## 2022-08-15 PROCEDURE — G8417 CALC BMI ABV UP PARAM F/U: HCPCS | Performed by: NURSE PRACTITIONER

## 2022-08-15 RX ORDER — PNV NO.95/FERROUS FUM/FOLIC AC 28MG-0.8MG
TABLET ORAL
COMMUNITY

## 2022-08-15 NOTE — PATIENT INSTRUCTIONS
Continue current medications as prescribed. Have VONDA study performed as ordered. Follow-up WATCHMAN PEGGY for 1 year evaluation pending. Follow-up with your PCP as scheduled. Follow-up with Dr. Yo Moody in 6 months as scheduled or sooner if need.

## 2022-08-15 NOTE — PROGRESS NOTES
[address] [facility] [address]  Dept: [phone]  Dept Fax: [phone]  Loc: [phone]    Visit Date: 8/15/2022    Mr. Natali Martinez is a [de-identified] y.o. male  who presented for: 6 month follow-up    Primary Cardiologist: Christel Marie MD    No chief complaint on file. HPI:   HPI   Last seen in office on 1/24/22 per Dr. Arcos Felipe. Per office note:  79 yo M s.p L SFA  8/2021, TAVR, WATCHMAN who presents for follow-up. Post WATCHMAN PEGGY shows no issues since WATCHMAN. Has mild-moderate PVL. No CHF symptoms. Has some orthostasis from time to time. BP 160s. HR 72. He is off Plavix/Xarelto. He had a GI bleed, now on ASA. BP was also noted at 190s at one point. Was having skin itching that was thought to be related to meds, but did see Derm who did not think it was the meds. 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 Add back Coreg 6.25 mg BID. Monitor BP. Continue ASA. Anemia is resolved. Continue to monitor symptoms, if progressive CHF, would need to consider PVL closure if becomes severe, but no signs of this at this time. The patient was advised on risk/benefits of the new Rx and he agreed to proceed with the medication(s). Discussed diet/exercise/BP/weight loss/health lifestyle choices/lipids; the patient understands the goals and will try to comply.    Disposition: 6 month        Current Outpatient Medications:     amLODIPine (NORVASC) 5 MG tablet, Take 1 tablet by mouth in the morning., Disp: 90 tablet, Rfl: 0    hydrOXYzine HCl (ATARAX) 25 MG tablet, Take 25 mg by mouth every 8 hours as needed for Itching, Disp: , Rfl:     Ginkgo Biloba 120 MG CAPS, Take 240 mg by mouth daily, Disp: , Rfl:     nitroGLYCERIN (NITROSTAT) 0.4 MG SL tablet, up to max of 3 total doses. If no relief after 1 dose, call 911. (Patient not taking: No sig reported), Disp: 25 tablet, Rfl: 0    umeclidinium-vilanterol (ANORO ELLIPTA) 62.5-25 MCG/INH AEPB inhaler, Inhale 1 puff into the lungs daily, Disp: 90 each, Rfl: 3    pantoprazole (PROTONIX) 40 MG tablet, Take 1 tablet by mouth daily, Disp: 90 tablet, Rfl: 3    lovastatin (MEVACOR) 20 MG tablet, TAKE 1 TABLET DAILY, Disp: 90 tablet, Rfl: 1    potassium chloride (KLOR-CON M) 20 MEQ extended release tablet, Take 1 tablet by mouth daily, Disp: 90 tablet, Rfl: 1    triamcinolone (KENALOG) 0.1 % cream, Apply as needed to left leg, Disp: , Rfl:     aspirin 81 MG EC tablet, Take 81 mg by mouth daily Stop 4 weeks post peripheral intervention 9/11/21, Disp: , Rfl:     OXYGEN, Please provide patient with 3LPM of oxygen at night time for nocturnal hypoxia. Patient to have repeat pulse oximetry testing done on oxygen, Disp: 3 L, Rfl: 0    Past Medical History  Brenton SOTOMAYOR  has a past medical history of ASCVD (arteriosclerotic cardiovascular disease), Atrial fibrillation ([address] CAD (coronary artery disease), Cerebral artery occlusion with cerebral infarction ([address] COPD (chronic obstructive pulmonary disease) ([address] Difficult intubation, Dysplasia of vocal cord, Elevated glucose, Hyperlipidemia, Hypertension, Nocturnal hypoxia, Osteoarthritis, Osteopenia, Osteopenia, PAD (peripheral artery disease) ([address] Polio, and Rheumatic fever. Social History  Brenton SOTOMAYOR  reports that he quit smoking about 17 years ago. His smoking use included cigarettes. He has a 75.00 pack-year smoking history. He has never used smokeless tobacco. He reports current alcohol use. He reports that he does not use drugs.     Family History  Brenton SOTOMAYOR family history includes Arthritis in his maternal grandmother; Heart Disease in his father. There is no family history of bicuspid aortic valve, aneurysms, heart transplant, pacemakers, defibrillators, or sudden cardiac death. Past Surgical History   Past Surgical History:   Procedure Laterality Date    AORTIC VALVE REPAIR  09/2020    TAVR    BALLOON ANGIOPLASTY, ARTERY  07/2018    s/p Left CFA, SFA and popliteal intervention    CARDIAC CATHETERIZATION N/A 07/15/2022    FACIAL COSMETIC SURGERY      29years old    HAND SURGERY Right     carpal tunnel     OTHER SURGICAL HISTORY  01/05/2021    Left Atrial Appenadage closure/watchman    PTCA  06/2020    stent LAD    TONSILLECTOMY AND ADENOIDECTOMY      TRANSESOPHAGEAL ECHOCARDIOGRAM N/A 01/10/2022    TRANSESOPHAGEAL ECHOCARDIOGRAM performed by Jose Alberto Medeiros MD at [facility]     Today's visit:   Subjective:  Doing well  Using treadmill (1 mile); bicycle 2 miles daily  No claudication sx with exercise  Has rash on R calf -  dry skin for past 8 months - seeing skin specialist - adjusting meds. No other skin breakdown or open sores/discoloration LE or feet  Dyspnea still the same - stable - using home O2 when he exercises and at night (2.5 - 3L)  Still follows with Pulmonology  No chest pain or pressure  Little lightheaded once in a while - not really positional; not ADL limiting; no falls  No syncope or near syncope  No palpitations  No LE swelling  No open areas on feet; no change in nails; no skin breakdown  Tolerating meds  No bleeding issues on ASA    Review of Systems   Constitutional: Negative for chills and fever  HENT: Negative for congestion, sinus pressure, sneezing and sore throat. Eyes: Negative for pain, discharge, redness and itching. Respiratory: Negative for orthopnea, PND, cough  Gastrointestinal: Negative for blood in stool, constipation, diarrhea   Endocrine: Negative for cold intolerance, heat intolerance, polydipsia. Genitourinary: Negative for dysuria, hematuria.    Musculoskeletal: Negative for arthralgias, joint swelling and neck pain. Neurological: Negative for numbness and headaches. Psychiatric/Behavioral: Negative for agitation, confusion, decreased concentration and dysphoric mood. Objective: There were no vitals taken for this visit. Wt Readings from Last 3 Encounters:   07/18/22 166 lb 12.8 oz (75.7 kg)   07/13/22 163 lb (73.9 kg)   07/11/22 166 lb (75.3 kg)     BP Readings from Last 3 Encounters:   07/18/22 132/72   07/13/22 (!) 134/50   07/07/22 (!) 130/58       Nursing note and vitals reviewed. Physical Exam    Constitutional: Oriented to person, place, and time. Appears well-developed and well-nourished. No acute distress; appears well. HENT:   Head: Normocephalic and atraumatic. Eyes: EOM are normal. Pupils are equal, round, and reactive to light. Neck: Normal range of motion. Neck supple. No JVD present. Cardiovascular: Normal rate, regular rhythm, normal heart sounds and intact distal pulses. No murmur heard. Pulmonary/Chest: Effort normal and breath sounds normal. No respiratory distress. No wheezes. No rales. Abdominal: Soft. Bowel sounds are normal. No distension. There is no tenderness. Musculoskeletal: Normal range of motion. No edema. PT 1+ bilat; rash/dry skin posterior lower R calf. No erythema or drainage. Neurological: Alert and oriented to person, place, and time. No cranial nerve deficit. Coordination normal.   Skin: Skin is warm and dry. Psychiatric: Normal mood and affect.        Lab Results   Component Value Date/Time    CKTOTAL 59 11/22/2020 06:07 PM       Lab Results   Component Value Date/Time    WBC 8.7 07/13/2022 10:02 AM    RBC 5.07 07/13/2022 10:02 AM    HGB 14.4 07/13/2022 10:02 AM    HCT 44.1 07/13/2022 10:02 AM    MCV 87.0 07/13/2022 10:02 AM    MCH 28.4 07/13/2022 10:02 AM    MCHC 32.7 07/13/2022 10:02 AM     07/13/2022 10:02 AM    MPV 9.5 07/13/2022 10:02 AM       Lab Results   Component Value Date/Time     2022 10:02 AM    K 5.1 2022 10:02 AM    K 4.3 08/10/2021 09:07 AM    CL 99 2022 10:02 AM    CO2 27 2022 10:02 AM    BUN 11 2022 10:02 AM    LABALBU 4.5 2021 09:22 AM    CREATININE 0.9 2022 10:02 AM    CALCIUM 9.5 2022 10:02 AM    LABGLOM 81 2022 10:02 AM    GLUCOSE 96 2022 10:02 AM    GLUCOSE 90 2017 07:37 AM       Lab Results   Component Value Date/Time    ALKPHOS 117 2021 09:22 AM    ALT 9 2021 09:22 AM    AST 23 2021 09:22 AM    PROT 7.7 2021 09:22 AM    BILITOT 0.2 2021 09:22 AM    BILIDIR <0.2 2021 09:22 AM    LABALBU 4.5 2021 09:22 AM       No results found for: MG    Lab Results   Component Value Date    INR 1.00 2022    INR 1.02 08/10/2021    INR 2.51 (H) 2021         Lab Results   Component Value Date/Time    LABA1C 5.4 2018 07:38 AM       Lab Results   Component Value Date/Time    TRIG 88 08/10/2021 09:07 AM    HDL 50 08/10/2021 09:07 AM    LDLCALC 69 08/10/2021 09:07 AM    LDLDIRECT 83 2014 12:00 AM    LABVLDL 17 2017 07:37 AM       Lab Results   Component Value Date/Time    TSH 1.460 2021 06:16 PM         Testing Reviewed:      I have individually reviewed the cardiac test below:    EK22  Electrocardiogram, 12-lead   Order: [id number]  Status: Final result    Visible to patient: Yes (not seen)    Next appt:  Today at 10:00 AM in Cardiology Edra Sherly Casonpluisa, APRN - CNP)    0 Result Notes    Component Ref Range & Units 22 1007 21 0502 21 0550 20 7053 20 0927 20 0634 20 0834   Ventricular Rate BPM 69  81  64  91  63  68  78    Atrial Rate BPM 69  81  64  91  63  68  78    P-R Interval ms 180  198  190  182  196  210  188    QRS Duration ms 102  104  100  98  138  96  96    Q-T Interval ms 434  402  446  392  486  440  408    QTc Calculation (Bazett) ms 465  466  460  482  497  467  465    P Axis degrees 41  63  45  66  62 63  69    R Axis degrees -11  7  1   72  4  29    T Axis degrees 47  56  58  86  105  67  55    Resulting Agency  [address]             Narrative & Impression    Normal sinus rhythm  Possible Anterior infarct , age undetermined  Abnormal ECG  When compared with ECG of 06-JAN-2021 05:02,  No significant change was found  Confirmed by Marichuy Mosley ([id number]) on 7/13/2022 9:24:53 PM           PEGGY: 1/10/22: Indications: Post Watchmen device. Summary   Procedure performed without complications. Probe easily inserted by Cardiologist.   The transesophageal approach was used. Risk benefits and alternatives were explained to the patient and informed   consent was obtained. Ejection fraction is visually estimated at 50%. Overall left ventricular function is normal.   Mildly dilated left atrium. LAAO device in good position   no thrombi   no leaks   TAVR valve seen in good position   no evidence of stenosis   moderate para valvular regurgitation is noted    Signature    ----------------------------------------------------------------   Electronically signed by Angelica Cassidy MD (Interpreting   physician) on 01/10/2022 at 04:30 PM      ECHO: 09/30/21  Indications:Post TAVR 1 year. Additional Medical History: Former smoker, hypertension, hyperlipidemia,  COPD, coronary artery disease, stroke, history of rheumatic fever, polio,  atrial fibrillation    Summary  Normal left ventricular size and systolic function. There were no regional wall motion abnormalities. Wall thickness was within normal limits. Ejection fraction was estimated at 55-60%. Doppler parameters were consistent with abnormal left ventricular  relaxation (grade 1 diastolic dysfunction). S/p TAVR. DOPPLER: Transaortic velocity was within the normal range with  no evidence of aortic stenosis (mean gradient 6 mmHg, V max 1.8 m/s, EOA  1.7 cm2).  There was mild perivalvular aortic regurgitation. There was trace mitral regurgitation. IVC size is within normal limits with normal respiratory phasic changes. Signature  ----------------------------------------------------------------  Electronically signed by Liana Mac MD (Interpreting  physician) on 2021 at 02:05 PM  ----------------------------------------------------------------  Event monitor: 21  EVENT MONITOR     PATIENT NAME: Art Carcamo                   :        1940  MED REC NO:   [id number]                           ROOM:  ACCOUNT NO:   [de-identified]                           ADMIT DATE: 2021  PROVIDER:     Katiuska Serna MD     TEST TYPE:  30-day event monitor. MONITORING PERIOD:  2021 to 2021. INDICATION:  Atrial fibrillation. FINDINGS:  Baseline rhythm is sinus rhythm with first-degree AV block  with intermittent PVCs and a heart rate of 82 beats per minute. No  findings of atrial fibrillation or flutter were noted. No findings of  high-grade AV block, nonsustained VT or VF were noted. SUMMARY:  Sinus rhythm, first-degree AV block. Lori Parks MD   D: 2021: Alvaro Peak closure: WATCHMAN    2020: TAVR    VONDA: 22  PROCEDURE: VL DUP LOWER EXTREMITY ARTERIES LEFT       CLINICAL INFORMATION: PAD (peripheral artery disease) ([address] S/P peripheral artery angioplasty with stent placement, Essential hypertension       COMPARISON: Vascular ultrasound 2021. TECHNIQUE: Multiple grayscale and color flow sonographic images of the major arteries of the left lower extremity were obtained from the level of the groin to the level of the ankle . Spectral Doppler waveforms were obtained and velocity measurements    were measured. FINDINGS:        LEFT ARTERY   (PSV cm/sec)   ? ???????????????????????   CFA --------------->158 PSV cm/sec STENT Prox 99 PSV cm/sec MID 99 PSV cm/sec DIST 89 PSV cm/sec   PROF -------------> 69 PSV cm/sec   SFA PROX ------->201 PSV cm/sec    SFA MID ---------->59 PSV cm/sec   SFA DIST -------->72 PSV cm/sec    POP A PROX --->99 PSV cm/sec STENT Prox 99 PSV cm/sec MID 99 PSV cm/sec DIST 89 PSV CM/SEC   POP A DIST ---->97 PSV cm/sec    PTA --------------->69 PSV cm/sec    PERONEAL ----->77 PSV cm/sec    IVORY ----------------> 95 PSV cm/sec           VONDA   RIGHT       IVORY----->0.96   PTA----->0.97       VONDA    LEFT       IVORY----->1.01   PTA----->0.98       VELOCITY MEASUREMENTS: The ABIs are normal bilaterally. The patient's vascular stents within the common femoral artery and the popliteal artery are patent. When transitioning from the distal aspect of the common femoral artery stent into the superficial femoral artery there is a marked elevation of peak systolic velocity. There is good pulsatility throughout the right lower extremity. Impression   Vascular stents in the left lower extremity are patent. However, there appears to be greater than 50% stenosis transitioning from the distal common femoral artery stent into the proximal superficial femoral artery. **This report has been created using voice recognition software. It may contain minor errors which are inherent in voice recognition technology. **       Final report electronically signed by Dr Bryce Ace on 2022 4:04 PM      Peripheral angiogram: 8/10/21    PATIENT NAME: Nelida Kahn                   :        1940  MED REC NO:   [id number]                           ROOM:       0030  ACCOUNT NO:   [de-identified]                           ADMIT DATE: 08/10/2021  PROVIDER:     Christel Marie MD     DATE OF PROCEDURE:  08/10/2021     PERIPHERAL ANGIOGRAM/INTERVENTION     INDICATION:  Left lower extremity life-limiting claudication, inability  to exercise related to lower extremity PAD; previous left lower  extremity intervention; lower extremity arterial duplex showing a left  VONDA of 0.53 with occlusion of the left SFA. PERIPHERAL ANGIOGRAM:  ABDOMINAL AORTA:  Diffuse atheromatous disease. No significant aneurysm  or dissection noted. RENAL ARTERIES:  Left renal artery has severe calcification in the mid  segment, but has perfusion. Right renal artery is patent. COMMON ILIAC ARTERIES:  Diffuse calcification throughout, but patent. EXTERNAL ILIAC ARTERIES:  Diffuse calcification throughout, but patent. COMMON FEMORAL ARTERIES:  Bilaterally patent, but diffuse calcification  throughout. At that point, I used a 5-Faroese VCF catheter over 0.035 angled  Glidewire and crossed over and placed the catheter at the left common  femoral artery and performed angiography of the left lower extremity. LEFT SFA:  Occluded at the proximal segment. Reconstitutes via the  profunda collaterals to the mid to distal segment. POPLITEAL ARTERY:  Previously placed stent, appears to be  patent with mild diffuse disease throughout. Distal popliteal artery is  patent. TIBIOPERONEAL ARTERIES:  The anterior tibial artery appears to be  occluded at the mid segment. TP trunk is patent. The peroneal artery  is patent. The posterior tibial artery is occluded in the ostium. RIGHT COMMON FEMORAL ARTERY:  Diffuse calcification, but patent. RIGHT SFA:  Proximal 50% stenosis with calcification, but otherwise  patent and demonstrates there is diffuse calcification, but it was  patent. POPLITEAL ARTERIES:  Patent. TIBIOPERONEAL ARTERIES:  Diffuse disease throughout the entire length of  lower extremity tibioperoneal arteries. It appears that the TP trunk is  patent. IMMEDIATE COMPLICATIONS:  None. MEDICATIONS:  See EMR. ACCESS:  Perclose was used for hemostasis. ESTIMATED BLOOD LOSS:  Less than 50 mL. SUMMARY:  Successful L SFA angioplasty and stenting with 7 x 120 Everflex,  Preceded by 6.0 In. PACT DCB. PLAN:  1.   Bedrest.  2.  Optimal medical therapy. 3.  Risk factor management. 4.  Routine access site care. 5.  IV fluids. 6.  Overnight observation. 7.  Xarelto 2.5 mg b.i.d. and Plavix to continue. 8.  Surveillance ABIs. 9.  Followup in the office in four to six weeks postprocedure. All the above were explained to the patient and the patient's family. They were agreeable and amenable to the plan. Amilcar Gutierrez MD     D: 08/13/2021    Assessment/Plan   HTN  PAD w/ L SFA  s/p SJ 8/10/21 - VONDA study for 1 year follow-up pending  Paroxysmal non-valvular atrial fibrillation - [id number] score: 5, HASBLED = 4 s/p WATCHMAN  LLE PAD s/p Supera implant 5.5 x 60  S/p TAVR - S329 - +4 cc- mild to mod PVL  S/p LAD PCI, 4/6658  Chronic diastolic CHF, NYHA II  Chronic oxygen use at night  EF 55%  No issues with WATCHMAN. No claudication sx uses treadmill and exercise bike daily. No anginal sx. BP Controlled. No s/sx decompensated HF, euvolemic. The patient was advised on risk/benefits of the new Rx and he agreed to proceed with the medication(s). Discussed diet/exercise/BP/weight loss/health lifestyle choices/lipids; the patient understands the goals and will try to comply. Disposition: 6 month (1 year post WATCHMAN)     Patient instructions:   Continue current medications as prescribed. Have VONDA study performed as ordered. Follow-up WATCHMAN PEGGY for 1 year evaluation pending. Follow-up with your PCP as scheduled. Follow-up with Dr. Stacey Serrano in 6 months as scheduled or sooner if need.      Electronically signed by LEWIS Rodriguez CNP   8/15/2022 at 1:25 PM EST

## 2022-08-29 ENCOUNTER — HOSPITAL ENCOUNTER (OUTPATIENT)
Dept: INTERVENTIONAL RADIOLOGY/VASCULAR | Age: 82
Discharge: HOME | End: 2022-08-29
Payer: MEDICARE

## 2022-08-29 DIAGNOSIS — I73.9 PAD (PERIPHERAL ARTERY DISEASE) (HCC): ICD-10-CM

## 2022-08-29 DIAGNOSIS — Z95.820 S/P PERIPHERAL ARTERY ANGIOPLASTY WITH STENT PLACEMENT: ICD-10-CM

## 2022-08-29 PROCEDURE — 93922 UPR/L XTREMITY ART 2 LEVELS: CPT

## 2022-08-30 ENCOUNTER — TELEPHONE (OUTPATIENT)
Dept: CARDIOLOGY CLINIC | Age: 82
End: 2022-08-30

## 2022-08-30 NOTE — TELEPHONE ENCOUNTER
----- Message from LEWIS Simpson CNP sent at 8/29/2022  5:26 PM EDT -----  Please let patient know that the VONDA study on his legs is normal. Continue current treatment. Thank you.    Judy

## 2022-09-29 ENCOUNTER — HOSPITAL ENCOUNTER (OUTPATIENT)
Age: 82
Discharge: HOME | End: 2022-09-29
Payer: MEDICARE

## 2022-09-29 LAB
ERYTHROCYTE [DISTWIDTH] IN BLOOD BY AUTOMATED COUNT: 15.2 % (ref 11.5–14.5)
ERYTHROCYTE [DISTWIDTH] IN BLOOD BY AUTOMATED COUNT: 49.9 FL (ref 35–45)
FERRITIN: 94 NG/ML (ref 22–322)
HCT VFR BLD CALC: 45.6 % (ref 42–52)
HEMOGLOBIN: 14.8 GM/DL (ref 14–18)
IRON: 98 UG/DL (ref 65–195)
MCH RBC QN AUTO: 29.1 PG (ref 26–33)
MCHC RBC AUTO-ENTMCNC: 32.5 GM/DL (ref 32.2–35.5)
MCV RBC AUTO: 89.8 FL (ref 80–94)
PLATELET # BLD: 287 THOU/MM3 (ref 130–400)
PMV BLD AUTO: 9.8 FL (ref 9.4–12.4)
RBC # BLD: 5.08 MILL/MM3 (ref 4.7–6.1)
TOTAL IRON BINDING CAPACITY: 336 UG/DL (ref 171–450)
WBC # BLD: 9.1 THOU/MM3 (ref 4.8–10.8)

## 2022-09-29 PROCEDURE — 85027 COMPLETE CBC AUTOMATED: CPT

## 2022-09-29 PROCEDURE — 82728 ASSAY OF FERRITIN: CPT

## 2022-09-29 PROCEDURE — 83540 ASSAY OF IRON: CPT

## 2022-09-29 PROCEDURE — 36415 COLL VENOUS BLD VENIPUNCTURE: CPT

## 2022-09-29 PROCEDURE — 83550 IRON BINDING TEST: CPT

## 2022-10-03 DIAGNOSIS — E78.2 MIXED HYPERLIPIDEMIA: ICD-10-CM

## 2022-10-03 RX ORDER — LOVASTATIN 20 MG/1
TABLET ORAL
Qty: 90 TABLET | Refills: 1 | Status: SHIPPED | OUTPATIENT
Start: 2022-10-03

## 2022-10-03 NOTE — TELEPHONE ENCOUNTER
Sara Schmidt called requesting a refill on the following medications:  Requested Prescriptions     Pending Prescriptions Disp Refills    lovastatin (MEVACOR) 20 MG tablet [Pharmacy Med Name: LOVASTATIN TAB 20MG] 90 tablet 1     Sig: TAKE 1 TABLET DAILY       Date of last visit: 7/18/2022  Date of next visit (if applicable):10/25/2022  Date of last refill:   Pharmacy Name: [facility]

## 2022-10-25 ENCOUNTER — OFFICE VISIT (OUTPATIENT)
Dept: FAMILY MEDICINE CLINIC | Age: 82
End: 2022-10-25
Payer: MEDICARE

## 2022-10-25 VITALS
SYSTOLIC BLOOD PRESSURE: 134 MMHG | HEIGHT: 69 IN | BODY MASS INDEX: 24.62 KG/M2 | RESPIRATION RATE: 16 BRPM | TEMPERATURE: 97.9 F | HEART RATE: 74 BPM | WEIGHT: 166.2 LBS | OXYGEN SATURATION: 91 % | DIASTOLIC BLOOD PRESSURE: 72 MMHG

## 2022-10-25 DIAGNOSIS — E78.2 MIXED HYPERLIPIDEMIA: ICD-10-CM

## 2022-10-25 DIAGNOSIS — L29.9 GENERALIZED PRURITUS: ICD-10-CM

## 2022-10-25 DIAGNOSIS — I10 ESSENTIAL HYPERTENSION: Primary | ICD-10-CM

## 2022-10-25 DIAGNOSIS — R42 LIGHTHEADEDNESS: ICD-10-CM

## 2022-10-25 DIAGNOSIS — I25.10 ASCVD (ARTERIOSCLEROTIC CARDIOVASCULAR DISEASE): ICD-10-CM

## 2022-10-25 PROCEDURE — 99214 OFFICE O/P EST MOD 30 MIN: CPT | Performed by: FAMILY MEDICINE

## 2022-10-25 PROCEDURE — G8420 CALC BMI NORM PARAMETERS: HCPCS | Performed by: FAMILY MEDICINE

## 2022-10-25 PROCEDURE — 3078F DIAST BP <80 MM HG: CPT | Performed by: FAMILY MEDICINE

## 2022-10-25 PROCEDURE — 1036F TOBACCO NON-USER: CPT | Performed by: FAMILY MEDICINE

## 2022-10-25 PROCEDURE — G8427 DOCREV CUR MEDS BY ELIG CLIN: HCPCS | Performed by: FAMILY MEDICINE

## 2022-10-25 PROCEDURE — G8484 FLU IMMUNIZE NO ADMIN: HCPCS | Performed by: FAMILY MEDICINE

## 2022-10-25 PROCEDURE — 3074F SYST BP LT 130 MM HG: CPT | Performed by: FAMILY MEDICINE

## 2022-10-25 PROCEDURE — 1123F ACP DISCUSS/DSCN MKR DOCD: CPT | Performed by: FAMILY MEDICINE

## 2022-10-25 RX ORDER — POTASSIUM CHLORIDE 20 MEQ/1
20 TABLET, EXTENDED RELEASE ORAL DAILY
Qty: 90 TABLET | Refills: 1 | Status: SHIPPED | OUTPATIENT
Start: 2022-10-25

## 2022-10-25 RX ORDER — AMLODIPINE BESYLATE 5 MG/1
5 TABLET ORAL DAILY
Qty: 90 TABLET | Refills: 1 | Status: SHIPPED | OUTPATIENT
Start: 2022-10-25

## 2022-10-25 RX ORDER — HYDROXYZINE HYDROCHLORIDE 25 MG/1
25 TABLET, FILM COATED ORAL EVERY 8 HOURS PRN
Qty: 90 TABLET | Refills: 3 | Status: SHIPPED | OUTPATIENT
Start: 2022-10-25

## 2022-10-25 SDOH — ECONOMIC STABILITY: FOOD INSECURITY: WITHIN THE PAST 12 MONTHS, YOU WORRIED THAT YOUR FOOD WOULD RUN OUT BEFORE YOU GOT MONEY TO BUY MORE.: NEVER TRUE

## 2022-10-25 SDOH — ECONOMIC STABILITY: FOOD INSECURITY: WITHIN THE PAST 12 MONTHS, THE FOOD YOU BOUGHT JUST DIDN'T LAST AND YOU DIDN'T HAVE MONEY TO GET MORE.: NEVER TRUE

## 2022-10-25 ASSESSMENT — ENCOUNTER SYMPTOMS
COUGH: 1
WHEEZING: 1
SHORTNESS OF BREATH: 1

## 2022-10-25 ASSESSMENT — SOCIAL DETERMINANTS OF HEALTH (SDOH): HOW HARD IS IT FOR YOU TO PAY FOR THE VERY BASICS LIKE FOOD, HOUSING, MEDICAL CARE, AND HEATING?: NOT HARD AT ALL

## 2022-10-25 NOTE — PROGRESS NOTES
[facility]  [address] [facility]  Dept: [phone]  Dept Fax: [phone]  Loc: [phone]  PROGRESS NOTE      Visit Date: 10/25/2022    Delilah Hatfield is a [de-identified] y.o. male who presents today for:  Chief Complaint   Patient presents with    Hypertension    Follow-up Chronic Condition    Other       Subjective:  Hypertension  Associated symptoms include shortness of breath. Other  Associated symptoms include chills and coughing. Pertinent negatives include no fever. 5 month f/u    HTN:  on norvasc. Gastric erosion and GERD on EGD in 2021. On protonix. Generalized itching which he uses Atarax for. Has carotid artery stenosis:  seen by neurology. On aspirin. On lovastatin. Gets episodes of lightheadedness    ASCVD: History of PCI. Has had watchman procedure for A. Fib, status post TAVR    Wife is present    Review of Systems   Constitutional:  Positive for chills. Negative for fever. Respiratory:  Positive for cough, shortness of breath and wheezing. Neurological:  Positive for light-headedness. Negative for syncope.    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)    Coronary artery disease due to lipid rich plaque    Claudication (HCC)    S/P coronary angioplasty    S/P cardiac cath    S/P percutaneous transluminal angioplasty (PTA) with stent placement    Severe aortic stenosis    Essential hypertension    Hyperlipidemia LDL goal <70    S/P TAVR (transcatheter aortic valve replacement)    Nocturnal hypoxia    Chronic respiratory failure with hypoxia (HCC)    Hypersomnia    Other emphysema (HCC)    Afib (HCC)    Presence of Watchman left atrial appendage closure device    Urticaria    Other atopic dermatitis    PAD (peripheral artery disease) (HCC)    Abnormal ultrasound    Iron deficiency anemia, unspecified    Bilateral carotid artery stenosis    Stenosis of left vertebral artery     Past Medical History:   Diagnosis Date    ASCVD (arteriosclerotic cardiovascular disease)     Atrial fibrillation (HCC)     CAD (coronary artery disease)     Cerebral artery occlusion with cerebral infarction [facility])     TIA no defiect    COPD (chronic obstructive pulmonary disease) ([facility]     Difficult intubation     got dysplasia of vocal cord    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 Lowest de-sat 77%.     Osteoarthritis     Osteopenia     Osteopenia     PAD (peripheral artery disease) ([facility]     Polio 1946    Rheumatic fever       Past Surgical History:   Procedure Laterality Date    AORTIC VALVE REPAIR  2020    TAVR    BALLOON ANGIOPLASTY, ARTERY  2018    s/p Left CFA, SFA and popliteal intervention    CARDIAC CATHETERIZATION N/A 07/15/2022    FACIAL COSMETIC SURGERY      29years old    HAND SURGERY Right     carpal tunnel     OTHER SURGICAL HISTORY  2021    Left Atrial Appenadage closure/watchman    PTCA  2020    stent LAD    TONSILLECTOMY AND ADENOIDECTOMY      TRANSESOPHAGEAL ECHOCARDIOGRAM N/A 01/10/2022    TRANSESOPHAGEAL ECHOCARDIOGRAM performed by Temitope Clemons MD at [facility]     Family History   Problem Relation Age of Onset    Heart Disease Father     Arthritis Maternal Grandmother      Social History     Tobacco Use    Smoking status: Former     Packs/day: 1.50     Years: 50.00     Pack years: 75.00     Types: Cigarettes     Quit date: 10/9/2004     Years since quittin.0    Smokeless tobacco: Never   Substance Use Topics    Alcohol use: Yes     Comment: 3 to 4 a week  weekends Current Outpatient Medications   Medication Sig Dispense Refill    lovastatin (MEVACOR) 20 MG tablet TAKE 1 TABLET DAILY 90 tablet 1    Ferrous Sulfate (IRON) 325 (65 Fe) MG TABS Take by mouth      amLODIPine (NORVASC) 5 MG tablet Take 1 tablet by mouth in the morning. 90 tablet 0    hydrOXYzine HCl (ATARAX) 25 MG tablet Take 25 mg by mouth every 8 hours as needed for Itching      Ginkgo Biloba 120 MG CAPS Take 240 mg by mouth daily      nitroGLYCERIN (NITROSTAT) 0.4 MG SL tablet up to max of 3 total doses. If no relief after 1 dose, call 911. 25 tablet 0    umeclidinium-vilanterol (ANORO ELLIPTA) 62.5-25 MCG/INH AEPB inhaler Inhale 1 puff into the lungs daily 90 each 3    pantoprazole (PROTONIX) 40 MG tablet Take 1 tablet by mouth daily 90 tablet 3    potassium chloride (KLOR-CON M) 20 MEQ extended release tablet Take 1 tablet by mouth daily 90 tablet 1    triamcinolone (KENALOG) 0.1 % cream Apply as needed to left leg      aspirin 81 MG EC tablet Take 81 mg by mouth daily Stop 4 weeks post peripheral intervention  9/11/21      OXYGEN Please provide patient with 3LPM of oxygen at night time for nocturnal hypoxia. Patient to have repeat pulse oximetry testing done on oxygen 3 L 0     No current facility-administered medications for this visit.      Allergies   Allergen Reactions    Brilinta [Ticagrelor] Shortness Of Breath    Cephalexin Rash and Hives     Health Maintenance   Topic Date Due    DTaP/Tdap/Td vaccine (1 - Tdap) Never done    COVID-19 Vaccine (5 - Booster for Pfizer series) 06/14/2022    Lipids  08/10/2022    Depression Screen  05/25/2023    Annual Wellness Visit (AWV)  05/26/2023    Flu vaccine  Completed    Shingles vaccine  Completed    Pneumococcal 65+ years Vaccine  Completed    Hepatitis A vaccine  Aged Out    Hib vaccine  Aged Out    Meningococcal (ACWY) vaccine  Aged Out       Objective:  /72   Pulse 74   Temp 97.9 °F (36.6 °C)   Resp 16   Ht 5' 8.5\" (1.74 m)   Wt 166 lb 3.2 oz (75.4 kg)   SpO2 91%   BMI 24.90 kg/m²   Physical Exam  Vitals reviewed. Constitutional:       General: He is not in acute distress. Appearance: He is not ill-appearing. Cardiovascular:      Rate and Rhythm: Normal rate and regular rhythm. Pulmonary:      Effort: Pulmonary effort is normal. No respiratory distress. Breath sounds: Normal breath sounds. No wheezing. Musculoskeletal:      Right lower leg: No edema. Left lower leg: No edema. Neurological:      Mental Status: He is alert. Mental status is at baseline. Psychiatric:         Mood and Affect: Mood normal.         Behavior: Behavior normal.       Impression/Plan:  1. Essential hypertension  Chronic. Controlled. Continue Norvasc  - potassium chloride (KLOR-CON M) 20 MEQ extended release tablet; Take 1 tablet by mouth daily  Dispense: 90 tablet; Refill: 1  - amLODIPine (NORVASC) 5 MG tablet; Take 1 tablet by mouth daily  Dispense: 90 tablet; Refill: 1    2. Mixed hyperlipidemia  Chronic. Check status of control with lipid panel. Continue lovastatin  - Lipid Panel; Future    3. ASCVD (arteriosclerotic cardiovascular disease)  Chronic. Has known carotid artery stenosis, PAD, and CAD. Medical management. On lovastatin and aspirin. 4. Lightheadedness  Chronic. Intermittent episodes. Unclear etiology. Possibly due to carotid artery stenosis? Has follow-up with neuro interventional in early 2023 with repeat CTAs    5. Generalized pruritus  Chronic. Controlled. Refill med  - hydrOXYzine HCl (ATARAX) 25 MG tablet; Take 1 tablet by mouth every 8 hours as needed for Itching  Dispense: 90 tablet; Refill: 3      They voiced understanding. All questions answered. They agreed with treatment plan. See patient instructions for any educational materials that may have been given. Discussed use, benefit, and side effects of prescribed medications. Reviewed health maintenance.     (Please note that portions of this note may have been completed with a voice recognition program.  Efforts were made to edit the dictation but occasionally words are mis-transcribed.)    Return in about 7 months (around 5/26/2023) for medicare wellness.       Electronically signed by Mg Jackson MD on 10/25/2022 at 9:45 AM

## 2022-10-27 ENCOUNTER — HOSPITAL ENCOUNTER (OUTPATIENT)
Age: 82
Discharge: HOME | End: 2022-10-27
Payer: MEDICARE

## 2022-10-27 DIAGNOSIS — E78.2 MIXED HYPERLIPIDEMIA: ICD-10-CM

## 2022-10-27 LAB
CHOLESTEROL, TOTAL: 184 MG/DL (ref 100–199)
HDLC SERPL-MCNC: 64 MG/DL
LDL CHOLESTEROL CALCULATED: 105 MG/DL
TRIGL SERPL-MCNC: 76 MG/DL (ref 0–199)

## 2022-10-27 PROCEDURE — 80061 LIPID PANEL: CPT

## 2022-10-27 PROCEDURE — 36415 COLL VENOUS BLD VENIPUNCTURE: CPT

## 2022-12-14 ENCOUNTER — TELEPHONE (OUTPATIENT)
Dept: CARDIOLOGY CLINIC | Age: 82
End: 2022-12-14

## 2022-12-14 DIAGNOSIS — I48.91 ATRIAL FIBRILLATION, UNSPECIFIED TYPE (HCC): ICD-10-CM

## 2022-12-14 DIAGNOSIS — I48.0 PAROXYSMAL ATRIAL FIBRILLATION (HCC): ICD-10-CM

## 2022-12-14 DIAGNOSIS — Z95.818 PRESENCE OF WATCHMAN LEFT ATRIAL APPENDAGE CLOSURE DEVICE: Primary | ICD-10-CM

## 2022-12-14 NOTE — TELEPHONE ENCOUNTER
Farrah patient had watchman placed on 1/5/2021. He needs a 2 year follow-up around 1/5/2023 with a CBC and BMP. Labs ordered. Can you please call patient?

## 2022-12-16 NOTE — TELEPHONE ENCOUNTER
Patient is scheduled for a 2 year follow-up with Judy on 1-9-23. Spoke with patient's spouse and confirmed appt. As well as to have labs done before appt.

## 2022-12-29 ENCOUNTER — HOSPITAL ENCOUNTER (OUTPATIENT)
Age: 82
Discharge: HOME | End: 2022-12-29
Payer: MEDICARE

## 2022-12-29 DIAGNOSIS — I48.0 PAROXYSMAL ATRIAL FIBRILLATION (HCC): ICD-10-CM

## 2022-12-29 DIAGNOSIS — Z95.818 PRESENCE OF WATCHMAN LEFT ATRIAL APPENDAGE CLOSURE DEVICE: ICD-10-CM

## 2022-12-29 LAB
ANION GAP SERPL CALCULATED.3IONS-SCNC: 10 MEQ/L (ref 8–16)
BASOPHILS # BLD: 1.1 %
BASOPHILS ABSOLUTE: 0.1 THOU/MM3 (ref 0–0.1)
BUN BLDV-MCNC: 11 MG/DL (ref 7–22)
CALCIUM SERPL-MCNC: 9.5 MG/DL (ref 8.5–10.5)
CHLORIDE BLD-SCNC: 100 MEQ/L (ref 98–111)
CO2: 28 MEQ/L (ref 23–33)
CREAT SERPL-MCNC: 1 MG/DL (ref 0.4–1.2)
EOSINOPHIL # BLD: 5.3 %
EOSINOPHILS ABSOLUTE: 0.4 THOU/MM3 (ref 0–0.4)
ERYTHROCYTE [DISTWIDTH] IN BLOOD BY AUTOMATED COUNT: 12.5 % (ref 11.5–14.5)
ERYTHROCYTE [DISTWIDTH] IN BLOOD BY AUTOMATED COUNT: 42.5 FL (ref 35–45)
GFR SERPL CREATININE-BSD FRML MDRD: > 60 ML/MIN/1.73M2
GLUCOSE BLD-MCNC: 93 MG/DL (ref 70–108)
HCT VFR BLD CALC: 44.5 % (ref 42–52)
HEMOGLOBIN: 14.1 GM/DL (ref 14–18)
IMMATURE GRANS (ABS): 0.02 THOU/MM3 (ref 0–0.07)
IMMATURE GRANULOCYTES: 0.2 %
LYMPHOCYTES # BLD: 21.8 %
LYMPHOCYTES ABSOLUTE: 1.8 THOU/MM3 (ref 1–4.8)
MCH RBC QN AUTO: 29.4 PG (ref 26–33)
MCHC RBC AUTO-ENTMCNC: 31.7 GM/DL (ref 32.2–35.5)
MCV RBC AUTO: 92.9 FL (ref 80–94)
MONOCYTES # BLD: 9.8 %
MONOCYTES ABSOLUTE: 0.8 THOU/MM3 (ref 0.4–1.3)
NUCLEATED RED BLOOD CELLS: 0 /100 WBC
PLATELET # BLD: 331 THOU/MM3 (ref 130–400)
PMV BLD AUTO: 9.5 FL (ref 9.4–12.4)
POTASSIUM SERPL-SCNC: 5.2 MEQ/L (ref 3.5–5.2)
RBC # BLD: 4.79 MILL/MM3 (ref 4.7–6.1)
SEG NEUTROPHILS: 61.8 %
SEGMENTED NEUTROPHILS ABSOLUTE COUNT: 5.1 THOU/MM3 (ref 1.8–7.7)
SODIUM BLD-SCNC: 138 MEQ/L (ref 135–145)
WBC # BLD: 8.2 THOU/MM3 (ref 4.8–10.8)

## 2022-12-29 PROCEDURE — 80048 BASIC METABOLIC PNL TOTAL CA: CPT

## 2022-12-29 PROCEDURE — 36415 COLL VENOUS BLD VENIPUNCTURE: CPT

## 2022-12-29 PROCEDURE — 85025 COMPLETE CBC W/AUTO DIFF WBC: CPT

## 2023-01-09 ENCOUNTER — OFFICE VISIT (OUTPATIENT)
Dept: CARDIOLOGY CLINIC | Age: 83
End: 2023-01-09
Payer: MEDICARE

## 2023-01-09 VITALS — SYSTOLIC BLOOD PRESSURE: 138 MMHG | HEART RATE: 79 BPM | DIASTOLIC BLOOD PRESSURE: 69 MMHG

## 2023-01-09 DIAGNOSIS — I10 ESSENTIAL HYPERTENSION: ICD-10-CM

## 2023-01-09 DIAGNOSIS — Z95.818 PRESENCE OF WATCHMAN LEFT ATRIAL APPENDAGE CLOSURE DEVICE: Primary | ICD-10-CM

## 2023-01-09 DIAGNOSIS — I73.9 PAD (PERIPHERAL ARTERY DISEASE) (HCC): ICD-10-CM

## 2023-01-09 DIAGNOSIS — Z95.820 S/P PERIPHERAL ARTERY ANGIOPLASTY WITH STENT PLACEMENT: ICD-10-CM

## 2023-01-09 DIAGNOSIS — E78.2 MIXED HYPERLIPIDEMIA: ICD-10-CM

## 2023-01-09 DIAGNOSIS — I48.91 ATRIAL FIBRILLATION, UNSPECIFIED TYPE (HCC): ICD-10-CM

## 2023-01-09 DIAGNOSIS — Z95.2 S/P TAVR (TRANSCATHETER AORTIC VALVE REPLACEMENT): ICD-10-CM

## 2023-01-09 PROCEDURE — 1123F ACP DISCUSS/DSCN MKR DOCD: CPT | Performed by: NURSE PRACTITIONER

## 2023-01-09 PROCEDURE — 99213 OFFICE O/P EST LOW 20 MIN: CPT | Performed by: NURSE PRACTITIONER

## 2023-01-09 PROCEDURE — 93000 ELECTROCARDIOGRAM COMPLETE: CPT | Performed by: NURSE PRACTITIONER

## 2023-01-09 PROCEDURE — G8484 FLU IMMUNIZE NO ADMIN: HCPCS | Performed by: NURSE PRACTITIONER

## 2023-01-09 PROCEDURE — 3075F SYST BP GE 130 - 139MM HG: CPT | Performed by: NURSE PRACTITIONER

## 2023-01-09 PROCEDURE — 1036F TOBACCO NON-USER: CPT | Performed by: NURSE PRACTITIONER

## 2023-01-09 PROCEDURE — G8420 CALC BMI NORM PARAMETERS: HCPCS | Performed by: NURSE PRACTITIONER

## 2023-01-09 PROCEDURE — G8427 DOCREV CUR MEDS BY ELIG CLIN: HCPCS | Performed by: NURSE PRACTITIONER

## 2023-01-09 PROCEDURE — 3078F DIAST BP <80 MM HG: CPT | Performed by: NURSE PRACTITIONER

## 2023-01-09 RX ORDER — AMLODIPINE BESYLATE 5 MG/1
5 TABLET ORAL DAILY
Qty: 90 TABLET | Refills: 1 | Status: SHIPPED | OUTPATIENT
Start: 2023-01-09

## 2023-01-09 RX ORDER — POTASSIUM CHLORIDE 20 MEQ/1
20 TABLET, EXTENDED RELEASE ORAL DAILY
Qty: 90 TABLET | Refills: 1 | Status: SHIPPED | OUTPATIENT
Start: 2023-01-09

## 2023-01-09 RX ORDER — LOVASTATIN 20 MG/1
TABLET ORAL
Qty: 90 TABLET | Refills: 1 | Status: SHIPPED | OUTPATIENT
Start: 2023-01-09

## 2023-01-09 NOTE — PATIENT INSTRUCTIONS
Continue current medications as prescribed. Follow-up with Neurology as scheduled. Have the CTA studies done as scheduled. Seek emergency care if your symptoms come on and don't resolve in 10 minutes or less. Follow-up with your PCP as scheduled. Follow-up with Dr. Cristlea Campos in one year as scheduled or sooner if need.

## 2023-01-09 NOTE — PROGRESS NOTES
[id number] [address] [facility] [address]  Dept: [phone]  Dept Fax: [phone]  Loc: [phone]    Visit Date: 1/9/2023    Mr. Alfredo Cool is a [de-identified] y.o. male  who presented for: 2 year post WATCHMAN follow-up    Primary Cardiologist: Julieth Zuniga MD    Chief Complaint   Patient presents with    Follow-up     2 year fu s/p watchman        HPI:   HPI   Last seen in office on 8/15/22 per this writer for 6 month follow-up. Per office note:  Doing well  Using treadmill (1 mile); bicycle 2 miles daily  No claudication sx with exercise  Has rash on R calf -  dry skin for past 8 months - seeing skin specialist - adjusting meds. No other skin breakdown or open sores/discoloration LE or feet  Dyspnea still the same - stable - using home O2 when he exercises and at night (2.5 - 3L)  Still follows with Pulmonology  No chest pain or pressure  Little lightheaded once in a while - not really positional; not ADL limiting; no falls  No syncope or near syncope  No palpitations  No LE swelling  No open areas on feet; no change in nails; no skin breakdown  Tolerating meds  No bleeding issues on ASA  Assessment/Plan   HTN  PAD w/ L SFA  s/p SJ 8/10/21 - VONDA study for 1 year follow-up pending  Paroxysmal non-valvular atrial fibrillation - [id number] score: 5, HASBLED = 4 s/p WATCHMAN  LLE PAD s/p Supera implant 5.5 x 60  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 No claudication sx uses treadmill and exercise bike daily. No anginal sx. BP Controlled. No s/sx decompensated HF, euvolemic. The patient was advised on risk/benefits of the new Rx and he agreed to proceed with the medication(s). Discussed diet/exercise/BP/weight loss/health lifestyle choices/lipids; the patient understands the goals and will try to comply.    Disposition: 6 month (1 year post WATCHMAN)  Patient instructions:   Continue current medications as prescribed. Have VONDA study performed as ordered. Follow-up WATCHMAN PEGGY for 1 year evaluation pending. Follow-up with your PCP as scheduled. Follow-up with Dr. Carlton Smith in 6 months as scheduled or sooner if need. Last seen in office on 1/24/22 per Dr. Carlton Smith. Per office note:  79 yo M s.p L SFA  8/2021, TAVR, WATCHMAN who presents for follow-up. Post WATCHMAN PEGGY shows no issues since WATCHMAN. Has mild-moderate PVL. No CHF symptoms. Has some orthostasis from time to time. BP 160s. HR 72. He is off Plavix/Xarelto. He had a GI bleed, now on ASA. BP was also noted at 190s at one point. Was having skin itching that was thought to be related to meds, but did see Derm who did not think it was the meds. Assessment/Plan   HTN  PAD w/ L SFA  s/p SJ 8/10/21  Paroxysmal non-valvular atrial fibrillation - [id number] score: 5, HASBLED = 4 s/p WATCHMAN  LLE PAD s/p Supera implant 5.5 x 60  S/p TAVR - S329 - +4 cc- mild to mod PVL  S/p LAD PCI, 0/0310  Chronic diastolic CHF, NYHA II  Chronic oxygen use at night  EF 55%  No issues with WATCHMAN, uses treadmill daily. Add back Coreg 6.25 mg BID. Monitor BP. Continue ASA. Anemia is resolved. Continue to monitor symptoms, if progressive CHF, would need to consider PVL closure if becomes severe, but no signs of this at this time. The patient was advised on risk/benefits of the new Rx and he agreed to proceed with the medication(s). Discussed diet/exercise/BP/weight loss/health lifestyle choices/lipids; the patient understands the goals and will try to comply.    Disposition: 6 month        Current Outpatient Medications:     lovastatin (MEVACOR) 20 MG tablet, TAKE 1 TABLET DAILY, Disp: 90 tablet, Rfl: 1    potassium chloride (KLOR-CON M) 20 MEQ extended release tablet, Take 1 tablet by mouth daily, Disp: 90 tablet, Rfl: 1    amLODIPine (NORVASC) 5 MG tablet, Take 1 tablet by mouth daily, Disp: 90 tablet, Rfl: 1    hydrOXYzine HCl (ATARAX) 25 MG tablet, Take 1 tablet by mouth every 8 hours as needed for Itching, Disp: 90 tablet, Rfl: 3    Ferrous Sulfate (IRON) 325 (65 Fe) MG TABS, Take by mouth, Disp: , Rfl:     Ginkgo Biloba 120 MG CAPS, Take 240 mg by mouth daily, Disp: , Rfl:     nitroGLYCERIN (NITROSTAT) 0.4 MG SL tablet, up to max of 3 total doses. If no relief after 1 dose, call 911., Disp: 25 tablet, Rfl: 0    umeclidinium-vilanterol (ANORO ELLIPTA) 62.5-25 MCG/INH AEPB inhaler, Inhale 1 puff into the lungs daily, Disp: 90 each, Rfl: 3    pantoprazole (PROTONIX) 40 MG tablet, Take 1 tablet by mouth daily, Disp: 90 tablet, Rfl: 3    triamcinolone (KENALOG) 0.1 % cream, Apply as needed to left leg, Disp: , Rfl:     aspirin 81 MG EC tablet, Take 81 mg by mouth daily Stop 4 weeks post peripheral intervention 9/11/21, Disp: , Rfl:     OXYGEN, Please provide patient with 3LPM of oxygen at night time for nocturnal hypoxia. Patient to have repeat pulse oximetry testing done on oxygen, Disp: 3 L, Rfl: 0    Past Medical History  Brenton SOTOMAYOR  has a past medical history of ASCVD (arteriosclerotic cardiovascular disease), Atrial fibrillation ([address] CAD (coronary artery disease), Cerebral artery occlusion with cerebral infarction ([address] COPD (chronic obstructive pulmonary disease) ([address] Difficult intubation, Dysplasia of vocal cord, Elevated glucose, Hyperlipidemia, Hypertension, Nocturnal hypoxia, Osteoarthritis, Osteopenia, Osteopenia, PAD (peripheral artery disease) ([address] Polio, and Rheumatic fever. Social History  Brenton SOTOMAYOR  reports that he quit smoking about 18 years ago. His smoking use included cigarettes. He has a 75.00 pack-year smoking history. He has never used smokeless tobacco. He reports current alcohol use. He reports that he does not use drugs.     Family History  Brenton SOTOMAYOR family history includes Arthritis in his maternal grandmother; Heart Disease in his father. There is no family history of bicuspid aortic valve, aneurysms, heart transplant, pacemakers, defibrillators, or sudden cardiac death. Past Surgical History   Past Surgical History:   Procedure Laterality Date    AORTIC VALVE REPAIR  09/2020    TAVR    BALLOON ANGIOPLASTY, ARTERY  07/2018    s/p Left CFA, SFA and popliteal intervention    CARDIAC CATHETERIZATION N/A 07/15/2022    FACIAL COSMETIC SURGERY      29years old    HAND SURGERY Right     carpal tunnel     OTHER SURGICAL HISTORY  01/05/2021    Left Atrial Appenadage closure/watchman    PTCA  06/2020    stent LAD    TONSILLECTOMY AND ADENOIDECTOMY      TRANSESOPHAGEAL ECHOCARDIOGRAM N/A 01/10/2022    TRANSESOPHAGEAL ECHOCARDIOGRAM performed by Manjula Gunn MD at [facility]     Today's visit:   Subjective:  No chest pain or pressure  Has some issues with PEÑA; has to sit down to rest - about same as was   - walked in from far distance in parking lot - by time got to office felt like needed to sit down to rest  No swelling issues  No claudication  Brief episodes - ~ 20 - 30 seconds - sx that mind clouds; tongue feels funny  - no passing out; just comes right back - has had off and on for long time; Following with Neurology - has CTA neck and head pending  Known carotid disease  Can't do as much as he used to but not sure if it just isn't age    Review of Systems   Constitutional: Negative for chills and fever  HENT: Negative for congestion, sinus pressure, sneezing and sore throat. Eyes: Negative for pain, discharge, redness and itching. Respiratory: Negative for orthopnea, PND, cough  Gastrointestinal: Negative for blood in stool, constipation, diarrhea   Endocrine: Negative for cold intolerance, heat intolerance, polydipsia. Genitourinary: Negative for dysuria, hematuria. Musculoskeletal: Negative for arthralgias, joint swelling and neck pain. Neurological: Negative for numbness and headaches.    Psychiatric/Behavioral: Negative for agitation, confusion, decreased concentration and dysphoric mood. Objective:     /69   Pulse 79     Wt Readings from Last 3 Encounters:   10/25/22 166 lb 3.2 oz (75.4 kg)   08/15/22 164 lb 6.4 oz (74.6 kg)   07/18/22 166 lb 12.8 oz (75.7 kg)     BP Readings from Last 3 Encounters:   01/09/23 138/69   10/25/22 134/72   08/15/22 (!) 147/60       Nursing note and vitals reviewed. Physical Exam    Bruit R carotid  Constitutional: Oriented to person, place, and time. Appears well-developed and well-nourished. No acute distress; appears well. HENT:   Head: Normocephalic and atraumatic. Eyes: EOM are normal. Pupils are equal, round, and reactive to light. Neck: Normal range of motion. Neck supple. No JVD present. Cardiovascular: Normal rate, regular rhythm, normal heart sounds and intact distal pulses. No murmur heard. Pulmonary/Chest: Effort normal and breath sounds normal. No respiratory distress. No wheezes. No rales. Abdominal: Soft. Bowel sounds are normal. No distension. There is no tenderness. Musculoskeletal: Normal range of motion. No edema. Neurological: Alert and oriented to person, place, and time. No cranial nerve deficit. Coordination normal. Speech clear, facial symmetry intact. No lateralizing sx. Skin: Skin is warm and dry. Psychiatric: Normal mood and affect.        Lab Results   Component Value Date/Time    CKTOTAL 59 11/22/2020 06:07 PM       Lab Results   Component Value Date/Time    WBC 8.2 12/29/2022 09:52 AM    RBC 4.79 12/29/2022 09:52 AM    HGB 14.1 12/29/2022 09:52 AM    HCT 44.5 12/29/2022 09:52 AM    MCV 92.9 12/29/2022 09:52 AM    MCH 29.4 12/29/2022 09:52 AM    MCHC 31.7 12/29/2022 09:52 AM     12/29/2022 09:52 AM    MPV 9.5 12/29/2022 09:52 AM       Lab Results   Component Value Date/Time     12/29/2022 09:52 AM    K 5.2 12/29/2022 09:52 AM    K 4.3 08/10/2021 09:07 AM     12/29/2022 09:52 AM    CO2 28 12/29/2022 09:52 AM    BUN 11 12/29/2022 09:52 AM    LABALBU 4.5 11/16/2021 09:22 AM    CREATININE 1.0 12/29/2022 09:52 AM    CALCIUM 9.5 12/29/2022 09:52 AM    LABGLOM >60 12/29/2022 09:52 AM    GLUCOSE 93 12/29/2022 09:52 AM    GLUCOSE 90 04/06/2017 07:37 AM       Lab Results   Component Value Date/Time    ALKPHOS 117 11/16/2021 09:22 AM    ALT 9 11/16/2021 09:22 AM    AST 23 11/16/2021 09:22 AM    PROT 7.7 11/16/2021 09:22 AM    BILITOT 0.2 11/16/2021 09:22 AM    BILIDIR <0.2 11/16/2021 09:22 AM    LABALBU 4.5 11/16/2021 09:22 AM       No results found for: MG    Lab Results   Component Value Date    INR 1.00 07/13/2022    INR 1.02 08/10/2021    INR 2.51 (H) 02/19/2021         Lab Results   Component Value Date/Time    LABA1C 5.4 05/03/2018 07:38 AM       Lab Results   Component Value Date/Time    TRIG 76 10/27/2022 08:04 AM    HDL 64 10/27/2022 08:04 AM    LDLCALC 105 10/27/2022 08:04 AM    LDLDIRECT 83 09/30/2014 12:00 AM    LABVLDL 17 04/06/2017 07:37 AM       Lab Results   Component Value Date/Time    TSH 1.460 03/17/2021 06:16 PM         Testing Reviewed:    CTA: 5/9/2022  PROCEDURE: CTA NECK W WO CONTRAST       CLINICAL INFORMATION: Lightheadedness, Bilateral carotid artery stenosis. Intermittent vertigo. COMPARISON: Carotid duplex ultrasound dated 6/29/2020. TECHNIQUE: 1 mm axial images were obtained through the neck during fast bolus administration of 80 mL Isovue-370 injected in the right AC. A noncontrast localizer was obtained. 3-D reconstructions were created on a dedicated 3-D workstation. These    include multiplanar MPR images, multiplanar MIP images and centerline reconstructions. All CT scans at this facility use dose modulation, iterative reconstruction, and/or weight-based dosing when appropriate to reduce radiation dose to as low as reasonably achievable. FINDINGS:    There is a bovine type arch.  There is calcified mural plaque at the brachiocephalic and left subclavian arteries with mild stenosis in the proximal left subclavian artery. There is also mild stenosis at the takeoff of the right subclavian artery. There    is mild stenosis at the takeoff of the left common carotid artery and mid segment of the left common carotid artery. There is moderate stenosis at the distal most segments of the bilateral common carotid arteries. There is calcified mural plaque    extending into the carotid bifurcations and proximal internal carotid arteries. Narrowest luminal diameter in the proximal right internal carotid artery near the mid cervical segment is 0.8 mm with a point more distal, where the walls are parallel,    measuring 4.9 mm. There is also a tandem lesion at the mid segment of the right internal carotid artery with moderate stenosis. On the left side, the narrowest luminal diameter in the proximal left internal carotid artery is 1 mm the point more distal,    where the walls are parallel, measuring 4.9 mm. Cervical segments of internal carotid arteries are otherwise patent without focal stenosis. The right vertebral artery is dominant. There is moderate stenosis at takeoff of the right vertebral artery and    severe stenosis at the takeoff of the left vertebral artery. There are focal areas of moderate to severe stenosis in the mid V2 segments of the left vertebral artery. There is mild stenosis in the right V4 segment and severe stenosis in the left V4    segment. There is mural calcification in the cavernous, clinoid and supraclinoid segments of internal carotid arteries with areas of up to moderate stenosis in the right supraclinoid segment. No aneurysm is identified in the intracranial segments of internal    carotid arteries. The bilateral middle cerebral and visualized portions of the anterior cerebral arteries are patent without focal abnormality identified. The basilar artery is patent without focal stenosis or visualized aneurysm.  The bilateral posterior    cerebral and superior cerebellar arteries are patent without focal abnormality identified. Visualized intracranial contents are unremarkable. Orbits are unremarkable. There is mild mucosal thickening in the bilateral maxillary sinuses. Paranasal sinuses are otherwise clear. Mastoid air cells are clear. The vocal cords are closely apposed    limiting evaluation of the larynx. Given this caveat, mucosal surfaces of the aerodigestive tract are otherwise symmetric without focal nodular thickening or visualized mass. There are scattered cervical chain lymph nodes bilaterally without definite    cervical lymphadenopathy. The parotid, submandibular and thyroid glands are unremarkable. There are moderate emphysematous changes in the visualized portions of lungs. There are mild-to-moderate degenerative changes of the cervical spine. Impression       1. Prominent calcified mural plaque in the bilateral carotid bulbs and proximal internal carotid arteries with 84% stenosis on the right and 80% stenosis on the left by NASCET criteria. 2. Moderate stenosis in the distal most segments of the common carotid arteries. 3. Mural calcifications in the intracranial segments of internal carotid arteries with focal areas of up to moderate stenosis. 4. Focal areas of up to severe stenosis in the nondominant left vertebral artery. **This report has been created using voice recognition software. It may contain minor errors which are inherent in voice recognition technology. **       Final report electronically signed by Dr. Brando Greer MD on 2022 9:47 AM     I have individually reviewed the cardiac test below:  EK22  Electrocardiogram, 12-lead   Order: [id number]  Status: Final result    Visible to patient: Yes (not seen)    Next appt:  Today at 10:00 AM in Cardiology LEWIS Flores CNP)    0 Result Notes    Component Ref Range & Units 22 1007 21 0502 21 0550 20 7371 20 9797 9/16/20 0634 6/16/20 0834   Ventricular Rate BPM 69  81  64  91  63  68  78    Atrial Rate BPM 69  81  64  91  63  68  78    P-R Interval ms 180  198  190  182  196  210  188    QRS Duration ms 102  104  100  98  138  96  96    Q-T Interval ms 434  402  446  392  486  440  408    QTc Calculation (Bazett) ms 465  466  460  482  497  467  465    P Axis degrees 41  63  45  66  62  63  69    R Axis degrees -11  7  1   72  4  29    T Axis degrees 47  56  58  86  105  67  55    Resulting Agency  [address]             Narrative & Impression    Normal sinus rhythm  Possible Anterior infarct , age undetermined  Abnormal ECG  When compared with ECG of 06-JAN-2021 05:02,  No significant change was found  Confirmed by Vielka Fraser ([id number]) on 7/13/2022 9:24:53 PM           PEGGY: 1/10/22: Indications: Post Watchmen device (1/5/21) 1 year exam  Summary   Procedure performed without complications. Probe easily inserted by Cardiologist.   The transesophageal approach was used. Risk benefits and alternatives were explained to the patient and informed   consent was obtained. Ejection fraction is visually estimated at 50%. Overall left ventricular function is normal.   Mildly dilated left atrium. LAAO device in good position   no thrombi   no leaks   TAVR valve seen in good position   no evidence of stenosis   moderate para valvular regurgitation is noted    Signature    ----------------------------------------------------------------   Electronically signed by Pio Dickinson MD (Interpreting   physician) on 01/10/2022 at 04:30 PM      ECHO: 09/30/21  Indications:Post TAVR 1 year. Additional Medical History: Former smoker, hypertension, hyperlipidemia,  COPD, coronary artery disease, stroke, history of rheumatic fever, polio,  atrial fibrillation    Summary  Normal left ventricular size and systolic function.   There were no regional wall motion abnormalities. Wall thickness was within normal limits. Ejection fraction was estimated at 55-60%. Doppler parameters were consistent with abnormal left ventricular  relaxation (grade 1 diastolic dysfunction). S/p TAVR. DOPPLER: Transaortic velocity was within the normal range with  no evidence of aortic stenosis (mean gradient 6 mmHg, V max 1.8 m/s, EOA  1.7 cm2). There was mild perivalvular aortic regurgitation. There was trace mitral regurgitation. IVC size is within normal limits with normal respiratory phasic changes. Signature  ----------------------------------------------------------------  Electronically signed by Odell Hunter MD (Interpreting  physician) on 2021 at 02:05 PM  ----------------------------------------------------------------  Event monitor: 21  EVENT MONITOR     PATIENT NAME: Eleanor Sanches                   :        1940  MED REC NO:   [id number]                           ROOM:  ACCOUNT NO:   [de-identified]                           ADMIT DATE: 2021  PROVIDER:     Lauro Lombardo MD     TEST TYPE:  30-day event monitor. MONITORING PERIOD:  2021 to 2021. INDICATION:  Atrial fibrillation. FINDINGS:  Baseline rhythm is sinus rhythm with first-degree AV block  with intermittent PVCs and a heart rate of 82 beats per minute. No  findings of atrial fibrillation or flutter were noted. No findings of  high-grade AV block, nonsustained VT or VF were noted. SUMMARY:  Sinus rhythm, first-degree AV block.     Sergey Santo MD   D: 2021: Radha Wilson closure: WATCHMAN    2020: TAVR    VONDA: 22  PROCEDURE: VL VONDA BILATERAL LIMITED 1-2 LEVELS       CLINICAL INFORMATION: PAD (peripheral artery disease) ([address] S/P peripheral artery angioplasty with stent placement       COMPARISON: Arterial Doppler, 2022       TECHNIQUE: Bilateral arm and ankle pressures were measured and Ankle-brachial indices were calculated bilaterally. . Doppler waveforms were also generated for both ankles. FINDINGS: ABIs and Doppler waveforms are within normal limits. VONDA   RIGHT       IVORY----->0.99   PTA----->0.95           VONDA    LEFT       IVORY----->0.87   PTA----->0.98               Impression   Normal study. **This report has been created using voice recognition software. It may contain minor errors which are inherent in voice recognition technology. **       Final report electronically signed by Dr. Worley Dakin on 8/29/2022 9:21 AM     VONDA: 2/21/22  PROCEDURE: VL DUP LOWER EXTREMITY ARTERIES LEFT       CLINICAL INFORMATION: PAD (peripheral artery disease) ([address] S/P peripheral artery angioplasty with stent placement, Essential hypertension       COMPARISON: Vascular ultrasound 8/27/2021. TECHNIQUE: Multiple grayscale and color flow sonographic images of the major arteries of the left lower extremity were obtained from the level of the groin to the level of the ankle . Spectral Doppler waveforms were obtained and velocity measurements    were measured. FINDINGS:        LEFT ARTERY   (PSV cm/sec)   ? ???????????????????????   CFA --------------->158 PSV cm/sec STENT Prox 99 PSV cm/sec MID 99 PSV cm/sec DIST 89 PSV cm/sec   PROF -------------> 69 PSV cm/sec   SFA PROX ------->201 PSV cm/sec    SFA MID ---------->59 PSV cm/sec   SFA DIST -------->72 PSV cm/sec    POP A PROX --->99 PSV cm/sec STENT Prox 99 PSV cm/sec MID 99 PSV cm/sec DIST 89 PSV CM/SEC   POP A DIST ---->97 PSV cm/sec    PTA --------------->69 PSV cm/sec    PERONEAL ----->77 PSV cm/sec    IVORY ----------------> 95 PSV cm/sec           VONDA   RIGHT       IVORY----->0.96   PTA----->0.97       VONDA    LEFT       IVORY----->1.01   PTA----->0.98       VELOCITY MEASUREMENTS: The ABIs are normal bilaterally. The patient's vascular stents within the common femoral artery and the popliteal artery are patent.        When transitioning from the distal aspect of the common femoral artery stent into the superficial femoral artery there is a marked elevation of peak systolic velocity. There is good pulsatility throughout the right lower extremity. Impression   Vascular stents in the left lower extremity are patent. However, there appears to be greater than 50% stenosis transitioning from the distal common femoral artery stent into the proximal superficial femoral artery. **This report has been created using voice recognition software. It may contain minor errors which are inherent in voice recognition technology. **       Final report electronically signed by Dr Leila Zheng on 2022 4:04 PM      Peripheral angiogram: 8/10/21    PATIENT NAME: Dariusz Malcolm                   :        1940  MED REC NO:   [id number]                           ROOM:       0030  ACCOUNT NO:   [de-identified]                           ADMIT DATE: 08/10/2021  PROVIDER:     Judd Graves MD     DATE OF PROCEDURE:  08/10/2021     PERIPHERAL ANGIOGRAM/INTERVENTION     INDICATION:  Left lower extremity life-limiting claudication, inability  to exercise related to lower extremity PAD; previous left lower  extremity intervention; lower extremity arterial duplex showing a left  VONDA of 0.53 with occlusion of the left SFA. PERIPHERAL ANGIOGRAM:  ABDOMINAL AORTA:  Diffuse atheromatous disease. No significant aneurysm  or dissection noted. RENAL ARTERIES:  Left renal artery has severe calcification in the mid  segment, but has perfusion. Right renal artery is patent. COMMON ILIAC ARTERIES:  Diffuse calcification throughout, but patent. EXTERNAL ILIAC ARTERIES:  Diffuse calcification throughout, but patent. COMMON FEMORAL ARTERIES:  Bilaterally patent, but diffuse calcification  throughout.      At that point, I used a 5-[place] VCF catheter over 0.035 angled  Glidewire and crossed over and placed the catheter at the left common  femoral artery and performed angiography of the left lower extremity. LEFT SFA:  Occluded at the proximal segment. Reconstitutes via the  profunda collaterals to the mid to distal segment. POPLITEAL ARTERY:  Previously placed stent, appears to be  patent with mild diffuse disease throughout. Distal popliteal artery is  patent. TIBIOPERONEAL ARTERIES:  The anterior tibial artery appears to be  occluded at the mid segment. TP trunk is patent. The peroneal artery  is patent. The posterior tibial artery is occluded in the ostium. RIGHT COMMON FEMORAL ARTERY:  Diffuse calcification, but patent. RIGHT SFA:  Proximal 50% stenosis with calcification, but otherwise  patent and demonstrates there is diffuse calcification, but it was  patent. POPLITEAL ARTERIES:  Patent. TIBIOPERONEAL ARTERIES:  Diffuse disease throughout the entire length of  lower extremity tibioperoneal arteries. It appears that the TP trunk is  patent. IMMEDIATE COMPLICATIONS:  None. MEDICATIONS:  See EMR. ACCESS:  Perclose was used for hemostasis. ESTIMATED BLOOD LOSS:  Less than 50 mL. SUMMARY:  Successful L SFA angioplasty and stenting with 7 x 120 Everflex,  Preceded by 6.0 In. PACT DCB. PLAN:  1. Bedrest.  2.  Optimal medical therapy. 3.  Risk factor management. 4.  Routine access site care. 5.  IV fluids. 6.  Overnight observation. 7.  Xarelto 2.5 mg b.i.d. and Plavix to continue. 8.  Surveillance ABIs. 9.  Followup in the office in four to six weeks postprocedure. All the above were explained to the patient and the patient's family. They were agreeable and amenable to the plan.    Bonita Tabares MD     D: 08/13/2021    Assessment/Plan   HTN  PAD w/ L SFA  s/p SJ 8/10/21 - VONDA study for 1 year normal  Paroxysmal non-valvular atrial fibrillation - [id number] score: 5, HASBLED = 4 s/p WATCHMAN  LLE PAD s/p Supera implant 5.5 x 60  S/p TAVR - S329 - +4 cc- mild to mod PVL  S/p LAD PCI, 2/7573  Chronic diastolic CHF, NYHA II  Chronic oxygen use at night  EF 55%    PEGGY 1 year post WATCHMAN; No leaks or thrombi; good position. Labs stable 2 years post WATCHMAN. S/p TAVR 2020  No claudication sx uses treadmill and exercise bike daily. No anginal sx. BP Controlled. No s/sx decompensated HF, euvolemic. Some ? Brief TIA sx - work-up with Neuro for known carotid disease. Discussed stroke sx and need for emergency care. The patient was advised on risk/benefits of the new Rx and he agreed to proceed with the medication(s). Discussed diet/exercise/BP/weight loss/health lifestyle choices/lipids; the patient understands the goals and will try to comply. Continue current medications as prescribed. Follow-up with Neurology as scheduled. Have the CTA studies done as scheduled. Seek emergency care if your symptoms come on and don't resolve in 10 minutes or less. Follow-up with your PCP as scheduled. Follow-up with Dr. Clare Rodriguez in one year as scheduled or sooner if need.        Disposition: Electronically signed by LEWIS Blankenship CNP   1/9/2023 at 1:25 PM EST

## 2023-01-19 ENCOUNTER — HOSPITAL ENCOUNTER (OUTPATIENT)
Dept: CT IMAGING | Age: 83
Discharge: HOME | End: 2023-01-19
Payer: MEDICARE

## 2023-01-19 DIAGNOSIS — I65.23 BILATERAL CAROTID ARTERY STENOSIS: ICD-10-CM

## 2023-01-19 PROCEDURE — 70496 CT ANGIOGRAPHY HEAD: CPT

## 2023-01-19 PROCEDURE — 70498 CT ANGIOGRAPHY NECK: CPT

## 2023-01-19 PROCEDURE — 6360000004 HC RX CONTRAST MEDICATION: Performed by: RADIOLOGY

## 2023-01-19 RX ADMIN — IOPAMIDOL 80 ML: 755 INJECTION, SOLUTION INTRAVENOUS at 10:11

## 2023-01-24 ENCOUNTER — OFFICE VISIT (OUTPATIENT)
Dept: NEUROLOGY | Age: 83
End: 2023-01-24

## 2023-01-24 VITALS
SYSTOLIC BLOOD PRESSURE: 144 MMHG | DIASTOLIC BLOOD PRESSURE: 62 MMHG | OXYGEN SATURATION: 89 % | WEIGHT: 175.8 LBS | HEIGHT: 69 IN | BODY MASS INDEX: 26.04 KG/M2 | HEART RATE: 75 BPM

## 2023-01-24 DIAGNOSIS — I65.23 BILATERAL CAROTID ARTERY STENOSIS: Primary | ICD-10-CM

## 2023-01-24 DIAGNOSIS — I65.03: ICD-10-CM

## 2023-01-24 ASSESSMENT — ENCOUNTER SYMPTOMS
COUGH: 0
SHORTNESS OF BREATH: 1
RHINORRHEA: 0
NAUSEA: 0
ABDOMINAL PAIN: 0
SORE THROAT: 0
VOMITING: 0

## 2023-01-24 NOTE — PROGRESS NOTES
Neurology Office Note    Date:1/24/2023        Patient Name:Brenton Sumner     YOB: 1940     Age:82 y.o. Reason for referral: Bilateral carotid stenosis      Chief Complaint:   Follow up     Subjective       Lina Curtis is a 17-year-old male with a past medical history of atrial fibrillation s/p watchman device, coronary artery disease s/p stent placement, hyperlipidemia, hypertension, peripheral artery disease, who presents to the neuro intervention clinic today for follow up regarding bilateral carotid stenosis and vertebral stenosis. He is currently taking 81 mg aspirin. He does have a history of GI bleed, nosebleeds, and easy bruising and bleeding secondary to being on DAPT and Coumadin in the past.  He denies any headache, numbness, weakness, speech difficulty, or double or blurred vision. He does endorse some dizziness which lasts usually 10-15 seconds and occurs multiple times a week but not daily. He denies any history of stroke or smoking. He does continue to have easy bruising on the ASA, with multiple bruises on hands, arms and legs. Review of Systems   Review of Systems   Constitutional:  Negative for chills and fever. HENT:  Negative for rhinorrhea and sore throat. Eyes:  Positive for visual disturbance (Occasional floaters). Respiratory:  Positive for shortness of breath. Negative for cough. Cardiovascular:  Negative for chest pain and palpitations. Gastrointestinal:  Negative for abdominal pain, nausea and vomiting. Genitourinary:  Negative for dysuria. Musculoskeletal:  Negative for arthralgias and myalgias. Skin:  Negative for rash. Neurological:  Positive for dizziness and light-headedness. Negative for speech difficulty, weakness, numbness and headaches. Hematological:  Bruises/bleeds easily. Psychiatric/Behavioral:  Negative for confusion. The patient is not nervous/anxious.       Medications   Home Meds:   Current Outpatient Medications on File Prior to Visit   Medication Sig Dispense Refill    Apoaequorin (PREVAGEN PO) Take by mouth      lovastatin (MEVACOR) 20 MG tablet TAKE 1 TABLET DAILY 90 tablet 1    potassium chloride (KLOR-CON M) 20 MEQ extended release tablet Take 1 tablet by mouth daily 90 tablet 1    amLODIPine (NORVASC) 5 MG tablet Take 1 tablet by mouth daily 90 tablet 1    hydrOXYzine HCl (ATARAX) 25 MG tablet Take 1 tablet by mouth every 8 hours as needed for Itching 90 tablet 3    Ferrous Sulfate (IRON) 325 (65 Fe) MG TABS Take by mouth      nitroGLYCERIN (NITROSTAT) 0.4 MG SL tablet up to max of 3 total doses. If no relief after 1 dose, call 911. 25 tablet 0    umeclidinium-vilanterol (ANORO ELLIPTA) 62.5-25 MCG/INH AEPB inhaler Inhale 1 puff into the lungs daily 90 each 3    pantoprazole (PROTONIX) 40 MG tablet Take 1 tablet by mouth daily 90 tablet 3    triamcinolone (KENALOG) 0.1 % cream Apply as needed to left leg      aspirin 81 MG EC tablet Take 81 mg by mouth daily Stop 4 weeks post peripheral intervention  9/11/21      OXYGEN Please provide patient with 3LPM of oxygen at night time for nocturnal hypoxia. Patient to have repeat pulse oximetry testing done on oxygen 3 L 0     No current facility-administered medications on file prior to visit. PRN Meds:     Past History    Past Medical History:   has a past medical history of ASCVD (arteriosclerotic cardiovascular disease), Atrial fibrillation ([address] CAD (coronary artery disease), Cerebral artery occlusion with cerebral infarction ([address] COPD (chronic obstructive pulmonary disease) ([address] Difficult intubation, Dysplasia of vocal cord, Elevated glucose, Hyperlipidemia, Hypertension, Nocturnal hypoxia, Osteoarthritis, Osteopenia, Osteopenia, PAD (peripheral artery disease) ([address] Polio, and Rheumatic fever. Social History:   reports that he quit smoking about 18 years ago. His smoking use included cigarettes. He has a 75.00 pack-year smoking history.  He has never used smokeless tobacco. He reports current alcohol use. He reports that he does not use drugs. Family History:   Family History   Problem Relation Age of Onset    Heart Disease Father     Arthritis Maternal Grandmother        Physical Examination      Vitals:  BP (!) 144/62 (Site: Left Upper Arm, Position: Sitting, Cuff Size: Large Adult)   Pulse 75   Ht 5' 8.5\" (1.74 m)   Wt 175 lb 12.8 oz (79.7 kg)   SpO2 (!) 89% Comment: 83 after walking  BMI 26.34 kg/m²         Physical Exam  Vitals reviewed. Constitutional:       General: He is not in acute distress. Appearance: Normal appearance. He is not ill-appearing. HENT:      Head: Normocephalic and atraumatic. Right Ear: External ear normal.      Left Ear: External ear normal.      Nose: Nose normal.      Mouth/Throat:      Mouth: Mucous membranes are moist.      Pharynx: No oropharyngeal exudate or posterior oropharyngeal erythema. Eyes:      Extraocular Movements: EOM normal.      Pupils: Pupils are equal, round, and reactive to light. Pulmonary:      Effort: Pulmonary effort is normal. No respiratory distress. Breath sounds: Normal breath sounds. No wheezing. Musculoskeletal:         General: Normal range of motion. Right lower leg: No edema. Left lower leg: No edema. Skin:     General: Skin is warm. Findings: Bruising present. No rash. Neurological:      General: No focal deficit present. Mental Status: He is alert and oriented to person, place, and time. Coordination: Finger-Nose-Finger Test and Heel to [employer] normal.   Psychiatric:         Mood and Affect: Mood normal.         Speech: Speech normal.         Behavior: Behavior normal.     Neurologic Exam     Mental Status   Oriented to person, place, and time. Follows 2 step commands. Attention: normal. Concentration: normal.   Speech: speech is normal   Level of consciousness: alert  Knowledge: good.      Cranial Nerves     CN II   Visual fields full to confrontation. CN III, IV, VI   Pupils are equal, round, and reactive to light. Extraocular motions are normal.   Right pupil: Size: 3 mm. Shape: regular. Reactivity: brisk. Left pupil: Size: 3 mm. Shape: regular. Reactivity: brisk. CN V   Facial sensation intact. CN VII   Facial expression full, symmetric. CN VIII   CN VIII normal.   Hearing: intact    CN IX, X   CN IX normal.   CN X normal.   Palate: symmetric    CN XI   CN XI normal.   Right trapezius strength: normal  Left trapezius strength: normal    CN XII   CN XII normal.   Tongue: not atrophic  Fasciculations: absent  Tongue deviation: none    Motor Exam   Muscle bulk: normal  Overall muscle tone: normal  Right arm pronator drift: absent  Left arm pronator drift: absent    BUE: 5/5  BLE: 5/5     Sensory Exam   Light touch normal.     Gait, Coordination, and Reflexes     Coordination   Finger to nose coordination: normal  Heel to shin coordination: normal    Tremor   Resting tremor: absent  Intention tremor: absent  Action tremor: absent     Labs/Imaging/Diagnostics   Labs:       Imaging:  CTA HEAD W WO CONTRAST    Result Date: 1/19/2023   1. There is approximately 85% stenosis in the proximal right internal carotid artery, unchanged since previous CTA dated 5/9/2022. There is no significant hemodynamic stenosis in the right common carotid artery. 2. There is approximately 80% stenosis in the proximal left internal carotid artery, unchanged since previous CTA dated 5/9/2022. There is no significant hemodynamic stenosis in the left common carotid artery. 3. There are larger right and smaller left vertebral arteries with antegrade flow bilaterally. There are calcifications at the origins of the vertebral arteries bilaterally. 4. There is atherosclerotic calcification in the aortic arch and at the origins of the brachiocephalic, left common carotid and left subclavian arteries.  5. There is atherosclerotic calcification in the cavernous segments of both internal carotid arteries. There is no evidence of severe stenosis. 6. There is calcification in both distal vertebral arteries, left greater than right. There is antegrade flow in the vertebral arteries bilaterally. 7. There is a patent posterior communicating artery on the left side which helps supply the left posterior cerebral artery. 8. There is mild enlargement of the right and left lobes of the thyroid gland. 9. There is a calcified granuloma in the left upper lobe of the lung. **This report has been created using voice recognition software. It may contain minor errors which are inherent in voice recognition technology. ** Final report electronically signed by DR Juventino Ybarra on 1/19/2023 4:19 PM    CTA NECK W WO CONTRAST    Result Date: 1/19/2023   1. There is approximately 85% stenosis in the proximal right internal carotid artery, unchanged since previous CTA dated 5/9/2022. There is no significant hemodynamic stenosis in the right common carotid artery. 2. There is approximately 80% stenosis in the proximal left internal carotid artery, unchanged since previous CTA dated 5/9/2022. There is no significant hemodynamic stenosis in the left common carotid artery. 3. There are larger right and smaller left vertebral arteries with antegrade flow bilaterally. There are calcifications at the origins of the vertebral arteries bilaterally. 4. There is atherosclerotic calcification in the aortic arch and at the origins of the brachiocephalic, left common carotid and left subclavian arteries. 5. There is atherosclerotic calcification in the cavernous segments of both internal carotid arteries. There is no evidence of severe stenosis. 6. There is calcification in both distal vertebral arteries, left greater than right. There is antegrade flow in the vertebral arteries bilaterally. 7. There is a patent posterior communicating artery on the left side which helps supply the left posterior cerebral artery. 8. There is mild enlargement of the right and left lobes of the thyroid gland. 9. There is a calcified granuloma in the left upper lobe of the lung. **This report has been created using voice recognition software. It may contain minor errors which are inherent in voice recognition technology. ** Final report electronically signed by DR Feli Mei on 1/19/2023 4:19 PM      Assessment and Plan:          Bilateral Carotid Artery Stenosis, Left Vertebral Artery Stenosis  DSA 7/13/22: Minimal non flow limiting stenosis at both carotid arteries. Severe flow limiting stenosis at the origin of the left Vertebral artery. Supplementary supply to the left vertebral artery arising from the ascending cervical trunk with connection to the distal V2 / proximal V3 segment of the left vertebral artery. Severe atherosclerotic disease within the origins of the great vessels of the chest. R vert imaging unable to be obtained. CT angiogram head and neck from 1/19/23 demonstrates stable stenosis about 84% stenosis of the right internal carotid artery and 80% stenosis of the left internal carotid artery, focal areas of severe stenosis in the nondominant left vertebral artery. Continue aspirin 81 mg daily. Ideally would like for the patient to be on dual antiplatelets, but given his history of GI bleeding, nosebleeding, and easy bleeding in general we will hold off at this time. Continue lovastatin 20 mg daily  Planning for DSA targeting R vert for imaging which was not able to be obtained last time. Will have a right radial approach this time. Discussed benefits and risks (hematoma, bleeding, infection, dissection, nephrotoxicity, stroke) and patient has decided to proceed with procedure. Nothing to drink starting at midnight before the procedure, okay to take medications with a sip of water. This patient was seen and evaluated with Dr. Lucía Yusuf and he is in agreement with the assessment and plan.     Electronically signed by Spencer Romero PA-C on 1/24/23 at 1:43 PM EST

## 2023-01-25 ENCOUNTER — TELEPHONE (OUTPATIENT)
Dept: NEUROLOGY | Age: 83
End: 2023-01-25

## 2023-01-25 NOTE — TELEPHONE ENCOUNTER
Spoke with Ariela Torres and Aleta Phalen from cath lab and explained Dr. Lyle Tapia would like DSA on 1-31-23 in new suite. If new suite is not yet available, it was asked for [facility] or patient be notified so we can reschedule until it is available. Spoke with Hesham Busch, patient's wife and notified her of date and time of procedure. Reviewed prep information also. She expressed understanding. It was also reiterated, if suite is not yet available they may receive a call Monday 1- to reschedule. Expressed understanding.

## 2023-01-31 ENCOUNTER — APPOINTMENT (OUTPATIENT)
Dept: CARDIAC CATH/INVASIVE PROCEDURES | Age: 83
End: 2023-01-31
Payer: MEDICARE

## 2023-01-31 ENCOUNTER — APPOINTMENT (OUTPATIENT)
Dept: INTERVENTIONAL RADIOLOGY/VASCULAR | Age: 83
End: 2023-01-31
Attending: PSYCHIATRY & NEUROLOGY
Payer: MEDICARE

## 2023-01-31 ENCOUNTER — HOSPITAL ENCOUNTER (OUTPATIENT)
Dept: INPATIENT UNIT | Age: 83
Discharge: HOME | End: 2023-01-31
Attending: PSYCHIATRY & NEUROLOGY | Admitting: PSYCHIATRY & NEUROLOGY
Payer: MEDICARE

## 2023-01-31 VITALS
OXYGEN SATURATION: 91 % | SYSTOLIC BLOOD PRESSURE: 141 MMHG | DIASTOLIC BLOOD PRESSURE: 58 MMHG | RESPIRATION RATE: 14 BRPM | HEART RATE: 70 BPM | TEMPERATURE: 97.5 F | HEIGHT: 69 IN | WEIGHT: 175 LBS | BODY MASS INDEX: 25.92 KG/M2

## 2023-01-31 LAB
ABO: NORMAL
ANION GAP SERPL CALC-SCNC: 13 MEQ/L (ref 8–16)
ANTIBODY SCREEN: NORMAL
APTT PPP: 30.8 SECONDS (ref 22–38)
BUN SERPL-MCNC: 11 MG/DL (ref 7–22)
CALCIUM SERPL-MCNC: 9.2 MG/DL (ref 8.5–10.5)
CHLORIDE SERPL-SCNC: 103 MEQ/L (ref 98–111)
CO2 SERPL-SCNC: 23 MEQ/L (ref 23–33)
CREAT SERPL-MCNC: 0.9 MG/DL (ref 0.4–1.2)
DEPRECATED RDW RBC AUTO: 41.6 FL (ref 35–45)
EKG ATRIAL RATE: 74 BPM
EKG P AXIS: 63 DEGREES
EKG P-R INTERVAL: 182 MS
EKG Q-T INTERVAL: 402 MS
EKG QRS DURATION: 100 MS
EKG QTC CALCULATION (BAZETT): 446 MS
EKG R AXIS: 6 DEGREES
EKG T AXIS: 64 DEGREES
EKG VENTRICULAR RATE: 74 BPM
ERYTHROCYTE [DISTWIDTH] IN BLOOD BY AUTOMATED COUNT: 12.6 % (ref 11.5–14.5)
GFR SERPL CREATININE-BSD FRML MDRD: > 60 ML/MIN/1.73M2
GLUCOSE SERPL-MCNC: 88 MG/DL (ref 70–108)
HCT VFR BLD AUTO: 46.5 % (ref 42–52)
HGB BLD-MCNC: 15 GM/DL (ref 14–18)
INR PPP: 0.97 (ref 0.85–1.13)
MCH RBC QN AUTO: 29.5 PG (ref 26–33)
MCHC RBC AUTO-ENTMCNC: 32.3 GM/DL (ref 32.2–35.5)
MCV RBC AUTO: 91.4 FL (ref 80–94)
PLATELET # BLD AUTO: 258 THOU/MM3 (ref 130–400)
PMV BLD AUTO: 9.4 FL (ref 9.4–12.4)
POTASSIUM SERPL-SCNC: 5 MEQ/L (ref 3.5–5.2)
RBC # BLD AUTO: 5.09 MILL/MM3 (ref 4.7–6.1)
RH FACTOR: NORMAL
SODIUM SERPL-SCNC: 139 MEQ/L (ref 135–145)
WBC # BLD AUTO: 9.2 THOU/MM3 (ref 4.8–10.8)

## 2023-01-31 PROCEDURE — 85610 PROTHROMBIN TIME: CPT

## 2023-01-31 PROCEDURE — 86901 BLOOD TYPING SEROLOGIC RH(D): CPT

## 2023-01-31 PROCEDURE — 2500000003 HC RX 250 WO HCPCS: Performed by: PSYCHIATRY & NEUROLOGY

## 2023-01-31 PROCEDURE — 93005 ELECTROCARDIOGRAM TRACING: CPT

## 2023-01-31 PROCEDURE — 6360000004 HC RX CONTRAST MEDICATION: Performed by: PSYCHIATRY & NEUROLOGY

## 2023-01-31 PROCEDURE — 36415 COLL VENOUS BLD VENIPUNCTURE: CPT

## 2023-01-31 PROCEDURE — 99999 PR OFFICE/OUTPT VISIT,PROCEDURE ONLY: CPT | Performed by: PSYCHIATRY & NEUROLOGY

## 2023-01-31 PROCEDURE — 6360000002 HC RX W HCPCS: Performed by: PSYCHIATRY & NEUROLOGY

## 2023-01-31 PROCEDURE — 85730 THROMBOPLASTIN TIME PARTIAL: CPT

## 2023-01-31 PROCEDURE — 2500000003 HC RX 250 WO HCPCS

## 2023-01-31 PROCEDURE — 36226 PLACE CATH VERTEBRAL ART: CPT

## 2023-01-31 PROCEDURE — 75710 ARTERY X-RAYS ARM/LEG: CPT

## 2023-01-31 PROCEDURE — 86850 RBC ANTIBODY SCREEN: CPT

## 2023-01-31 PROCEDURE — 36140 INTRO NDL ICATH UPR/LXTR ART: CPT

## 2023-01-31 PROCEDURE — 93010 ELECTROCARDIOGRAM REPORT: CPT | Performed by: INTERNAL MEDICINE

## 2023-01-31 PROCEDURE — 2580000003 HC RX 258

## 2023-01-31 PROCEDURE — 6360000002 HC RX W HCPCS

## 2023-01-31 PROCEDURE — 86900 BLOOD TYPING SEROLOGIC ABO: CPT

## 2023-01-31 PROCEDURE — 85027 COMPLETE CBC AUTOMATED: CPT

## 2023-01-31 PROCEDURE — 80048 BASIC METABOLIC PNL TOTAL CA: CPT

## 2023-01-31 RX ORDER — SODIUM CHLORIDE 0.9 % (FLUSH) 0.9 %
5-40 SYRINGE (ML) INJECTION PRN
Status: DISCONTINUED | OUTPATIENT
Start: 2023-01-31 | End: 2023-01-31 | Stop reason: HOSPADM

## 2023-01-31 RX ORDER — VERAPAMIL HYDROCHLORIDE 2.5 MG/ML
2.5 INJECTION, SOLUTION INTRAVENOUS ONCE
Status: COMPLETED | OUTPATIENT
Start: 2023-01-31 | End: 2023-01-31

## 2023-01-31 RX ORDER — SODIUM CHLORIDE 0.9 % (FLUSH) 0.9 %
5-40 SYRINGE (ML) INJECTION EVERY 12 HOURS SCHEDULED
Status: DISCONTINUED | OUTPATIENT
Start: 2023-01-31 | End: 2023-01-31 | Stop reason: HOSPADM

## 2023-01-31 RX ORDER — SODIUM CHLORIDE 9 MG/ML
INJECTION, SOLUTION INTRAVENOUS PRN
Status: DISCONTINUED | OUTPATIENT
Start: 2023-01-31 | End: 2023-01-31 | Stop reason: HOSPADM

## 2023-01-31 RX ORDER — HEPARIN SODIUM 5000 [USP'U]/ML
3000 INJECTION, SOLUTION INTRAVENOUS; SUBCUTANEOUS ONCE
Status: COMPLETED | OUTPATIENT
Start: 2023-01-31 | End: 2023-01-31

## 2023-01-31 RX ORDER — ACETAMINOPHEN 325 MG/1
650 TABLET ORAL EVERY 4 HOURS PRN
Status: DISCONTINUED | OUTPATIENT
Start: 2023-01-31 | End: 2023-01-31 | Stop reason: HOSPADM

## 2023-01-31 RX ADMIN — IOPAMIDOL 40 ML: 612 INJECTION, SOLUTION INTRAVENOUS at 11:00

## 2023-01-31 RX ADMIN — HEPARIN SODIUM 3000 UNITS: 5000 INJECTION, SOLUTION INTRAVENOUS; SUBCUTANEOUS at 10:29

## 2023-01-31 RX ADMIN — SODIUM CHLORIDE: 9 INJECTION, SOLUTION INTRAVENOUS at 08:36

## 2023-01-31 RX ADMIN — Medication 200 MCG: at 10:29

## 2023-01-31 RX ADMIN — VERAPAMIL HYDROCHLORIDE 2.5 MG: 2.5 INJECTION, SOLUTION INTRAVENOUS at 10:29

## 2023-01-31 ASSESSMENT — ENCOUNTER SYMPTOMS
RHINORRHEA: 0
VOMITING: 0
TROUBLE SWALLOWING: 0
SHORTNESS OF BREATH: 0
PHOTOPHOBIA: 0
ABDOMINAL PAIN: 0
NAUSEA: 0
COUGH: 0
SORE THROAT: 0

## 2023-01-31 NOTE — PROCEDURES
DATE OF PROCEDURE: 1/31/23. PREOPERATIVE DIAGNOSIS: R VA stenosis. POSTOPERATIVE DIAGNOSIS: Severe right VA stenosis    SURGEON: Javon Ortega MD.    ANESTHESIA: Moderate and local.    MEDICATIONS: 0.5 mg versed and 25 mcg fentanyl. CONTRAST: 40 ml iso jazmyn. FLUORO TIME: 3 minutes. Estimated Blood Loss: < 10 ml. INTERVENTIONAL PROCEDURE:  1-VESSEL CEREBRAL ANGIOGRAM.  US-GUIDED ARTERIAL ACCESS    CATHETERIZATION OF THE FOLLOWING VESSEL:  RIGHT SUBCLAVIAN ARTERY. ANGIOGRAPHY AND INTERPRETATION OF THE FOLLOWING IMAGES  RIGHT SUBCLAVIAN ARTERY CERVICAL AP ANGIOGRAM.   RIGHT SUBCLAVIAN ARTERY CERVICAL OBLIQUE ANGIOGRAM.   RIGHT SUBCLAVIAN ARTERY STANDARD CRANIAL AP AND LATERAL ANGIOGRAM.       INDICATION:  82-year-old male with a past medical history of atrial fibrillation s/p watchman device, coronary artery disease s/p stent placement, hyperlipidemia, hypertension, peripheral artery disease, who presents to [address] for diagnostic cerebral angiogram with conscious sedation using right radial approach to evaluate bilateral carotid artery stenosis and left vertebral artery stenosis. He is currently taking 81 mg aspirin. He does have a history of GI bleed, nosebleeds, and easy bruising and bleeding secondary to being on DAPT and Coumadin in the past.  He denies any headache, numbness, weakness, speech difficulty, or double or blurred vision. He does endorse some dizziness which lasts usually 10-15 seconds and occurs multiple times a week but not daily. He denies any history of stroke or smoking, but reports several TIAs in the past. He does continue to have easy bruising on the ASA, with multiple bruises on hands, arms and legs. Today, patient denies any changes in symptoms, past medical history, or daily medications since previous neuro interventional clinic visit 1/24/23.  Procedure, including risks versus benefits, discussed with patient and wife at bedside, who are in agreement to proceed with diagnostic cerebral angiogram today. DESCRIPTION OF PROCEDURE AND FINDINGS:     The patient was brought into the angiography suite. Bilateral groins were sterilely prepped and draped in the usual fashion. I administered moderate sedation throughout this 23-minute procedure. An independent trained observer pushed medications at my direction, and monitored the patients level of consciousness and physiological status throughout. Because of inability to feel the radial artery pulse well, the decision was made to use ultrasound guidance to identify the radial artery. Then the needle was advanced under life US guidance to access the femoral artery that was visualized well and was fully patent during needle insertion. Seldinger technique was then used to pass a 5 [place] sheath into the right radial artery. This was then connected to a continuous flush drip. A 4 Chinese terumo catheter was introduced into the aorta. Double flushing was then done. The catheter was moved to the right subclavian artery under guidance of fluoroscopy with the help of the glide wire. PA and oblique cervical angiogram was then obtained by hand injection. These angiograms showed that the vertebral artery is large in size. There is diffuse atherosclerotic disease/plaque in the subclavian artery with no hemodynamically significant stenosis. There is atherosclerotic disease/plaque origin of the vertebral artery tat is causing severe stenosis (~ 70%). PA and lateral cranial angiograms were then obtained. The right vertebral and basilar artery including posterior cerebral, superior cerebellar, anterior inferior cerebellar, posterior inferior cerebellar branches were visualized. There is no disease in the proximal, mid and distal portions of basilar artery. All of the branches were within normal limits and do not have aneurysms, arteriovenous malformations or significant atherosclerotic, vasculitic or dissecting changes.      The catheter was withdrawn. At this point, the radial sheath was pulled and excellent hemostasis was achieved by manual compression. Sterile dressing was then placed overlying the puncture site. The patient was then transferred to the cath recovery. There were no complications from the procedure. IMPRESSION:  There is atherosclerotic disease/plaque origin of the vertebral artery tat is causing severe stenosis (~ 70%).

## 2023-01-31 NOTE — PROCEDURES
BRIEF POST-OPERATIVE PROCEDURE NOTE    (Full Note Pending)    Date of Procedure: 01/31/23    Surgeon(s): Bay Franco MD     Pre-operative Diagnosis:   R VA stenosis    Post-operative Diagnosis:  Severe R VA origin stenosis. Procedure(s):  Cerebral angiogram through right radial artery. Anesthesia:  Moderate (23 minutes)  and local    Brief Findings:  Severe R VA origin stenosis. Estimated Blood Loss:  < 10 ML. Specimens:  None. Complications:  None immediate. Flouro time: 3 Minutes. Contrast: 40 ml of Isovue- 300. Access: right radial.    Disposition: cath recovery (SDS).            Condition: stable    Surgeon:   Bay Franco MD;   Vascular and Interventional Neurology, Neurocritical Care  [address] [place] [address]  [phone]

## 2023-01-31 NOTE — PROGRESS NOTES
Patient admitted to [address] [place] [address]  Ambulatory for cerebral angiogram.  Patient NPO. Patient accompanied by spouse. Vital signs obtained. Assessment and data collection intiated. Oriented to room. Policies and procedures for 2E explained. All questions answered with no further questions at this time. Fall prevention and safety precautions discussed with patient.

## 2023-01-31 NOTE — H&P
Neuro Interventional Pre-Procedural H&P Note    Date:1/31/2023       BXQR:[id number]  Patient Name:Brenton Sumner     YOB: 1940     Age:82 y.o. Physician: Trent Pinto MD    Procedure:  diagnostic cerebral angiogram with conscious sedation using right radial approach       Chief Complaint: bilateral carotid artery stenosis, left vertebral artery stenosis      Subjective     Lupe Torres is a 82-year-old male with a past medical history of atrial fibrillation s/p watchman device, coronary artery disease s/p stent placement, hyperlipidemia, hypertension, peripheral artery disease, who presents to [address] for diagnostic cerebral angiogram with conscious sedation using right radial approach to evaluate bilateral carotid artery stenosis and left vertebral artery stenosis. He is currently taking 81 mg aspirin. He does have a history of GI bleed, nosebleeds, and easy bruising and bleeding secondary to being on DAPT and Coumadin in the past.  He denies any headache, numbness, weakness, speech difficulty, or double or blurred vision. He does endorse some dizziness which lasts usually 10-15 seconds and occurs multiple times a week but not daily. He denies any history of stroke or smoking, but reports several TIAs in the past. He does continue to have easy bruising on the ASA, with multiple bruises on hands, arms and legs. Today, patient denies any changes in symptoms, past medical history, or daily medications since previous neuro interventional clinic visit 1/24/23. Procedure, including risks versus benefits, discussed with patient and wife at bedside, who are in agreement to proceed with diagnostic cerebral angiogram today. Review of Systems   Review of Systems   Constitutional:  Negative for chills and fever. HENT:  Negative for rhinorrhea, sore throat and trouble swallowing. Eyes:  Negative for photophobia and visual disturbance. Respiratory:  Negative for cough and shortness of breath. Cardiovascular:  Negative for chest pain and palpitations. Gastrointestinal:  Negative for abdominal pain, nausea and vomiting. Genitourinary:  Negative for dysuria. Musculoskeletal:  Negative for arthralgias and myalgias. Skin:  Negative for rash. Neurological:  Positive for dizziness (chronic, intermittent) and light-headedness (chronic, intermittent). Negative for tremors, facial asymmetry, speech difficulty, weakness, numbness and headaches. Hematological:  Bruises/bleeds easily. Psychiatric/Behavioral:  Negative for confusion and decreased concentration. The patient is not nervous/anxious. Medications   Scheduled Meds:   Continuous Infusions:   PRN Meds:   Medications Prior to Admission:   No current facility-administered medications on file prior to encounter. Current Outpatient Medications on File Prior to Encounter   Medication Sig Dispense Refill    Apoaequorin (PREVAGEN PO) Take by mouth      lovastatin (MEVACOR) 20 MG tablet TAKE 1 TABLET DAILY 90 tablet 1    potassium chloride (KLOR-CON M) 20 MEQ extended release tablet Take 1 tablet by mouth daily 90 tablet 1    amLODIPine (NORVASC) 5 MG tablet Take 1 tablet by mouth daily 90 tablet 1    hydrOXYzine HCl (ATARAX) 25 MG tablet Take 1 tablet by mouth every 8 hours as needed for Itching 90 tablet 3    Ferrous Sulfate (IRON) 325 (65 Fe) MG TABS Take by mouth      nitroGLYCERIN (NITROSTAT) 0.4 MG SL tablet up to max of 3 total doses.  If no relief after 1 dose, call 911. 25 tablet 0    umeclidinium-vilanterol (ANORO ELLIPTA) 62.5-25 MCG/INH AEPB inhaler Inhale 1 puff into the lungs daily 90 each 3    pantoprazole (PROTONIX) 40 MG tablet Take 1 tablet by mouth daily 90 tablet 3    triamcinolone (KENALOG) 0.1 % cream Apply as needed to left leg      aspirin 81 MG EC tablet Take 81 mg by mouth daily Stop 4 weeks post peripheral intervention  9/11/21      OXYGEN Please provide patient with 3LPM of oxygen at night time for nocturnal hypoxia. Patient to have repeat pulse oximetry testing done on oxygen 3 L 0     Past History    Past Medical History:   has a past medical history of ASCVD (arteriosclerotic cardiovascular disease), Atrial fibrillation ([facility] [address] CAD (coronary artery disease), Cerebral artery occlusion with cerebral infarction ([facility] [address] COPD (chronic obstructive pulmonary disease) ([facility] [address] Difficult intubation, Dysplasia of vocal cord, Elevated glucose, Hyperlipidemia, Hypertension, Nocturnal hypoxia, Osteoarthritis, Osteopenia, Osteopenia, PAD (peripheral artery disease) ([facility] [address] Polio, and Rheumatic fever. Social History:   reports that he quit smoking about 18 years ago. His smoking use included cigarettes. He has a 75.00 pack-year smoking history. He has never used smokeless tobacco. He reports current alcohol use. He reports that he does not use drugs. Family History:   Family History   Problem Relation Age of Onset    Heart Disease Father     Arthritis Maternal Grandmother        Physical Examination      Vitals: There were no vitals taken for this visit. No data recorded. I/O (24Hr): No intake or output data in the 24 hours ending 01/31/23 0756      Physical Exam  Vitals reviewed. Constitutional:       General: He is not in acute distress. Appearance: Normal appearance. He is not ill-appearing. HENT:      Head: Normocephalic and atraumatic. Right Ear: External ear normal.      Left Ear: External ear normal.      Nose: Nose normal.      Mouth/Throat:      Mouth: Mucous membranes are moist.      Pharynx: No oropharyngeal exudate or posterior oropharyngeal erythema. Eyes:      Extraocular Movements: Extraocular movements intact and EOM normal.      Pupils: Pupils are equal, round, and reactive to light. Cardiovascular:      Rate and Rhythm: Normal rate and regular rhythm. Pulses: Normal pulses. Heart sounds: Normal heart sounds. No murmur heard.      Comments: Radial pulses 2+ bilaterally  L DP 2+, R DP 1+  1+ bilateral PT pulses  Pulmonary:      Effort: Pulmonary effort is normal. No respiratory distress. Breath sounds: Normal breath sounds. No wheezing. Abdominal:      General: Bowel sounds are normal.      Palpations: Abdomen is soft. Tenderness: There is no abdominal tenderness. Musculoskeletal:         General: Normal range of motion. Right lower leg: No edema. Left lower leg: No edema. Skin:     General: Skin is warm. Findings: Bruising present. No rash. Neurological:      Mental Status: He is alert and oriented to person, place, and time. Coordination: Finger-Nose-Finger Test and Heel to [employer] normal.   Psychiatric:         Mood and Affect: Mood normal.         Speech: Speech normal.         Behavior: Behavior normal.     Neurologic Exam     Mental Status   Oriented to person, place, and time. Follows 1 step commands. Attention: normal. Concentration: decreased. Speech: speech is normal   Level of consciousness: alert  Able to repeat. Cranial Nerves     CN II   Visual fields full to confrontation. Right visual field deficit: none  Left visual field deficit: none     CN III, IV, VI   Pupils are equal, round, and reactive to light. Extraocular motions are normal.   Right pupil: Shape: regular. Reactivity: brisk. Left pupil: Shape: regular. Reactivity: brisk. CN V   Facial sensation intact. Right facial sensation deficit: none  Left facial sensation deficit: none    CN VII   Facial expression full, symmetric.    Right facial weakness: none  Left facial weakness: none    CN VIII   CN VIII normal.   Hearing: intact    CN IX, X   CN IX normal.   CN X normal.   Palate: symmetric    CN XI   CN XI normal.   Right sternocleidomastoid strength: normal  Left sternocleidomastoid strength: normal  Right trapezius strength: normal  Left trapezius strength: normal    CN XII   CN XII normal.   Tongue: not atrophic  Fasciculations: absent  Tongue deviation: none    Motor Exam   Muscle bulk: normal  Overall muscle tone: normal  Right arm pronator drift: absent  Left arm pronator drift: absent  Motor Strength:    Bilateral upper extremities: 5/5  RLE: 5/5  LLE: 4+/5     Sensory Exam   Light touch normal.     Gait, Coordination, and Reflexes     Coordination   Finger to nose coordination: normal  Heel to shin coordination: normal    Tremor   Resting tremor: absent  Action tremor: absent  No adventitious motor movements noted on exam.         Mallampati Score: II          ASA Score: 2         Labs/Imaging/Diagnostics   Labs:  CBC:No results for input(s): WBC, RBC, HGB, HCT, MCV, RDW, PLT in the last 72 hours. CHEMISTRIES:No results for input(s): NA, K, CL, CO2, BUN, CREATININE, GLUCOSE, CA, PHOS, MG in the last 72 hours. COAGULATION STUDIES:No results for input(s): PROTIME, INR, APTT in the last 72 hours. LIVER PROFILE:No results for input(s): AST, ALT, BILIDIR, BILITOT, ALKPHOS in the last 72 hours. CHOLESTEROL AND A1C:No results for input(s): LDLCALC, HDL, CHOL, TRIG, LABA1C in the last 720 hours. Imaging:    CTA Head & Neck 1/19/2023:    1. There is approximately 85% stenosis in the proximal right internal carotid artery, unchanged since previous CTA dated 5/9/2022. There is no significant hemodynamic stenosis in the right common carotid artery. 2. There is approximately 80% stenosis in the proximal left internal carotid artery, unchanged since previous CTA dated 5/9/2022. There is no significant hemodynamic stenosis in the left common carotid artery. 3. There are larger right and smaller left vertebral arteries with antegrade flow bilaterally. There are calcifications at the origins of the vertebral arteries bilaterally. 4. There is atherosclerotic calcification in the aortic arch and at the origins of the brachiocephalic, left common carotid and left subclavian arteries.    5. There is atherosclerotic calcification in the cavernous segments of both internal carotid arteries. There is no evidence of severe stenosis. 6. There is calcification in both distal vertebral arteries, left greater than right. There is antegrade flow in the vertebral arteries bilaterally. 7. There is a patent posterior communicating artery on the left side which helps supply the left posterior cerebral artery. 8. There is mild enlargement of the right and left lobes of the thyroid gland. 9. There is a calcified granuloma in the left upper lobe of the lung. Assessment and Plan:        Bilateral internal carotid artery stenosis, left vertebral artery stenosis  DSA 7/13/22: Minimal non flow limiting stenosis at both carotid arteries. Severe flow limiting stenosis at the origin of the left Vertebral artery. Supplementary supply to the left vertebral artery arising from the ascending cervical trunk with connection to the distal V2 / proximal V3 segment of the left vertebral artery. Severe atherosclerotic disease within the origins of the great vessels of the chest. R vert imaging unable to be obtained. CT angiogram head and neck from 1/19/23 demonstrates stable stenosis about 84% stenosis of the right internal carotid artery and 80% stenosis of the left internal carotid artery, focal areas of severe stenosis in the nondominant left vertebral artery. Continue aspirin 81 mg daily. Ideally would like for the patient to be on dual antiplatelets, but given his history of GI bleeding, nosebleeding, and easy bleeding in general we will hold off at this time. Continue lovastatin 20 mg daily  Planning for DSA today targeting right vertebral artery for imaging which was not able to be obtained last time. Will take a right radial approach in today's procedure. The risks and benefits of the procedure were explained to the patient. Risks include, but are not limited to, stroke, hematoma, hemorrhage, infection, nephrotoxicity, allergic reaction, bleeding, and dissection.   Written informed consent was obtained. We will proceed with diagnostic cerebral angiogram today with Dr. Татьяна Casillas. Remain NPO until following the procedure. Okay to take morning medications with a sip of water. Further recommendations following today's diagnostic cerebral angiogram with Dr. Татьяна Casillas. This patient was seen and evaluated with Dr. Татьяна Casillas and he is in agreement with the assessment and plan.     Electronically signed by Cheryl Peres PA-C on 1/31/23 at 9:13 AM EST

## 2023-01-31 NOTE — NURSE NAVIGATOR
Verify consent, H&P, and/or immediate pre-procedure note completed  2853 Staff involved in the case-DAVID Lyle,RN; Aleida Crump, RT; Reynaldo Rivas, RT, Denver Rosser, RN; Heather Modi, RT; Dr. Lexi Curtis. Kenneth Loo, RN; Froilan Beaulieu, RN  2855 Pre-procedure peripheral pulse,  right pedal 1+, right posterior tibial 1+, left pedal 2+, left posterior tibial 1+  1010 Patient received in cath lab for procedure family taken to waiting room. Neuro assessment NIHSS 0; A&O x 4;  (Abbreviated neuro checks, vital signs, and pulse oximeter every 5 minutes documented in flow sheets  1015 Patient prepped for procedure  1022 Procedure started with Dr. Eddy Amin. Timeout performed and the following information was verified: correct patient, correct procedure, correct procedure site, correct position, correct laterality (if applicable), procedure site marked and visible (if applicable), and consents verified. Allergies reviewed. Pertinent diagnostic and radiologic results present and relevant images available (if applicable). All special equipment or special requirements available as applicable. Prophylactic antibiotics given as applicable. Fire risk safety assessment completed, shared with team and appropriate interventions implemented. 1028 2% Lidocaine time-2 ml  1029 Access obtained at right radial via 5 Arabic Sheath  1029-Heparin 3000 units, Verapamil 2.5 mg , and Nitro 200 mcg cocktail given  1035 Angiogram right brachiocephalic  4400-[id number] right vertebral artery    1040 Sheath-removed, intact, 1040 Vasc band applied with 8 ml air  1041 Procedure completed; patient tolerated well. 1043 Right Radial site; area soft to touch with no bleeding noted. 1045 Post-procedure peripheral pulse,  radial right 2+, left radial 2+  1046 Radial dressing remains dry and intact with area soft. Neuro assessment A& o x4. Denies pain.   VS per flowsheet Jordi Score 9  1049 Case end time  1050 Patient on bed, patient exits procedural suite  1052 Patient take to 2E11    Xray time- 2.8 minutes   97 mGy  Sedation time- 23 minutes (First dose until procedure end time)  Total contrast- 40 ml's

## 2023-01-31 NOTE — PROGRESS NOTES
Returned to 2E11. Monitor attached showing SR. Vasc-band in place to right wrist.  Hemostasis maintained, no bleeding, swelling, or edema noted. 0.9NSS infusing with approx 800 ml remaining.

## 2023-01-31 NOTE — PROGRESS NOTES
Discharge teaching and instructions completed with patient and wife using teachback method. AVS reviewed. Patient and wife voiced understanding regarding medications, follow up appointments, and care of self at home.

## 2023-03-23 ENCOUNTER — HOSPITAL ENCOUNTER (OUTPATIENT)
Age: 83
Discharge: HOME | End: 2023-03-23
Payer: MEDICARE

## 2023-03-23 LAB
DEPRECATED RDW RBC AUTO: 44.1 FL (ref 35–45)
ERYTHROCYTE [DISTWIDTH] IN BLOOD BY AUTOMATED COUNT: 13.2 % (ref 11.5–14.5)
HCT VFR BLD AUTO: 43 % (ref 42–52)
HGB BLD-MCNC: 13.7 GM/DL (ref 14–18)
MCH RBC QN AUTO: 29 PG (ref 26–33)
MCHC RBC AUTO-ENTMCNC: 31.9 GM/DL (ref 32.2–35.5)
MCV RBC AUTO: 90.9 FL (ref 80–94)
PLATELET # BLD AUTO: 240 THOU/MM3 (ref 130–400)
PMV BLD AUTO: 10.5 FL (ref 9.4–12.4)
RBC # BLD AUTO: 4.73 MILL/MM3 (ref 4.7–6.1)
WBC # BLD AUTO: 7.2 THOU/MM3 (ref 4.8–10.8)

## 2023-03-23 PROCEDURE — 85027 COMPLETE CBC AUTOMATED: CPT

## 2023-03-23 PROCEDURE — 36415 COLL VENOUS BLD VENIPUNCTURE: CPT

## 2023-05-15 ENCOUNTER — OFFICE VISIT (OUTPATIENT)
Dept: PULMONOLOGY | Age: 83
End: 2023-05-15
Payer: MEDICARE

## 2023-05-15 VITALS
HEART RATE: 62 BPM | BODY MASS INDEX: 25.74 KG/M2 | OXYGEN SATURATION: 90 % | HEIGHT: 69 IN | TEMPERATURE: 97.3 F | DIASTOLIC BLOOD PRESSURE: 60 MMHG | SYSTOLIC BLOOD PRESSURE: 128 MMHG | WEIGHT: 173.8 LBS

## 2023-05-15 DIAGNOSIS — J96.11 CHRONIC RESPIRATORY FAILURE WITH HYPOXIA (HCC): ICD-10-CM

## 2023-05-15 DIAGNOSIS — Z87.891 FORMER SMOKER, STOPPED SMOKING IN DISTANT PAST: ICD-10-CM

## 2023-05-15 DIAGNOSIS — J44.9 COPD, MODERATE (HCC): ICD-10-CM

## 2023-05-15 DIAGNOSIS — J43.9 PULMONARY EMPHYSEMA, UNSPECIFIED EMPHYSEMA TYPE (HCC): Primary | ICD-10-CM

## 2023-05-15 DIAGNOSIS — Z95.818 PRESENCE OF WATCHMAN LEFT ATRIAL APPENDAGE CLOSURE DEVICE: ICD-10-CM

## 2023-05-15 PROBLEM — M18.0 PRIMARY OSTEOARTHRITIS OF BOTH FIRST CARPOMETACARPAL JOINTS: Status: ACTIVE | Noted: 2023-05-15

## 2023-05-15 PROCEDURE — G8427 DOCREV CUR MEDS BY ELIG CLIN: HCPCS | Performed by: NURSE PRACTITIONER

## 2023-05-15 PROCEDURE — 3023F SPIROM DOC REV: CPT | Performed by: NURSE PRACTITIONER

## 2023-05-15 PROCEDURE — 99214 OFFICE O/P EST MOD 30 MIN: CPT | Performed by: NURSE PRACTITIONER

## 2023-05-15 PROCEDURE — 3074F SYST BP LT 130 MM HG: CPT | Performed by: NURSE PRACTITIONER

## 2023-05-15 PROCEDURE — 1036F TOBACCO NON-USER: CPT | Performed by: NURSE PRACTITIONER

## 2023-05-15 PROCEDURE — 3078F DIAST BP <80 MM HG: CPT | Performed by: NURSE PRACTITIONER

## 2023-05-15 PROCEDURE — 1123F ACP DISCUSS/DSCN MKR DOCD: CPT | Performed by: NURSE PRACTITIONER

## 2023-05-15 PROCEDURE — G8417 CALC BMI ABV UP PARAM F/U: HCPCS | Performed by: NURSE PRACTITIONER

## 2023-05-15 RX ORDER — UMECLIDINIUM BROMIDE AND VILANTEROL TRIFENATATE 62.5; 25 UG/1; UG/1
1 POWDER RESPIRATORY (INHALATION) DAILY
Qty: 3 EACH | Refills: 3 | Status: SHIPPED | OUTPATIENT
Start: 2023-05-15

## 2023-05-15 ASSESSMENT — ENCOUNTER SYMPTOMS
COUGH: 0
EYES NEGATIVE: 1
VOMITING: 0
SHORTNESS OF BREATH: 1
NAUSEA: 0
WHEEZING: 0
DIARRHEA: 0
ABDOMINAL PAIN: 0

## 2023-05-15 NOTE — PROGRESS NOTES
[facility]     Patient: Caty Pacheco, 82 y.o.   : 1940  5/15/2023    Pt of Dr. Eugenie Boyd   Patient presents with    COPD     1yr COPD f/u w/o testing;. Doing well. Does still get SOBOE. Notes he does have O2 at home and one in the car. Usually has 2Lpm.  Will need a refill of Anoro is continued on tx. Is accompanied by his wife, Surekha Garcia. HPI  Brenton SOTOMAYOR is here for 1 year follow up for COPD    Any new medical issues since last visit : no  Cardiac history: AFIB, s/p watchman   Any recent exacerbations that have required oral atb or steroids No  Cough has improved. Swelling in legs last 4 months, gained 20 lbs .  Next appt with Dr Lamine Locke in 2024  Current Symptoms : SOB with walking fast pace or up hill. ,   \" I feel my breathing has been the same\"   Current treatment includes Anoro - high cost  until meets deductible , does not have JOSE LUIS   Walks 1 mile treadmill daily, may take a few breaks for coffee, Not breaking for SOB      Oxygen: Yes, Activity and Sleep and PRN,  2LPM ,last 6 min walk:     MMRC Grade 1:  I get short of breath when hurrying on the level ground, or walking up a slight hill    SOCIAL HISTORY:  Social History     Tobacco Use    Smoking status: Former     Packs/day: 1.50     Years: 50.00     Pack years: 75.00     Types: Cigarettes     Quit date: 10/9/2004     Years since quittin.6    Smokeless tobacco: Never    Tobacco comments:     Quit smoking    Vaping Use    Vaping Use: Never used   Substance Use Topics    Alcohol use: Yes     Comment: 3 to 4 a week  weekends    Drug use: No       CURRENT MEDICATIONS:  Current Outpatient Medications   Medication Sig Dispense Refill    umeclidinium-vilanterol (ANORO ELLIPTA) 62.5-25 MCG/ACT inhaler Inhale 1 puff into the lungs daily 3 each 3    lovastatin (MEVACOR) 20 MG tablet TAKE 1 TABLET DAILY 90 tablet 1    potassium chloride (KLOR-CON M) 20 MEQ extended

## 2023-05-17 ENCOUNTER — OFFICE VISIT (OUTPATIENT)
Dept: CARDIOLOGY CLINIC | Age: 83
End: 2023-05-17
Payer: MEDICARE

## 2023-05-17 VITALS
SYSTOLIC BLOOD PRESSURE: 143 MMHG | BODY MASS INDEX: 26.67 KG/M2 | HEIGHT: 68 IN | HEART RATE: 69 BPM | DIASTOLIC BLOOD PRESSURE: 61 MMHG | WEIGHT: 176 LBS

## 2023-05-17 DIAGNOSIS — I50.32 CHRONIC DIASTOLIC CHF (CONGESTIVE HEART FAILURE) (HCC): ICD-10-CM

## 2023-05-17 DIAGNOSIS — Z95.2 S/P TAVR (TRANSCATHETER AORTIC VALVE REPLACEMENT): ICD-10-CM

## 2023-05-17 DIAGNOSIS — Z95.818 PRESENCE OF WATCHMAN LEFT ATRIAL APPENDAGE CLOSURE DEVICE: ICD-10-CM

## 2023-05-17 DIAGNOSIS — I10 ESSENTIAL HYPERTENSION: Primary | ICD-10-CM

## 2023-05-17 PROCEDURE — 3077F SYST BP >= 140 MM HG: CPT | Performed by: STUDENT IN AN ORGANIZED HEALTH CARE EDUCATION/TRAINING PROGRAM

## 2023-05-17 PROCEDURE — 99214 OFFICE O/P EST MOD 30 MIN: CPT | Performed by: STUDENT IN AN ORGANIZED HEALTH CARE EDUCATION/TRAINING PROGRAM

## 2023-05-17 PROCEDURE — 1036F TOBACCO NON-USER: CPT | Performed by: STUDENT IN AN ORGANIZED HEALTH CARE EDUCATION/TRAINING PROGRAM

## 2023-05-17 PROCEDURE — G8417 CALC BMI ABV UP PARAM F/U: HCPCS | Performed by: STUDENT IN AN ORGANIZED HEALTH CARE EDUCATION/TRAINING PROGRAM

## 2023-05-17 PROCEDURE — 3078F DIAST BP <80 MM HG: CPT | Performed by: STUDENT IN AN ORGANIZED HEALTH CARE EDUCATION/TRAINING PROGRAM

## 2023-05-17 PROCEDURE — G8427 DOCREV CUR MEDS BY ELIG CLIN: HCPCS | Performed by: STUDENT IN AN ORGANIZED HEALTH CARE EDUCATION/TRAINING PROGRAM

## 2023-05-17 PROCEDURE — 1123F ACP DISCUSS/DSCN MKR DOCD: CPT | Performed by: STUDENT IN AN ORGANIZED HEALTH CARE EDUCATION/TRAINING PROGRAM

## 2023-05-17 RX ORDER — FUROSEMIDE 20 MG/1
20 TABLET ORAL DAILY
Qty: 90 TABLET | Refills: 1 | Status: SHIPPED | OUTPATIENT
Start: 2023-05-17

## 2023-05-17 RX ORDER — FUROSEMIDE 20 MG/1
20 TABLET ORAL DAILY
Qty: 30 TABLET | Refills: 0 | Status: SHIPPED | OUTPATIENT
Start: 2023-05-17

## 2023-05-17 NOTE — PROGRESS NOTES
[facility] PROFESSIONAL SERVICES  [facility]   [address]    [address] [address]   Dept: [phone]   Dept Fax: [phone]   Loc: [phone]      Chief Complaint   Patient presents with    Follow-up    Leg Swelling    Shortness of Breath     With activity      Cardiologist:  Dr. Kathy Young  81 yo male presents for weight gain, swelling. Hx of HTN, PAD s/p L SFA, PAF, TAVR, PCI 2404, chronic diastolic CHF, EF 39%. Some SOB with activity. Has gained some weight, about 20 lbs over the last year and leg is swelling up. No chest pain. His appetite is okay. Slightly more fatigue. Saw pulm recently who suggested follow up with cardio. General:   No fever, no chills, no weight loss, ++ fatigue  Pulmonary:    ++ dyspnea, no wheezing  Cardiac:    Denies recent chest pain   GI:     No nausea or vomiting, no abdominal pain  Neuro:      No dizziness or light headedness  Musculoskeletal:  No recent active issues  Extremities:   ++ edema      Past Medical History:   Diagnosis Date    ASCVD (arteriosclerotic cardiovascular disease)     Atrial fibrillation (HCC)     CAD (coronary artery disease)     Cerebral artery occlusion with cerebral infarction [facility])     TIA no defiect    COPD (chronic obstructive pulmonary disease) ([facility] [address]     Difficult intubation     got dysplasia of vocal cord    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    Elevated glucose     Hyperlipidemia     Hypertension     Nocturnal hypoxia 10/20/2020    Abnormal overnight pulse oximeter on room air 10/17/2020: total of 5 hours/ 10 min spent with oxygen < 89%. Lowest de-sat 77%.     Osteoarthritis     Osteopenia     Osteopenia     PAD (peripheral artery disease) (HCC)     Polio 1948    Rheumatic fever 1946       Allergies   Allergen Reactions    Brilinta [Ticagrelor] Shortness Of Breath    Cephalexin Rash and Hives       Current Outpatient Medications   Medication Sig Dispense Refill    Apoaequorin (PREVAGEN PO) Take by mouth

## 2023-06-06 ENCOUNTER — HOSPITAL ENCOUNTER (OUTPATIENT)
Age: 83
Discharge: HOME | End: 2023-06-06
Payer: MEDICARE

## 2023-06-06 ENCOUNTER — OFFICE VISIT (OUTPATIENT)
Dept: FAMILY MEDICINE CLINIC | Age: 83
End: 2023-06-06
Payer: MEDICARE

## 2023-06-06 VITALS
HEIGHT: 68 IN | SYSTOLIC BLOOD PRESSURE: 138 MMHG | OXYGEN SATURATION: 92 % | HEART RATE: 73 BPM | TEMPERATURE: 98.4 F | WEIGHT: 168.4 LBS | BODY MASS INDEX: 25.52 KG/M2 | DIASTOLIC BLOOD PRESSURE: 66 MMHG

## 2023-06-06 DIAGNOSIS — Z00.00 MEDICARE ANNUAL WELLNESS VISIT, SUBSEQUENT: Primary | ICD-10-CM

## 2023-06-06 DIAGNOSIS — I73.9 PAD (PERIPHERAL ARTERY DISEASE) (HCC): ICD-10-CM

## 2023-06-06 DIAGNOSIS — I25.119 ATHEROSCLEROSIS OF NATIVE CORONARY ARTERY OF NATIVE HEART WITH ANGINA PECTORIS (HCC): ICD-10-CM

## 2023-06-06 DIAGNOSIS — I10 ESSENTIAL HYPERTENSION: ICD-10-CM

## 2023-06-06 DIAGNOSIS — E78.2 MIXED HYPERLIPIDEMIA: ICD-10-CM

## 2023-06-06 LAB
DEPRECATED RDW RBC AUTO: 47.5 FL (ref 35–45)
ERYTHROCYTE [DISTWIDTH] IN BLOOD BY AUTOMATED COUNT: 13.8 % (ref 11.5–14.5)
HCT VFR BLD AUTO: 47.3 % (ref 42–52)
HGB BLD-MCNC: 14.9 GM/DL (ref 14–18)
MCH RBC QN AUTO: 29.1 PG (ref 26–33)
MCHC RBC AUTO-ENTMCNC: 31.5 GM/DL (ref 32.2–35.5)
MCV RBC AUTO: 92.4 FL (ref 80–94)
PLATELET # BLD AUTO: 264 THOU/MM3 (ref 130–400)
PMV BLD AUTO: 10.3 FL (ref 9.4–12.4)
RBC # BLD AUTO: 5.12 MILL/MM3 (ref 4.7–6.1)
WBC # BLD AUTO: 8.9 THOU/MM3 (ref 4.8–10.8)

## 2023-06-06 PROCEDURE — 1123F ACP DISCUSS/DSCN MKR DOCD: CPT | Performed by: FAMILY MEDICINE

## 2023-06-06 PROCEDURE — G0439 PPPS, SUBSEQ VISIT: HCPCS | Performed by: FAMILY MEDICINE

## 2023-06-06 PROCEDURE — 36415 COLL VENOUS BLD VENIPUNCTURE: CPT

## 2023-06-06 PROCEDURE — 85027 COMPLETE CBC AUTOMATED: CPT

## 2023-06-06 PROCEDURE — 3078F DIAST BP <80 MM HG: CPT | Performed by: FAMILY MEDICINE

## 2023-06-06 PROCEDURE — 3075F SYST BP GE 130 - 139MM HG: CPT | Performed by: FAMILY MEDICINE

## 2023-06-06 RX ORDER — POTASSIUM CHLORIDE 20 MEQ/1
20 TABLET, EXTENDED RELEASE ORAL DAILY
Qty: 90 TABLET | Refills: 3 | Status: SHIPPED | OUTPATIENT
Start: 2023-06-06

## 2023-06-06 RX ORDER — AMLODIPINE BESYLATE 5 MG/1
5 TABLET ORAL DAILY
Qty: 90 TABLET | Refills: 3 | Status: SHIPPED | OUTPATIENT
Start: 2023-06-06

## 2023-06-06 RX ORDER — LOVASTATIN 20 MG/1
TABLET ORAL
Qty: 90 TABLET | Refills: 3 | Status: SHIPPED | OUTPATIENT
Start: 2023-06-06

## 2023-06-06 RX ORDER — PANTOPRAZOLE SODIUM 40 MG/1
40 TABLET, DELAYED RELEASE ORAL DAILY
Qty: 90 TABLET | Refills: 3 | Status: SHIPPED | OUTPATIENT
Start: 2023-06-06

## 2023-06-06 SDOH — ECONOMIC STABILITY: FOOD INSECURITY: WITHIN THE PAST 12 MONTHS, THE FOOD YOU BOUGHT JUST DIDN'T LAST AND YOU DIDN'T HAVE MONEY TO GET MORE.: NEVER TRUE

## 2023-06-06 SDOH — ECONOMIC STABILITY: HOUSING INSECURITY
IN THE LAST 12 MONTHS, WAS THERE A TIME WHEN YOU DID NOT HAVE A STEADY PLACE TO SLEEP OR SLEPT IN A SHELTER (INCLUDING NOW)?: NO

## 2023-06-06 SDOH — ECONOMIC STABILITY: FOOD INSECURITY: WITHIN THE PAST 12 MONTHS, YOU WORRIED THAT YOUR FOOD WOULD RUN OUT BEFORE YOU GOT MONEY TO BUY MORE.: NEVER TRUE

## 2023-06-06 SDOH — ECONOMIC STABILITY: INCOME INSECURITY: HOW HARD IS IT FOR YOU TO PAY FOR THE VERY BASICS LIKE FOOD, HOUSING, MEDICAL CARE, AND HEATING?: NOT HARD AT ALL

## 2023-06-06 ASSESSMENT — PATIENT HEALTH QUESTIONNAIRE - PHQ9
SUM OF ALL RESPONSES TO PHQ QUESTIONS 1-9: 0
SUM OF ALL RESPONSES TO PHQ9 QUESTIONS 1 & 2: 0
1. LITTLE INTEREST OR PLEASURE IN DOING THINGS: 0
2. FEELING DOWN, DEPRESSED OR HOPELESS: 0
SUM OF ALL RESPONSES TO PHQ QUESTIONS 1-9: 0

## 2023-06-06 NOTE — PROGRESS NOTES
Medicare Annual Wellness Visit    Tom Cobb is here for Medicare AWV    Assessment & Plan   Medicare annual wellness visit, subsequent  Essential hypertension  -     potassium chloride (KLOR-CON M) 20 MEQ extended release tablet; Take 1 tablet by mouth daily, Disp-90 tablet, R-3Normal  -     amLODIPine (NORVASC) 5 MG tablet; Take 1 tablet by mouth daily, Disp-90 tablet, R-3Normal  -     CBC; Future  -     Comprehensive Metabolic Panel; Future  Mixed hyperlipidemia  -     lovastatin (MEVACOR) 20 MG tablet; TAKE 1 TABLET DAILY, Disp-90 tablet, R-3Normal  -     Lipid Panel; Future  PAD (peripheral artery disease) (HCC)  -     pantoprazole (PROTONIX) 40 MG tablet; Take 1 tablet by mouth daily, Disp-90 tablet, R-3Normal  -     Lipid Panel; Future  Atherosclerosis of native coronary artery of native heart with angina pectoris ([facility] [address]      Chronic problems as above. Refill medications. Check labs in 6 months  PAD: On aspirin and lovastatin. Chronic. Stable    Atherosclerosis coronary arteries: Chronic. Stable. History of stent. Medical management. On statin and aspirin    Recommendations for Preventive Services Due: see orders and patient instructions/AVS.  Recommended screening schedule for the next 5-10 years is provided to the patient in written form: see Patient Instructions/AVS.     Return in about 6 months (around 12/6/2023) for HTN, CAD. Subjective       Patient's complete Health Risk Assessment and screening values have been reviewed and are found in Flowsheets. The following problems were reviewed today and where indicated follow up appointments were made and/or referrals ordered.     Positive Risk Factor Screenings with Interventions:    Fall Risk:  Do you feel unsteady or are you worried about falling? : (!) yes (takes time and is mindful about falling)  2 or more falls in past year?: no  Fall with injury in past year?: no     Interventions:    Patient declines any further evaluation or

## 2023-08-01 ENCOUNTER — OFFICE VISIT (OUTPATIENT)
Dept: NEUROLOGY | Age: 83
End: 2023-08-01

## 2023-08-01 VITALS
SYSTOLIC BLOOD PRESSURE: 134 MMHG | DIASTOLIC BLOOD PRESSURE: 64 MMHG | WEIGHT: 168 LBS | BODY MASS INDEX: 25.54 KG/M2 | HEART RATE: 72 BPM

## 2023-08-01 DIAGNOSIS — R42 DIZZINESS: ICD-10-CM

## 2023-08-01 DIAGNOSIS — I65.23 CAROTID STENOSIS, ASYMPTOMATIC, BILATERAL: Primary | ICD-10-CM

## 2023-08-01 DIAGNOSIS — I65.03 STENOSIS OF BOTH VERTEBRAL ARTERIES: Primary | ICD-10-CM

## 2023-08-01 NOTE — PROGRESS NOTES
does confirm some blurry vision but states he follows with his eye doctor and was found to have cataracts. Review of Systems   Review of Systems   Constitutional:  Negative for fatigue and fever. Eyes:  Negative for visual disturbance. Respiratory:  Negative for cough and shortness of breath. Cardiovascular:  Negative for chest pain. Gastrointestinal:  Negative for abdominal pain, nausea and vomiting. Skin:  Negative for rash. Neurological:  Positive for dizziness (intermittent, chronic). Negative for facial asymmetry, speech difficulty, weakness, light-headedness, numbness and headaches. Hematological:  Bruises/bleeds easily. Psychiatric/Behavioral:  The patient is not nervous/anxious. Medications   Home Meds:   Current Outpatient Medications on File Prior to Visit   Medication Sig Dispense Refill    potassium chloride (KLOR-CON M) 20 MEQ extended release tablet Take 1 tablet by mouth daily 90 tablet 3    amLODIPine (NORVASC) 5 MG tablet Take 1 tablet by mouth daily 90 tablet 3    lovastatin (MEVACOR) 20 MG tablet TAKE 1 TABLET DAILY 90 tablet 3    pantoprazole (PROTONIX) 40 MG tablet Take 1 tablet by mouth daily 90 tablet 3    Apoaequorin (PREVAGEN PO) Take by mouth Daily      furosemide (LASIX) 20 MG tablet Take 1 tablet by mouth daily 30 tablet 0    furosemide (LASIX) 20 MG tablet Take 1 tablet by mouth daily 90 tablet 1    umeclidinium-vilanterol (ANORO ELLIPTA) 62.5-25 MCG/ACT inhaler Inhale 1 puff into the lungs daily 3 each 3    hydrOXYzine HCl (ATARAX) 25 MG tablet Take 1 tablet by mouth every 8 hours as needed for Itching 90 tablet 3    Ferrous Sulfate (IRON) 325 (65 Fe) MG TABS Take by mouth      nitroGLYCERIN (NITROSTAT) 0.4 MG SL tablet up to max of 3 total doses.  If no relief after 1 dose, call 911. 25 tablet 0    triamcinolone (KENALOG) 0.1 % cream Apply as needed to left leg      aspirin 81 MG EC tablet Take 1 tablet by mouth daily Stop 4 weeks post peripheral

## 2023-08-03 ASSESSMENT — ENCOUNTER SYMPTOMS
COUGH: 0
VOMITING: 0
SHORTNESS OF BREATH: 0
NAUSEA: 0
ABDOMINAL PAIN: 0

## 2023-09-21 ENCOUNTER — TELEPHONE (OUTPATIENT)
Dept: PULMONOLOGY | Age: 83
End: 2023-09-21

## 2023-09-21 DIAGNOSIS — J43.9 PULMONARY EMPHYSEMA, UNSPECIFIED EMPHYSEMA TYPE (HCC): Primary | ICD-10-CM

## 2023-09-21 NOTE — TELEPHONE ENCOUNTER
Patients wife called in requesting a script for a rescue inhaler be sent into [facility] in [place]. Please advise.

## 2023-09-22 RX ORDER — ALBUTEROL SULFATE 90 UG/1
2 AEROSOL, METERED RESPIRATORY (INHALATION) 4 TIMES DAILY PRN
Qty: 18 G | Refills: 5 | Status: SHIPPED | OUTPATIENT
Start: 2023-09-22

## 2023-11-13 ENCOUNTER — HOSPITAL ENCOUNTER (OUTPATIENT)
Age: 83
Discharge: HOME | End: 2023-11-13
Payer: MEDICARE

## 2023-11-13 DIAGNOSIS — E78.2 MIXED HYPERLIPIDEMIA: ICD-10-CM

## 2023-11-13 DIAGNOSIS — I10 ESSENTIAL HYPERTENSION: ICD-10-CM

## 2023-11-13 DIAGNOSIS — I73.9 PAD (PERIPHERAL ARTERY DISEASE) (HCC): ICD-10-CM

## 2023-11-13 LAB
ALBUMIN SERPL BCG-MCNC: 4.1 G/DL (ref 3.5–5.1)
ALP SERPL-CCNC: 83 U/L (ref 38–126)
ALT SERPL W/O P-5'-P-CCNC: 8 U/L (ref 11–66)
ANION GAP SERPL CALC-SCNC: 9 MEQ/L (ref 8–16)
AST SERPL-CCNC: 21 U/L (ref 5–40)
BILIRUB SERPL-MCNC: 0.7 MG/DL (ref 0.3–1.2)
BUN SERPL-MCNC: 16 MG/DL (ref 7–22)
CALCIUM SERPL-MCNC: 9.1 MG/DL (ref 8.5–10.5)
CHLORIDE SERPL-SCNC: 103 MEQ/L (ref 98–111)
CHOLEST SERPL-MCNC: 148 MG/DL (ref 100–199)
CO2 SERPL-SCNC: 28 MEQ/L (ref 23–33)
CREAT SERPL-MCNC: 1 MG/DL (ref 0.4–1.2)
DEPRECATED RDW RBC AUTO: 44.8 FL (ref 35–45)
ERYTHROCYTE [DISTWIDTH] IN BLOOD BY AUTOMATED COUNT: 13.2 % (ref 11.5–14.5)
GFR SERPL CREATININE-BSD FRML MDRD: > 60 ML/MIN/1.73M2
GLUCOSE SERPL-MCNC: 91 MG/DL (ref 70–108)
HCT VFR BLD AUTO: 44.9 % (ref 42–52)
HDLC SERPL-MCNC: 56 MG/DL
HGB BLD-MCNC: 14.5 GM/DL (ref 14–18)
LDLC SERPL CALC-MCNC: 82 MG/DL
MCH RBC QN AUTO: 29.7 PG (ref 26–33)
MCHC RBC AUTO-ENTMCNC: 32.3 GM/DL (ref 32.2–35.5)
MCV RBC AUTO: 91.8 FL (ref 80–94)
PLATELET # BLD AUTO: 241 THOU/MM3 (ref 130–400)
PMV BLD AUTO: 10.1 FL (ref 9.4–12.4)
POTASSIUM SERPL-SCNC: 4.6 MEQ/L (ref 3.5–5.2)
PROT SERPL-MCNC: 7.4 G/DL (ref 6.1–8)
RBC # BLD AUTO: 4.89 MILL/MM3 (ref 4.7–6.1)
SODIUM SERPL-SCNC: 140 MEQ/L (ref 135–145)
TRIGL SERPL-MCNC: 48 MG/DL (ref 0–199)
WBC # BLD AUTO: 7.9 THOU/MM3 (ref 4.8–10.8)

## 2023-11-13 PROCEDURE — 36415 COLL VENOUS BLD VENIPUNCTURE: CPT

## 2023-11-13 PROCEDURE — 85027 COMPLETE CBC AUTOMATED: CPT

## 2023-11-13 PROCEDURE — 80061 LIPID PANEL: CPT

## 2023-11-13 PROCEDURE — 80053 COMPREHEN METABOLIC PANEL: CPT

## 2023-11-15 DIAGNOSIS — L29.9 GENERALIZED PRURITUS: ICD-10-CM

## 2023-11-15 RX ORDER — HYDROXYZINE HYDROCHLORIDE 25 MG/1
25 TABLET, FILM COATED ORAL EVERY 8 HOURS PRN
Qty: 90 TABLET | Refills: 3 | Status: SHIPPED | OUTPATIENT
Start: 2023-11-15

## 2023-11-15 NOTE — TELEPHONE ENCOUNTER
Patients wife came in patient needs a short supply of his HYDROXYZINE 25 MG sent to [facility] in [place] to get him to his appointment next month

## 2023-12-07 ENCOUNTER — OFFICE VISIT (OUTPATIENT)
Dept: FAMILY MEDICINE CLINIC | Age: 83
End: 2023-12-07
Payer: MEDICARE

## 2023-12-07 VITALS
OXYGEN SATURATION: 86 % | WEIGHT: 170.8 LBS | BODY MASS INDEX: 25.88 KG/M2 | TEMPERATURE: 97.6 F | DIASTOLIC BLOOD PRESSURE: 72 MMHG | RESPIRATION RATE: 18 BRPM | SYSTOLIC BLOOD PRESSURE: 136 MMHG | HEART RATE: 82 BPM | HEIGHT: 68 IN

## 2023-12-07 DIAGNOSIS — I50.32 CHRONIC DIASTOLIC CHF (CONGESTIVE HEART FAILURE) (HCC): ICD-10-CM

## 2023-12-07 DIAGNOSIS — I87.2 STASIS DERMATITIS OF BOTH LEGS: ICD-10-CM

## 2023-12-07 DIAGNOSIS — J43.9 PULMONARY EMPHYSEMA, UNSPECIFIED EMPHYSEMA TYPE (HCC): ICD-10-CM

## 2023-12-07 DIAGNOSIS — I73.9 PAD (PERIPHERAL ARTERY DISEASE) (HCC): ICD-10-CM

## 2023-12-07 DIAGNOSIS — E78.2 MIXED HYPERLIPIDEMIA: ICD-10-CM

## 2023-12-07 DIAGNOSIS — I25.119 ATHEROSCLEROSIS OF NATIVE CORONARY ARTERY OF NATIVE HEART WITH ANGINA PECTORIS (HCC): ICD-10-CM

## 2023-12-07 DIAGNOSIS — I10 ESSENTIAL HYPERTENSION: Primary | ICD-10-CM

## 2023-12-07 PROCEDURE — 1123F ACP DISCUSS/DSCN MKR DOCD: CPT | Performed by: FAMILY MEDICINE

## 2023-12-07 PROCEDURE — G8417 CALC BMI ABV UP PARAM F/U: HCPCS | Performed by: FAMILY MEDICINE

## 2023-12-07 PROCEDURE — 3075F SYST BP GE 130 - 139MM HG: CPT | Performed by: FAMILY MEDICINE

## 2023-12-07 PROCEDURE — 3023F SPIROM DOC REV: CPT | Performed by: FAMILY MEDICINE

## 2023-12-07 PROCEDURE — 1036F TOBACCO NON-USER: CPT | Performed by: FAMILY MEDICINE

## 2023-12-07 PROCEDURE — 99214 OFFICE O/P EST MOD 30 MIN: CPT | Performed by: FAMILY MEDICINE

## 2023-12-07 PROCEDURE — G8427 DOCREV CUR MEDS BY ELIG CLIN: HCPCS | Performed by: FAMILY MEDICINE

## 2023-12-07 PROCEDURE — G8484 FLU IMMUNIZE NO ADMIN: HCPCS | Performed by: FAMILY MEDICINE

## 2023-12-07 PROCEDURE — 3078F DIAST BP <80 MM HG: CPT | Performed by: FAMILY MEDICINE

## 2023-12-07 RX ORDER — FUROSEMIDE 20 MG/1
20 TABLET ORAL DAILY
Qty: 90 TABLET | Refills: 3 | Status: SHIPPED | OUTPATIENT
Start: 2023-12-07

## 2023-12-07 RX ORDER — TRIAMCINOLONE ACETONIDE 1 MG/G
CREAM TOPICAL
Qty: 1 EACH | Refills: 2 | Status: SHIPPED | OUTPATIENT
Start: 2023-12-07

## 2023-12-07 RX ORDER — POTASSIUM CHLORIDE 20 MEQ/1
20 TABLET, EXTENDED RELEASE ORAL DAILY
Qty: 90 TABLET | Refills: 3 | Status: SHIPPED | OUTPATIENT
Start: 2023-12-07

## 2023-12-07 ASSESSMENT — ENCOUNTER SYMPTOMS
ABDOMINAL PAIN: 0
RHINORRHEA: 0
EYE DISCHARGE: 0
COUGH: 0
SHORTNESS OF BREATH: 0
SORE THROAT: 0

## 2023-12-07 NOTE — PROGRESS NOTES
[facility]  [address] [facility]  Dept: [phone]  Dept Fax: [phone]  Loc: [phone]  Resident Note    Assessment & Plan:    1. Essential hypertension    2. PAD (peripheral artery disease) ([address])    3. Mixed hyperlipidemia    4. Atherosclerosis of native coronary artery of native heart with angina pectoris ([address])    5. Pulmonary emphysema, unspecified emphysema type ([address])    6. Chronic diastolic CHF (congestive heart failure) ([facility])    7. Stasis dermatitis of both legs       Orders Placed This Encounter    triamcinolone (KENALOG) 0.1 % cream     Sig: Apply as needed to left leg     Dispense:  1 each     Refill:  2    potassium chloride (KLOR-CON M) 20 MEQ extended release tablet     Sig: Take 1 tablet by mouth daily     Dispense:  90 tablet     Refill:  3    furosemide (LASIX) 20 MG tablet     Sig: Take 1 tablet by mouth daily     Dispense:  90 tablet     Refill:  3     HTN well controlled. Cont norvasc 5 mg daily. CAD/CHF - LDL close to goal, 82 on 11/13/23. Remain at current dose of lovastatin 20 mg daily given age and patient desire to remain on current dose. Follow up with Dr. Sandee Perales in January as scheduled. Cont lasix 20 mg daily, refill sent until patient sees cardiology. Cont potassium, refill sent. COPD/emphysema, stable  - o2 sat at 86% in office, as patient is without his home oxygen on this visit. Patient felt well. - cont anoro and albulterol  - follow up with pulm in may. Stasis dermatitis of both lower extremities w/ hx of stasis ulcerations in the past  - cont hydroxyzine as needed for itch  - refill topical steroid to be used intermittent for up to 2 weeks for acute dermatitis flares  - use moisturizer daily  - advised compression stockings if leg discomfort is bothersome    Advised patient to obtain TDAP at [facility].     Return in about 6 months (around 6/7/2024) for medicare

## 2023-12-07 NOTE — PATIENT INSTRUCTIONS
You have stasis dermatitis from chronic vein disease in your legs. Use a moisturizer daily on your legs. Use the steroid cream for up to 2 weeks at a time for flares of itchiness.

## 2024-01-10 ENCOUNTER — HOSPITAL ENCOUNTER (OUTPATIENT)
Age: 84
Discharge: HOME | End: 2024-01-10
Payer: MEDICARE

## 2024-01-10 LAB
DEPRECATED MEAN GLUCOSE BLD GHB EST-ACNC: 105 MG/DL (ref 70–126)
FOLATE SERPL-MCNC: 8.2 NG/ML (ref 4.8–24.2)
HBA1C MFR BLD HPLC: 5.5 % (ref 4.4–6.4)
TSH SERPL DL<=0.005 MIU/L-ACNC: 1.4 UIU/ML (ref 0.4–4.2)
VIT B12 SERPL-MCNC: 364 PG/ML (ref 211–911)

## 2024-01-10 PROCEDURE — 83036 HEMOGLOBIN GLYCOSYLATED A1C: CPT

## 2024-01-10 PROCEDURE — 84207 ASSAY OF VITAMIN B-6: CPT

## 2024-01-10 PROCEDURE — 82746 ASSAY OF FOLIC ACID SERUM: CPT

## 2024-01-10 PROCEDURE — 86334 IMMUNOFIX E-PHORESIS SERUM: CPT

## 2024-01-10 PROCEDURE — 36415 COLL VENOUS BLD VENIPUNCTURE: CPT

## 2024-01-10 PROCEDURE — 84155 ASSAY OF PROTEIN SERUM: CPT

## 2024-01-10 PROCEDURE — 82784 ASSAY IGA/IGD/IGG/IGM EACH: CPT

## 2024-01-10 PROCEDURE — 84425 ASSAY OF VITAMIN B-1: CPT

## 2024-01-10 PROCEDURE — 82607 VITAMIN B-12: CPT

## 2024-01-10 PROCEDURE — 84165 PROTEIN E-PHORESIS SERUM: CPT

## 2024-01-10 PROCEDURE — 84443 ASSAY THYROID STIM HORMONE: CPT

## 2024-01-11 ENCOUNTER — OFFICE VISIT (OUTPATIENT)
Dept: CARDIOLOGY CLINIC | Age: 84
End: 2024-01-11

## 2024-01-11 VITALS
HEIGHT: 68 IN | WEIGHT: 168.6 LBS | SYSTOLIC BLOOD PRESSURE: 144 MMHG | BODY MASS INDEX: 25.55 KG/M2 | HEART RATE: 107 BPM | DIASTOLIC BLOOD PRESSURE: 82 MMHG

## 2024-01-11 DIAGNOSIS — I73.9 PAD (PERIPHERAL ARTERY DISEASE) (HCC): Primary | ICD-10-CM

## 2024-01-11 DIAGNOSIS — I48.91 ATRIAL FIBRILLATION, UNSPECIFIED TYPE (HCC): ICD-10-CM

## 2024-01-11 DIAGNOSIS — I25.10 CORONARY ARTERY DISEASE INVOLVING NATIVE CORONARY ARTERY OF NATIVE HEART WITHOUT ANGINA PECTORIS: ICD-10-CM

## 2024-01-11 DIAGNOSIS — I50.32 CHRONIC DIASTOLIC CHF (CONGESTIVE HEART FAILURE) (HCC): ICD-10-CM

## 2024-01-11 NOTE — PROGRESS NOTES
Assessment/Plan   PAD w/ L SFA  s/p SJ 8/10/21 - VONDA study for 1 year follow-up pending  Paroxysmal non-valvular atrial fibrillation - [id number] score: 5, HASBLED = 4 s/p WATCHMAN  LLE PAD s/p Supera implant 5.5 x 60  S/p TAVR - S329 - +4 cc- mild to mod PVL  S/p LAD PCI, 6/2020  Chronic diastolic CHF, NYHA II  Chronic oxygen   EF 55%  Hx of CVA  Stable, has weakness, but likely CVA related and dyspnea related to COPD.  Stable in terms of cardiac issues.  Needs neuro studies. Trying to walk every day.  No chest pain or angina.  Many meds adjusted due to COPD, CVA, side effects.  He is on ASA, Lasix, statin, Norvasc.  Re-check BP.  Discussed symptoms needing emergency care or those which require further medical attention.   Discussed diet/exercise/BP/weight loss/health lifestyle choices/lipids; the patient understands the goals and will try to comply.        Disposition:  1 year           Electronically signed by Hebert Kirby MD   1/11/2024 at 1:39 PM EST

## 2024-01-13 LAB
IMMUNOFIXATION WITH QUANT: NORMAL
PYRIDOXAL PHOS SERPL-SCNC: 22 NMOL/L (ref 20–125)
VIT B1 PYROPHOSHATE BLD-SCNC: 117 NMOL/L (ref 70–180)

## 2024-01-29 RX ORDER — NITROGLYCERIN 0.4 MG/1
TABLET SUBLINGUAL
Qty: 25 TABLET | Refills: 0 | Status: SHIPPED | OUTPATIENT
Start: 2024-01-29

## 2024-01-29 NOTE — TELEPHONE ENCOUNTER
Brenton Sumner called requesting a refill on the following medications:  Requested Prescriptions     Pending Prescriptions Disp Refills    nitroGLYCERIN (NITROSTAT) 0.4 MG SL tablet 25 tablet 0     Sig: up to max of 3 total doses. If no relief after 1 dose, call 911.     Pharmacy verified: [facility]  [facility]    PT DOING WELL, CURRENT RX IS     Date of last visit: 2024  Date of next visit (if applicable): 1/15/2025

## 2024-02-01 ENCOUNTER — HOSPITAL ENCOUNTER (OUTPATIENT)
Dept: MRI IMAGING | Age: 84
Discharge: HOME | End: 2024-02-01
Payer: MEDICARE

## 2024-02-01 DIAGNOSIS — R41.3 MEMORY LOSS: ICD-10-CM

## 2024-02-01 DIAGNOSIS — Z86.73 HISTORY OF STROKE: ICD-10-CM

## 2024-02-01 PROCEDURE — 70551 MRI BRAIN STEM W/O DYE: CPT

## 2024-02-02 ENCOUNTER — HOSPITAL ENCOUNTER (OUTPATIENT)
Dept: INFUSION THERAPY | Age: 84
Discharge: HOME | End: 2024-02-02
Payer: MEDICARE

## 2024-02-02 ENCOUNTER — OFFICE VISIT (OUTPATIENT)
Dept: ONCOLOGY | Age: 84
End: 2024-02-02
Payer: MEDICARE

## 2024-02-02 VITALS
SYSTOLIC BLOOD PRESSURE: 138 MMHG | RESPIRATION RATE: 18 BRPM | HEART RATE: 56 BPM | DIASTOLIC BLOOD PRESSURE: 72 MMHG | TEMPERATURE: 98.4 F

## 2024-02-02 VITALS
WEIGHT: 168 LBS | OXYGEN SATURATION: 88 % | DIASTOLIC BLOOD PRESSURE: 72 MMHG | SYSTOLIC BLOOD PRESSURE: 138 MMHG | HEIGHT: 68 IN | BODY MASS INDEX: 25.46 KG/M2 | HEART RATE: 56 BPM | TEMPERATURE: 98.4 F | RESPIRATION RATE: 18 BRPM

## 2024-02-02 DIAGNOSIS — D47.2 MGUS (MONOCLONAL GAMMOPATHY OF UNKNOWN SIGNIFICANCE): Primary | ICD-10-CM

## 2024-02-02 PROCEDURE — 3078F DIAST BP <80 MM HG: CPT | Performed by: INTERNAL MEDICINE

## 2024-02-02 PROCEDURE — 99203 OFFICE O/P NEW LOW 30 MIN: CPT | Performed by: INTERNAL MEDICINE

## 2024-02-02 PROCEDURE — 99211 OFF/OP EST MAY X REQ PHY/QHP: CPT

## 2024-02-02 PROCEDURE — 3075F SYST BP GE 130 - 139MM HG: CPT | Performed by: INTERNAL MEDICINE

## 2024-02-02 PROCEDURE — 1123F ACP DISCUSS/DSCN MKR DOCD: CPT | Performed by: INTERNAL MEDICINE

## 2024-02-02 ASSESSMENT — ENCOUNTER SYMPTOMS
EYES NEGATIVE: 1
ALLERGIC/IMMUNOLOGIC NEGATIVE: 1
GASTROINTESTINAL NEGATIVE: 1
SHORTNESS OF BREATH: 1

## 2024-02-02 NOTE — PROGRESS NOTES
[facility] [place] [facility]  [facility]  [address]  [address]  [place] [id number]  Dept: [phone]  Loc: [phone]   Hematology/Oncology Consult (Clinic)        2/2/24     Brenton Sumner   1940     Licha Myles DO Hageman, Jason D, MD       Reason: Thank you Dr. Myles for your request for consult concerning patient's new findings of monoclonal gammopathy of unspecified significance for further evaluation recommendations  Chief Complaint   Patient presents with    New Patient     D47.2 (ICD-10-CM) - MGUS (monoclonal gammopathy of unknown significance)         ASSESSMENT:     1. MGUS (monoclonal gammopathy of unknown significance)  -     CBC with Auto Differential; Future  -     Comprehensive Metabolic Panel; Future  -     Electrophoresis Protein, Serum; Future       PLAN:   1.  MGUS:  Brenton does have a very faint monoclonal gammopathy on immunofixation cannot rule out this being immune reaction as well.  However he has no signs of overt multiple myeloma at this point.  He has a normal hemoglobin calcium and kidney function at this time.  Would recommend follow-up though with return visit in 6 months with repeat CBC CMP and SPEP with immunofixation reflex for monitoring.  If he has 2 SPEP with RAJ that are negative in a row i.e. every 6 months for total of a year he may be discharged from clinic.  But would recommend he have follow-up at least the next year every 6 months.  He is in agreement with this plan.  Will get a CBC CMP and SPEP with reflex RAJ 1 week prior to office visit so we have results.  He knows to call the office to make sure we have results before coming in if we do not have those results we can Lu reschedule.        Symptom Management: (include nausea, vomiting, diarrhea, constipation, appetite, weight loss, energy, anxiety, depression, SOB, neuropathy, pain, Patient main concern today)      He does report some baseline

## 2024-02-07 DIAGNOSIS — L29.9 GENERALIZED PRURITUS: ICD-10-CM

## 2024-02-07 RX ORDER — HYDROXYZINE HYDROCHLORIDE 25 MG/1
25 TABLET, FILM COATED ORAL EVERY 8 HOURS PRN
Qty: 90 TABLET | Refills: 3 | Status: SHIPPED | OUTPATIENT
Start: 2024-02-07

## 2024-02-07 NOTE — TELEPHONE ENCOUNTER
Blanche is calling for Brenton. He needs a refill on his hydroxyzine 25mg 1 every 8 hours as needed for itching. [facility]

## 2024-02-07 NOTE — TELEPHONE ENCOUNTER
Brenton Sumner called requesting a refill on the following medications:  Requested Prescriptions     Pending Prescriptions Disp Refills    hydrOXYzine HCl (ATARAX) 25 MG tablet 90 tablet 3     Sig: Take 1 tablet by mouth every 8 hours as needed for Itching       Date of last visit: 12/7/2023  Date of next visit (if applicable):6/7/2024  Date of last refill: 11/15/23  Pharmacy Name: [facility]    Rx pended      Thanks,  Linda De La Cruz LPN

## 2024-02-08 ENCOUNTER — TELEPHONE (OUTPATIENT)
Dept: NEUROLOGY | Age: 84
End: 2024-02-08

## 2024-02-08 NOTE — TELEPHONE ENCOUNTER
Dr. Webster office called with concerns of MRI Paulo without Contrast that was completed 2/1/24.   She states \"MRI with evidence of previous stroke - left temporal, cerebellar thalamic. Please make sure Dr. Vera has the images to review. He has an 80% LICA, with the above findings, surgery ay now be a consideration.\"    Note in the  as well as imaging in chart.    Thank you,  Alexandria

## 2024-02-22 DIAGNOSIS — I87.2 STASIS DERMATITIS OF BOTH LEGS: ICD-10-CM

## 2024-02-22 RX ORDER — TRIAMCINOLONE ACETONIDE 1 MG/G
CREAM TOPICAL
Qty: 80 G | Refills: 2 | Status: SHIPPED | OUTPATIENT
Start: 2024-02-22

## 2024-02-22 NOTE — TELEPHONE ENCOUNTER
Brenton Sumner called requesting a refill on the following medications:  Requested Prescriptions     Pending Prescriptions Disp Refills    triamcinolone (KENALOG) 0.1 % cream [Pharmacy Med Name: TRIAMCINOLON CRE 0.1%] 80 g 2     Sig: APPLY TO LEFT LEG AS NEEDED       Date of last visit: 12/7/2023  Date of next visit (if applicable):6/7/2024  Date of last refill: 12/7/23  Pharmacy Name: [facility]    Rx pended      Thanks,  Linda De La Cruz LPN

## 2024-02-28 ENCOUNTER — OFFICE VISIT (OUTPATIENT)
Dept: NEUROLOGY | Age: 84
End: 2024-02-28
Payer: MEDICARE

## 2024-02-28 VITALS
WEIGHT: 168 LBS | SYSTOLIC BLOOD PRESSURE: 132 MMHG | DIASTOLIC BLOOD PRESSURE: 52 MMHG | BODY MASS INDEX: 25.46 KG/M2 | HEIGHT: 68 IN

## 2024-02-28 DIAGNOSIS — I65.03 STENOSIS OF BOTH VERTEBRAL ARTERIES: Primary | ICD-10-CM

## 2024-02-28 PROCEDURE — 3078F DIAST BP <80 MM HG: CPT | Performed by: NURSE PRACTITIONER

## 2024-02-28 PROCEDURE — G8417 CALC BMI ABV UP PARAM F/U: HCPCS | Performed by: NURSE PRACTITIONER

## 2024-02-28 PROCEDURE — 1036F TOBACCO NON-USER: CPT | Performed by: NURSE PRACTITIONER

## 2024-02-28 PROCEDURE — 99214 OFFICE O/P EST MOD 30 MIN: CPT | Performed by: NURSE PRACTITIONER

## 2024-02-28 PROCEDURE — G8427 DOCREV CUR MEDS BY ELIG CLIN: HCPCS | Performed by: NURSE PRACTITIONER

## 2024-02-28 PROCEDURE — 3075F SYST BP GE 130 - 139MM HG: CPT | Performed by: NURSE PRACTITIONER

## 2024-02-28 PROCEDURE — G8484 FLU IMMUNIZE NO ADMIN: HCPCS | Performed by: NURSE PRACTITIONER

## 2024-02-28 PROCEDURE — 1123F ACP DISCUSS/DSCN MKR DOCD: CPT | Performed by: NURSE PRACTITIONER

## 2024-02-28 NOTE — PROGRESS NOTES
normal   Level of consciousness: alert  Knowledge: good.     Cranial Nerves     CN II   Visual fields full to confrontation.     CN III, IV, VI   Pupils are equal, round, and reactive to light.  Extraocular motions are normal.   Right pupil: Size: 3 mm. Shape: regular. Reactivity: brisk.   Left pupil: Size: 3 mm. Shape: regular. Reactivity: brisk.     CN V   Facial sensation intact.     CN VII   Facial expression full, symmetric.     CN VIII   CN VIII normal.     CN IX, X   CN IX normal.   CN X normal.   Palate: symmetric    CN XI   CN XI normal.   Right trapezius strength: normal  Left trapezius strength: normal    CN XII   CN XII normal.   Tongue deviation: none    Motor Exam   Muscle bulk: normal  Overall muscle tone: normal  Right arm pronator drift: absent  Left arm pronator drift: absent    BUE: 5/5  BLE: 4/5     Sensory Exam   Light touch normal.     Gait, Coordination, and Reflexes     Coordination   Finger to nose coordination: normal  Heel to shin coordination: normal    Tremor   Resting tremor: absent  Intention tremor: absent  Action tremor: absent       Labs/Imaging/Diagnostics     Imaging:  No results found.     Assessment and Plan:        Bilateral internal carotid artery stenosis, bilateral vertebral artery stenosis:  DSA 7/13/2022: minimal non flowing limiting stenosis at both carotid arteries. Severe flow limiting stenosis at the origin of the left vertebral artery. Supplementary supply to the left vertebral artery arising from the ascending cervical trunk with connection to the distal V2 / proximal V3 segment of the left vertebral artery. Severe atherosclerotic disease within the origins of the great vessels of the chest. Right vertebral artery imaging unable to be obtained. Less than 50% stenosis in the bilateral internal carotid arteries  CTA head and neck 1/19/2023: stable stenosis about 84% in right ICA and 80% stenosis of left ICA, focal areas of severe stenosis in the nondominant left

## 2024-04-16 ENCOUNTER — TELEPHONE (OUTPATIENT)
Dept: CARDIOLOGY CLINIC | Age: 84
End: 2024-04-16

## 2024-04-16 ENCOUNTER — APPOINTMENT (OUTPATIENT)
Dept: GENERAL RADIOLOGY | Age: 84
DRG: 291 | End: 2024-04-16
Payer: MEDICARE

## 2024-04-16 ENCOUNTER — APPOINTMENT (OUTPATIENT)
Dept: CT IMAGING | Age: 84
DRG: 291 | End: 2024-04-16
Payer: MEDICARE

## 2024-04-16 ENCOUNTER — HOSPITAL ENCOUNTER (INPATIENT)
Age: 84
LOS: 4 days | Discharge: HOME | DRG: 291 | End: 2024-04-20
Attending: EMERGENCY MEDICINE
Payer: MEDICARE

## 2024-04-16 DIAGNOSIS — I48.0 PAROXYSMAL ATRIAL FIBRILLATION (HCC): ICD-10-CM

## 2024-04-16 DIAGNOSIS — I50.32 CHRONIC DIASTOLIC CHF (CONGESTIVE HEART FAILURE) (HCC): ICD-10-CM

## 2024-04-16 DIAGNOSIS — I50.9 ACUTE ON CHRONIC CONGESTIVE HEART FAILURE, UNSPECIFIED HEART FAILURE TYPE (HCC): Primary | ICD-10-CM

## 2024-04-16 DIAGNOSIS — I48.91 ATRIAL FIBRILLATION WITH RVR (HCC): ICD-10-CM

## 2024-04-16 DIAGNOSIS — R68.89 INCREASED OXYGEN DEMAND: ICD-10-CM

## 2024-04-16 LAB
ALBUMIN SERPL BCG-MCNC: 4 G/DL (ref 3.5–5.1)
ALP SERPL-CCNC: 98 U/L (ref 38–126)
ALT SERPL W/O P-5'-P-CCNC: 7 U/L (ref 11–66)
ANION GAP SERPL CALC-SCNC: 13 MEQ/L (ref 8–16)
AST SERPL-CCNC: 19 U/L (ref 5–40)
BASOPHILS ABSOLUTE: 0.1 THOU/MM3 (ref 0–0.1)
BASOPHILS NFR BLD AUTO: 0.8 %
BILIRUB SERPL-MCNC: 0.8 MG/DL (ref 0.3–1.2)
BUN SERPL-MCNC: 16 MG/DL (ref 7–22)
CALCIUM SERPL-MCNC: 8.9 MG/DL (ref 8.5–10.5)
CHLORIDE SERPL-SCNC: 96 MEQ/L (ref 98–111)
CO2 SERPL-SCNC: 27 MEQ/L (ref 23–33)
CREAT SERPL-MCNC: 1.1 MG/DL (ref 0.4–1.2)
D DIMER PPP IA.FEU-MCNC: 2198 NG/ML FEU (ref 0–500)
DEPRECATED RDW RBC AUTO: 45.2 FL (ref 35–45)
EOSINOPHIL NFR BLD AUTO: 3.5 %
EOSINOPHILS ABSOLUTE: 0.3 THOU/MM3 (ref 0–0.4)
ERYTHROCYTE [DISTWIDTH] IN BLOOD BY AUTOMATED COUNT: 13.8 % (ref 11.5–14.5)
FLUAV RNA RESP QL NAA+PROBE: NOT DETECTED
FLUBV RNA RESP QL NAA+PROBE: NOT DETECTED
GFR SERPL CREATININE-BSD FRML MDRD: 67 ML/MIN/1.73M2
GLUCOSE SERPL-MCNC: 103 MG/DL (ref 70–108)
HCT VFR BLD AUTO: 43.1 % (ref 42–52)
HGB BLD-MCNC: 14.3 GM/DL (ref 14–18)
IMM GRANULOCYTES # BLD AUTO: 0.02 THOU/MM3 (ref 0–0.07)
IMM GRANULOCYTES NFR BLD AUTO: 0.2 %
LYMPHOCYTES ABSOLUTE: 1.6 THOU/MM3 (ref 1–4.8)
LYMPHOCYTES NFR BLD AUTO: 18.8 %
MCH RBC QN AUTO: 29.9 PG (ref 26–33)
MCHC RBC AUTO-ENTMCNC: 33.2 GM/DL (ref 32.2–35.5)
MCV RBC AUTO: 90.2 FL (ref 80–94)
MONOCYTES ABSOLUTE: 0.8 THOU/MM3 (ref 0.4–1.3)
MONOCYTES NFR BLD AUTO: 9.2 %
NEUTROPHILS NFR BLD AUTO: 67.5 %
NRBC BLD AUTO-RTO: 0 /100 WBC
NT-PROBNP SERPL IA-MCNC: 2651 PG/ML (ref 0–449)
OSMOLALITY SERPL CALC.SUM OF ELEC: 273.4 MOSMOL/KG (ref 275–300)
PLATELET # BLD AUTO: 255 THOU/MM3 (ref 130–400)
PMV BLD AUTO: 9.7 FL (ref 9.4–12.4)
POTASSIUM SERPL-SCNC: 3.9 MEQ/L (ref 3.5–5.2)
PROCALCITONIN SERPL IA-MCNC: 0.07 NG/ML (ref 0.01–0.09)
PROT SERPL-MCNC: 7.8 G/DL (ref 6.1–8)
RBC # BLD AUTO: 4.78 MILL/MM3 (ref 4.7–6.1)
SARS-COV-2 RNA RESP QL NAA+PROBE: NOT DETECTED
SEGMENTED NEUTROPHILS ABSOLUTE COUNT: 5.6 THOU/MM3 (ref 1.8–7.7)
SODIUM SERPL-SCNC: 136 MEQ/L (ref 135–145)
TROPONIN, HIGH SENSITIVITY: 29 NG/L (ref 0–12)
TROPONIN, HIGH SENSITIVITY: 32 NG/L (ref 0–12)
WBC # BLD AUTO: 8.3 THOU/MM3 (ref 4.8–10.8)

## 2024-04-16 PROCEDURE — 2140000000 HC CCU INTERMEDIATE R&B

## 2024-04-16 PROCEDURE — 71045 X-RAY EXAM CHEST 1 VIEW: CPT

## 2024-04-16 PROCEDURE — 2500000003 HC RX 250 WO HCPCS: Performed by: STUDENT IN AN ORGANIZED HEALTH CARE EDUCATION/TRAINING PROGRAM

## 2024-04-16 PROCEDURE — 6360000002 HC RX W HCPCS: Performed by: STUDENT IN AN ORGANIZED HEALTH CARE EDUCATION/TRAINING PROGRAM

## 2024-04-16 PROCEDURE — 87636 SARSCOV2 & INF A&B AMP PRB: CPT

## 2024-04-16 PROCEDURE — 6370000000 HC RX 637 (ALT 250 FOR IP): Performed by: STUDENT IN AN ORGANIZED HEALTH CARE EDUCATION/TRAINING PROGRAM

## 2024-04-16 PROCEDURE — 99223 1ST HOSP IP/OBS HIGH 75: CPT | Performed by: INTERNAL MEDICINE

## 2024-04-16 PROCEDURE — 85379 FIBRIN DEGRADATION QUANT: CPT

## 2024-04-16 PROCEDURE — 80053 COMPREHEN METABOLIC PANEL: CPT

## 2024-04-16 PROCEDURE — 99285 EMERGENCY DEPT VISIT HI MDM: CPT

## 2024-04-16 PROCEDURE — 2580000003 HC RX 258: Performed by: STUDENT IN AN ORGANIZED HEALTH CARE EDUCATION/TRAINING PROGRAM

## 2024-04-16 PROCEDURE — 83880 ASSAY OF NATRIURETIC PEPTIDE: CPT

## 2024-04-16 PROCEDURE — 71275 CT ANGIOGRAPHY CHEST: CPT

## 2024-04-16 PROCEDURE — 84145 PROCALCITONIN (PCT): CPT

## 2024-04-16 PROCEDURE — 84484 ASSAY OF TROPONIN QUANT: CPT

## 2024-04-16 PROCEDURE — 93010 ELECTROCARDIOGRAM REPORT: CPT | Performed by: INTERNAL MEDICINE

## 2024-04-16 PROCEDURE — 6360000004 HC RX CONTRAST MEDICATION: Performed by: STUDENT IN AN ORGANIZED HEALTH CARE EDUCATION/TRAINING PROGRAM

## 2024-04-16 PROCEDURE — 87040 BLOOD CULTURE FOR BACTERIA: CPT

## 2024-04-16 PROCEDURE — 93005 ELECTROCARDIOGRAM TRACING: CPT | Performed by: STUDENT IN AN ORGANIZED HEALTH CARE EDUCATION/TRAINING PROGRAM

## 2024-04-16 PROCEDURE — 36415 COLL VENOUS BLD VENIPUNCTURE: CPT

## 2024-04-16 PROCEDURE — 85025 COMPLETE CBC W/AUTO DIFF WBC: CPT

## 2024-04-16 RX ORDER — ENOXAPARIN SODIUM 100 MG/ML
40 INJECTION SUBCUTANEOUS EVERY 24 HOURS
Status: DISCONTINUED | OUTPATIENT
Start: 2024-04-16 | End: 2024-04-18

## 2024-04-16 RX ORDER — POLYETHYLENE GLYCOL 3350 17 G/17G
17 POWDER, FOR SOLUTION ORAL DAILY PRN
Status: DISCONTINUED | OUTPATIENT
Start: 2024-04-16 | End: 2024-04-20 | Stop reason: HOSPADM

## 2024-04-16 RX ORDER — ONDANSETRON 4 MG/1
4 TABLET, ORALLY DISINTEGRATING ORAL EVERY 8 HOURS PRN
Status: DISCONTINUED | OUTPATIENT
Start: 2024-04-16 | End: 2024-04-20 | Stop reason: HOSPADM

## 2024-04-16 RX ORDER — SODIUM CHLORIDE 0.9 % (FLUSH) 0.9 %
10 SYRINGE (ML) INJECTION PRN
Status: DISCONTINUED | OUTPATIENT
Start: 2024-04-16 | End: 2024-04-20 | Stop reason: HOSPADM

## 2024-04-16 RX ORDER — POTASSIUM CHLORIDE 7.45 MG/ML
10 INJECTION INTRAVENOUS PRN
Status: DISCONTINUED | OUTPATIENT
Start: 2024-04-16 | End: 2024-04-20 | Stop reason: HOSPADM

## 2024-04-16 RX ORDER — AMLODIPINE BESYLATE 5 MG/1
5 TABLET ORAL DAILY
Status: DISCONTINUED | OUTPATIENT
Start: 2024-04-17 | End: 2024-04-18

## 2024-04-16 RX ORDER — SODIUM CHLORIDE 0.9 % (FLUSH) 0.9 %
5-40 SYRINGE (ML) INJECTION EVERY 12 HOURS SCHEDULED
Status: DISCONTINUED | OUTPATIENT
Start: 2024-04-16 | End: 2024-04-20 | Stop reason: HOSPADM

## 2024-04-16 RX ORDER — ATORVASTATIN CALCIUM 20 MG/1
20 TABLET, FILM COATED ORAL DAILY
Status: DISCONTINUED | OUTPATIENT
Start: 2024-04-17 | End: 2024-04-20 | Stop reason: HOSPADM

## 2024-04-16 RX ORDER — SODIUM CHLORIDE 9 MG/ML
INJECTION, SOLUTION INTRAVENOUS CONTINUOUS
Status: ACTIVE | OUTPATIENT
Start: 2024-04-16 | End: 2024-04-17

## 2024-04-16 RX ORDER — MAGNESIUM SULFATE IN WATER 40 MG/ML
2000 INJECTION, SOLUTION INTRAVENOUS PRN
Status: DISCONTINUED | OUTPATIENT
Start: 2024-04-16 | End: 2024-04-20 | Stop reason: HOSPADM

## 2024-04-16 RX ORDER — ACETAMINOPHEN 325 MG/1
650 TABLET ORAL EVERY 6 HOURS PRN
Status: DISCONTINUED | OUTPATIENT
Start: 2024-04-16 | End: 2024-04-20 | Stop reason: HOSPADM

## 2024-04-16 RX ORDER — SODIUM CHLORIDE 9 MG/ML
INJECTION, SOLUTION INTRAVENOUS PRN
Status: DISCONTINUED | OUTPATIENT
Start: 2024-04-16 | End: 2024-04-20 | Stop reason: HOSPADM

## 2024-04-16 RX ORDER — ASPIRIN 81 MG/1
81 TABLET ORAL DAILY
Status: DISCONTINUED | OUTPATIENT
Start: 2024-04-16 | End: 2024-04-20 | Stop reason: HOSPADM

## 2024-04-16 RX ORDER — ONDANSETRON 2 MG/ML
4 INJECTION INTRAMUSCULAR; INTRAVENOUS EVERY 6 HOURS PRN
Status: DISCONTINUED | OUTPATIENT
Start: 2024-04-16 | End: 2024-04-20 | Stop reason: HOSPADM

## 2024-04-16 RX ORDER — HYDROXYZINE HYDROCHLORIDE 25 MG/1
25 TABLET, FILM COATED ORAL EVERY 8 HOURS PRN
Status: DISCONTINUED | OUTPATIENT
Start: 2024-04-16 | End: 2024-04-20 | Stop reason: HOSPADM

## 2024-04-16 RX ORDER — ALBUTEROL SULFATE 90 UG/1
2 AEROSOL, METERED RESPIRATORY (INHALATION) EVERY 4 HOURS PRN
Status: DISCONTINUED | OUTPATIENT
Start: 2024-04-16 | End: 2024-04-20 | Stop reason: HOSPADM

## 2024-04-16 RX ORDER — METOPROLOL TARTRATE 50 MG/1
50 TABLET, FILM COATED ORAL 2 TIMES DAILY
Status: DISCONTINUED | OUTPATIENT
Start: 2024-04-16 | End: 2024-04-20 | Stop reason: HOSPADM

## 2024-04-16 RX ORDER — PANTOPRAZOLE SODIUM 40 MG/1
40 TABLET, DELAYED RELEASE ORAL DAILY
Status: DISCONTINUED | OUTPATIENT
Start: 2024-04-17 | End: 2024-04-20 | Stop reason: HOSPADM

## 2024-04-16 RX ORDER — POTASSIUM CHLORIDE 20 MEQ/1
40 TABLET, EXTENDED RELEASE ORAL PRN
Status: DISCONTINUED | OUTPATIENT
Start: 2024-04-16 | End: 2024-04-20 | Stop reason: HOSPADM

## 2024-04-16 RX ORDER — ACETAMINOPHEN 650 MG/1
650 SUPPOSITORY RECTAL EVERY 6 HOURS PRN
Status: DISCONTINUED | OUTPATIENT
Start: 2024-04-16 | End: 2024-04-20 | Stop reason: HOSPADM

## 2024-04-16 RX ORDER — BUMETANIDE 0.25 MG/ML
1 INJECTION INTRAMUSCULAR; INTRAVENOUS ONCE
Status: COMPLETED | OUTPATIENT
Start: 2024-04-16 | End: 2024-04-16

## 2024-04-16 RX ADMIN — BUMETANIDE 1 MG: 0.25 INJECTION INTRAMUSCULAR; INTRAVENOUS at 14:25

## 2024-04-16 RX ADMIN — METOPROLOL TARTRATE 50 MG: 50 TABLET, FILM COATED ORAL at 23:54

## 2024-04-16 RX ADMIN — IOPAMIDOL 80 ML: 755 INJECTION, SOLUTION INTRAVENOUS at 14:58

## 2024-04-16 RX ADMIN — ENOXAPARIN SODIUM 40 MG: 100 INJECTION SUBCUTANEOUS at 15:56

## 2024-04-16 RX ADMIN — DILTIAZEM HYDROCHLORIDE 5 MG/HR: 5 INJECTION INTRAVENOUS at 14:08

## 2024-04-16 RX ADMIN — METOPROLOL TARTRATE 50 MG: 50 TABLET, FILM COATED ORAL at 14:26

## 2024-04-16 RX ADMIN — ASPIRIN 81 MG: 81 TABLET, COATED ORAL at 15:55

## 2024-04-16 RX ADMIN — ACETAMINOPHEN 650 MG: 325 TABLET ORAL at 19:45

## 2024-04-16 RX ADMIN — SODIUM CHLORIDE: 9 INJECTION, SOLUTION INTRAVENOUS at 16:00

## 2024-04-16 ASSESSMENT — PAIN DESCRIPTION - LOCATION: LOCATION: HEAD

## 2024-04-16 ASSESSMENT — PAIN SCALES - GENERAL: PAINLEVEL_OUTOF10: 4

## 2024-04-16 ASSESSMENT — PAIN - FUNCTIONAL ASSESSMENT
PAIN_FUNCTIONAL_ASSESSMENT: NONE - DENIES PAIN
PAIN_FUNCTIONAL_ASSESSMENT: NONE - DENIES PAIN

## 2024-04-16 NOTE — PROGRESS NOTES
Pharmacy Medication History Note      List of current medications patient is taking is complete.    Source of information: Patient, recent fill history    Changes made to medication list:  Medications removed (include reason, ex. therapy complete or physician discontinued):  None    Medications added/doses adjusted:  Updated directions for Prevagen to 1 tablet daily  Updated directions for ferrous sulfate to 1 tablet daily    Other notes (ex. Recent course of antibiotics, Coumadin dosing):  Denies use of other OTC or herbal medications.      Allergies reviewed      Electronically signed by Loraine Chambers RPH on 4/16/2024 at 3:41 PM

## 2024-04-16 NOTE — ED PROVIDER NOTES
his mother is . He indicated that his father is . He indicated that his maternal grandmother is . He indicated that his maternal grandfather is .       SOCIAL HISTORY       Social History     Tobacco Use    Smoking status: Former     Current packs/day: 0.00     Average packs/day: 1.5 packs/day for 40.0 years (60.0 ttl pk-yrs)     Types: Cigarettes     Start date: 10/9/1964     Quit date: 10/9/2004     Years since quittin.5    Smokeless tobacco: Never    Tobacco comments:     Quit smoking    Vaping Use    Vaping Use: Never used   Substance Use Topics    Alcohol use: Yes     Alcohol/week: 2.0 standard drinks of alcohol     Types: 2 Cans of beer per week     Comment: 3 to 4 a week  weekends    Drug use: No       PHYSICAL EXAM       ED Triage Vitals [24 1113]   BP Temp Temp Source Pulse Respirations SpO2 Height Weight   120/77 97.5 °F (36.4 °C) Oral (!) 127 18 (!) 89 % -- --       Physical Exam  Constitutional:       Appearance: Normal appearance.   HENT:      Head: Normocephalic and atraumatic.      Right Ear: External ear normal.      Left Ear: External ear normal.      Nose: Nose normal.      Mouth/Throat:      Mouth: Mucous membranes are moist.   Eyes:      Extraocular Movements: Extraocular movements intact.   Cardiovascular:      Rate and Rhythm: Tachycardia present. Rhythm irregular.      Pulses: Normal pulses.      Heart sounds: Normal heart sounds.   Pulmonary:      Effort: Pulmonary effort is normal.      Breath sounds: Normal breath sounds. Decreased air movement present.   Abdominal:      General: Abdomen is flat.      Palpations: Abdomen is soft.   Musculoskeletal:         General: Normal range of motion.      Cervical back: Normal range of motion and neck supple.   Skin:     General: Skin is warm and dry.      Capillary Refill: Capillary refill takes less than 2 seconds.   Neurological:      General: No focal deficit present.      Mental Status: He is alert  congestive heart failure, A-fib RVR, pleural effusion, cardiac tamponade, acute infection, electrolyte abnormalities    Although some of these diagnoses are unlikely, they were consider in my medical decision making.    Data Reviewed (none if left blank)    My Independent interpretations:     EKG:       A-fib with RVR, rate 127, QRS 96, QTc 418, normal axis, no significant ST elevations or depressions noted    Imaging: See Ed Course  Labs:      See Ed Course    Decision Rules/Clinical Scores utilized:      External Documentation Reviewed:   Previous patient encounter documents & history available on EMR was reviewed   See Formal Diagnostic Results above for the lab and radiology tests and orders.    Please see ED course for further reassessments, treatments, MDM, and final disposition.     Case discussed with specialties other than Emergency Medicine: Hospitalist    Shared Decision-Making was performed, disposition discussed with the patient/family and questions answered.    Social determinants of health impacting treatment or disposition:      Code Status:  Not addressed during this ED visit    Summary of Patient Presentation:   ED Course as of 04/16/24 1236   Tue Apr 16, 2024   1135 Pulse(!): 127 [EL]   1135 SpO2(!): 89 % [EL]   1214 covid flu  neg [EL]   1225 Troponin, High Sensitivity(!): 32 [EL]   1225 Pro-BNP(!): 2651.0 [EL]   1236 IMPRESSION:  1. Persistent peripheral interstitial opacities that can reflect underlying chronic interstitial disease.   [EL]      ED Course User Index  [EL] Kari Green MD       ED Medications administered this visit:  (None if blank)  Medications - No data to display    Vitals Reviewed:    Vitals:    04/16/24 1113 04/16/24 1152   BP: 120/77 122/66   Pulse: (!) 127 (!) 134   Resp: 18 20   Temp: 97.5 °F (36.4 °C) 98 °F (36.7 °C)   TempSrc: Oral Oral   SpO2: (!) 89% 93%   Weight:  76.2 kg (168 lb)       PROCEDURES: (None if blank)  Procedures:     CRITICAL CARE: (Please see  Attending note / Attestation regarding Critical Care Time. )    Medical Decision Making     84-year-old female past medical history significant for atrial fibrillation, CHF presents to ED for increased shortness of breath x 2 weeks with increased oxygen demand.  Normally on 2 L nasal cannula as needed and at night, now requiring 3 to 4 L nasal cannula.  Patient also seen to be in A-fib with RVR.  At this time, would like to admit for further evaluation and care.    FINAL IMPRESSION      1. Acute on chronic congestive heart failure, unspecified heart failure type (HCC)    2. Increased oxygen demand          DISPOSITION/PLAN   Condition: condition: stable  Dispo: Admit to med/surg floor  DISPOSITION Decision To Admit 04/16/2024 12:35:04 PM      Outpatient follow up (If applicable):  No follow-up provider specified.  Not applicable          DISCHARGE PRESCRIPTIONS: (None if blank)  New Prescriptions    No medications on file         This transcription was electronically signed. Parts of this transcriptions may have been dictated by use of voice recognition software and electronically transcribed, and parts may have been transcribed with the assistance of an ED scribe. The transcription may contain errors not detected in proofreading.  Please refer to my supervising physician's documentation if my documentation differs.    Electronically Signed: Kari Green MD, 04/16/24, 12:36 PM

## 2024-04-16 NOTE — ED NOTES
ED nurse-to-nurse bedside report    Chief Complaint   Patient presents with    Irregular Heart Beat      LOC: alert and orientated to name, place, date  Vital signs   Vitals:    04/16/24 1600 04/16/24 1701 04/16/24 1819 04/16/24 1907   BP:  (!) 109/58 110/63 103/66   Pulse: 86 75 89 88   Resp: 18 18 18 16   Temp:       TempSrc:       SpO2: 92% 92% 91% 91%   Weight:          Pain:    Pain Interventions: NA  Pain Goal: 0  Oxygen: Yes    Current needs required 3L   Telemetry: Yes  LDAs:   Peripheral IV 04/16/24 Distal;Left Cephalic (Active)   Site Assessment Clean, dry & intact 04/16/24 1139   Phlebitis Assessment No symptoms 04/16/24 1139   Infiltration Assessment 0 04/16/24 1139     Continuous Infusions:    dilTIAZem 125 mg in sodium chloride 0.9 % 125 mL infusion Stopped (04/16/24 1702)    sodium chloride      sodium chloride 50 mL/hr at 04/16/24 1600     Mobility: Requires assistance * 1  Hood Fall Risk Score:       6/6/2023    12:03 PM 5/25/2022     8:43 AM 4/22/2022     8:17 AM 4/20/2021    11:51 AM 6/25/2020    11:49 AM 4/22/2019     9:48 AM 10/23/2017     9:18 AM   Fall Risk   2 or more falls in past year? no no no no no no no   Fall with injury in past year? no no no no no no no     Fall Interventions: call light in reach, bed in low position with wheels locked, side rails up x2  Report given to: NOEL Cruz

## 2024-04-16 NOTE — ED NOTES
Pt finished eating dinner. Repositioned in bed for comfort. Lights turned off. Call light remains in reach.

## 2024-04-16 NOTE — ED NOTES
Report received from NOEL Alvarado. Patient resting in bed. Respirations easy and unlabored. No distress noted. Call light within reach.

## 2024-04-16 NOTE — ED TRIAGE NOTES
Pt to er. Pt c/o irregular heart beat and SOB. States normally on 2 LNC and this past couple weeks he has had to increase his oxygen to 3 LNC. Pt states he increased it today to 4 LNC due to SPO2 88%. Denies all CP.

## 2024-04-16 NOTE — TELEPHONE ENCOUNTER
Pt's daughter called.  Pt was at Neuro in [facility] today.  -140 per pulse ox.  C/O increased SOB and feeling bad.    Advised to go to ER.  Pt is not on a BB.    Pt's daughter will bring him to ER.

## 2024-04-16 NOTE — H&P
Hospitalist - History & Physical      Patient: Brenton Sumner    Unit/Bed:39/039A  YOB: 1940  MRN: [id number]   Acct: [id number]   PCP: Jeffrey Purcell MD    Date of Service: Pt seen/examined on 04/16/24  and Admitted to Inpatient with expected LOS greater than two midnights due to medical therapy.     Chief Complaint:  \"short of breath\"    Assessment and Plan:  Paroxysmal Atrial Fibrillation with RVR: Rate 125-130 on arrival. Not on OAC given past bleeding issues. S/p Watchman LAAO in 2021 so risk for thrombus very low. Prioritize rate control at this time. Start lopressor 50 mg BID, Cardizem gtt. Wean as able. Monitor telemetry. If symptomatology progresses and rate refractory, switch to amio gtt. If still refractory, consult cardiology for PEGGY-DCCV.   Acute on Chronic Hypoxic Respiratory Failure: Requiring 4 LPM on arrival (baseline 2-3 LPM). Worse nocturnally. CXR with peripheral interstitial opacities concerning for ILD. Given elevated D-Dimer, tobacco hx, and no CT chest in recent years, will evaluate further with CTA chest. Pre-treat with gentle IVF. If CTA negative, could just be related to his RVR and loss of preload. In that case, should improve with rate control.   CAD, ICM, Chronic Diastolic HFpEF 55-60%, NYHA III: S/p prior PCI to LAD in 2020. Resume home ASA. Unclear why he is not on GDMT meds. Following up with cardiology. Started on BB, titrate as needed.   Severe AS: S/p TAVR in 2020   PAD/PVD: S/p angioplasty to CFA, SFA (left) in 2018  MGUS: Following with [facility].   COPD: Stable in no acute exacerbation. Continue LAMA/LABA.   Hyperlipidemia: Continue home statin   Vertebral Artery & ICA Stenosis: Bilateral. Follows outpatient with interventional neurology. S/p recent angiogram. VA Stenosis ~70%, ~50% in ICA b/l. On stain and ASA only given bleeding issues.   Prior CVA: Noted per MRI last year showing old strokes. No residual deficits. Follows with  without murmurs, rubs or gallops.  Abdomen: Soft, non-tender, non-distended with normal bowel sounds.  Musculoskeletal:  No clubbing, cyanosis or edema bilaterally.  Skin: Skin color, texture, turgor normal.  No rashes or lesions.  Neurologic:  Neurovascularly intact without any focal sensory/motor deficits. Cranial nerves: II-XII intact, grossly non-focal.  Psychiatric: Alert and oriented, thought content appropriate, normal insight  Capillary Refill: Brisk,< 3 seconds   Peripheral Pulses: +2 palpable, equal bilaterally     Labs:   Recent Labs     04/16/24  1133   WBC 8.3   HGB 14.3   HCT 43.1        Recent Labs     04/16/24  1133      K 3.9   CL 96*   CO2 27   BUN 16   CREATININE 1.1   CALCIUM 8.9     Recent Labs     04/16/24  1133   AST 19   ALT 7*   BILITOT 0.8   ALKPHOS 98     No results for input(s): \"INR\" in the last 72 hours.  No results for input(s): \"CKTOTAL\", \"TROPONINI\" in the last 72 hours.    Urinalysis:    Lab Results   Component Value Date/Time    NITRU NEGATIVE 11/22/2020 10:45 PM    BLOODU NEGATIVE 11/22/2020 10:45 PM    GLUCOSEU NEGATIVE 11/22/2020 10:45 PM     Radiology:   CTA CHEST W WO CONTRAST   Final Result   1. No pulmonary embolism.   2. Small bilateral pleural effusions with adjacent atelectasis/infiltrate.   3. Chronic lung disease.      Final report electronically signed by Dr. Karan Gonzales on 4/16/2024 3:20 PM      XR CHEST PORTABLE   Final Result   1. Persistent peripheral interstitial opacities that can reflect underlying chronic interstitial disease.            **This report has been created using voice recognition software.  It may contain minor errors which are inherent in voice recognition technology.**      Final report electronically signed by Dr Estela Hernandez on 4/16/2024 12:32 PM        No results found.    EKG: Afib RVR, rate 127 bpm, no ST changes or TWA    Electronically signed by Ekaterina Fontenot DO on 4/16/2024 at 3:40 PM

## 2024-04-16 NOTE — ED PROVIDER NOTES
I performed a history and physical examination of the patient and discussed management with the resident. I reviewed the resident’s note and agree with the documented findings and plan of care. Any areas of disagreement are noted on the chart. I was personally present for the key portions of any procedures. I have documented in the chart those procedures where I was not present during the key portions. I have reviewed the emergency nurses triage note. I agree with the chief complaint, past medical history, past surgical history, allergies, medications, social and family history as documented unless otherwise noted below. Documentation of the HPI, Physical Exam and Medical Decision Making performed by medical students or scribes is based on my personal performance of the HPI, PE and MDM. For Phys Assistant/ Nurse Practitioner cases/documentation I have personally evaluated this patient and have completed at least one if not all key elements of the E/M (history, physical exam, and MDM). My findings are as noted below.    In other words, I personally saw and examined the patient I have reviewed and agreed with the resident findings including all diagnostic interpretations and treatment plans as written.  I was present for the key portion of any procedures performed and the inclusive time noted in any critical care statement.    Patient presents today for shortness of breath.  Patient states that he has COPD CHF and history of atrial fibrillation.  He has a watchman in place.  He is no longer on anticoagulation.  Patient states that he has noticed some increased welling in his lower extremities.  He states that his shortness of breath has been worsening.  He states that if he gets up and walks across the room he has been getting more more short of breath.  Patient states he has been using his inhalers and taking his diuretics as prescribed.  Here today the patient is in atrial fibrillation.  Patient states that he has 2  other components within normal limits   TROPONIN - Abnormal; Notable for the following components:    Troponin, High Sensitivity 29 (*)     All other components within normal limits   D-DIMER, QUANTITATIVE - Abnormal; Notable for the following components:    D-Dimer, Quant 2198.00 (*)     All other components within normal limits   COVID-19 & INFLUENZA COMBO   CULTURE, BLOOD 1   CULTURE, BLOOD 2   ANION GAP   GLOMERULAR FILTRATION RATE, ESTIMATED   PROCALCITONIN         Final diagnoses:   Acute on chronic congestive heart failure, unspecified heart failure type (HCC)   Increased oxygen demand   .  I have seen this patient with the resident Dr. Waller and agree with her assessment and plan.     Jose M Timmons,   04/16/24 2016

## 2024-04-17 ENCOUNTER — APPOINTMENT (OUTPATIENT)
Age: 84
DRG: 291 | End: 2024-04-17
Payer: MEDICARE

## 2024-04-17 PROBLEM — I50.33 ACUTE ON CHRONIC HEART FAILURE WITH PRESERVED EJECTION FRACTION (HCC): Status: ACTIVE | Noted: 2024-04-17

## 2024-04-17 LAB
ANION GAP SERPL CALC-SCNC: 13 MEQ/L (ref 8–16)
BUN SERPL-MCNC: 17 MG/DL (ref 7–22)
CALCIUM SERPL-MCNC: 9 MG/DL (ref 8.5–10.5)
CHLORIDE SERPL-SCNC: 98 MEQ/L (ref 98–111)
CO2 SERPL-SCNC: 29 MEQ/L (ref 23–33)
CREAT SERPL-MCNC: 1.2 MG/DL (ref 0.4–1.2)
DEPRECATED RDW RBC AUTO: 46.5 FL (ref 35–45)
ECHO AV AREA PEAK VELOCITY: 3.4 CM2
ECHO AV AREA/BSA PEAK VELOCITY: 1.8 CM2/M2
ECHO AV CUSP MM: 2 CM
ECHO AV PEAK GRADIENT: 9 MMHG
ECHO AV PEAK VELOCITY: 1.5 M/S
ECHO AV VELOCITY RATIO: 0.67
ECHO BSA: 1.91 M2
ECHO LA AREA 2C: 17.4 CM2
ECHO LA AREA 4C: 18.1 CM2
ECHO LA DIAMETER INDEX: 2.26 CM/M2
ECHO LA DIAMETER: 4.3 CM
ECHO LA MAJOR AXIS: 5.2 CM
ECHO LA MINOR AXIS: 5.3 CM
ECHO LA VOL BP: 48 ML (ref 18–58)
ECHO LA VOL MOD A2C: 47 ML (ref 18–58)
ECHO LA VOL MOD A4C: 49 ML (ref 18–58)
ECHO LA VOL/BSA BIPLANE: 25 ML/M2 (ref 16–34)
ECHO LA VOLUME INDEX MOD A2C: 25 ML/M2 (ref 16–34)
ECHO LA VOLUME INDEX MOD A4C: 26 ML/M2 (ref 16–34)
ECHO LV EDV A2C: 88 ML
ECHO LV EDV A4C: 83 ML
ECHO LV EDV INDEX A4C: 44 ML/M2
ECHO LV EDV NDEX A2C: 46 ML/M2
ECHO LV EJECTION FRACTION A2C: 44 %
ECHO LV EJECTION FRACTION A4C: 34 %
ECHO LV EJECTION FRACTION BIPLANE: 38 % (ref 55–100)
ECHO LV ESV A2C: 50 ML
ECHO LV ESV A4C: 55 ML
ECHO LV ESV INDEX A2C: 26 ML/M2
ECHO LV ESV INDEX A4C: 29 ML/M2
ECHO LV FRACTIONAL SHORTENING: 31 % (ref 28–44)
ECHO LV INTERNAL DIMENSION DIASTOLE INDEX: 2.68 CM/M2
ECHO LV INTERNAL DIMENSION DIASTOLIC: 5.1 CM (ref 4.2–5.9)
ECHO LV INTERNAL DIMENSION SYSTOLIC INDEX: 1.84 CM/M2
ECHO LV INTERNAL DIMENSION SYSTOLIC: 3.5 CM
ECHO LV IVSD: 0.9 CM (ref 0.6–1)
ECHO LV MASS 2D: 188 G (ref 88–224)
ECHO LV MASS INDEX 2D: 99 G/M2 (ref 49–115)
ECHO LV POSTERIOR WALL DIASTOLIC: 1.1 CM (ref 0.6–1)
ECHO LV RELATIVE WALL THICKNESS RATIO: 0.43
ECHO LVOT AREA: 4.9 CM2
ECHO LVOT DIAM: 2.5 CM
ECHO LVOT PEAK GRADIENT: 4 MMHG
ECHO LVOT PEAK VELOCITY: 1 M/S
ECHO MV A VELOCITY: 0.52 M/S
ECHO MV E DECELERATION TIME (DT): 130 MS
ECHO MV E VELOCITY: 1.03 M/S
ECHO MV E/A RATIO: 1.98
ECHO MV REGURGITANT PEAK GRADIENT: 88 MMHG
ECHO MV REGURGITANT PEAK VELOCITY: 4.7 M/S
ECHO PV MAX VELOCITY: 0.5 M/S
ECHO PV PEAK GRADIENT: 1 MMHG
ECHO RV INTERNAL DIMENSION: 3 CM
ECHO TV E WAVE: 0.5 M/S
EKG Q-T INTERVAL: 288 MS
EKG QRS DURATION: 96 MS
EKG QTC CALCULATION (BAZETT): 418 MS
EKG R AXIS: -8 DEGREES
EKG T AXIS: 76 DEGREES
EKG VENTRICULAR RATE: 127 BPM
ERYTHROCYTE [DISTWIDTH] IN BLOOD BY AUTOMATED COUNT: 13.8 % (ref 11.5–14.5)
GFR SERPL CREATININE-BSD FRML MDRD: 60 ML/MIN/1.73M2
GLUCOSE SERPL-MCNC: 84 MG/DL (ref 70–108)
HCT VFR BLD AUTO: 44.1 % (ref 42–52)
HGB BLD-MCNC: 14.2 GM/DL (ref 14–18)
MAGNESIUM SERPL-MCNC: 2.1 MG/DL (ref 1.6–2.4)
MCH RBC QN AUTO: 29.6 PG (ref 26–33)
MCHC RBC AUTO-ENTMCNC: 32.2 GM/DL (ref 32.2–35.5)
MCV RBC AUTO: 91.9 FL (ref 80–94)
OSMOLALITY SERPL CALC.SUM OF ELEC: 280.1 MOSMOL/KG (ref 275–300)
PLATELET # BLD AUTO: 267 THOU/MM3 (ref 130–400)
PMV BLD AUTO: 9.8 FL (ref 9.4–12.4)
POTASSIUM SERPL-SCNC: 4.5 MEQ/L (ref 3.5–5.2)
RBC # BLD AUTO: 4.8 MILL/MM3 (ref 4.7–6.1)
SODIUM SERPL-SCNC: 140 MEQ/L (ref 135–145)
T4 FREE SERPL-MCNC: 1.38 NG/DL (ref 0.93–1.68)
TROPONIN, HIGH SENSITIVITY: 34 NG/L (ref 0–12)
TSH SERPL DL<=0.005 MIU/L-ACNC: 1.95 UIU/ML (ref 0.4–4.2)
WBC # BLD AUTO: 9.5 THOU/MM3 (ref 4.8–10.8)

## 2024-04-17 PROCEDURE — 80048 BASIC METABOLIC PNL TOTAL CA: CPT

## 2024-04-17 PROCEDURE — 93306 TTE W/DOPPLER COMPLETE: CPT | Performed by: INTERNAL MEDICINE

## 2024-04-17 PROCEDURE — 99223 1ST HOSP IP/OBS HIGH 75: CPT | Performed by: INTERNAL MEDICINE

## 2024-04-17 PROCEDURE — 6370000000 HC RX 637 (ALT 250 FOR IP)

## 2024-04-17 PROCEDURE — 84439 ASSAY OF FREE THYROXINE: CPT

## 2024-04-17 PROCEDURE — 2140000000 HC CCU INTERMEDIATE R&B

## 2024-04-17 PROCEDURE — 36415 COLL VENOUS BLD VENIPUNCTURE: CPT

## 2024-04-17 PROCEDURE — 85027 COMPLETE CBC AUTOMATED: CPT

## 2024-04-17 PROCEDURE — 6360000002 HC RX W HCPCS: Performed by: STUDENT IN AN ORGANIZED HEALTH CARE EDUCATION/TRAINING PROGRAM

## 2024-04-17 PROCEDURE — 83735 ASSAY OF MAGNESIUM: CPT

## 2024-04-17 PROCEDURE — 99233 SBSQ HOSP IP/OBS HIGH 50: CPT | Performed by: INTERNAL MEDICINE

## 2024-04-17 PROCEDURE — 84443 ASSAY THYROID STIM HORMONE: CPT

## 2024-04-17 PROCEDURE — 6370000000 HC RX 637 (ALT 250 FOR IP): Performed by: STUDENT IN AN ORGANIZED HEALTH CARE EDUCATION/TRAINING PROGRAM

## 2024-04-17 PROCEDURE — 93306 TTE W/DOPPLER COMPLETE: CPT

## 2024-04-17 PROCEDURE — 2580000003 HC RX 258: Performed by: STUDENT IN AN ORGANIZED HEALTH CARE EDUCATION/TRAINING PROGRAM

## 2024-04-17 PROCEDURE — 94761 N-INVAS EAR/PLS OXIMETRY MLT: CPT

## 2024-04-17 PROCEDURE — 94640 AIRWAY INHALATION TREATMENT: CPT

## 2024-04-17 PROCEDURE — 84484 ASSAY OF TROPONIN QUANT: CPT

## 2024-04-17 RX ORDER — FUROSEMIDE 20 MG/1
20 TABLET ORAL DAILY
Status: DISCONTINUED | OUTPATIENT
Start: 2024-04-18 | End: 2024-04-20 | Stop reason: HOSPADM

## 2024-04-17 RX ADMIN — ENOXAPARIN SODIUM 40 MG: 100 INJECTION SUBCUTANEOUS at 14:34

## 2024-04-17 RX ADMIN — PANTOPRAZOLE SODIUM 40 MG: 40 TABLET, DELAYED RELEASE ORAL at 05:21

## 2024-04-17 RX ADMIN — TIOTROPIUM BROMIDE AND OLODATEROL 2 PUFF: 3.124; 2.736 SPRAY, METERED RESPIRATORY (INHALATION) at 08:21

## 2024-04-17 RX ADMIN — ACETAMINOPHEN 650 MG: 325 TABLET ORAL at 15:21

## 2024-04-17 RX ADMIN — SODIUM CHLORIDE, PRESERVATIVE FREE 10 ML: 5 INJECTION INTRAVENOUS at 07:55

## 2024-04-17 RX ADMIN — AMLODIPINE BESYLATE 5 MG: 5 TABLET ORAL at 07:55

## 2024-04-17 RX ADMIN — ASPIRIN 81 MG: 81 TABLET, COATED ORAL at 07:55

## 2024-04-17 RX ADMIN — METOPROLOL TARTRATE 50 MG: 50 TABLET, FILM COATED ORAL at 07:55

## 2024-04-17 RX ADMIN — METOPROLOL TARTRATE 50 MG: 50 TABLET, FILM COATED ORAL at 20:50

## 2024-04-17 RX ADMIN — ATORVASTATIN CALCIUM 20 MG: 20 TABLET, FILM COATED ORAL at 07:55

## 2024-04-17 RX ADMIN — SODIUM CHLORIDE, PRESERVATIVE FREE 10 ML: 5 INJECTION INTRAVENOUS at 20:50

## 2024-04-17 ASSESSMENT — PAIN SCALES - GENERAL
PAINLEVEL_OUTOF10: 2
PAINLEVEL_OUTOF10: 0

## 2024-04-17 ASSESSMENT — PAIN DESCRIPTION - DESCRIPTORS: DESCRIPTORS: ACHING

## 2024-04-17 ASSESSMENT — PAIN DESCRIPTION - ORIENTATION: ORIENTATION: MID

## 2024-04-17 ASSESSMENT — PAIN DESCRIPTION - LOCATION: LOCATION: HEAD

## 2024-04-17 ASSESSMENT — PAIN - FUNCTIONAL ASSESSMENT: PAIN_FUNCTIONAL_ASSESSMENT: PREVENTS OR INTERFERES SOME ACTIVE ACTIVITIES AND ADLS

## 2024-04-17 NOTE — CARE COORDINATION
Case Management Assessment Initial Evaluation    Date/Time of Evaluation: 2024 7:20 AM  Assessment Completed by: Lizet Davies RN    If patient is discharged prior to next notation, then this note serves as note for discharge by case management.    Patient Name: Brenton Sumner                   YOB: 1940  Diagnosis: Atrial fibrillation with RVR (HCC) [I48.91]  Increased oxygen demand [R68.89]  Acute on chronic congestive heart failure, unspecified heart failure type (HCC) [I50.9]                   Date / Time: 2024 11:19 AM  Location: [address] [place] [address]     Patient Admission Status: Inpatient   Readmission Risk Low 0-14, Mod 15-19), High > 20: Readmission Risk Score: 14.7    Current PCP: Jeffrey Purcell MD    Additional Case Management Notes: Admitted through ED with SOB. Found to be in afib with RVR, ventricular rate up to 130. History of TAVR in  and Watchman in . Troponins 32, 29, 34. BNP 2651.0. Was started on Cardizem gtt, now off as HR is 64. O2 on at 4L/nc, sats 94%. PT/OT consulted.     Procedures: none    Imagin/16 PCXR: Persistent peripheral interstitial opacities that can reflect underlying chronic interstitial disease.    CTA chest: No pulmonary embolism. Small bilateral pleural effusions with adjacent atelectasis/infiltrate. Chronic lung disease.     Patient Goals/Plan/Treatment Preferences: Plans home with wife. Has home O2 from [facility], wears 3L ATC. Has a walker as well, does not use unless needed. Open to HH if needed.        24 1048   Service Assessment   Patient Orientation Alert and Oriented   Cognition Alert   History Provided By Patient   Primary Caregiver Self   Accompanied By/Relationship wife   Support Systems Spouse/Significant Other;Children;Family Members   Patient's Healthcare Decision Maker is: Named in Scanned ACP Document   PCP Verified by CM Yes   Last Visit to PCP Within last 6 months   Prior Functional Level Independent in ADLs/IADLs

## 2024-04-17 NOTE — PLAN OF CARE
Problem: Discharge Planning  Goal: Discharge to home or other facility with appropriate resources  Outcome: Progressing  Flowsheets (Taken 4/16/2024 2300 by Christina Patton, RN)  Discharge to home or other facility with appropriate resources: Identify barriers to discharge with patient and caregiver     Problem: Skin/Tissue Integrity  Goal: Absence of new skin breakdown  Description: 1.  Monitor for areas of redness and/or skin breakdown  2.  Assess vascular access sites hourly  3.  Every 4-6 hours minimum:  Change oxygen saturation probe site  4.  Every 4-6 hours:  If on nasal continuous positive airway pressure, respiratory therapy assess nares and determine need for appliance change or resting period.  Outcome: Progressing     Problem: Chronic Conditions and Co-morbidities  Goal: Patient's chronic conditions and co-morbidity symptoms are monitored and maintained or improved  Outcome: Progressing     Problem: Respiratory - Adult  Goal: Clear lung sounds  4/17/2024 1601 by Jenny Geller, RN  Outcome: Progressing    Care plan reviewed with patient.  Patient verbalizes understanding of the care plan and contributed to goal setting.

## 2024-04-17 NOTE — PLAN OF CARE
Problem: Discharge Planning  Goal: Discharge to home or other facility with appropriate resources  Outcome: Progressing  Flowsheets  Taken 4/16/2024 2300 by Christina Patton, RN  Discharge to home or other facility with appropriate resources: Identify barriers to discharge with patient and caregiver  Taken 4/16/2024 2206 by Brooke Zamora RN  Discharge to home or other facility with appropriate resources:   Identify barriers to discharge with patient and caregiver   Identify discharge learning needs (meds, wound care, etc)     Problem: Skin/Tissue Integrity  Goal: Absence of new skin breakdown  Description: 1.  Monitor for areas of redness and/or skin breakdown  2.  Assess vascular access sites hourly  3.  Every 4-6 hours minimum:  Change oxygen saturation probe site  4.  Every 4-6 hours:  If on nasal continuous positive airway pressure, respiratory therapy assess nares and determine need for appliance change or resting period.  Outcome: Progressing   Care plan reviewed with patient.  Patient verbalizes understanding of the care plan and contributed to goal setting.

## 2024-04-17 NOTE — PROGRESS NOTES
Patient arrived per cart to 3B. Heart monitor applied and vitals taken.  Admission paperwork completed.  Explained to patient that [facility] is not responsible for any lost or stolen items.  Patient verbalized understanding. Oriented to room and use of call light and bed controls.  Patient denies pain or needs. No signs of distress noted.  Bed locked & in low position, side-rails up x2.  Call light in reach.  Reminded patient to call nurse if any needs arise.     2 person skin assessment performed by this nurse and Mann Perez RN.    Explained patients right to have family, representative or physician notified of their admission.  Patient has Declined for physician to be notified.  Patient has Declined for family/representative to be notified.

## 2024-04-17 NOTE — CONSULTS
Golzarian, Ekaterina, DO   50 mg at 04/17/24 0755    albuterol sulfate HFA (PROVENTIL;VENTOLIN;PROAIR) 108 (90 Base) MCG/ACT inhaler 2 puff  2 puff Inhalation Q4H PRN Golzarian, Ekaterina, DO        amLODIPine (NORVASC) tablet 5 mg  5 mg Oral Daily Golzarian, Ekaterina, DO   5 mg at 04/17/24 0755    aspirin EC tablet 81 mg  81 mg Oral Daily Golzarian, Ekaterina, DO   81 mg at 04/17/24 0755    hydrOXYzine HCl (ATARAX) tablet 25 mg  25 mg Oral Q8H PRN Golzarian, Ekaterina, DO        atorvastatin (LIPITOR) tablet 20 mg  20 mg Oral Daily Golzarian, Ekaterina, DO   20 mg at 04/17/24 0755    pantoprazole (PROTONIX) tablet 40 mg  40 mg Oral Daily Golzarian, Ekaterina, DO   40 mg at 04/17/24 0521    tiotropium-olodaterol (STIOLTO) 2.5-2.5 MCG/ACT inhaler 2 puff  2 puff Inhalation Daily Golzarian, Ekaterina, DO   2 puff at 04/17/24 0821    sodium chloride flush 0.9 % injection 5-40 mL  5-40 mL IntraVENous 2 times per day Golzarian, Ekaterina, DO   10 mL at 04/17/24 0755    sodium chloride flush 0.9 % injection 10 mL  10 mL IntraVENous PRN Golzarian, Ekaterina, DO        0.9 % sodium chloride infusion   IntraVENous PRN Golzarian, Ekaterina, DO        potassium chloride (KLOR-CON M) extended release tablet 40 mEq  40 mEq Oral PRN Golzarian, Ekaterina, DO        Or    potassium bicarb-citric acid (EFFER-K) effervescent tablet 40 mEq  40 mEq Oral PRN Golzarian, Ekaterina, DO        Or    potassium chloride 10 mEq/100 mL IVPB (Peripheral Line)  10 mEq IntraVENous PRN Golzarian, Ekaterina, DO        magnesium sulfate 2000 mg in 50 mL IVPB premix  2,000 mg IntraVENous PRN Golzarian, Ekaterina, DO        enoxaparin (LOVENOX) injection 40 mg  40 mg SubCUTAneous Q24H Golzarian, Ekaterina, DO   40 mg at 04/16/24 1556    ondansetron (ZOFRAN-ODT) disintegrating tablet 4 mg  4 mg Oral Q8H PRN Golzarian, Ekaterina, DO        Or    ondansetron (ZOFRAN) injection 4 mg  4 mg IntraVENous Q6H PRN Golzarian, Ekaterina, DO        polyethylene glycol (GLYCOLAX) packet 17 g  17 g Oral Daily PRN Golthian, Ekaterina, DO      baseline  Moderate COPD  Vocal Cord Dysplasia?  Hx CVA  Hx Tobacco Use Disorder  GERD  Diastolic Heart Failure with Preserved Ejection Fraction, EF 50-55%, with acute exacerbation  Pulmonary Embolism, Ruled Out    Plan:  Known history of atrial fibrillation with exacerbation with suspected subsequent decompensation of known heart failure  Maintained on diltiazem infusion in ER; since discontinued  CHADVASC 5, HAS-BLED 4, s/p WATCHMAN; no indication for therapeutic anticoagulation at this time  Patient started on metoprolol tartrate; consider transition to metoprolol succinate pending clinical course; avoid nonselective beta blockers  Resume patient's home loop diuretic; Strict I/O's, Daily Weights  Continuous telemetry; Keep Mg>2, K>4  Judicious resumption of patient's home amlodipine with hold parameters recommended  Troponin elevation suspected secondary to demand; complete ECHO ordered for further evaluation  HF GDMT outpatient: none; consideration for initiation of other components of GDMT pending clinical course; recommendation for outpatient follow up with cardiology and HF clinic for further optimization of medical therapy  Optimization of patient's acute pathologies and additional chronic pathologies per primary  Continue patient's home ASA, Statin  Strongly recommend pulmonology/sleep medicine follow up for further optimization of patient's respiratory function   Complete ECHO ordered; TSH, FT4 ordered    Thank you for allowing us to participate in the care of this patient.  Please do not hesitate to call us with questions.    Electronically signed by Amor Macias MD on 4/17/2024 at 11:26 AM  Please refer to attestation by Dr. Sirena Green for advanced care recommendations.      I have seen and examined the patient independently. . Face to face evaluation and examination performed.   The above evaluation and note has been reviewed and Changes made as necessary. Labs, EKG, echo, and radiographs reviewed.   I

## 2024-04-17 NOTE — ED NOTES
ED to inpatient nurses report      Chief Complaint:  Chief Complaint   Patient presents with    Irregular Heart Beat     Present to ED from: home    MOA:     LOC: alert and orientated to name, place, date  Mobility: Requires assistance * 1  Oxygen Baseline: 2 liters    Current needs required: 4 liters     Code Status:   Full Code    What abnormal results were found and what did you give/do to treat them? A-fib RVR- cardizem ggt but stopped due to controlled heart rate  Any procedures or intervention occur? N/a    Mental Status:  Level of Consciousness: Alert (0)    Psych Assessment:        Vitals:  Patient Vitals for the past 24 hrs:   BP Temp Temp src Pulse Resp SpO2 Weight   04/16/24 1947 102/71 -- -- 94 16 90 % --   04/16/24 1907 103/66 -- -- 88 16 91 % --   04/16/24 1819 110/63 -- -- 89 18 91 % --   04/16/24 1701 (!) 109/58 -- -- 75 18 92 % --   04/16/24 1600 -- -- -- 86 18 92 % --   04/16/24 1429 116/72 -- -- 95 16 93 % --   04/16/24 1426 116/72 -- -- (!) 106 -- -- --   04/16/24 1425 116/72 -- -- -- -- -- --   04/16/24 1407 128/67 -- -- (!) 113 -- -- --   04/16/24 1405 128/67 -- -- (!) 113 24 92 % --   04/16/24 1344 -- -- -- (!) 105 -- 93 % --   04/16/24 1339 119/77 -- -- (!) 109 -- 92 % --   04/16/24 1256 -- -- -- (!) 101 -- 94 % --   04/16/24 1253 120/67 -- -- (!) 102 -- 91 % --   04/16/24 1152 122/66 98 °F (36.7 °C) Oral (!) 134 20 93 % 76.2 kg (168 lb)   04/16/24 1113 120/77 97.5 °F (36.4 °C) Oral (!) 127 18 (!) 89 % --        LDAs:   Peripheral IV 04/16/24 Distal;Left Cephalic (Active)   Site Assessment Clean, dry & intact 04/16/24 1139   Phlebitis Assessment No symptoms 04/16/24 1139   Infiltration Assessment 0 04/16/24 1139       Ambulatory Status:  Presents to emergency department  because of falls (Syncope, seizure, or loss of consciousness): No, Age > 70: Yes, Altered Mental Status, Intoxication with alcohol or substance confusion (Disorientation, impaired judgment, poor safety awaremess, or inability  to follow instructions): No, Impaired Mobility: Ambulates or transfers with assistive devices or assistance; Unable to ambulate or transer.: No, Nursing Judgement: Yes    Diagnosis:  DISPOSITION Admitted 04/16/2024 01:28:35 PM   Final diagnoses:   Acute on chronic congestive heart failure, unspecified heart failure type (HCC)   Increased oxygen demand        Consults:  None     Pain Score:  Pain Assessment  Pain Assessment: None - Denies Pain  Pain Level: 4  Pain Location: Head    C-SSRS:   Risk of Suicide: No Risk    Sepsis Screening:  Sepsis Risk Score: 2.14    [place] Fall Risk:  [place] 1 Fall Risk  Presents to emergency department  because of falls (Syncope, seizure, or loss of consciousness): No  Age > 70: Yes  Altered Mental Status, Intoxication with alcohol or substance confusion (Disorientation, impaired judgment, poor safety awaremess, or inability to follow instructions): No  Impaired Mobility: Ambulates or transfers with assistive devices or assistance; Unable to ambulate or transer.: No  Nursing Judgement: Yes    Swallow Screening        Preferred Language:   English      ALLERGIES     Brilinta [ticagrelor] and Cephalexin    SURGICAL HISTORY       Past Surgical History:   Procedure Laterality Date    AORTIC VALVE REPAIR  09/2020    TAVR    BALLOON ANGIOPLASTY, ARTERY  07/2018    s/p Left CFA, SFA and popliteal intervention    CARDIAC CATHETERIZATION N/A 07/15/2022    CARDIAC VALVE REPLACEMENT      FACIAL COSMETIC SURGERY      32 years old    HAND SURGERY Right     carpal tunnel     OTHER SURGICAL HISTORY  01/05/2021    Left Atrial Appenadage closure/watchman    PTCA  06/2020    stent LAD    TONSILLECTOMY AND ADENOIDECTOMY      TRANSESOPHAGEAL ECHOCARDIOGRAM N/A 01/10/2022    TRANSESOPHAGEAL ECHOCARDIOGRAM performed by Jesse Matthews MD at [facility]       PAST MEDICAL HISTORY       Past Medical History:   Diagnosis Date    ASCVD (arteriosclerotic cardiovascular disease)     Atrial fibrillation  (HCC)     CAD (coronary artery disease)     Cerebral artery occlusion with cerebral infarction (HCC)     TIA no defiect    COPD (chronic obstructive pulmonary disease) ([facility])     Difficult intubation     got dysplasia of vocal cord    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  Lowest de-sat 77%.    Osteoarthritis     Osteopenia     PAD (peripheral artery disease) ([facility])     Polio 1946    Rheumatic fever 1946           Electronically signed by Anthony Lake RN on 4/16/2024 at 8:04 PM

## 2024-04-17 NOTE — PROGRESS NOTES
PROGRESS NOTE      Patient:  Brenton Sumner  Unit/Bed:3B-22/022-A  YOB: 1940  MRN: [id number]   Acct: [id number]    PCP: Jeffrey Purcell MD    Date of Admission: 4/16/2024 LOS: 1    Date of Evaluation:  4/17/2024    Anticipated Discharge: Pending clinical course    Assessment/Plan:    PAF with RVR S/p Watchman LAAO in 2021  TSH 1.95, T41.38  Influenza A, influenza B, COVID-negative  Blood cultures x 2 NG 24 H  Afebrile since admission  No leukocytosis  [id number] Score: 6  Rate 125-130 on arrival. Not on OAC given past bleeding issues. S/p Watchman LAAO in 2021 so risk for thrombus very low.   Started on lopressor 50 mg BID, Cardizem gtt.   Cardizem drip held night of 4/16 due to low pressures  Cardiology consulted and recommended to continue with rate control with Lopressor 50 twice daily, home Lasix, aspirin and statin  Acute on chronic hypoxic respiratory failure  Likely secondary to A-fib with RVR  Baseline 2-3 LNC  Currently at 4 LNC  CXR 4/16:Persistent peripheral interstitial opacities that can reflect underlying chronic interstitial disease.   CTA chest 4/16: No PE,Small bilateral pleural effusions with adjacent atelectasis/infiltrate.,  Chronic lung disease  D-dimer 2198  Continue to wean as tolerated  CAD  Continue home statin  Chronic heart failure with preserved ejection fraction  S/p PCI to LAD in 2020  BMP 2651  Troponin 29, 34  Continue home aspirin, Lasix  S/p Bumex 1 mg IV in the ER (4/16)  CXR 4/16:Persistent peripheral interstitial opacities that can reflect underlying chronic interstitial disease.   Cardiology following, will appreciate recommendations  PEGGY on 1/10/22: EF 50%, mildly dilated left atrium  Follow-up echo  Severe AS  S/p TAVR in 2020  PAD  S/p angioplasty to CFA, SFA (left) in 2018   MGUS  Following with [facility]   COPD  Stable on home albuterol, Stiolto  Hyperlipidemia  Continue on home statin  Vertebral artery and ICA stenosis  Bilateral. Follows  outpatient with interventional neurology. S/p recent angiogram. VA Stenosis ~70%, ~50% in ICA b/l. On stain and ASA only given bleeding issues.   Prior CVA  Noted per MRI last year showing old strokes. No residual deficits. Follows with neurology. Continue home ASA.             Chief Complaint: SOB    Hospital Course: Per initial HPI, \"Juanjo Sumner is an 83 yo gentleman with history of CAD, ICM, HFpEF 55%, MGUS, PAD/PVD, severe AS (s/p TAVR in 2020), PAF (s/p Watchman, not OAC, not on any rate control or antiarrhythmics), former 60-PY tobacco smoker, vertebral artery and ICA stenosis, COPD, who presents to [facility] ED on 04/16/2024 with chief complaint of exertional shortness of breath patient states that in the last year, he has been getting worse and worse in terms of exertional functionality. He underwent TAVR in 2020 and Watchman in 2021 due to bleeding complications. He is only on ASA. He follows up with neurology outpatient for vertebral artery and ICA stenosis. At baseline he has on 2 LPM. He used to only wear this nocturnally for nocturnal hypoxia, however now he is wearing it almost throughout the day. Says he can walk without it. In the last couple days, he has required to go up to 4 LPM on it.  He also felt his heart beating fast, so he came in for further evaluation. Patient is accompanied by daughter and wife.  In the ED, his heart rate was ~130 bpm, remaining vitals normal. Lab workup revealed elevated proBNP and 2651 pg/mL, elevated high-sensitivity troponin 32 ng/L. EKG confirmed A-fib RVR. Pt was subsequently admitted for further management. \"    Subjective/HPI:   Brenton Sumner feels well overall. He denies HA, SOB, CP, N/V/D. Has no questions/ concerns today.       PMH, SURGICAL HX, FH, SOCIAL HX reviewed and updated as needed.    Medications:  Reviewed    Infusion Medications    sodium chloride       Scheduled Medications    [START ON 4/18/2024] furosemide  20 mg Oral Daily    metoprolol      Recent Labs     04/16/24  1133 04/17/24  0450    140   K 3.9 4.5   CL 96* 98   CO2 27 29   BUN 16 17   CREATININE 1.1 1.2   CALCIUM 8.9 9.0     Recent Labs     04/16/24  1133   AST 19   ALT 7*   BILITOT 0.8   ALKPHOS 98     No results for input(s): \"INR\" in the last 72 hours.  No results for input(s): \"CKTOTAL\", \"TROPONINT\" in the last 72 hours.    Urinalysis:      Lab Results   Component Value Date/Time    NITRU NEGATIVE 11/22/2020 10:45 PM    BLOODU NEGATIVE 11/22/2020 10:45 PM    GLUCOSEU NEGATIVE 11/22/2020 10:45 PM       All radiology images and reports reviewed and interpreted by me:  Radiology:  CTA CHEST W WO CONTRAST   Final Result   1. No pulmonary embolism.   2. Small bilateral pleural effusions with adjacent atelectasis/infiltrate.   3. Chronic lung disease.      Final report electronically signed by Dr. Karan Gonzales on 4/16/2024 3:20 PM      XR CHEST PORTABLE   Final Result   1. Persistent peripheral interstitial opacities that can reflect underlying chronic interstitial disease.            **This report has been created using voice recognition software.  It may contain minor errors which are inherent in voice recognition technology.**      Final report electronically signed by Dr Estela Hernandez on 4/16/2024 12:32 PM          Diet: ADULT DIET; Regular    Microbiology: yes - reviewed  Antibiotics: no    Steroids: no    Telemetry: Yes   Telemetry Review of past 24 hours:  Afib RVR (HR 130S)    LDA: CVC /PICC /midline /[place] /drains /Mediport /PIV /none    Labs (still needed?):  Yes /no  IVF (still needed?):  Yes /no    Level of care: Step Down /Med-Surg  Bed Status: Inpatient /observation    DVT Prophylaxis:  Lovenox /heparin /SCDs /already on Systemic Anticoagulation /none     PT/OT:  yes /no    Disposition:    Home       TCU       Rehab       Psych       SNF       Long Term Care Facility       Other-    Code Status: Full Code      An electronic signature was used to authenticate this

## 2024-04-18 LAB
ANION GAP SERPL CALC-SCNC: 12 MEQ/L (ref 8–16)
BUN SERPL-MCNC: 21 MG/DL (ref 7–22)
CALCIUM SERPL-MCNC: 8.5 MG/DL (ref 8.5–10.5)
CHLORIDE SERPL-SCNC: 98 MEQ/L (ref 98–111)
CO2 SERPL-SCNC: 26 MEQ/L (ref 23–33)
CREAT SERPL-MCNC: 1.1 MG/DL (ref 0.4–1.2)
DEPRECATED RDW RBC AUTO: 46.1 FL (ref 35–45)
ERYTHROCYTE [DISTWIDTH] IN BLOOD BY AUTOMATED COUNT: 13.7 % (ref 11.5–14.5)
GFR SERPL CREATININE-BSD FRML MDRD: 67 ML/MIN/1.73M2
GLUCOSE SERPL-MCNC: 92 MG/DL (ref 70–108)
HCT VFR BLD AUTO: 41.1 % (ref 42–52)
HGB BLD-MCNC: 13.3 GM/DL (ref 14–18)
MCH RBC QN AUTO: 29.5 PG (ref 26–33)
MCHC RBC AUTO-ENTMCNC: 32.4 GM/DL (ref 32.2–35.5)
MCV RBC AUTO: 91.1 FL (ref 80–94)
PLATELET # BLD AUTO: 242 THOU/MM3 (ref 130–400)
PMV BLD AUTO: 9.8 FL (ref 9.4–12.4)
POTASSIUM SERPL-SCNC: 4.2 MEQ/L (ref 3.5–5.2)
RBC # BLD AUTO: 4.51 MILL/MM3 (ref 4.7–6.1)
SODIUM SERPL-SCNC: 136 MEQ/L (ref 135–145)
WBC # BLD AUTO: 8.7 THOU/MM3 (ref 4.8–10.8)

## 2024-04-18 PROCEDURE — 2580000003 HC RX 258: Performed by: STUDENT IN AN ORGANIZED HEALTH CARE EDUCATION/TRAINING PROGRAM

## 2024-04-18 PROCEDURE — 6370000000 HC RX 637 (ALT 250 FOR IP)

## 2024-04-18 PROCEDURE — 6370000000 HC RX 637 (ALT 250 FOR IP): Performed by: STUDENT IN AN ORGANIZED HEALTH CARE EDUCATION/TRAINING PROGRAM

## 2024-04-18 PROCEDURE — 80048 BASIC METABOLIC PNL TOTAL CA: CPT

## 2024-04-18 PROCEDURE — 99232 SBSQ HOSP IP/OBS MODERATE 35: CPT | Performed by: NURSE PRACTITIONER

## 2024-04-18 PROCEDURE — 85027 COMPLETE CBC AUTOMATED: CPT

## 2024-04-18 PROCEDURE — 99233 SBSQ HOSP IP/OBS HIGH 50: CPT | Performed by: INTERNAL MEDICINE

## 2024-04-18 PROCEDURE — 36415 COLL VENOUS BLD VENIPUNCTURE: CPT

## 2024-04-18 PROCEDURE — 94640 AIRWAY INHALATION TREATMENT: CPT

## 2024-04-18 PROCEDURE — 6370000000 HC RX 637 (ALT 250 FOR IP): Performed by: NURSE PRACTITIONER

## 2024-04-18 PROCEDURE — 6360000002 HC RX W HCPCS: Performed by: NURSE PRACTITIONER

## 2024-04-18 PROCEDURE — 97162 PT EVAL MOD COMPLEX 30 MIN: CPT

## 2024-04-18 PROCEDURE — 97116 GAIT TRAINING THERAPY: CPT

## 2024-04-18 PROCEDURE — 2140000000 HC CCU INTERMEDIATE R&B

## 2024-04-18 RX ORDER — AMLODIPINE BESYLATE 2.5 MG/1
2.5 TABLET ORAL DAILY
Status: DISCONTINUED | OUTPATIENT
Start: 2024-04-19 | End: 2024-04-20 | Stop reason: HOSPADM

## 2024-04-18 RX ORDER — BUMETANIDE 0.25 MG/ML
1 INJECTION INTRAMUSCULAR; INTRAVENOUS EVERY 12 HOURS
Status: DISCONTINUED | OUTPATIENT
Start: 2024-04-18 | End: 2024-04-20

## 2024-04-18 RX ADMIN — ASPIRIN 81 MG: 81 TABLET, COATED ORAL at 08:20

## 2024-04-18 RX ADMIN — FUROSEMIDE 20 MG: 20 TABLET ORAL at 08:20

## 2024-04-18 RX ADMIN — SODIUM CHLORIDE, PRESERVATIVE FREE 10 ML: 5 INJECTION INTRAVENOUS at 08:21

## 2024-04-18 RX ADMIN — ATORVASTATIN CALCIUM 20 MG: 20 TABLET, FILM COATED ORAL at 08:20

## 2024-04-18 RX ADMIN — METOPROLOL TARTRATE 50 MG: 50 TABLET, FILM COATED ORAL at 08:21

## 2024-04-18 RX ADMIN — APIXABAN 5 MG: 5 TABLET, FILM COATED ORAL at 20:00

## 2024-04-18 RX ADMIN — AMLODIPINE BESYLATE 5 MG: 5 TABLET ORAL at 08:20

## 2024-04-18 RX ADMIN — METOPROLOL TARTRATE 50 MG: 50 TABLET, FILM COATED ORAL at 20:00

## 2024-04-18 RX ADMIN — TIOTROPIUM BROMIDE AND OLODATEROL 2 PUFF: 3.124; 2.736 SPRAY, METERED RESPIRATORY (INHALATION) at 08:12

## 2024-04-18 RX ADMIN — PANTOPRAZOLE SODIUM 40 MG: 40 TABLET, DELAYED RELEASE ORAL at 06:34

## 2024-04-18 RX ADMIN — BUMETANIDE 1 MG: 0.25 INJECTION INTRAMUSCULAR; INTRAVENOUS at 13:33

## 2024-04-18 RX ADMIN — SODIUM CHLORIDE, PRESERVATIVE FREE 10 ML: 5 INJECTION INTRAVENOUS at 20:00

## 2024-04-18 RX ADMIN — ACETAMINOPHEN 650 MG: 325 TABLET ORAL at 23:52

## 2024-04-18 ASSESSMENT — PAIN DESCRIPTION - LOCATION: LOCATION: HEAD

## 2024-04-18 ASSESSMENT — PAIN - FUNCTIONAL ASSESSMENT: PAIN_FUNCTIONAL_ASSESSMENT: ACTIVITIES ARE NOT PREVENTED

## 2024-04-18 ASSESSMENT — PAIN SCALES - GENERAL: PAINLEVEL_OUTOF10: 3

## 2024-04-18 ASSESSMENT — PAIN DESCRIPTION - DESCRIPTORS: DESCRIPTORS: DISCOMFORT

## 2024-04-18 NOTE — PLAN OF CARE
Problem: Discharge Planning  Goal: Discharge to home or other facility with appropriate resources  4/18/2024 0129 by Sukh Garcia, RN  Outcome: Progressing  Flowsheets (Taken 4/18/2024 0129)  Discharge to home or other facility with appropriate resources:   Identify barriers to discharge with patient and caregiver   Identify discharge learning needs (meds, wound care, etc)     Problem: Skin/Tissue Integrity  Goal: Absence of new skin breakdown  Description: 1.  Monitor for areas of redness and/or skin breakdown  2.  Assess vascular access sites hourly  3.  Every 4-6 hours minimum:  Change oxygen saturation probe site  4.  Every 4-6 hours:  If on nasal continuous positive airway pressure, respiratory therapy assess nares and determine need for appliance change or resting period.  4/18/2024 0129 by Sukh Garcia, RN  Outcome: Progressing  Note: Ongoing assessment & interventions provided throughout shift.  Skin assessments provided.  Encouraging/assisting patient to turn as needed.      Problem: Chronic Conditions and Co-morbidities  Goal: Patient's chronic conditions and co-morbidity symptoms are monitored and maintained or improved  4/18/2024 0129 by Sukh Garcia, RN  Outcome: Progressing  Flowsheets (Taken 4/18/2024 0129)  Care Plan - Patient's Chronic Conditions and Co-Morbidity Symptoms are Monitored and Maintained or Improved:   Monitor and assess patient's chronic conditions and comorbid symptoms for stability, deterioration, or improvement   Collaborate with multidisciplinary team to address chronic and comorbid conditions and prevent exacerbation or deterioration   Update acute care plan with appropriate goals if chronic or comorbid symptoms are exacerbated and prevent overall improvement and discharge     Problem: Respiratory - Adult  Goal: Achieves optimal ventilation and oxygenation  Flowsheets (Taken 4/18/2024 0130)  Achieves optimal ventilation and oxygenation:   Assess for changes in respiratory  status   Position to facilitate oxygenation and minimize respiratory effort   Assess for changes in mentation and behavior   Oxygen supplementation based on oxygen saturation or arterial blood gases   Assess the need for suctioning and aspirate as needed   Assess and instruct to report shortness of breath or any respiratory difficulty   Encourage broncho-pulmonary hygiene including cough, deep breathe, incentive spirometry   Care plan reviewed with patient.  Patient verbalizes understanding of the care plan and contributed to goal setting.

## 2024-04-18 NOTE — PROGRESS NOTES
[facility]  INPATIENT PHYSICAL THERAPY  EVALUATION  [facility] CCU-STEPDOWN 3B - 3B-22/022-A    Time In: 1422  Time Out: 1439  Timed Code Treatment Minutes: 8 Minutes  Minutes: 17          Date: 2024  Patient Name: Brenton Sumner,  Gender:  male        MRN: [id number]  : 1940  (84 y.o.)      Referring Practitioner: Dr. MADELYN Fontenot  Diagnosis: a fib with RVR  Additional Pertinent Hx: 83 yo gentleman with history of CAD, ICM, HFpEF 55%, MGUS, PAD/PVD, severe AS (s/p TAVR in ), PAF (s/p Watchman, not OAC, not on any rate control or antiarrhythmics), former 60-PY tobacco smoker, vertebral artery and ICA stenosis, COPD, who presents to [facility] ED on 2024 with chief complaint of exertional shortness of breath patient states that in the last year, he has been getting worse and worse in terms of exertional functionality. He underwent TAVR in  and Watchman in  due to bleeding complications. He is only on ASA. He follows up with neurology outpatient for vertebral artery and ICA stenosis. At baseline he has on 2 LPM. He used to only wear this nocturnally for nocturnal hypoxia, however now he is wearing it almost throughout the day. Says he can walk without it. In the last couple days, he has required to go up to 4 LPM on it.  He also felt his heart beating fast, so he came in for further evaluation. Patient is accompanied by daughter and wife.  In the ED, his heart rate was ~130 bpm, remaining vitals normal. Lab workup revealed elevated proBNP and 2651 pg/mL, elevated high-sensitivity troponin 32 ng/L. EKG confirmed A-fib RVR.     Restrictions/Precautions:  Restrictions/Precautions: Fall Risk    Subjective:  Chart Reviewed: Yes  Patient assessed for rehabilitation services?: Yes  Subjective: pleasant and cooperative, wife present and supportive, per pt waiting to have cardioversion tomorrow.    General:        Hearing: Within functional limits       Pain: no pain    Vitals: Oxygen: 3L as  Therapeutic Intervention: Decreased functional mobility , Decreased strength, Decreased endurance, Decreased balance  Assessment: Brenton Sumner is a 84 y.o. male that presents with a fib with RVR. Pt demonstrates a decrease in baseline by way of bed mobility, transfers and ambulation secondary to decreased activity tolerance, strength, fatigue, and balance deficits. Pt will benefit from skilled PT services throughout admission and beyond hospital discharge for improvements in functional mobility and in order to decrease fall risk and return pt to PLOF.     Therapy Prognosis: Good    Requires PT Follow-Up: Yes    Discharge Recommendations:  Discharge Recommendations: Continue to assess pending progress, Home with assist PRN    Patient Education:      .    Patient Education  Education Given To: Patient  Education Provided: Role of Therapy, Plan of Care  Education Method: Verbal  Education Outcome: Verbalized understanding       Equipment Recommendations:  Equipment Needed: No    Plan:  Current Treatment Recommendations: Strengthening, Balance training, Endurance training, Functional mobility training, Transfer training, Gait training, Stair training, Home exercise program, Patient/Caregiver education & training, Safety education & training, Therapeutic activities  General Plan:  (3-5X GM)    Goals:  Patient Goals : get back to walking on treadmill and tolerating more activity  Short Term Goals  Time Frame for Short Term Goals: by discharge  Short Term Goal 1: bed mobility with MOD I to get in/out of bed  Short Term Goal 2: transfer with MOD I to get in/out of chairs  Short Term Goal 3: amb >100'x1 with RW and MOD I to walk safely in home  Short Term Goal 4: negotiate 3 steps with HR and S to enter home safely  Long Term Goals  Time Frame for Long Term Goals : no LTGs set secondary to short ELOS    Following session, patient left in safe position with all fall risk precautions in place.

## 2024-04-18 NOTE — PLAN OF CARE
Problem: Respiratory - Adult  Goal: Clear lung sounds  Outcome: Progressing   Patient agreed on goals

## 2024-04-18 NOTE — PROGRESS NOTES
Cardiology Progress Note      Patient:  Brenton Sumner  YOB: 1940  MRN: [id number]   Acct: [id number]  Admit Date:  4/16/2024  Primary Cardiologist: Hebert Kirby MD  Seen by Dr. Green     Per prior cardiology consult note-  CHIEF COMPLAINT: Shortness of Breath  Reason for Consult: \"Afib with RVR\"        HPI:   This is an 84 year old male who presented to [facility] ED on 04/16/2024 secondary to a several week history of progressive shortness of breath at rest and with exertion. He endorses a baseline cough with clear/white sputum production (denies hemoptysis), chronic oxygen use (3L continuously), and difficulty speaking with his increased work of breathing. Additional review of symptoms is positive for bilateral lower extremity edema. He denies lightheadedness, dizziness, fall, loss of consciousness, chest pain/tightness/pressure, constipation, diarrhea, or change in urination. He denies any recent illness, sick contacts, or change in medication. He denies any additional acute symptoms or concerns. Collaborative history obtained from patient's family at bedside.      Cardiac History:  - Follows with Dr. Hebert Kirby; last seen 01/11/2024  - s/p TAVR September 2020  - S/P WATCHMAN January 2021  - CAD s/p PCI LAD Stent June 2020  - Known pAFib [id number]: 5; HASBLED 4  - PAD L SFA  s/p intervention August 2021      Subjective (Events in last 24 hours):   pt up in chair eating lunch   States I still dont feel right     O2 4L NC - usually 3L NC  Tele AFB - occ RVR ---> pt denies palpitations   SOB persists-- worse than baseline      Wife at bedside       Objective:   BP (!) 103/58   Pulse 81   Temp 98 °F (36.7 °C) (Oral)   Resp 18   Ht 1.727 m (5' 8\")   Wt 76.4 kg (168 lb 6.9 oz)   SpO2 94%   BMI 25.61 kg/m²        TELEMETRY: AFB - intermittent RVR     Physical Exam:  General Appearance: alert and oriented to person, place and time, in no acute distress  Cardiovascular: irregular rhythm,  procedure details and management plans were discussed in detail  with the family members, and they were in agreement with plan.           JUAN A OATES MD     D: 06/16/2020      Lab Data:    Cardiac Enzymes:  No results for input(s): \"CKTOTAL\", \"CKMB\", \"CKMBINDEX\", \"TROPONINI\" in the last 72 hours.    CBC:   Lab Results   Component Value Date/Time    WBC 8.7 04/18/2024 04:13 AM    RBC 4.51 04/18/2024 04:13 AM    HGB 13.3 04/18/2024 04:13 AM    HCT 41.1 04/18/2024 04:13 AM     04/18/2024 04:13 AM       CMP:    Lab Results   Component Value Date/Time     04/18/2024 04:13 AM    K 4.2 04/18/2024 04:13 AM    K 4.3 08/10/2021 09:07 AM    CL 98 04/18/2024 04:13 AM    CO2 26 04/18/2024 04:13 AM    BUN 21 04/18/2024 04:13 AM    CREATININE 1.1 04/18/2024 04:13 AM    LABGLOM 67 04/18/2024 04:13 AM    GLUCOSE 92 04/18/2024 04:13 AM    GLUCOSE 90 04/06/2017 07:37 AM    CALCIUM 8.5 04/18/2024 04:13 AM       Hepatic Function Panel:    Lab Results   Component Value Date/Time    ALKPHOS 98 04/16/2024 11:33 AM    ALT 7 04/16/2024 11:33 AM    AST 19 04/16/2024 11:33 AM    PROT 7.8 04/16/2024 11:33 AM    BILITOT 0.8 04/16/2024 11:33 AM    BILIDIR <0.2 11/16/2021 09:22 AM       Magnesium:    Lab Results   Component Value Date/Time    MG 2.1 04/17/2024 04:50 AM       PT/INR:    Lab Results   Component Value Date/Time    INR 0.97 01/31/2023 08:04 AM       HgBA1c:    Lab Results   Component Value Date/Time    LABA1C 5.5 01/10/2024 08:06 AM       FLP:    Lab Results   Component Value Date/Time    TRIG 48 11/13/2023 09:09 AM    HDL 56 11/13/2023 09:09 AM    LDLCALC 82 11/13/2023 09:09 AM    LDLDIRECT 83 09/30/2014 12:00 AM       TSH:    Lab Results   Component Value Date/Time    TSH 1.950 04/17/2024 04:50 AM         Assessment:    Known PAFB RVR    - sp LAAO device       Mild acute on chronic diastolic chf     COPD - wears 3 L NC   On 4L NC now     Hx   - s/p TAVR September 2020  - S/P WATCHMAN January 2021  - CAD s/p PCI LAD  Stent June 2020  - Known pAFib [id number]: 5; HASBLED 4  - PAD L SFA  s/p intervention August 2021      Plan:  Continue diuresis - restart bumex 1mg iv bid  Bar / CV tomorrow - oac x 1 month   Optimize chf then need optimization pulmonary           Electronically signed by LEWIS Man CNP on 4/18/2024 at 12:27 PM

## 2024-04-18 NOTE — PROGRESS NOTES
Evaluated SGLT2 cost for [facility]    Jardiance: $96.16/month, patient eligible for free 14-day trial from [facility].    Farxiga: $91.62/month, patient eligible for free 30-day trial from [facility].    Patient likely to qualify for Farxiga patient assistance program to receive drug at no cost via . Will attempt enrollment if patient is prescribed Farxiga at discharge.     Thank you!   Cee Rosenbaum [facility] - Prescription Assistance ([phone]) 4/18/2024,9:39 AM

## 2024-04-18 NOTE — PROGRESS NOTES
Pt was sitting eating at the time of the visit. He was dealing with A-Fib issues. He was hopeful and blessed.    04/18/24 1302   Encounter Summary   Encounter Overview/Reason  Initial Encounter   Service Provided For: Patient   Referral/Consult From: [facility]   Support System Spouse   Last Encounter  04/18/24   Complexity of Encounter Low   Begin Time 1256   End Time  1301   Total Time Calculated 5 min   Spiritual/Emotional needs   Type Spiritual Support   Assessment/Intervention/Outcome   Assessment Hopeful   Intervention Empowerment   Outcome Encouraged

## 2024-04-18 NOTE — PROGRESS NOTES
Patient educated on how to use incentive spirometer. Patient verbalized understanding and demonstrated proper use. Emphasized importance and usage of device, with coughing and deep breathing every 24 hours while awake.

## 2024-04-18 NOTE — PROGRESS NOTES
PROGRESS NOTE      Patient:  Brenton Sumner  Unit/Bed:3B-22/022-A  YOB: 1940  MRN: [id number]   Acct: [id number]    PCP: Jeffrey Purcell MD    Date of Admission: 4/16/2024 LOS: 2    Date of Evaluation:  4/18/2024    Anticipated Discharge: pending clinical course, likely 1-2 days    Assessment/Plan:    PAF with RVR S/p Watchman LAAO in 2021  TSH 1.95, T41.38  Influenza A, influenza B, COVID-negative  Blood cultures x 2 NG 48 H  Afebrile since admission  No leukocytosis  [id number] Score: 6  Rate 125-130 on arrival. Not on OAC given past bleeding issues. S/p Watchman LAAO in 2021 so risk for thrombus very low.   Started on lopressor 50 mg BID and Cardizem gtt.   Cardizem drip held night of 4/16 due to low pressures  Cardiology consulted and recommended bumex 1mg iv bid . Plan for Bar / CV tomorrow - oac x 1 month   Acute on chronic hypoxic respiratory failure  Likely secondary to A-fib with RVR  Baseline 3 LNC  Currently at 4 LNC  CXR 4/16:Persistent peripheral interstitial opacities that can reflect underlying chronic interstitial disease.   CTA chest 4/16: No PE,Small bilateral pleural effusions with adjacent atelectasis/infiltrate.,  Chronic lung disease  D-dimer 2198  Cont diuresis   Continue to wean as tolerated  CAD  Continue home statin  Chronic heart failure with reduced ejection fraction  S/p PCI to LAD in 2020  BMP 2651  Troponin 29, 34  Continue home aspirin  S/p Bumex 1 mg IV in the ER (4/16)  CXR 4/16:Persistent peripheral interstitial opacities that can reflect underlying chronic interstitial disease.   Cardiology following,  recommended bumex 1mg iv bid   BAR on 1/10/22: EF 50%, mildly dilated left atrium  Echo 4/17: EF 40 to 45%, moderate global hypokinesis, moderate perivalvular regurgitation of aortic valve, mild MR.  Essential hypertension  Home Norvasc decreased to 2.5 with hold parameters on 4/18  Severe AS  S/p TAVR in 2020  PAD  S/p angioplasty to CFA, SFA (left) in 2018  and well . Wife at bedside. All questions/concerns answered.       PMH, SURGICAL HX, FH, SOCIAL HX reviewed and updated as needed.    Medications:  Reviewed    Infusion Medications    sodium chloride       Scheduled Medications    [START ON 4/19/2024] amLODIPine  2.5 mg Oral Daily    bumetanide  1 mg IntraVENous Q12H    apixaban  5 mg Oral BID    [Held by provider] furosemide  20 mg Oral Daily    metoprolol tartrate  50 mg Oral BID    aspirin  81 mg Oral Daily    atorvastatin  20 mg Oral Daily    pantoprazole  40 mg Oral Daily    tiotropium-olodaterol  2 puff Inhalation Daily    sodium chloride flush  5-40 mL IntraVENous 2 times per day     PRN Meds: albuterol sulfate HFA, hydrOXYzine HCl, sodium chloride flush, sodium chloride, potassium chloride **OR** potassium alternative oral replacement **OR** potassium chloride, magnesium sulfate, ondansetron **OR** ondansetron, polyethylene glycol, acetaminophen **OR** acetaminophen      Intake/Output Summary (Last 24 hours) at 4/18/2024 1630  Last data filed at 4/18/2024 1339  Gross per 24 hour   Intake 1140 ml   Output 0 ml   Net 1140 ml       Exam:  /69   Pulse 84   Temp 98.2 °F (36.8 °C) (Oral)   Resp 18   Ht 1.727 m (5' 8\")   Wt 76.4 kg (168 lb 6.9 oz)   SpO2 94%   BMI 25.61 kg/m²     Physical Exam  Vitals reviewed.   Constitutional:       Appearance: Normal appearance.   Cardiovascular:      Rate and Rhythm: Tachycardia present.   Pulmonary:      Effort: Pulmonary effort is normal. No respiratory distress.      Breath sounds: Normal breath sounds. No wheezing.   Abdominal:      General: There is no distension.      Palpations: Abdomen is soft.      Tenderness: There is no abdominal tenderness. There is no guarding.   Musculoskeletal:         General: No tenderness.      Right lower leg: No edema.      Left lower leg: No edema.   Skin:     General: Skin is warm.   Neurological:      General: No focal deficit present.      Mental Status: He is alert. Mental

## 2024-04-18 NOTE — PLAN OF CARE
Problem: Discharge Planning  Goal: Discharge to home or other facility with appropriate resources  Outcome: Progressing  Flowsheets (Taken 4/18/2024 0905)  Discharge to home or other facility with appropriate resources: Identify barriers to discharge with patient and caregiver     Problem: Skin/Tissue Integrity  Goal: Absence of new skin breakdown  Description: 1.  Monitor for areas of redness and/or skin breakdown  2.  Assess vascular access sites hourly  3.  Every 4-6 hours minimum:  Change oxygen saturation probe site  4.  Every 4-6 hours:  If on nasal continuous positive airway pressure, respiratory therapy assess nares and determine need for appliance change or resting period.  Outcome: Progressing     Problem: Chronic Conditions and Co-morbidities  Goal: Patient's chronic conditions and co-morbidity symptoms are monitored and maintained or improved  Outcome: Progressing  Flowsheets (Taken 4/18/2024 0905)  Care Plan - Patient's Chronic Conditions and Co-Morbidity Symptoms are Monitored and Maintained or Improved: Monitor and assess patient's chronic conditions and comorbid symptoms for stability, deterioration, or improvement     Problem: Respiratory - Adult  Goal: Clear lung sounds  4/18/2024 1940 by Lucía Bee, RN  Outcome: Progressing     Problem: Respiratory - Adult  Goal: Achieves optimal ventilation and oxygenation  Outcome: Progressing  Flowsheets (Taken 4/18/2024 0905)  Achieves optimal ventilation and oxygenation: Assess for changes in respiratory status     Problem: Safety - Adult  Goal: Free from fall injury  Outcome: Progressing     Problem: Cardiovascular - Adult  Goal: Maintains optimal cardiac output and hemodynamic stability  Outcome: Progressing     Problem: Cardiovascular - Adult  Goal: Absence of cardiac dysrhythmias or at baseline  Outcome: Progressing   Care plan reviewed with patient.  Patient  verbalize understanding of the plan of care and contribute to goal setting.

## 2024-04-19 ENCOUNTER — HOSPITAL ENCOUNTER (OUTPATIENT)
Age: 84
DRG: 291 | End: 2024-04-19
Attending: NUCLEAR MEDICINE
Payer: MEDICARE

## 2024-04-19 ENCOUNTER — APPOINTMENT (OUTPATIENT)
Age: 84
DRG: 291 | End: 2024-04-19
Attending: NUCLEAR MEDICINE
Payer: MEDICARE

## 2024-04-19 DIAGNOSIS — I48.91 NEW ONSET A-FIB (HCC): Primary | ICD-10-CM

## 2024-04-19 LAB
ANION GAP SERPL CALC-SCNC: 12 MEQ/L (ref 8–16)
APTT PPP: 36.1 SECONDS (ref 22–38)
BUN SERPL-MCNC: 19 MG/DL (ref 7–22)
CALCIUM SERPL-MCNC: 8.5 MG/DL (ref 8.5–10.5)
CHLORIDE SERPL-SCNC: 96 MEQ/L (ref 98–111)
CO2 SERPL-SCNC: 26 MEQ/L (ref 23–33)
CREAT SERPL-MCNC: 1.1 MG/DL (ref 0.4–1.2)
DEPRECATED RDW RBC AUTO: 46.3 FL (ref 35–45)
ECHO BSA: 1.91 M2
ECHO BSA: 1.91 M2
EKG ATRIAL RATE: 56 BPM
EKG P AXIS: 38 DEGREES
EKG P-R INTERVAL: 196 MS
EKG Q-T INTERVAL: 424 MS
EKG Q-T INTERVAL: 470 MS
EKG QRS DURATION: 110 MS
EKG QRS DURATION: 110 MS
EKG QTC CALCULATION (BAZETT): 433 MS
EKG QTC CALCULATION (BAZETT): 453 MS
EKG R AXIS: -8 DEGREES
EKG R AXIS: 6 DEGREES
EKG T AXIS: 43 DEGREES
EKG T AXIS: 56 DEGREES
EKG VENTRICULAR RATE: 56 BPM
EKG VENTRICULAR RATE: 63 BPM
ERYTHROCYTE [DISTWIDTH] IN BLOOD BY AUTOMATED COUNT: 13.5 % (ref 11.5–14.5)
GFR SERPL CREATININE-BSD FRML MDRD: 67 ML/MIN/1.73M2
GLUCOSE SERPL-MCNC: 101 MG/DL (ref 70–108)
HCT VFR BLD AUTO: 40.7 % (ref 42–52)
HGB BLD-MCNC: 13.1 GM/DL (ref 14–18)
INR PPP: 1.24 (ref 0.85–1.13)
LEFT VENTRICULAR EJECTION FRACTION HIGH VALUE: 45 %
LEFT VENTRICULAR EJECTION FRACTION MODE: NORMAL
LV EF: 40 %
MCH RBC QN AUTO: 29.7 PG (ref 26–33)
MCHC RBC AUTO-ENTMCNC: 32.2 GM/DL (ref 32.2–35.5)
MCV RBC AUTO: 92.3 FL (ref 80–94)
PLATELET # BLD AUTO: 230 THOU/MM3 (ref 130–400)
PMV BLD AUTO: 9.6 FL (ref 9.4–12.4)
POTASSIUM SERPL-SCNC: 3.8 MEQ/L (ref 3.5–5.2)
RBC # BLD AUTO: 4.41 MILL/MM3 (ref 4.7–6.1)
SODIUM SERPL-SCNC: 134 MEQ/L (ref 135–145)
WBC # BLD AUTO: 8.8 THOU/MM3 (ref 4.8–10.8)

## 2024-04-19 PROCEDURE — 93320 DOPPLER ECHO COMPLETE: CPT | Performed by: NUCLEAR MEDICINE

## 2024-04-19 PROCEDURE — 85730 THROMBOPLASTIN TIME PARTIAL: CPT

## 2024-04-19 PROCEDURE — 93005 ELECTROCARDIOGRAM TRACING: CPT | Performed by: NUCLEAR MEDICINE

## 2024-04-19 PROCEDURE — 2500000003 HC RX 250 WO HCPCS: Performed by: NUCLEAR MEDICINE

## 2024-04-19 PROCEDURE — 93312 ECHO TRANSESOPHAGEAL: CPT

## 2024-04-19 PROCEDURE — 36415 COLL VENOUS BLD VENIPUNCTURE: CPT

## 2024-04-19 PROCEDURE — 93325 DOPPLER ECHO COLOR FLOW MAPG: CPT | Performed by: NUCLEAR MEDICINE

## 2024-04-19 PROCEDURE — 6360000002 HC RX W HCPCS: Performed by: NURSE PRACTITIONER

## 2024-04-19 PROCEDURE — 97110 THERAPEUTIC EXERCISES: CPT

## 2024-04-19 PROCEDURE — 99152 MOD SED SAME PHYS/QHP 5/>YRS: CPT

## 2024-04-19 PROCEDURE — 85027 COMPLETE CBC AUTOMATED: CPT

## 2024-04-19 PROCEDURE — 80048 BASIC METABOLIC PNL TOTAL CA: CPT

## 2024-04-19 PROCEDURE — 94640 AIRWAY INHALATION TREATMENT: CPT

## 2024-04-19 PROCEDURE — 93312 ECHO TRANSESOPHAGEAL: CPT | Performed by: NUCLEAR MEDICINE

## 2024-04-19 PROCEDURE — 6370000000 HC RX 637 (ALT 250 FOR IP): Performed by: STUDENT IN AN ORGANIZED HEALTH CARE EDUCATION/TRAINING PROGRAM

## 2024-04-19 PROCEDURE — 93010 ELECTROCARDIOGRAM REPORT: CPT | Performed by: INTERNAL MEDICINE

## 2024-04-19 PROCEDURE — 85610 PROTHROMBIN TIME: CPT

## 2024-04-19 PROCEDURE — 92960 CARDIOVERSION ELECTRIC EXT: CPT

## 2024-04-19 PROCEDURE — 97166 OT EVAL MOD COMPLEX 45 MIN: CPT

## 2024-04-19 PROCEDURE — 2140000000 HC CCU INTERMEDIATE R&B

## 2024-04-19 PROCEDURE — 92960 CARDIOVERSION ELECTRIC EXT: CPT | Performed by: NUCLEAR MEDICINE

## 2024-04-19 PROCEDURE — 99233 SBSQ HOSP IP/OBS HIGH 50: CPT | Performed by: INTERNAL MEDICINE

## 2024-04-19 PROCEDURE — 2580000003 HC RX 258: Performed by: STUDENT IN AN ORGANIZED HEALTH CARE EDUCATION/TRAINING PROGRAM

## 2024-04-19 PROCEDURE — 6370000000 HC RX 637 (ALT 250 FOR IP): Performed by: NURSE PRACTITIONER

## 2024-04-19 PROCEDURE — 93005 ELECTROCARDIOGRAM TRACING: CPT | Performed by: STUDENT IN AN ORGANIZED HEALTH CARE EDUCATION/TRAINING PROGRAM

## 2024-04-19 PROCEDURE — 99232 SBSQ HOSP IP/OBS MODERATE 35: CPT | Performed by: STUDENT IN AN ORGANIZED HEALTH CARE EDUCATION/TRAINING PROGRAM

## 2024-04-19 PROCEDURE — 99152 MOD SED SAME PHYS/QHP 5/>YRS: CPT | Performed by: NUCLEAR MEDICINE

## 2024-04-19 PROCEDURE — 6360000002 HC RX W HCPCS: Performed by: NUCLEAR MEDICINE

## 2024-04-19 RX ORDER — FLUMAZENIL 0.1 MG/ML
INJECTION INTRAVENOUS PRN
Status: COMPLETED | OUTPATIENT
Start: 2024-04-19 | End: 2024-04-19

## 2024-04-19 RX ORDER — SODIUM CHLORIDE 9 MG/ML
INJECTION, SOLUTION INTRAVENOUS PRN
Status: DISCONTINUED | OUTPATIENT
Start: 2024-04-19 | End: 2024-04-19 | Stop reason: SDUPTHER

## 2024-04-19 RX ORDER — SODIUM CHLORIDE 0.9 % (FLUSH) 0.9 %
5-40 SYRINGE (ML) INJECTION EVERY 12 HOURS SCHEDULED
Status: DISCONTINUED | OUTPATIENT
Start: 2024-04-19 | End: 2024-04-19 | Stop reason: SDUPTHER

## 2024-04-19 RX ORDER — SODIUM CHLORIDE 0.9 % (FLUSH) 0.9 %
5-40 SYRINGE (ML) INJECTION PRN
Status: DISCONTINUED | OUTPATIENT
Start: 2024-04-19 | End: 2024-04-19 | Stop reason: SDUPTHER

## 2024-04-19 RX ORDER — NALOXONE HYDROCHLORIDE 0.4 MG/ML
INJECTION, SOLUTION INTRAMUSCULAR; INTRAVENOUS; SUBCUTANEOUS PRN
Status: COMPLETED | OUTPATIENT
Start: 2024-04-19 | End: 2024-04-19

## 2024-04-19 RX ORDER — FENTANYL CITRATE 50 UG/ML
INJECTION, SOLUTION INTRAMUSCULAR; INTRAVENOUS PRN
Status: COMPLETED | OUTPATIENT
Start: 2024-04-19 | End: 2024-04-19

## 2024-04-19 RX ORDER — MIDAZOLAM HYDROCHLORIDE 1 MG/ML
INJECTION INTRAMUSCULAR; INTRAVENOUS PRN
Status: COMPLETED | OUTPATIENT
Start: 2024-04-19 | End: 2024-04-19

## 2024-04-19 RX ADMIN — BUMETANIDE 1 MG: 0.25 INJECTION INTRAMUSCULAR; INTRAVENOUS at 23:46

## 2024-04-19 RX ADMIN — SODIUM CHLORIDE, PRESERVATIVE FREE 10 ML: 5 INJECTION INTRAVENOUS at 19:51

## 2024-04-19 RX ADMIN — METOPROLOL TARTRATE 50 MG: 50 TABLET, FILM COATED ORAL at 08:18

## 2024-04-19 RX ADMIN — BUMETANIDE 1 MG: 0.25 INJECTION INTRAMUSCULAR; INTRAVENOUS at 00:25

## 2024-04-19 RX ADMIN — ACETAMINOPHEN 650 MG: 325 TABLET ORAL at 05:55

## 2024-04-19 RX ADMIN — APIXABAN 5 MG: 5 TABLET, FILM COATED ORAL at 08:17

## 2024-04-19 RX ADMIN — BUMETANIDE 1 MG: 0.25 INJECTION INTRAMUSCULAR; INTRAVENOUS at 14:34

## 2024-04-19 RX ADMIN — ACETAMINOPHEN 650 MG: 325 TABLET ORAL at 16:29

## 2024-04-19 RX ADMIN — TIOTROPIUM BROMIDE AND OLODATEROL 2 PUFF: 3.124; 2.736 SPRAY, METERED RESPIRATORY (INHALATION) at 08:39

## 2024-04-19 RX ADMIN — APIXABAN 5 MG: 5 TABLET, FILM COATED ORAL at 20:52

## 2024-04-19 RX ADMIN — FENTANYL CITRATE 25 MCG: 50 INJECTION, SOLUTION INTRAMUSCULAR; INTRAVENOUS at 13:05

## 2024-04-19 RX ADMIN — MIDAZOLAM 2 MG: 1 INJECTION INTRAMUSCULAR; INTRAVENOUS at 13:02

## 2024-04-19 RX ADMIN — NALOXONE HYDROCHLORIDE 0.4 MG: 0.4 INJECTION, SOLUTION INTRAMUSCULAR; INTRAVENOUS; SUBCUTANEOUS at 13:12

## 2024-04-19 RX ADMIN — FLUMAZENIL 0.2 MG: 0.1 INJECTION INTRAVENOUS at 13:12

## 2024-04-19 RX ADMIN — METOPROLOL TARTRATE 50 MG: 50 TABLET, FILM COATED ORAL at 19:52

## 2024-04-19 RX ADMIN — ASPIRIN 81 MG: 81 TABLET, COATED ORAL at 08:17

## 2024-04-19 RX ADMIN — PANTOPRAZOLE SODIUM 40 MG: 40 TABLET, DELAYED RELEASE ORAL at 05:53

## 2024-04-19 RX ADMIN — FENTANYL CITRATE 25 MCG: 50 INJECTION, SOLUTION INTRAMUSCULAR; INTRAVENOUS at 13:02

## 2024-04-19 RX ADMIN — SODIUM CHLORIDE, PRESERVATIVE FREE 10 ML: 5 INJECTION INTRAVENOUS at 08:22

## 2024-04-19 RX ADMIN — ATORVASTATIN CALCIUM 20 MG: 20 TABLET, FILM COATED ORAL at 08:18

## 2024-04-19 ASSESSMENT — PAIN DESCRIPTION - ONSET: ONSET: ON-GOING

## 2024-04-19 ASSESSMENT — PAIN - FUNCTIONAL ASSESSMENT: PAIN_FUNCTIONAL_ASSESSMENT: PREVENTS OR INTERFERES SOME ACTIVE ACTIVITIES AND ADLS

## 2024-04-19 ASSESSMENT — PAIN DESCRIPTION - LOCATION
LOCATION: HEAD
LOCATION: HEAD

## 2024-04-19 ASSESSMENT — PAIN DESCRIPTION - DESCRIPTORS
DESCRIPTORS: ACHING
DESCRIPTORS: DULL

## 2024-04-19 ASSESSMENT — PAIN SCALES - GENERAL
PAINLEVEL_OUTOF10: 3
PAINLEVEL_OUTOF10: 3

## 2024-04-19 ASSESSMENT — PAIN DESCRIPTION - ORIENTATION
ORIENTATION: RIGHT;MID
ORIENTATION: MID

## 2024-04-19 ASSESSMENT — PAIN DESCRIPTION - FREQUENCY: FREQUENCY: INTERMITTENT

## 2024-04-19 ASSESSMENT — PAIN DESCRIPTION - PAIN TYPE: TYPE: ACUTE PAIN

## 2024-04-19 NOTE — PROGRESS NOTES
PROGRESS NOTE      Patient:  Brenton Sumner  Unit/Bed:3B-22/022-A  YOB: 1940  MRN: [id number]   Acct: [id number]    PCP: Jeffrey Purcell MD    Date of Admission: 4/16/2024 LOS: 3    Date of Evaluation:  4/19/2024    Anticipated Discharge: likely tomorrow 4/20    Assessment/Plan:      PAF with RVR S/p Watchman LAAO in 2021  TSH 1.95, T41.38  Influenza A, influenza B, COVID-negative  Blood cultures x 2 NG 48 H  Afebrile since admission  No leukocytosis  [id number] Score: 6  Rate 125-130 on arrival. Not on OAC given past bleeding issues. S/p Watchman LAAO in 2021 so risk for thrombus very low.   Started on lopressor 50 mg BID and Cardizem gtt.   Cardizem drip held night of 4/16 due to low pressures  Cardiology consulted and recommended bumex 1mg iv bid . Plan for Bar / CV today (4/20) - oac x 1 month after  Acute on chronic hypoxic respiratory failure, resolved  Likely secondary to A-fib with RVR  Baseline 3 LNC  Currently at 3 LNC  CXR 4/16:Persistent peripheral interstitial opacities that can reflect underlying chronic interstitial disease.   CTA chest 4/16: No PE,Small bilateral pleural effusions with adjacent atelectasis/infiltrate.,  Chronic lung disease  D-dimer 2198  Cont IV diuresis and will switch to home diuretic prior to DC   CAD  Continue home statin  Chronic heart failure with reduced ejection fraction  S/p PCI to LAD in 2020  BMP 2651  Troponin 29, 34  Continue home aspirin  S/p Bumex 1 mg IV in the ER (4/16)  CXR 4/16:Persistent peripheral interstitial opacities that can reflect underlying chronic interstitial disease.   Cardiology following,  recommended bumex 1mg iv bid   BAR on 1/10/22: EF 50%, mildly dilated left atrium  Echo 4/17: EF 40 to 45%, moderate global hypokinesis, moderate perivalvular regurgitation of aortic valve, mild MR.  Essential hypertension  Home Norvasc decreased to 2.5 with hold parameters on 4/18  Severe AS  S/p TAVR in 2020  PAD  S/p angioplasty to CFA,  []Drains / []Mediport / [x]PIV / []None    Labs (still needed?): [x]Yes / []No  IVF (still needed?): []Yes / [x]No    Level of care: [x]Step Down / []Med-Surg  Bed Status: [x]Inpatient / []Observation    DVT Prophylaxis: [] Lovenox / [] Heparin / [] SCDs / [x] Already on Systemic Anticoagulation / [] None     PT/OT: [x]Yes / []No    Disposition:    [x] Home       [] TCU       [] Rehab       [] Psych       [] SNF       [] Long Term Care Facility       [] Other-    Code Status: Full Code      An electronic signature was used to authenticate this note  - Ana Rosa Moreno MD PGY-1 on 4/19/2024 at 10:28 AM

## 2024-04-19 NOTE — PROGRESS NOTES
[facility]  Sedation/Analgesia History & Physical    Pt Name: Brenton Sumner  Account number: [id number]  MRN: [id number]  YOB: 1940  Provider Performing Procedure: Jesse Matthews MD MD [facility]  Primary Care Physician: Jeffrey Purcell MD  Date: 4/19/2024    PRE-PROCEDURE    Code Status: FULL CODE  Brief History/Pre-Procedure Diagnosis:   A fib RVR      Consent: : I have discussed with the patient risks, benefits, and alternatives (and relevant risks, benefits, and side effects related to alternatives or not receiving care), and likelihood of the success.   The patient and/or representative appear to understand and agree to proceed.  The discussion encompasses risks, benefits, and side effects related to the alternatives and the risks related to not receiving the proposed care, treatment, and services.         MEDICAL HISTORY  []ASHD/ANGINA/MI/CHF   [x]Hypertension  []Diabetes  []Hyperlipidemia  []Smoking  []Family Hx of ASHD  []Additional information:       has a past medical history of ASCVD (arteriosclerotic cardiovascular disease), Atrial fibrillation (HCC), CAD (coronary artery disease), Cerebral artery occlusion with cerebral infarction (HCC), COPD (chronic obstructive pulmonary disease) (HCC), Difficult intubation, Dysplasia of vocal cord, Hyperlipidemia, Hypertension, Nocturnal hypoxia, Osteoarthritis, Osteopenia, PAD (peripheral artery disease) ([facility]), Polio, and Rheumatic fever.    SURGICAL HISTORY   has a past surgical history that includes Tonsillectomy and adenoidectomy; Facial cosmetic surgery; Balloon angioplasty, artery (07/2018); Hand surgery (Right); Aortic valve repair (09/2020); Percutaneous Transluminal Coronary Angio (06/2020); other surgical history (01/05/2021); transesophageal echocardiogram (N/A, 01/10/2022); Cardiac catheterization (N/A, 07/15/2022); and Cardiac valve replacement.  Additional information:       ALLERGIES   Allergies as of 04/19/2024 -  mL, IntraVENous, PRN, Golzarian, Ekaterina, DO    0.9 % sodium chloride infusion, , IntraVENous, PRN, Golzarian, Ekaterina, DO    potassium chloride (KLOR-CON M) extended release tablet 40 mEq, 40 mEq, Oral, PRN **OR** potassium bicarb-citric acid (EFFER-K) effervescent tablet 40 mEq, 40 mEq, Oral, PRN **OR** potassium chloride 10 mEq/100 mL IVPB (Peripheral Line), 10 mEq, IntraVENous, PRN, Golzarian, Ekaterina, DO    magnesium sulfate 2000 mg in 50 mL IVPB premix, 2,000 mg, IntraVENous, PRN, Golzarian, Ekaterina, DO    ondansetron (ZOFRAN-ODT) disintegrating tablet 4 mg, 4 mg, Oral, Q8H PRN **OR** ondansetron (ZOFRAN) injection 4 mg, 4 mg, IntraVENous, Q6H PRN, Golzarian, Ekaterina, DO    polyethylene glycol (GLYCOLAX) packet 17 g, 17 g, Oral, Daily PRN, Golzarian, Ekaterina, DO    acetaminophen (TYLENOL) tablet 650 mg, 650 mg, Oral, Q6H PRN, 650 mg at 04/19/24 0555 **OR** acetaminophen (TYLENOL) suppository 650 mg, 650 mg, Rectal, Q6H PRN, Golzarian, Ekaterina, DO  Prior to Admission medications    Medication Sig Start Date End Date Taking? Authorizing Provider   triamcinolone (KENALOG) 0.1 % cream APPLY TO LEFT LEG AS NEEDED  Patient taking differently: Apply 0.1 g topically APPLY TO LEFT LEG AS NEEDED 2/22/24   Jeffrey Purcell MD   hydrOXYzine HCl (ATARAX) 25 MG tablet Take 1 tablet by mouth every 8 hours as needed for Itching  Patient taking differently: Take 1 tablet by mouth every 8 hours as needed for Itching Usually takes one daily 2/7/24   Jeffrey Purcell MD   nitroGLYCERIN (NITROSTAT) 0.4 MG SL tablet up to max of 3 total doses. If no relief after 1 dose, call 911. 1/29/24   Hebert Kirby MD   potassium chloride (KLOR-CON M) 20 MEQ extended release tablet Take 1 tablet by mouth daily 12/7/23   Jeffrey Purcell MD   furosemide (LASIX) 20 MG tablet Take 1 tablet by mouth daily 12/7/23   Jeffrey Purcell MD   albuterol sulfate HFA (VENTOLIN HFA) 108 (90 Base) MCG/ACT inhaler Inhale 2 puffs into the lungs 4 times daily as

## 2024-04-19 NOTE — PLAN OF CARE
Problem: Respiratory - Adult  Goal: Clear lung sounds  Outcome: Progressing     Problem: Respiratory - Adult  Goal: Achieves optimal ventilation and oxygenation  4/19/2024 0945 by Jessica Myles RCP   Assess for changes in respiratory status   Position to facilitate oxygenation and minimize respiratory effort   Assess for changes in mentation and behavior   Oxygen supplementation based on oxygen saturation or arterial blood gases   Assess the need for suctioning and aspirate as needed   Assess and instruct to report shortness of breath or any respiratory difficulty  Continue therapy as ordered to achieve and maintain clear breath sounds and improve aeration. Pt agrees with the plan.

## 2024-04-19 NOTE — CARE COORDINATION
DISCHARGE PLANNING EVALUATION  4/19/24, 11:26 AM EDT    Reason for Referral: Discharge planning  Decision Maker: Family helps with decision making.   Current Services: None  New Services Requested: Offered home health for nursing, but they do not feel they need it.   Family/ Social/ Home environment: From home with spouse.  He was independent in the home.  His spouse and daughter were present during evaluation.  Spouse helps set up medication.    Payment Source:Medicare and Medical [place]  Transportation at Discharge: family   Post-acute (PAC) provider list was provided to patient. Patient was informed of their freedom to choose PAC provider. Discussed and offered to show the patient the relevant PAC Providers quality and resource use measures on Medicare Compare web site via computer based on patient's goals of care and treatment preferences. Questions regarding selection process were answered.      Teach Back Method used with Brenton, spouse Blanche and daughter Peri regarding care plan and discharge planning.   Patient and family verbalized understanding of the plan of care and contribute to goal setting.       Patient preferences and discharge plan: Brenton plans to return home with his spouse.  Spouse Blanche told SW that she helps set up his medications and monitors his blood pressure at home.  Denies need for home health.      Electronically signed by ROXI Lucas on 4/19/2024 at 11:26 AM

## 2024-04-19 NOTE — CARE COORDINATION
4/19/24, 1:36 PM EDT    DISCHARGE ON GOING EVALUATION    [facility]       [facility]  Location: [facility]22/022-A Reason for admit: Atrial fibrillation with RVR (HCC) [I48.91]  Increased oxygen demand [R68.89]  Acute on chronic congestive heart failure, unspecified heart failure type (HCC) [I50.9]     Procedures:   4/19 Plan for Cardioversion     Imaging since last note: none     Barriers to Discharge: Hospitalist and Cardiology following. PT/OT. Telemetry Afib/Aflutter. Plan for cardioversion today. Eliquis. 1mg IV Bumex BID. 90% on 4L NC.     PCP: Jeffrey Purcell MD  Readmission Risk Score: 15.1%    Patient Goals/Plan/Treatment Preferences: Plans to return home w/ wife. Has DME. 3L ATC [facility]. Declines  services.

## 2024-04-19 NOTE — PLAN OF CARE
Problem: Discharge Planning  Goal: Discharge to home or other facility with appropriate resources  4/19/2024 1634 by Lucía Bee, RN  Outcome: Progressing     Problem: Skin/Tissue Integrity  Goal: Absence of new skin breakdown  Description: 1.  Monitor for areas of redness and/or skin breakdown  2.  Assess vascular access sites hourly  3.  Every 4-6 hours minimum:  Change oxygen saturation probe site  4.  Every 4-6 hours:  If on nasal continuous positive airway pressure, respiratory therapy assess nares and determine need for appliance change or resting period.  Outcome: Progressing     Problem: Chronic Conditions and Co-morbidities  Goal: Patient's chronic conditions and co-morbidity symptoms are monitored and maintained or improved  Outcome: Progressing  Flowsheets (Taken 4/19/2024 0805)  Care Plan - Patient's Chronic Conditions and Co-Morbidity Symptoms are Monitored and Maintained or Improved: Monitor and assess patient's chronic conditions and comorbid symptoms for stability, deterioration, or improvement     Problem: Respiratory - Adult  Goal: Clear lung sounds  4/19/2024 1634 by Lucía Bee, RN  Outcome: Progressing     Problem: Respiratory - Adult  Goal: Achieves optimal ventilation and oxygenation  4/19/2024 1634 by Lucía Bee, RN  Outcome: Progressing     Problem: Safety - Adult  Goal: Free from fall injury  Outcome: Progressing     Problem: Cardiovascular - Adult  Goal: Maintains optimal cardiac output and hemodynamic stability  Outcome: Progressing     Problem: Cardiovascular - Adult  Goal: Absence of cardiac dysrhythmias or at baseline  Outcome: Progressing  Care plan reviewed with patient.  Patient  verbalize understanding of the plan of care and contribute to goal setting.

## 2024-04-19 NOTE — PLAN OF CARE
Problem: Discharge Planning  Goal: Discharge to home or other facility with appropriate resources  4/19/2024 0201 by Sukh Garcia RN  Outcome: Progressing  Flowsheets (Taken 4/19/2024 0201)  Discharge to home or other facility with appropriate resources:   Identify barriers to discharge with patient and caregiver   Identify discharge learning needs (meds, wound care, etc)     Problem: Skin/Tissue Integrity  Goal: Absence of new skin breakdown  Description: 1.  Monitor for areas of redness and/or skin breakdown  2.  Assess vascular access sites hourly  3.  Every 4-6 hours minimum:  Change oxygen saturation probe site  4.  Every 4-6 hours:  If on nasal continuous positive airway pressure, respiratory therapy assess nares and determine need for appliance change or resting period.  4/19/2024 0201 by Suhk Garcia RN  Outcome: Progressing  Note: Ongoing assessment & interventions provided throughout shift.  Skin assessments provided.  Encouraging/assisting patient to turn as needed.      Problem: Chronic Conditions and Co-morbidities  Goal: Patient's chronic conditions and co-morbidity symptoms are monitored and maintained or improved  4/19/2024 0201 by Sukh Garcia RN  Outcome: Progressing  Flowsheets (Taken 4/19/2024 0201)  Care Plan - Patient's Chronic Conditions and Co-Morbidity Symptoms are Monitored and Maintained or Improved:   Monitor and assess patient's chronic conditions and comorbid symptoms for stability, deterioration, or improvement   Collaborate with multidisciplinary team to address chronic and comorbid conditions and prevent exacerbation or deterioration   Update acute care plan with appropriate goals if chronic or comorbid symptoms are exacerbated and prevent overall improvement and discharge       Problem: Respiratory - Adult  Goal: Achieves optimal ventilation and oxygenation  4/19/2024 0201 by Sukh Garcia, RN  Outcome: Progressing  Flowsheets (Taken 4/19/2024 0201)  Achieves optimal  ventilation and oxygenation:   Assess for changes in respiratory status   Position to facilitate oxygenation and minimize respiratory effort   Assess for changes in mentation and behavior   Oxygen supplementation based on oxygen saturation or arterial blood gases   Assess the need for suctioning and aspirate as needed   Assess and instruct to report shortness of breath or any respiratory difficulty     Problem: Cardiovascular - Adult  Goal: Maintains optimal cardiac output and hemodynamic stability  4/19/2024 0201 by Sukh Garcia, RN  Outcome: Progressing  Flowsheets (Taken 4/19/2024 0201)  Maintains optimal cardiac output and hemodynamic stability:   Monitor blood pressure and heart rate   Monitor urine output and notify Licensed Independent Practitioner for values outside of normal range   Assess for signs of decreased cardiac output     Problem: Cardiovascular - Adult  Goal: Absence of cardiac dysrhythmias or at baseline  4/19/2024 0201 by Sukh Garcia, RN  Outcome: Progressing  Flowsheets (Taken 4/19/2024 0201)  Absence of cardiac dysrhythmias or at baseline:   Monitor cardiac rate and rhythm   Assess for signs of decreased cardiac output     Problem: Safety - Adult  Goal: Free from fall injury  4/19/2024 0201 by Sukh Garcia, RN  Outcome: Progressing  Note: Bed locked & in low position, call light in reach, side-rails up x2, bed/chair alarm utilized, non-slip socks on when ambulating, reminded patient to use call light to call for assistance.       Care plan reviewed with patient.  Patient verbalizes understanding of the care plan and contributed to goal setting.

## 2024-04-19 NOTE — PROGRESS NOTES
Cardiology Progress Note      Patient:  Brenton Sumner  YOB: 1940  MRN: [id number]   Acct: [id number]  Admit Date:  4/16/2024  Primary Cardiologist: Hebert Kirby MD  Seen by Dr. Green      Per prior cardiology consult note-  CHIEF COMPLAINT: Shortness of Breath  Reason for Consult: \"Afib with RVR\"        HPI:   This is an 84 year old male who [facility] ED on 04/16/2024 secondary to a several week history of progressive shortness of breath at rest and with exertion. He endorses a baseline cough with clear/white sputum production (denies hemoptysis), chronic oxygen use (3L continuously), and difficulty speaking with his increased work of breathing. Additional review of symptoms is positive for bilateral lower extremity edema. He denies lightheadedness, dizziness, fall, loss of consciousness, chest pain/tightness/pressure, constipation, diarrhea, or change in urination. He denies any recent illness, sick contacts, or change in medication. He denies any additional acute symptoms or concerns. Collaborative history obtained from patient's family at bedside.      Cardiac History:  - Follows with Dr. Hebert Kirby; last seen 01/11/2024  - s/p TAVR September 2020  - S/P WATCHMAN January 2021  - CAD s/p PCI LAD Stent June 2020  - Known pAFib [id number]: 5; HASBLED 4  - PAD L SFA  s/p intervention August 2021       Subjective (Events in last 24 hours):   Pt awake, alert. Improving slowly. No symptoms at rest, feels sob and fatigued with more exertion. D/w patient about eliquis for 1 month, procedure today and will see how he tolerates and does overnight.     Still on oxygen.     Objective:   BP (!) 106/57   Pulse 75   Temp 97.6 °F (36.4 °C) (Oral)   Resp 18   Ht 1.727 m (5' 8\")   Wt 76.2 kg (167 lb 14.4 oz)   SpO2 94%   BMI 25.53 kg/m²      Vss   TELEMETRY: afib cvr, some rvr     Physical Exam:  General Appearance: alert and oriented to person, place and time, in no acute  distress  Cardiovascular: normal rate, irregular rhythm, normal S1 and S2, no murmurs, rubs, clicks, or gallops, distal pulses intact, no carotid bruits, no JVD  Pulmonary/Chest: clear to auscultation bilaterally- no wheezes, rales or rhonchi, normal air movement, no respiratory distress  Abdomen: soft, non-tender, non-distended, normal bowel sounds, no masses   Extremities: no cyanosis, clubbing, mild bilat LE edema, pulse   Skin: warm and dry, no rash or erythema  Neurological: alert, oriented, normal speech, no focal findings or movement disorder noted    Medications:    amLODIPine  2.5 mg Oral Daily    bumetanide  1 mg IntraVENous Q12H    apixaban  5 mg Oral BID    [Held by provider] furosemide  20 mg Oral Daily    metoprolol tartrate  50 mg Oral BID    aspirin  81 mg Oral Daily    atorvastatin  20 mg Oral Daily    pantoprazole  40 mg Oral Daily    tiotropium-olodaterol  2 puff Inhalation Daily    sodium chloride flush  5-40 mL IntraVENous 2 times per day      sodium chloride       albuterol sulfate HFA, 2 puff, Q4H PRN  hydrOXYzine HCl, 25 mg, Q8H PRN  sodium chloride flush, 10 mL, PRN  sodium chloride, , PRN  potassium chloride, 40 mEq, PRN   Or  potassium alternative oral replacement, 40 mEq, PRN   Or  potassium chloride, 10 mEq, PRN  magnesium sulfate, 2,000 mg, PRN  ondansetron, 4 mg, Q8H PRN   Or  ondansetron, 4 mg, Q6H PRN  polyethylene glycol, 17 g, Daily PRN  acetaminophen, 650 mg, Q6H PRN   Or  acetaminophen, 650 mg, Q6H PRN        Diagnostics:  EKG:   Echo   4/17/24  Left Ventricle Moderately reduced left ventricular systolic function with a visually estimated EF of 40 - 45%. Left ventricle size is normal. Normal wall thickness. Moderate global hypokinesis present. Indeterminate diastolic function.   Left Atrium Left atrium is mildly dilated.   Right Ventricle Right ventricle size is normal. Normal systolic function.   Right Atrium Right atrium is mildly dilated.   Aortic Valve Transcatheter  bioprosthetic valve that is well-seated. Moderate paravalvular regurgitation. No stenosis.   Mitral Valve Valve structure is normal. Mild regurgitation. No stenosis noted.   Tricuspid Valve Valve structure is normal. Trace regurgitation. No stenosis noted.   Pulmonic Valve Valve structure is normal. No regurgitation. No stenosis noted.   Aorta Normal sized aortic root and ascending aorta.   IVC/Hepatic Veins IVC diameter is less than or equal to 21 mm and decreases greater than 50% during inspiration; therefore the estimated right atrial pressure is normal (~3 mmHg). IVC size is normal.   Pericardium No pericardial effusion.            Stress:      Left Heart Cath:   RIGHT HEART CATHETERIZATION:  1.  RA is 2.  2.  RV is 38/1, 3.  3.  PA is 37/16, 24.  4.  Wedge pressure was 8.  5.  Cardiac output by Jyotsna method was 6.2 and cardiac index was 3.3.     CORONARY ANATOMY:  1.  Right coronary artery is the dominant vessel.  There is  mild-to-moderate calcification throughout the entire vessel.  There are  moderate luminal irregularities in the proximal mid and distal portions  of the vessel.  Distally, there is a large sized PL and PDA branches  both of which are patent.  2.  Left main is patent, and distally, there is 20% to 30% stenosis  before giving rise to LAD and left circumflex arteries.  3.  Left circumflex artery has mild-to-moderate calcification in the  [id number] segment, otherwise it has mild-to-moderate luminal  irregularities.  4.  LAD, proximally calcified as a 90% to 95% focal stenosis which is  calcified.  Beyond that, there are mild luminal irregularities at the  mid to distal portion of the LAD.     SUMMARY:  1.  Normal right heart pressures.  2.  PCI with drug-eluting stents of proximal 90% to 95% stenosis of the  LAD.  3.  Severe aortic stenosis.     RECOMMENDATIONS:  1.  DAPT for at least six months.  2.  Continue TAVR workup.  3.  Monitor radial artery access site.  4.  All procedure details

## 2024-04-19 NOTE — PROGRESS NOTES
[facility]  INPATIENT OCCUPATIONAL THERAPY  STRZ CCU-STEPDOWN 3B  EVALUATION    Time:    Time In: 1043  Time Out: 1104  Timed Code Treatment Minutes: 11 Minutes  Minutes: 21          Date: 2024  Patient Name: Brenton Sumner,   Gender: male      MRN: [id number]  : 1940  (84 y.o.)  Referring Practitioner: Ekaterina Fontenot DO  Diagnosis: A-fib with RVR  Additional Pertinent Hx: Brenton Sumner is an 85 yo gentleman with history of CAD, ICM, HFpEF 55%, MGUS, PAD/PVD, severe AS (s/p TAVR in ), PAF (s/p Watchman, not OAC, not on any rate control or antiarrhythmics), former 60-PY tobacco smoker, vertebral artery and ICA stenosis, COPD, who [facility] ED on [facility]24 with chief complaint of exertional shortness of breath patient states that in the last year, he has been getting worse and worse in terms of exertional functionality. He underwent TAVR in  and Watchman in  due to bleeding complications. He is only on ASA. He follows up with neurology outpatient for vertebral artery and ICA stenosis. At baseline he has on 2 LPM. He used to only wear this nocturnally for nocturnal hypoxia, however now he is wearing it almost throughout the day. Says he can walk without it. In the last couple days, he has required to go up to 4 LPM on it.  He also felt his heart beating fast, so he came in for further evaluation. DX: Paroxysmal Atrial Fibrillation with RVR, Acute on Chronic Hypoxic Respiratory Failure    Restrictions/Precautions:  Restrictions/Precautions: Fall Risk  Position Activity Restriction  Other position/activity restrictions: monitor O2    Subjective  Chart Reviewed: Yes, Orders, Progress Notes, History and Physical  Patient assessed for rehabilitation services?: Yes  Family / Caregiver Present: No    Subjective: cooperative initially then irritable    Pain: 0/10:      Vitals: Oxygen: during mobility O2 sat read 82%, Pt to did not look SOB, increased O2 to 4L & quickly  various t/fs including toilet with S & 0-1 vcs for safety  Short Term Goal 2: Pt will complete 3-5 min standing ADL task with 1 UE release & S  Short Term Goal 3: Pt will complete mobility to/from bathroom with S & 0-1 vcs for safety  Long Term Goals  Time Frame for Long Term Goals : No LTG set d/t short ELOS    AM-PAC Inpatient Daily Activity Raw Score: 21  AM-PAC Inpatient ADL T-Scale Score : 44.27    Following session, patient left in safe position with all fall risk precautions in place.

## 2024-04-20 VITALS
DIASTOLIC BLOOD PRESSURE: 51 MMHG | SYSTOLIC BLOOD PRESSURE: 130 MMHG | OXYGEN SATURATION: 95 % | TEMPERATURE: 97.9 F | WEIGHT: 167.9 LBS | BODY MASS INDEX: 25.45 KG/M2 | RESPIRATION RATE: 18 BRPM | HEIGHT: 68 IN | HEART RATE: 61 BPM

## 2024-04-20 LAB
ANION GAP SERPL CALC-SCNC: 12 MEQ/L (ref 8–16)
BASOPHILS ABSOLUTE: 0.1 THOU/MM3 (ref 0–0.1)
BASOPHILS NFR BLD AUTO: 0.8 %
BUN SERPL-MCNC: 16 MG/DL (ref 7–22)
CALCIUM SERPL-MCNC: 8.7 MG/DL (ref 8.5–10.5)
CHLORIDE SERPL-SCNC: 96 MEQ/L (ref 98–111)
CO2 SERPL-SCNC: 28 MEQ/L (ref 23–33)
CREAT SERPL-MCNC: 0.9 MG/DL (ref 0.4–1.2)
DEPRECATED RDW RBC AUTO: 45.9 FL (ref 35–45)
EOSINOPHIL NFR BLD AUTO: 6.3 %
EOSINOPHILS ABSOLUTE: 0.6 THOU/MM3 (ref 0–0.4)
ERYTHROCYTE [DISTWIDTH] IN BLOOD BY AUTOMATED COUNT: 13.4 % (ref 11.5–14.5)
GFR SERPL CREATININE-BSD FRML MDRD: 85 ML/MIN/1.73M2
GLUCOSE SERPL-MCNC: 90 MG/DL (ref 70–108)
HCT VFR BLD AUTO: 41.6 % (ref 42–52)
HGB BLD-MCNC: 13.3 GM/DL (ref 14–18)
IMM GRANULOCYTES # BLD AUTO: 0.03 THOU/MM3 (ref 0–0.07)
IMM GRANULOCYTES NFR BLD AUTO: 0.3 %
LYMPHOCYTES ABSOLUTE: 2 THOU/MM3 (ref 1–4.8)
LYMPHOCYTES NFR BLD AUTO: 19.7 %
MCH RBC QN AUTO: 29.9 PG (ref 26–33)
MCHC RBC AUTO-ENTMCNC: 32 GM/DL (ref 32.2–35.5)
MCV RBC AUTO: 93.5 FL (ref 80–94)
MONOCYTES ABSOLUTE: 1 THOU/MM3 (ref 0.4–1.3)
MONOCYTES NFR BLD AUTO: 10.1 %
NEUTROPHILS NFR BLD AUTO: 62.8 %
NRBC BLD AUTO-RTO: 0 /100 WBC
PLATELET # BLD AUTO: 254 THOU/MM3 (ref 130–400)
PMV BLD AUTO: 9.7 FL (ref 9.4–12.4)
POTASSIUM SERPL-SCNC: 4 MEQ/L (ref 3.5–5.2)
RBC # BLD AUTO: 4.45 MILL/MM3 (ref 4.7–6.1)
SEGMENTED NEUTROPHILS ABSOLUTE COUNT: 6.3 THOU/MM3 (ref 1.8–7.7)
SODIUM SERPL-SCNC: 136 MEQ/L (ref 135–145)
WBC # BLD AUTO: 10.1 THOU/MM3 (ref 4.8–10.8)

## 2024-04-20 PROCEDURE — 99232 SBSQ HOSP IP/OBS MODERATE 35: CPT | Performed by: STUDENT IN AN ORGANIZED HEALTH CARE EDUCATION/TRAINING PROGRAM

## 2024-04-20 PROCEDURE — 94640 AIRWAY INHALATION TREATMENT: CPT

## 2024-04-20 PROCEDURE — 6370000000 HC RX 637 (ALT 250 FOR IP): Performed by: STUDENT IN AN ORGANIZED HEALTH CARE EDUCATION/TRAINING PROGRAM

## 2024-04-20 PROCEDURE — 6370000000 HC RX 637 (ALT 250 FOR IP)

## 2024-04-20 PROCEDURE — 2580000003 HC RX 258: Performed by: STUDENT IN AN ORGANIZED HEALTH CARE EDUCATION/TRAINING PROGRAM

## 2024-04-20 PROCEDURE — 99239 HOSP IP/OBS DSCHRG MGMT >30: CPT | Performed by: INTERNAL MEDICINE

## 2024-04-20 PROCEDURE — 94761 N-INVAS EAR/PLS OXIMETRY MLT: CPT

## 2024-04-20 PROCEDURE — 36415 COLL VENOUS BLD VENIPUNCTURE: CPT

## 2024-04-20 PROCEDURE — 80048 BASIC METABOLIC PNL TOTAL CA: CPT

## 2024-04-20 PROCEDURE — 2700000000 HC OXYGEN THERAPY PER DAY

## 2024-04-20 PROCEDURE — 6370000000 HC RX 637 (ALT 250 FOR IP): Performed by: NURSE PRACTITIONER

## 2024-04-20 PROCEDURE — 85025 COMPLETE CBC W/AUTO DIFF WBC: CPT

## 2024-04-20 RX ORDER — METOPROLOL TARTRATE 50 MG/1
50 TABLET, FILM COATED ORAL 2 TIMES DAILY
Qty: 60 TABLET | Refills: 3 | Status: SHIPPED | OUTPATIENT
Start: 2024-04-20

## 2024-04-20 RX ORDER — AMLODIPINE BESYLATE 2.5 MG/1
2.5 TABLET ORAL DAILY
Qty: 30 TABLET | Refills: 3 | Status: SHIPPED | OUTPATIENT
Start: 2024-04-21

## 2024-04-20 RX ADMIN — SODIUM CHLORIDE, PRESERVATIVE FREE 10 ML: 5 INJECTION INTRAVENOUS at 09:09

## 2024-04-20 RX ADMIN — METOPROLOL TARTRATE 50 MG: 50 TABLET, FILM COATED ORAL at 09:09

## 2024-04-20 RX ADMIN — TIOTROPIUM BROMIDE AND OLODATEROL 2 PUFF: 3.124; 2.736 SPRAY, METERED RESPIRATORY (INHALATION) at 08:26

## 2024-04-20 RX ADMIN — APIXABAN 5 MG: 5 TABLET, FILM COATED ORAL at 09:08

## 2024-04-20 RX ADMIN — ATORVASTATIN CALCIUM 20 MG: 20 TABLET, FILM COATED ORAL at 09:09

## 2024-04-20 RX ADMIN — FUROSEMIDE 20 MG: 20 TABLET ORAL at 09:08

## 2024-04-20 RX ADMIN — PANTOPRAZOLE SODIUM 40 MG: 40 TABLET, DELAYED RELEASE ORAL at 05:54

## 2024-04-20 RX ADMIN — ASPIRIN 81 MG: 81 TABLET, COATED ORAL at 09:08

## 2024-04-20 RX ADMIN — AMLODIPINE BESYLATE 2.5 MG: 2.5 TABLET ORAL at 09:08

## 2024-04-20 NOTE — DISCHARGE INSTRUCTIONS
Keep f/u with cardiology (connor Mortimer, PA) in 1 month.     Eliquis x 1 month post cardioversion then okay to stop.

## 2024-04-20 NOTE — PLAN OF CARE
Problem: Discharge Planning  Goal: Discharge to home or other facility with appropriate resources  4/20/2024 1017 by Ellie Mathews RN  Outcome: Completed     Problem: Skin/Tissue Integrity  Goal: Absence of new skin breakdown  Description: 1.  Monitor for areas of redness and/or skin breakdown  2.  Assess vascular access sites hourly  3.  Every 4-6 hours minimum:  Change oxygen saturation probe site  4.  Every 4-6 hours:  If on nasal continuous positive airway pressure, respiratory therapy assess nares and determine need for appliance change or resting period.  4/20/2024 1017 by Ellie Mathews RN  Outcome: Completed     Problem: Chronic Conditions and Co-morbidities  Goal: Patient's chronic conditions and co-morbidity symptoms are monitored and maintained or improved  4/20/2024 1017 by Ellie Mathews RN  Outcome: Completed     Problem: Respiratory - Adult  Goal: Clear lung sounds  4/20/2024 1017 by Ellie Mathews RN  Outcome: Completed     Problem: Respiratory - Adult  Goal: Achieves optimal ventilation and oxygenation  4/20/2024 1017 by Ellie Mathews RN  Outcome: Completed     Problem: Safety - Adult  Goal: Free from fall injury  4/20/2024 1017 by Ellie Mathews RN  Outcome: Completed  Flowsheets (Taken 4/20/2024 1001)  Free From Fall Injury: Instruct family/caregiver on patient safety     Problem: Cardiovascular - Adult  Goal: Maintains optimal cardiac output and hemodynamic stability  4/20/2024 1017 by Ellie Mathews RN  Outcome: Completed     Problem: Cardiovascular - Adult  Goal: Absence of cardiac dysrhythmias or at baseline  4/20/2024 1017 by Ellie Mathews RN  Outcome: Completed     Problem: Pain  Goal: Verbalizes/displays adequate comfort level or baseline comfort level  4/20/2024 1017 by Ellie Mathews RN  Outcome: Completed     Problem: Infection - Adult  Goal: Absence of infection during hospitalization  4/20/2024 1017 by Ellie Mathews RN  Outcome: Completed   Care plan reviewed with patient.

## 2024-04-20 NOTE — PROGRESS NOTES
Pt discharged to [facility] via wheelchair by transport with all of his personal belongings. No questions or concerns at the time of discharge. Follow up appointments made, patient and wife updated on medications to  at pharmacy in [place].

## 2024-04-20 NOTE — PLAN OF CARE
Problem: Discharge Planning  Goal: Discharge to home or other facility with appropriate resources  4/20/2024 0958 by Ellie Mathews RN  Outcome: Progressing  Flowsheets (Taken 4/20/2024 0958)  Discharge to home or other facility with appropriate resources: Identify barriers to discharge with patient and caregiver     Problem: Skin/Tissue Integrity  Goal: Absence of new skin breakdown  Description: 1.  Monitor for areas of redness and/or skin breakdown  2.  Assess vascular access sites hourly  3.  Every 4-6 hours minimum:  Change oxygen saturation probe site  4.  Every 4-6 hours:  If on nasal continuous positive airway pressure, respiratory therapy assess nares and determine need for appliance change or resting period.  Outcome: Progressing  Note: No evidence of new skin breakdown assessed this shift.      Problem: Chronic Conditions and Co-morbidities  Goal: Patient's chronic conditions and co-morbidity symptoms are monitored and maintained or improved  4/20/2024 0958 by lElie Mathews RN  Outcome: Progressing  Flowsheets (Taken 4/20/2024 0958)  Care Plan - Patient's Chronic Conditions and Co-Morbidity Symptoms are Monitored and Maintained or Improved: Monitor and assess patient's chronic conditions and comorbid symptoms for stability, deterioration, or improvement     Problem: Respiratory - Adult  Goal: Achieves optimal ventilation and oxygenation  4/20/2024 0958 by Ellie Mathews RN  Outcome: Progressing  Flowsheets  Taken 4/20/2024 0958 by Ellie Mathews RN  Achieves optimal ventilation and oxygenation: Assess for changes in respiratory status  Taken 4/20/2024 0826 by Steph Syed RCP  Achieves optimal ventilation and oxygenation: Respiratory therapy support as indicated     Problem: Safety - Adult  Goal: Free from fall injury  4/20/2024 0958 by Ellie Mathews RN  Outcome: Progressing  Flowsheets (Taken 4/20/2024 0958)  Free From Fall Injury: Instruct family/caregiver on patient safety     Problem:  Cardiovascular - Adult  Goal: Maintains optimal cardiac output and hemodynamic stability  4/20/2024 0958 by Ellie Mathews RN  Outcome: Progressing  Flowsheets (Taken 4/20/2024 0958)  Maintains optimal cardiac output and hemodynamic stability: Monitor blood pressure and heart rate     Problem: Cardiovascular - Adult  Goal: Absence of cardiac dysrhythmias or at baseline  4/20/2024 0958 by Ellie Mathews RN  Outcome: Progressing  Flowsheets (Taken 4/20/2024 0958)  Absence of cardiac dysrhythmias or at baseline: Monitor cardiac rate and rhythm     Problem: Pain  Goal: Verbalizes/displays adequate comfort level or baseline comfort level  4/20/2024 0958 by Ellie Mathews RN  Outcome: Progressing  Flowsheets (Taken 4/20/2024 0958)  Verbalizes/displays adequate comfort level or baseline comfort level: Encourage patient to monitor pain and request assistance     Problem: Infection - Adult  Goal: Absence of infection during hospitalization  4/20/2024 0958 by Ellie Mathews RN  Outcome: Progressing  Flowsheets (Taken 4/20/2024 0958)  Absence of infection during hospitalization: Assess and monitor for signs and symptoms of infection   Care plan reviewed with patient.  Patient verbalize understanding of the plan of care and contribute to goal setting.

## 2024-04-20 NOTE — DISCHARGE SUMMARY
DISCHARGE SUMMARY      Patient Identification:   Brenton Sumner   : 1940  MRN: [id number]   Account: [id number]      Patient's PCP: Jeffrey Purcell MD    Admit Date: 2024, 1119     Discharge Date: 24     Admitting Physician: Steven Peterson MD     Discharge Physician: Ana Rosa Moreno MD     Discharge Diagnoses:    PAF with RVR S/p Watchman LAAO in  s/p BAR/CV on , resolved  Acute on chronic hypoxic respiratory failure, resolved  CAD  Chronic heart failure with reduced ejection fraction  Essential hypertension  Severe AS  PAD  MGUS  COPD  Hyperlipidemia  Vertebral artery and ICA stenosis  Prior CVA    The patient was seen and examined on day of discharge and this discharge summary is in conjunction with any daily progress note from day of discharge.    Hospital Course:   Brenton Sumner is a 84 y.o. male with  has a past medical history of ASCVD (arteriosclerotic cardiovascular disease), Atrial fibrillation (HCC), CAD (coronary artery disease), Cerebral artery occlusion with cerebral infarction (HCC), COPD (chronic obstructive pulmonary disease) (HCC), Difficult intubation, Dysplasia of vocal cord, Hyperlipidemia, Hypertension, Nocturnal hypoxia, Osteoarthritis, Osteopenia, PAD (peripheral artery disease) (HCC), Polio, and Rheumatic fever who was admitted to [facility] on 2024 for SOB.        Hospital Course By Problem:     PAF with RVR S/p Watchman LAAO in  s/p BAR/CV on , resolved  TSH 1.95, T41.38  Influenza A, influenza B, COVID-negative  Blood cultures x 2 NG 48 H  Afebrile since admission  No leukocytosis  [id number] Score: 6  Rate 125-130 on arrival. Not on OAC given past bleeding issues. S/p Watchman LAAO in  so risk for thrombus very low.   Started on lopressor 50 mg BID and Cardizem gtt.   Cardizem drip held night of  due to low pressures  Cardiology consulted and recommended bumex 1mg iv bid . S/p Bar / CV () . Recommend oac x 1  1 04/20/2024 04:45 AM         Significant Diagnostic Studies    Radiology:   CTA CHEST W WO CONTRAST   Final Result   1. No pulmonary embolism.   2. Small bilateral pleural effusions with adjacent atelectasis/infiltrate.   3. Chronic lung disease.      Final report electronically signed by Dr. Karan Gonzales on 4/16/2024 3:20 PM      XR CHEST PORTABLE   Final Result   1. Persistent peripheral interstitial opacities that can reflect underlying chronic interstitial disease.            **This report has been created using voice recognition software.  It may contain minor errors which are inherent in voice recognition technology.**      Final report electronically signed by Dr Estela Hernandez on 4/16/2024 12:32 PM             Consults:     IP CONSULT TO CARDIOLOGY  IP CONSULT TO SOCIAL WORK    Disposition:    [x] Home       [] TCU       [] Rehab       [] Psych       [] SNF       [] Long Term Care Facility       [] Other-    Condition at Discharge: Stable    Code Status:  Full Code       Follow-up visits:   Jeffrey Purcell MD  [facility] [address]  [phone]    Follow up on 4/24/2024  scheduled for 4/24/24 at 1015am    Mortimer, Connor, PA-C  [address] [address]  [phone]    Follow up on 5/23/2024  scheduled at 5/23/24 at 815am         Discharge Medications:        Medication List        START taking these medications      apixaban 5 MG Tabs tablet  Commonly known as: ELIQUIS  Take 1 tablet by mouth 2 times daily     metoprolol tartrate 50 MG tablet  Commonly known as: LOPRESSOR  Take 1 tablet by mouth 2 times daily            CHANGE how you take these medications      amLODIPine 2.5 MG tablet  Commonly known as: NORVASC  Take 1 tablet by mouth daily  Start taking on: April 21, 2024  What changed:   medication strength  how much to take     triamcinolone 0.1 % cream  Commonly known as: KENALOG  APPLY TO LEFT LEG AS NEEDED  What changed:   how much to take  how to take this             CONTINUE taking these medications      albuterol sulfate  (90 Base) MCG/ACT inhaler  Commonly known as: Ventolin HFA  Inhale 2 puffs into the lungs 4 times daily as needed for Wheezing     Anoro Ellipta 62.5-25 MCG/ACT inhaler  Generic drug: umeclidinium-vilanterol  Inhale 1 puff into the lungs daily     aspirin 81 MG EC tablet     furosemide 20 MG tablet  Commonly known as: LASIX  Take 1 tablet by mouth daily     hydrOXYzine HCl 25 MG tablet  Commonly known as: ATARAX  Take 1 tablet by mouth every 8 hours as needed for Itching     Iron 325 (65 Fe) MG Tabs     lovastatin 20 MG tablet  Commonly known as: MEVACOR  TAKE 1 TABLET DAILY     nitroGLYCERIN 0.4 MG SL tablet  Commonly known as: NITROSTAT  up to max of 3 total doses. If no relief after 1 dose, call 911.     pantoprazole 40 MG tablet  Commonly known as: PROTONIX  Take 1 tablet by mouth daily     potassium chloride 20 MEQ extended release tablet  Commonly known as: KLOR-CON M  Take 1 tablet by mouth daily     PREVAGEN PO            ASK your doctor about these medications      OXYGEN  Please provide patient with 3LPM of oxygen at night time for nocturnal hypoxia. Patient to have repeat pulse oximetry testing done on oxygen               Where to Get Your Medications        These medications were sent to [id number] - [place], [place] - [address] [facility] -  [phone] - F [phone]  [address] [place] [id number]      Phone: [phone]   amLODIPine 2.5 MG tablet  apixaban 5 MG Tabs tablet  metoprolol tartrate 50 MG tablet           Electronically signed by Ana Rosa Moreno MD,  PGY - 1, on 4/20/2024 at 10:11 AM

## 2024-04-20 NOTE — PROGRESS NOTES
Cardiology Progress Note      Patient:  Brenton Sumner  YOB: 1940  MRN: [id number]   Acct: [id number]  Admit Date:  4/16/2024  Primary Cardiologist: Hebert Kirby MD  Seen by Dr. Green      Per prior cardiology consult note-  CHIEF COMPLAINT: Shortness of Breath  Reason for Consult: \"Afib with RVR\"        HPI:   This is an 84 year old male who presented to [facility] ED on 04/16/2024 secondary to a several week history of progressive shortness of breath at rest and with exertion. He endorses a baseline cough with clear/white sputum production (denies hemoptysis), chronic oxygen use (3L continuously), and difficulty speaking with his increased work of breathing. Additional review of symptoms is positive for bilateral lower extremity edema. He denies lightheadedness, dizziness, fall, loss of consciousness, chest pain/tightness/pressure, constipation, diarrhea, or change in urination. He denies any recent illness, sick contacts, or change in medication. He denies any additional acute symptoms or concerns. Collaborative history obtained from patient's family at bedside.      Cardiac History:  - Follows with Dr. Hebert Kirby; last seen 01/11/2024  - s/p TAVR September 2020  - S/P WATCHMAN January 2021  - CAD s/p PCI LAD Stent June 2020  - Known pAFib [id number]: 5; HASBLED 4  - PAD L SFA  s/p intervention August 2021       Subjective (Events in last 24 hours):   Pt awake, alert. Did not notice much difference post cardioversion but has not done much, would expect his exertionala symptoms to improve slightly post CV.     Still on oxygen.     Objective:   /63   Pulse 62   Temp 97.7 °F (36.5 °C) (Oral)   Resp 18   Ht 1.727 m (5' 8\")   Wt 76.2 kg (167 lb 14.4 oz)   SpO2 93%   BMI 25.53 kg/m²      Vss   TELEMETRY: nsr      Physical Exam:  General Appearance: alert and oriented to person, place and time, in no acute distress  Cardiovascular: normal rate, irregular rhythm, normal S1 and S2, no

## 2024-04-20 NOTE — PLAN OF CARE
Problem: Discharge Planning  Goal: Discharge to home or other facility with appropriate resources     Problem: Skin/Tissue Integrity  Goal: Absence of new skin breakdown  4/19/2024 1634 by Lucía Bee, RN  Outcome: Progressing     Problem: Chronic Conditions and Co-morbidities  Goal: Patient's chronic conditions and co-morbidity symptoms are monitored and maintained or improved     Problem: Respiratory - Adult  Goal: Clear lung sounds  4/19/2024 1634 by Lucía Bee, RN  Outcome: Progressing     Problem: Respiratory - Adult  Goal: Achieves optimal ventilation and oxygenation     Problem: Cardiovascular - Adult  Goal: Maintains optimal cardiac output and hemodynamic stability     Problem: Cardiovascular - Adult  Goal: Absence of cardiac dysrhythmias or at baseline     Problem: Infection - Adult  Goal: Absence of infection during hospitalization     Problem: Safety - Adult  Goal: Free from fall injury     Problem: Pain  Goal: Verbalizes/displays adequate comfort level or baseline comfort level   Care plan reviewed with patient and verbalize understanding of the plan of care and contribute to goal setting.

## 2024-04-21 LAB
BACTERIA BLD AEROBE CULT: NORMAL
BACTERIA BLD AEROBE CULT: NORMAL

## 2024-04-22 ENCOUNTER — TELEPHONE (OUTPATIENT)
Dept: FAMILY MEDICINE CLINIC | Age: 84
End: 2024-04-22

## 2024-04-22 RX ORDER — UMECLIDINIUM BROMIDE AND VILANTEROL TRIFENATATE 62.5; 25 UG/1; UG/1
POWDER RESPIRATORY (INHALATION)
Qty: 180 G | Refills: 3 | Status: SHIPPED | OUTPATIENT
Start: 2024-04-22

## 2024-04-22 RX ORDER — METOPROLOL TARTRATE 50 MG/1
50 TABLET, FILM COATED ORAL 2 TIMES DAILY
Qty: 180 TABLET | Refills: 3 | OUTPATIENT
Start: 2024-04-22

## 2024-04-22 NOTE — TELEPHONE ENCOUNTER
[facility] Transitions Initial Follow Up Call    Outreach made within 2 business days of discharge: Yes    Patient: Brenton Sumner Patient : 1940   MRN: [id number]  Reason for Admission: There are no discharge diagnoses documented for the most recent discharge.  Discharge Date: 24       Spoke with: Blanche    Discharge department/facility: [facility]    TCM Interactive Patient Contact:  Was patient able to fill all prescriptions: Yes  Was patient instructed to bring all medications to the follow-up visit: Yes  Is patient taking all medications as directed in the discharge summary? Yes  Does patient understand their discharge instructions: Yes  Does patient have questions or concerns that need addressed prior to 7-14 day follow up office visit: no    Scheduled appointment with PCP within 7-14 days    Follow Up  Future Appointments   Date Time Provider Department Center   2024 10:15 AM Jeffrey Purcell MD [facility] [place]   2024  8:30 AM Haleigh Lance APRN - CNP N Pulm Med P - [place]   2024  8:15 AM Mortimer, Connor, PA-C N [facility] Heart [facility] - [place]   2024 10:45 AM Jeffrey Purcell MD [facility] [place]   2024  9:20 AM Chelsey Watson PA-C N Oncology [facility] [place]   1/15/2025 10:00 AM Judy Morales APRN - CNP N [facility] [place]   3/11/2025  1:40 PM SCHEDULE, SRPX NEUROINTERVENTION [facility] NEURINT [facility] - [place]       Margarita Miller MA

## 2024-04-24 ENCOUNTER — OFFICE VISIT (OUTPATIENT)
Dept: FAMILY MEDICINE CLINIC | Age: 84
End: 2024-04-24

## 2024-04-24 VITALS
TEMPERATURE: 98.4 F | DIASTOLIC BLOOD PRESSURE: 62 MMHG | SYSTOLIC BLOOD PRESSURE: 122 MMHG | HEART RATE: 69 BPM | HEIGHT: 68 IN | BODY MASS INDEX: 25.76 KG/M2 | WEIGHT: 170 LBS

## 2024-04-24 DIAGNOSIS — Z09 HOSPITAL DISCHARGE FOLLOW-UP: ICD-10-CM

## 2024-04-24 DIAGNOSIS — I48.0 PAROXYSMAL ATRIAL FIBRILLATION (HCC): Primary | ICD-10-CM

## 2024-04-24 DIAGNOSIS — R53.81 PHYSICAL DECONDITIONING: ICD-10-CM

## 2024-04-24 DIAGNOSIS — I25.119 ATHEROSCLEROSIS OF NATIVE CORONARY ARTERY OF NATIVE HEART WITH ANGINA PECTORIS (HCC): ICD-10-CM

## 2024-04-24 DIAGNOSIS — J96.11 CHRONIC RESPIRATORY FAILURE WITH HYPOXIA (HCC): ICD-10-CM

## 2024-04-24 PROBLEM — I50.33 ACUTE ON CHRONIC HEART FAILURE WITH PRESERVED EJECTION FRACTION (HCC): Status: RESOLVED | Noted: 2024-04-17 | Resolved: 2024-04-24

## 2024-04-24 PROBLEM — I50.9 ACUTE ON CHRONIC CONGESTIVE HEART FAILURE (HCC): Status: RESOLVED | Noted: 2024-04-16 | Resolved: 2024-04-24

## 2024-04-24 LAB
EKG ATRIAL RATE: 56 BPM
EKG P AXIS: 38 DEGREES
EKG P-R INTERVAL: 196 MS
EKG Q-T INTERVAL: 424 MS
EKG Q-T INTERVAL: 470 MS
EKG QRS DURATION: 110 MS
EKG QRS DURATION: 110 MS
EKG QTC CALCULATION (BAZETT): 433 MS
EKG QTC CALCULATION (BAZETT): 453 MS
EKG R AXIS: -8 DEGREES
EKG R AXIS: 6 DEGREES
EKG T AXIS: 43 DEGREES
EKG T AXIS: 56 DEGREES
EKG VENTRICULAR RATE: 56 BPM
EKG VENTRICULAR RATE: 63 BPM

## 2024-04-24 ASSESSMENT — PATIENT HEALTH QUESTIONNAIRE - PHQ9: DEPRESSION UNABLE TO ASSESS: PT REFUSES

## 2024-05-01 NOTE — PROGRESS NOTES
ischemic Heart Disease.    Query created by: Lizet Ocampo on 4/26/2024 9:06 AM      QUERY TEXT:    Patient admitted with PAF with RVR. Noted documentation of \"Acute on chronic   hypoxic respiratory failure.\" In order to support the diagnosis of acute on   chronic respiratory failure, please include additional clinical indicators in   your documentation.  Or please document if the diagnosis of acute respiratory   failure has been ruled out after further study.    The medical record reflects the following:  Risk Factors: CHF  Clinical Indicators: Resp 18-24/min. Breath sounds unlabored, fine crackles,   Pulmonary: Effort: Pulmonary effort is normal.  Breath sounds: Normal breath sounds. Decreased air movement present. On   admission documented: Acute on Chronic Hypoxic Respiratory Failure: Requiring   4 LPM on arrival (baseline 2-3 LPM). Worse nocturnally. CXR with peripheral   interstitial opacities concerning for ILD. Given elevated D-Dimer, tobacco hx,   and no CT chest in recent years, will evaluate further with CTA chest.   Pre-treat with gentle IVF. If CTA negative, could just be related to his RVR   and loss of preload. In that case, should improve with rate control. CTA (No   pulmonary embolism.) CXR 4/16:Persistent peripheral  Treatment: 4 liters oxygen, baseline 3 L, S/p IV diuresis per cardiology   recommendations.  Transitioned to home diuretic on day of DC      Acute Respiratory Failure Clinical Indicators per 3M MS-DRG Training Guide and   Quick Reference Guide:  pO2 < 60 mmHg or SpO2 (pulse oximetry) < 91% breathing room air  pCO2 > 50 and pH < 7.35  P/F ratio (pO2 / FIO2) < 300  pO2 decrease or pCO2 increase by 10 mmHg from baseline (if known)  Supplemental oxygen of 40% or more  Presence of respiratory distress, tachypnea, dyspnea, shortness of breath,   wheezing  Unable to speak in complete sentences  Use of accessory muscles to breathe  Extreme anxiety and feeling of impending doom  Tripod

## 2024-05-13 ENCOUNTER — OFFICE VISIT (OUTPATIENT)
Dept: PULMONOLOGY | Age: 84
End: 2024-05-13
Payer: MEDICARE

## 2024-05-13 VITALS
SYSTOLIC BLOOD PRESSURE: 126 MMHG | HEIGHT: 68 IN | BODY MASS INDEX: 25.34 KG/M2 | TEMPERATURE: 98.1 F | HEART RATE: 60 BPM | OXYGEN SATURATION: 92 % | DIASTOLIC BLOOD PRESSURE: 60 MMHG | WEIGHT: 167.2 LBS

## 2024-05-13 DIAGNOSIS — J96.11 CHRONIC RESPIRATORY FAILURE WITH HYPOXIA (HCC): Chronic | ICD-10-CM

## 2024-05-13 DIAGNOSIS — J43.9 PULMONARY EMPHYSEMA, UNSPECIFIED EMPHYSEMA TYPE (HCC): Primary | ICD-10-CM

## 2024-05-13 DIAGNOSIS — J90 BILATERAL PLEURAL EFFUSION: ICD-10-CM

## 2024-05-13 DIAGNOSIS — Z87.891 FORMER SMOKER, STOPPED SMOKING IN DISTANT PAST: ICD-10-CM

## 2024-05-13 PROCEDURE — 3023F SPIROM DOC REV: CPT | Performed by: NURSE PRACTITIONER

## 2024-05-13 PROCEDURE — 3078F DIAST BP <80 MM HG: CPT | Performed by: NURSE PRACTITIONER

## 2024-05-13 PROCEDURE — 3074F SYST BP LT 130 MM HG: CPT | Performed by: NURSE PRACTITIONER

## 2024-05-13 PROCEDURE — G8417 CALC BMI ABV UP PARAM F/U: HCPCS | Performed by: NURSE PRACTITIONER

## 2024-05-13 PROCEDURE — 1123F ACP DISCUSS/DSCN MKR DOCD: CPT | Performed by: NURSE PRACTITIONER

## 2024-05-13 PROCEDURE — 1036F TOBACCO NON-USER: CPT | Performed by: NURSE PRACTITIONER

## 2024-05-13 PROCEDURE — 99214 OFFICE O/P EST MOD 30 MIN: CPT | Performed by: NURSE PRACTITIONER

## 2024-05-13 PROCEDURE — 1111F DSCHRG MED/CURRENT MED MERGE: CPT | Performed by: NURSE PRACTITIONER

## 2024-05-13 PROCEDURE — G8427 DOCREV CUR MEDS BY ELIG CLIN: HCPCS | Performed by: NURSE PRACTITIONER

## 2024-05-13 ASSESSMENT — ENCOUNTER SYMPTOMS
DIARRHEA: 0
WHEEZING: 1
SHORTNESS OF BREATH: 0
NAUSEA: 0
COUGH: 1
ABDOMINAL PAIN: 0
EYES NEGATIVE: 1
VOMITING: 0

## 2024-05-13 NOTE — PROGRESS NOTES
[facility]     Patient: FUAD BRANNON, 84 y.o.   : 1940  2024    Pt of Dr. Reid     Subjective     Chief Complaint   Patient presents with    Follow-up     1 year COPD follow up with recent chest CTA and chest XR 24.        HPI       Patient presents for 1 year COPD  follow up , he is accompanied by his wife and daughter  Since last year, his SOB/fatigue has been worse   Since last visit: nex Dx AFIB w/ RVR, s/p cardioversion just last month  When he was in hospital \" I just felt awful\" , per daughter he is feeling better than he did in hospital .   He is using 3LPM NC, continuously.  He will occasionally take it off at rest (his current order is for 2 L/min nasal cannula with ambulation and continuous with sleep), he presents today on a POC device on 3 L/min is resting SpO2 is 92%  Very SOB walking from car to office.  Used to walk 1 mile on treadmill before AFIB occurred .   He is feeling down/depressed with his lack of ability to do more activity  Currently using Anoro Ellipta 1 puff once daily.     Immunization History   Administered Date(s) Administered    COVID-19, PFIZER Bivalent, DO NOT Dilute, (age 12y+), IM, 30 mcg/0.3 mL 10/25/2022    COVID-19, PFIZER GRAY top, DO NOT Dilute, (age 12 y+), IM, 30 mcg/0.3 mL 2022    COVID-19, PFIZER PURPLE top, DILUTE for use, (age 12 y+), 30mcg/0.3mL 2021, 2021, 2021, 2022, 10/25/2022    COVID-19, PFIZER, ( formula), (age 12y+), IM, 30mcg/0.3mL 10/06/2023    Influenza Vaccine, unspecified formulation 09/10/2014    Influenza Virus Vaccine 10/14/2015, 2018, 2020    Influenza, AFLURIA (age 3 yrs+), FLUZONE, (age 6 mo+), MDV, 0.5mL 2018    Influenza, FLUAD, (age 65 y+), Adjuvanted, 0.5mL 2020, 2021, 2023    Influenza, FLUARIX, FLULAVAL, FLUZONE (age 6 mo+) AND AFLURIA, (age 3 y+), PF, 0.5mL 2020    Influenza, FLUZONE (age 65 y+), High

## 2024-05-22 DIAGNOSIS — I87.2 STASIS DERMATITIS OF BOTH LEGS: ICD-10-CM

## 2024-05-22 RX ORDER — TRIAMCINOLONE ACETONIDE 1 MG/G
CREAM TOPICAL
Qty: 80 G | Refills: 2 | OUTPATIENT
Start: 2024-05-22

## 2024-05-22 NOTE — PROGRESS NOTES
[facility] PROFESSIONAL SERVICES  [facility]   [address]   [address]   [facility] [address]   Dept: [phone]   Dept Fax: [phone]   Loc: [phone]      Chief Complaint   Patient presents with    Follow-Up from Hospital     Hospital follow up.       Cardiologist:  Dr. Kirby  83 yo male presents for hfu for PAF with rvr, mild CHF, EF 40-45%. COPD. S/p TAVR, watchman, Cad/PCI 2020, PAD L SFA  s/p intervention.     Feeling better. Slow improvement. Not able to do much exertional without getting winded easily. Pulm is working on adjusting meds. Sees his oxygen drop into the 80s. No further palpitations. Got a device to monitor his HR at home. No afib noted since hospital. Denies palpitations. No cp. Sob is at baseline.     General:   No fever, no chills, no weight loss, ++ fatigue  Pulmonary:    ++ dyspnea, no wheezing  Cardiac:    Denies recent chest pain   GI:     No nausea or vomiting, no abdominal pain  Neuro:    No dizziness or light headedness  Musculoskeletal:  No recent active issues  Extremities:   No edema      Past Medical History:   Diagnosis Date    ASCVD (arteriosclerotic cardiovascular disease)     Atrial fibrillation (HCC)     CAD (coronary artery disease)     Cerebral artery occlusion with cerebral infarction (HCC)     TIA no defiect    COPD (chronic obstructive pulmonary disease) (HCC)     Difficult intubation     got dysplasia of vocal cord    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  Lowest de-sat 77%.    Osteoarthritis     Osteopenia     PAD (peripheral artery disease) (HCC)     Polio 1948    Rheumatic fever 1946       Allergies   Allergen Reactions    Brilinta [Ticagrelor] Shortness Of Breath    Cephalexin Rash and Hives       Current Outpatient Medications   Medication Sig Dispense

## 2024-05-23 ENCOUNTER — OFFICE VISIT (OUTPATIENT)
Dept: CARDIOLOGY CLINIC | Age: 84
End: 2024-05-23
Payer: MEDICARE

## 2024-05-23 VITALS
DIASTOLIC BLOOD PRESSURE: 61 MMHG | HEIGHT: 68 IN | WEIGHT: 166 LBS | HEART RATE: 79 BPM | BODY MASS INDEX: 25.16 KG/M2 | SYSTOLIC BLOOD PRESSURE: 133 MMHG

## 2024-05-23 DIAGNOSIS — I25.10 CORONARY ARTERY DISEASE DUE TO LIPID RICH PLAQUE: ICD-10-CM

## 2024-05-23 DIAGNOSIS — I25.83 CORONARY ARTERY DISEASE DUE TO LIPID RICH PLAQUE: ICD-10-CM

## 2024-05-23 DIAGNOSIS — Z95.818 PRESENCE OF WATCHMAN LEFT ATRIAL APPENDAGE CLOSURE DEVICE: ICD-10-CM

## 2024-05-23 DIAGNOSIS — I48.91 ATRIAL FIBRILLATION WITH RVR (HCC): Primary | ICD-10-CM

## 2024-05-23 DIAGNOSIS — I50.32 CHRONIC DIASTOLIC CHF (CONGESTIVE HEART FAILURE) (HCC): ICD-10-CM

## 2024-05-23 DIAGNOSIS — Z95.2 S/P TAVR (TRANSCATHETER AORTIC VALVE REPLACEMENT): ICD-10-CM

## 2024-05-23 PROCEDURE — 3078F DIAST BP <80 MM HG: CPT | Performed by: STUDENT IN AN ORGANIZED HEALTH CARE EDUCATION/TRAINING PROGRAM

## 2024-05-23 PROCEDURE — 3075F SYST BP GE 130 - 139MM HG: CPT | Performed by: STUDENT IN AN ORGANIZED HEALTH CARE EDUCATION/TRAINING PROGRAM

## 2024-05-23 PROCEDURE — 1036F TOBACCO NON-USER: CPT | Performed by: STUDENT IN AN ORGANIZED HEALTH CARE EDUCATION/TRAINING PROGRAM

## 2024-05-23 PROCEDURE — G8427 DOCREV CUR MEDS BY ELIG CLIN: HCPCS | Performed by: STUDENT IN AN ORGANIZED HEALTH CARE EDUCATION/TRAINING PROGRAM

## 2024-05-23 PROCEDURE — G8417 CALC BMI ABV UP PARAM F/U: HCPCS | Performed by: STUDENT IN AN ORGANIZED HEALTH CARE EDUCATION/TRAINING PROGRAM

## 2024-05-23 PROCEDURE — 99214 OFFICE O/P EST MOD 30 MIN: CPT | Performed by: STUDENT IN AN ORGANIZED HEALTH CARE EDUCATION/TRAINING PROGRAM

## 2024-05-23 PROCEDURE — 1123F ACP DISCUSS/DSCN MKR DOCD: CPT | Performed by: STUDENT IN AN ORGANIZED HEALTH CARE EDUCATION/TRAINING PROGRAM

## 2024-05-23 RX ORDER — METOPROLOL TARTRATE 50 MG/1
50 TABLET, FILM COATED ORAL 2 TIMES DAILY
Qty: 60 TABLET | Refills: 0 | Status: SHIPPED | OUTPATIENT
Start: 2024-05-23 | End: 2024-05-23

## 2024-05-23 RX ORDER — METOPROLOL TARTRATE 50 MG/1
50 TABLET, FILM COATED ORAL 2 TIMES DAILY
Qty: 180 TABLET | Refills: 3 | Status: SHIPPED | OUTPATIENT
Start: 2024-05-23

## 2024-05-23 RX ORDER — METOPROLOL TARTRATE 50 MG/1
50 TABLET, FILM COATED ORAL 2 TIMES DAILY
Qty: 60 TABLET | Refills: 0 | Status: SHIPPED | OUTPATIENT
Start: 2024-05-23

## 2024-05-23 NOTE — PROGRESS NOTES
Hospital follow up.  Patient is currently on 3L of oxygen and states he uses it 95% of the day.  He reports shortness of breath with exertion and minimal edema, more so the right ankle.  Patient denies chest pain, palpitations, and dizziness.

## 2024-06-04 DIAGNOSIS — E78.2 MIXED HYPERLIPIDEMIA: ICD-10-CM

## 2024-06-04 DIAGNOSIS — I73.9 PAD (PERIPHERAL ARTERY DISEASE) (HCC): ICD-10-CM

## 2024-06-04 RX ORDER — LOVASTATIN 20 MG/1
TABLET ORAL
Qty: 90 TABLET | Refills: 3 | Status: SHIPPED | OUTPATIENT
Start: 2024-06-04

## 2024-06-04 RX ORDER — PANTOPRAZOLE SODIUM 40 MG/1
40 TABLET, DELAYED RELEASE ORAL DAILY
Qty: 90 TABLET | Refills: 3 | Status: SHIPPED | OUTPATIENT
Start: 2024-06-04

## 2024-06-04 SDOH — HEALTH STABILITY: PHYSICAL HEALTH: ON AVERAGE, HOW MANY DAYS PER WEEK DO YOU ENGAGE IN MODERATE TO STRENUOUS EXERCISE (LIKE A BRISK WALK)?: 0 DAYS

## 2024-06-04 ASSESSMENT — LIFESTYLE VARIABLES
HOW OFTEN DURING THE LAST YEAR HAVE YOU FOUND THAT YOU WERE NOT ABLE TO STOP DRINKING ONCE YOU HAD STARTED: NEVER
HOW MANY STANDARD DRINKS CONTAINING ALCOHOL DO YOU HAVE ON A TYPICAL DAY: 1 OR 2
HOW OFTEN DURING THE LAST YEAR HAVE YOU NEEDED AN ALCOHOLIC DRINK FIRST THING IN THE MORNING TO GET YOURSELF GOING AFTER A NIGHT OF HEAVY DRINKING: NEVER
HOW OFTEN DO YOU HAVE A DRINK CONTAINING ALCOHOL: MONTHLY OR LESS
HAS A RELATIVE, FRIEND, DOCTOR, OR ANOTHER HEALTH PROFESSIONAL EXPRESSED CONCERN ABOUT YOUR DRINKING OR SUGGESTED YOU CUT DOWN: NO
HOW OFTEN DURING THE LAST YEAR HAVE YOU NEEDED AN ALCOHOLIC DRINK FIRST THING IN THE MORNING TO GET YOURSELF GOING AFTER A NIGHT OF HEAVY DRINKING: NEVER
HOW OFTEN DURING THE LAST YEAR HAVE YOU FAILED TO DO WHAT WAS NORMALLY EXPECTED FROM YOU BECAUSE OF DRINKING: WEEKLY
HAVE YOU OR SOMEONE ELSE BEEN INJURED AS A RESULT OF YOUR DRINKING: NO
HOW OFTEN DO YOU HAVE A DRINK CONTAINING ALCOHOL: 2
HOW OFTEN DURING THE LAST YEAR HAVE YOU FAILED TO DO WHAT WAS NORMALLY EXPECTED FROM YOU BECAUSE OF DRINKING: WEEKLY
HOW MANY STANDARD DRINKS CONTAINING ALCOHOL DO YOU HAVE ON A TYPICAL DAY: 1
HOW OFTEN DURING THE LAST YEAR HAVE YOU HAD A FEELING OF GUILT OR REMORSE AFTER DRINKING: NEVER
HOW OFTEN DURING THE LAST YEAR HAVE YOU BEEN UNABLE TO REMEMBER WHAT HAPPENED THE NIGHT BEFORE BECAUSE YOU HAD BEEN DRINKING: NEVER
HOW OFTEN DO YOU HAVE SIX OR MORE DRINKS ON ONE OCCASION: 2
HOW OFTEN DURING THE LAST YEAR HAVE YOU FOUND THAT YOU WERE NOT ABLE TO STOP DRINKING ONCE YOU HAD STARTED: NEVER
HOW OFTEN DURING THE LAST YEAR HAVE YOU HAD A FEELING OF GUILT OR REMORSE AFTER DRINKING: NEVER
HOW OFTEN DURING THE LAST YEAR HAVE YOU BEEN UNABLE TO REMEMBER WHAT HAPPENED THE NIGHT BEFORE BECAUSE YOU HAD BEEN DRINKING: NEVER
HAVE YOU OR SOMEONE ELSE BEEN INJURED AS A RESULT OF YOUR DRINKING: NO
HAS A RELATIVE, FRIEND, DOCTOR, OR ANOTHER HEALTH PROFESSIONAL EXPRESSED CONCERN ABOUT YOUR DRINKING OR SUGGESTED YOU CUT DOWN: NO

## 2024-06-04 ASSESSMENT — PATIENT HEALTH QUESTIONNAIRE - PHQ9
2. FEELING DOWN, DEPRESSED OR HOPELESS: NOT AT ALL
SUM OF ALL RESPONSES TO PHQ QUESTIONS 1-9: 1
SUM OF ALL RESPONSES TO PHQ9 QUESTIONS 1 & 2: 1
1. LITTLE INTEREST OR PLEASURE IN DOING THINGS: SEVERAL DAYS
SUM OF ALL RESPONSES TO PHQ QUESTIONS 1-9: 1

## 2024-06-04 NOTE — TELEPHONE ENCOUNTER
Last appointment this department: 4/24/2024  Next appointment this department: 6/7/2024    Last filled 6/3/2023

## 2024-06-05 ENCOUNTER — TELEPHONE (OUTPATIENT)
Dept: PULMONOLOGY | Age: 84
End: 2024-06-05

## 2024-06-05 DIAGNOSIS — B37.0 ORAL THRUSH: Primary | ICD-10-CM

## 2024-06-05 NOTE — TELEPHONE ENCOUNTER
This is an [facility] patient. Patients wife called in stating that they think he is getting thrush from the Trelegy and want to know if there is a certain mouthwash he should use or what they should do. Please advise thank you

## 2024-06-07 ENCOUNTER — OFFICE VISIT (OUTPATIENT)
Dept: FAMILY MEDICINE CLINIC | Age: 84
End: 2024-06-07

## 2024-06-07 VITALS
SYSTOLIC BLOOD PRESSURE: 138 MMHG | OXYGEN SATURATION: 94 % | BODY MASS INDEX: 24.86 KG/M2 | HEIGHT: 68 IN | WEIGHT: 164 LBS | HEART RATE: 68 BPM | DIASTOLIC BLOOD PRESSURE: 62 MMHG | RESPIRATION RATE: 18 BRPM

## 2024-06-07 DIAGNOSIS — I50.32 CHRONIC DIASTOLIC CHF (CONGESTIVE HEART FAILURE) (HCC): ICD-10-CM

## 2024-06-07 DIAGNOSIS — I10 ESSENTIAL HYPERTENSION: ICD-10-CM

## 2024-06-07 DIAGNOSIS — I25.119 ATHEROSCLEROSIS OF NATIVE CORONARY ARTERY OF NATIVE HEART WITH ANGINA PECTORIS (HCC): ICD-10-CM

## 2024-06-07 DIAGNOSIS — L29.9 GENERALIZED PRURITUS: ICD-10-CM

## 2024-06-07 DIAGNOSIS — Z00.00 MEDICARE ANNUAL WELLNESS VISIT, SUBSEQUENT: Primary | ICD-10-CM

## 2024-06-07 PROBLEM — I48.91 NEW ONSET A-FIB (HCC): Status: RESOLVED | Noted: 2020-11-13 | Resolved: 2024-06-07

## 2024-06-07 RX ORDER — HYDROXYZINE HYDROCHLORIDE 25 MG/1
25 TABLET, FILM COATED ORAL 2 TIMES DAILY PRN
Qty: 180 TABLET | Refills: 3 | Status: SHIPPED | OUTPATIENT
Start: 2024-06-07

## 2024-06-07 RX ORDER — FUROSEMIDE 20 MG/1
20 TABLET ORAL DAILY
Qty: 90 TABLET | Refills: 3 | Status: SHIPPED | OUTPATIENT
Start: 2024-06-07

## 2024-06-07 RX ORDER — AMLODIPINE BESYLATE 2.5 MG/1
2.5 TABLET ORAL DAILY
Qty: 90 TABLET | Refills: 3 | Status: SHIPPED | OUTPATIENT
Start: 2024-06-07

## 2024-06-07 RX ORDER — POTASSIUM CHLORIDE 20 MEQ/1
20 TABLET, EXTENDED RELEASE ORAL DAILY
Qty: 90 TABLET | Refills: 3 | Status: SHIPPED | OUTPATIENT
Start: 2024-06-07

## 2024-06-07 SDOH — ECONOMIC STABILITY: FOOD INSECURITY: WITHIN THE PAST 12 MONTHS, THE FOOD YOU BOUGHT JUST DIDN'T LAST AND YOU DIDN'T HAVE MONEY TO GET MORE.: NEVER TRUE

## 2024-06-07 SDOH — ECONOMIC STABILITY: FOOD INSECURITY: WITHIN THE PAST 12 MONTHS, YOU WORRIED THAT YOUR FOOD WOULD RUN OUT BEFORE YOU GOT MONEY TO BUY MORE.: NEVER TRUE

## 2024-06-07 SDOH — ECONOMIC STABILITY: INCOME INSECURITY: HOW HARD IS IT FOR YOU TO PAY FOR THE VERY BASICS LIKE FOOD, HOUSING, MEDICAL CARE, AND HEATING?: NOT HARD AT ALL

## 2024-06-07 NOTE — PROGRESS NOTES
Attending Supervising Physician's Attestation Statement  I performed a history and physical examination on the patient and discussed the management with the resident physician. I reviewed and agree with the findings and plan as documented in her note .     Diagnosis Orders   1. Medicare annual wellness visit, subsequent        2. Atherosclerosis of native coronary artery of native heart with angina pectoris (HCC)  furosemide (LASIX) 20 MG tablet      3. Chronic diastolic CHF (congestive heart failure) (HCC)  furosemide (LASIX) 20 MG tablet      4. Generalized pruritus  hydrOXYzine HCl (ATARAX) 25 MG tablet      5. Essential hypertension  potassium chloride (KLOR-CON M) 20 MEQ extended release tablet        Medicare wellness visit    Chronic diastolic and systolic CHF: Follows with cardiology.  On Lasix and metoprolol.  Stable.    CAD artery disease/atherosclerosis with PCI.  Chronic.  Medical management.  On aspirin and lovastatin    Electronically signed by Jeffrey Purcell MD on 6/7/24 at 9:06 PM EDT

## 2024-06-07 NOTE — PROGRESS NOTES
Medicare Annual Wellness Visit    Brenton Sumner is here for Medicare AWV (No concerns/)    Assessment & Plan   Medicare annual wellness visit, subsequent  ACP docs in place   Atherosclerosis of native coronary artery of native heart with angina pectoris (HCC)  Follows Cardio   -     furosemide (LASIX) 20 MG tablet; Take 1 tablet by mouth daily, Disp-90 tablet, R-3Normal  Chronic diastolic CHF (congestive heart failure) (HCC)  -     furosemide (LASIX) 20 MG tablet; Take 1 tablet by mouth daily, Disp-90 tablet, R-3Normal  Generalized pruritus  -     hydrOXYzine HCl (ATARAX) 25 MG tablet; Take 1 tablet by mouth 2 times daily as needed for Itching, Disp-180 tablet, R-3Normal  Essential hypertension  Norvasc 2.5 Daily, controlled in the office . Will continue  -     potassium chloride (KLOR-CON M) 20 MEQ extended release tablet; Take 1 tablet by mouth daily, Disp-90 tablet, R-3Normal      Recommendations for Preventive Services Due: see orders and patient instructions/AVS.  Recommended screening schedule for the next 5-10 years is provided to the patient in written form: see Patient Instructions/AVS.     Return in about 6 months (around 12/7/2024) for HTN.     Subjective   Pt doing well, has no concerns. Treadmill ~30 minutes twice since hospital DC. Wants to get back to working out daily. Needs refills.     Patient's complete Health Risk Assessment and screening values have been reviewed and are found in Flowsheets. The following problems were reviewed today and where indicated follow up appointments were made and/or referrals ordered.    Positive Risk Factor Screenings with Interventions:    Fall Risk:  Do you feel unsteady or are you worried about falling? : (!) yes  2 or more falls in past year?: no  Fall with injury in past year?: no     Interventions:    Reviewed medications, home hazards, visual acuity, and co-morbidities that can increase risk for falls    Cognitive:   Clock Drawing Test (CDT): Normal  Words

## 2024-07-29 ENCOUNTER — HOSPITAL ENCOUNTER (OUTPATIENT)
Age: 84
Discharge: HOME | End: 2024-07-29
Payer: MEDICARE

## 2024-07-29 DIAGNOSIS — D47.2 MGUS (MONOCLONAL GAMMOPATHY OF UNKNOWN SIGNIFICANCE): ICD-10-CM

## 2024-07-29 LAB
ALBUMIN SERPL BCG-MCNC: 4.1 G/DL (ref 3.5–5.1)
ALP SERPL-CCNC: 109 U/L (ref 38–126)
ALT SERPL W/O P-5'-P-CCNC: 9 U/L (ref 11–66)
ANION GAP SERPL CALC-SCNC: 11 MEQ/L (ref 8–16)
AST SERPL-CCNC: 19 U/L (ref 5–40)
BASOPHILS ABSOLUTE: 0.1 THOU/MM3 (ref 0–0.1)
BASOPHILS NFR BLD AUTO: 1.2 %
BILIRUB SERPL-MCNC: 0.6 MG/DL (ref 0.3–1.2)
BUN SERPL-MCNC: 17 MG/DL (ref 7–22)
CALCIUM SERPL-MCNC: 9.4 MG/DL (ref 8.5–10.5)
CHLORIDE SERPL-SCNC: 99 MEQ/L (ref 98–111)
CO2 SERPL-SCNC: 29 MEQ/L (ref 23–33)
CREAT SERPL-MCNC: 1.3 MG/DL (ref 0.4–1.2)
DEPRECATED RDW RBC AUTO: 46.1 FL (ref 35–45)
EOSINOPHIL NFR BLD AUTO: 5.2 %
EOSINOPHILS ABSOLUTE: 0.5 THOU/MM3 (ref 0–0.4)
ERYTHROCYTE [DISTWIDTH] IN BLOOD BY AUTOMATED COUNT: 14 % (ref 11.5–14.5)
GFR SERPL CREATININE-BSD FRML MDRD: 55 ML/MIN/1.73M2
GLUCOSE SERPL-MCNC: 107 MG/DL (ref 70–108)
HCT VFR BLD AUTO: 49.9 % (ref 42–52)
HGB BLD-MCNC: 15.9 GM/DL (ref 14–18)
IMM GRANULOCYTES # BLD AUTO: 0.02 THOU/MM3 (ref 0–0.07)
IMM GRANULOCYTES NFR BLD AUTO: 0.2 %
LYMPHOCYTES ABSOLUTE: 2.3 THOU/MM3 (ref 1–4.8)
LYMPHOCYTES NFR BLD AUTO: 26.3 %
MCH RBC QN AUTO: 28.7 PG (ref 26–33)
MCHC RBC AUTO-ENTMCNC: 31.9 GM/DL (ref 32.2–35.5)
MCV RBC AUTO: 90.1 FL (ref 80–94)
MONOCYTES ABSOLUTE: 0.7 THOU/MM3 (ref 0.4–1.3)
MONOCYTES NFR BLD AUTO: 8.2 %
NEUTROPHILS ABSOLUTE: 5.1 THOU/MM3 (ref 1.8–7.7)
NEUTROPHILS NFR BLD AUTO: 58.9 %
NRBC BLD AUTO-RTO: 0 /100 WBC
PLATELET # BLD AUTO: 258 THOU/MM3 (ref 130–400)
PMV BLD AUTO: 9.9 FL (ref 9.4–12.4)
POTASSIUM SERPL-SCNC: 4.6 MEQ/L (ref 3.5–5.2)
PROT SERPL-MCNC: 7.5 G/DL (ref 6.1–8)
RBC # BLD AUTO: 5.54 MILL/MM3 (ref 4.7–6.1)
SODIUM SERPL-SCNC: 139 MEQ/L (ref 135–145)
WBC # BLD AUTO: 8.7 THOU/MM3 (ref 4.8–10.8)

## 2024-07-29 PROCEDURE — 84155 ASSAY OF PROTEIN SERUM: CPT

## 2024-07-29 PROCEDURE — 80053 COMPREHEN METABOLIC PANEL: CPT

## 2024-07-29 PROCEDURE — 84165 PROTEIN E-PHORESIS SERUM: CPT

## 2024-07-29 PROCEDURE — 85025 COMPLETE CBC W/AUTO DIFF WBC: CPT

## 2024-07-29 PROCEDURE — 36415 COLL VENOUS BLD VENIPUNCTURE: CPT

## 2024-08-01 LAB — PROTEIN ELECTROPHORESIS, SERUM: NORMAL

## 2024-08-02 ENCOUNTER — HOSPITAL ENCOUNTER (OUTPATIENT)
Dept: INFUSION THERAPY | Age: 84
Discharge: HOME | End: 2024-08-02
Payer: MEDICARE

## 2024-08-02 ENCOUNTER — OFFICE VISIT (OUTPATIENT)
Dept: ONCOLOGY | Age: 84
End: 2024-08-02
Payer: MEDICARE

## 2024-08-02 VITALS
SYSTOLIC BLOOD PRESSURE: 145 MMHG | TEMPERATURE: 98.4 F | HEART RATE: 59 BPM | OXYGEN SATURATION: 90 % | WEIGHT: 167 LBS | RESPIRATION RATE: 18 BRPM | DIASTOLIC BLOOD PRESSURE: 65 MMHG | BODY MASS INDEX: 25.31 KG/M2 | HEIGHT: 68 IN

## 2024-08-02 VITALS
SYSTOLIC BLOOD PRESSURE: 145 MMHG | TEMPERATURE: 98.4 F | HEART RATE: 59 BPM | DIASTOLIC BLOOD PRESSURE: 65 MMHG | RESPIRATION RATE: 18 BRPM

## 2024-08-02 DIAGNOSIS — D47.2 MGUS (MONOCLONAL GAMMOPATHY OF UNKNOWN SIGNIFICANCE): Primary | ICD-10-CM

## 2024-08-02 PROCEDURE — 1036F TOBACCO NON-USER: CPT | Performed by: PHYSICIAN ASSISTANT

## 2024-08-02 PROCEDURE — 99214 OFFICE O/P EST MOD 30 MIN: CPT | Performed by: PHYSICIAN ASSISTANT

## 2024-08-02 PROCEDURE — 3077F SYST BP >= 140 MM HG: CPT | Performed by: PHYSICIAN ASSISTANT

## 2024-08-02 PROCEDURE — G8417 CALC BMI ABV UP PARAM F/U: HCPCS | Performed by: PHYSICIAN ASSISTANT

## 2024-08-02 PROCEDURE — 1123F ACP DISCUSS/DSCN MKR DOCD: CPT | Performed by: PHYSICIAN ASSISTANT

## 2024-08-02 PROCEDURE — 99211 OFF/OP EST MAY X REQ PHY/QHP: CPT

## 2024-08-02 PROCEDURE — 3078F DIAST BP <80 MM HG: CPT | Performed by: PHYSICIAN ASSISTANT

## 2024-08-02 PROCEDURE — G8427 DOCREV CUR MEDS BY ELIG CLIN: HCPCS | Performed by: PHYSICIAN ASSISTANT

## 2024-08-02 NOTE — PATIENT INSTRUCTIONS
Return to clinic in 6 months  Labs week before follow up appointment  Please call for questions or concerns.

## 2024-08-02 NOTE — PROGRESS NOTES
restricted band of protein migration in the gamma region which is too small to quantify. No monoclonal proteins seen.   BMP - Cr 1.3 (per review of EMR, baseline Cr 0.9-1.1), calcium 9.4, total protein 7.5. Discussed elevated creatinine and recommendation to increase fluid intake. Patient reports drinking 8-10 oz of water daily otherwise drinks coffee, pop. Encouraged to increase water intake.   CBC - hgb 15.9, Hct 49.9, MCV 90.1. WBC count 8.7, platelet count 258,000 within normal limits.   Patient denies development of b-type symptoms    -Will continue to monitor   -Will check labs in 6 months including CBC, BMP, LFT, kappa/lambda and RAJ      Return in about 6 months (around 2/2/2025).       All patient questions answered. Pt voiced understanding. Patient agreed with treatment plan. Follow up as directed. Patient instructed to call for questions or concerns.          Electronically signed by   Chelsey Watson PA-C

## 2024-08-09 ENCOUNTER — HOSPITAL ENCOUNTER (OUTPATIENT)
Dept: GENERAL RADIOLOGY | Age: 84
Discharge: HOME | End: 2024-08-09
Payer: MEDICARE

## 2024-08-09 ENCOUNTER — HOSPITAL ENCOUNTER (OUTPATIENT)
Age: 84
Discharge: HOME | End: 2024-08-09
Payer: MEDICARE

## 2024-08-09 ENCOUNTER — HOSPITAL ENCOUNTER (OUTPATIENT)
Dept: PULMONOLOGY | Age: 84
Discharge: HOME | End: 2024-08-09
Payer: MEDICARE

## 2024-08-09 DIAGNOSIS — J96.11 CHRONIC RESPIRATORY FAILURE WITH HYPOXIA (HCC): Chronic | ICD-10-CM

## 2024-08-09 DIAGNOSIS — J43.9 PULMONARY EMPHYSEMA, UNSPECIFIED EMPHYSEMA TYPE (HCC): ICD-10-CM

## 2024-08-09 DIAGNOSIS — Z87.891 FORMER SMOKER, STOPPED SMOKING IN DISTANT PAST: ICD-10-CM

## 2024-08-09 DIAGNOSIS — J90 BILATERAL PLEURAL EFFUSION: ICD-10-CM

## 2024-08-09 PROCEDURE — 94729 DIFFUSING CAPACITY: CPT

## 2024-08-09 PROCEDURE — 94726 PLETHYSMOGRAPHY LUNG VOLUMES: CPT

## 2024-08-09 PROCEDURE — 71046 X-RAY EXAM CHEST 2 VIEWS: CPT

## 2024-08-09 PROCEDURE — 94060 EVALUATION OF WHEEZING: CPT

## 2024-08-26 ENCOUNTER — OFFICE VISIT (OUTPATIENT)
Dept: PULMONOLOGY | Age: 84
End: 2024-08-26
Payer: MEDICARE

## 2024-08-26 VITALS
BODY MASS INDEX: 25.4 KG/M2 | DIASTOLIC BLOOD PRESSURE: 62 MMHG | OXYGEN SATURATION: 90 % | HEART RATE: 59 BPM | TEMPERATURE: 98 F | HEIGHT: 68 IN | SYSTOLIC BLOOD PRESSURE: 142 MMHG | WEIGHT: 167.6 LBS

## 2024-08-26 DIAGNOSIS — J44.9 COPD, MODERATE (HCC): ICD-10-CM

## 2024-08-26 DIAGNOSIS — Z87.891 FORMER SMOKER, STOPPED SMOKING IN DISTANT PAST: ICD-10-CM

## 2024-08-26 DIAGNOSIS — J96.11 CHRONIC RESPIRATORY FAILURE WITH HYPOXIA (HCC): Primary | ICD-10-CM

## 2024-08-26 PROCEDURE — 1123F ACP DISCUSS/DSCN MKR DOCD: CPT | Performed by: NURSE PRACTITIONER

## 2024-08-26 PROCEDURE — G8427 DOCREV CUR MEDS BY ELIG CLIN: HCPCS | Performed by: NURSE PRACTITIONER

## 2024-08-26 PROCEDURE — 3078F DIAST BP <80 MM HG: CPT | Performed by: NURSE PRACTITIONER

## 2024-08-26 PROCEDURE — 3077F SYST BP >= 140 MM HG: CPT | Performed by: NURSE PRACTITIONER

## 2024-08-26 PROCEDURE — 99214 OFFICE O/P EST MOD 30 MIN: CPT | Performed by: NURSE PRACTITIONER

## 2024-08-26 PROCEDURE — 1036F TOBACCO NON-USER: CPT | Performed by: NURSE PRACTITIONER

## 2024-08-26 PROCEDURE — G8417 CALC BMI ABV UP PARAM F/U: HCPCS | Performed by: NURSE PRACTITIONER

## 2024-08-26 PROCEDURE — 94618 PULMONARY STRESS TESTING: CPT | Performed by: NURSE PRACTITIONER

## 2024-08-26 PROCEDURE — 3023F SPIROM DOC REV: CPT | Performed by: NURSE PRACTITIONER

## 2024-08-26 ASSESSMENT — ENCOUNTER SYMPTOMS: SHORTNESS OF BREATH: 1

## 2024-08-26 NOTE — PROGRESS NOTES
[facility]     Patient: FUAD BRANNON, 84 y.o.   : 1940  2024    Pt of Dr. Reid     Subjective     Chief Complaint   Patient presents with    Follow-up     2 month pulmonary follow up with chest XR and PFT 24.        HPI       Patient presents for 2 month COPD  follow up with CXR and PFT   C/o not being able to do the things he used to be able to do like walking much of a distance, carrying items of any weight.   Recently had cardiac ablation and now on metoprolol tartrate, pt reports cardiology tells him his heart is good now and his SOB is his lungs.   We did switch from Anoro to trelegy and has felt better.   Can grocery shop as long as he has a cart to push and lean on.   History of stroke: Gets intermittent dizzy spells , chronic for at least 1 year, cardiology and neurology are aware of symptoms   He is using 3LPM NC, continuously. LAST 6 MIN walk in pulm office .    He will occasionally take it off at rest (his current order is for 2 L/min nasal cannula with ambulation and continuous with sleep), he presents today on a POC device on 3 L/min is resting SpO2 is 92%   Completed pulmonary rehab in  at [facility]      Immunization History   Administered Date(s) Administered    COVID-19, PFIZER Bivalent, DO NOT Dilute, (age 12y+), IM, 30 mcg/0.3 mL 10/25/2022    COVID-19, PFIZER GRAY top, DO NOT Dilute, (age 12 y+), IM, 30 mcg/0.3 mL 2022    COVID-19, PFIZER PURPLE top, DILUTE for use, (age 12 y+), 30mcg/0.3mL 2021, 2021, 2021, 2022, 10/25/2022    COVID-19, PFIZER, (- formula), (age 12y+), IM, 30mcg/0.3mL 10/06/2023    Influenza Vaccine, unspecified formulation 09/10/2014    Influenza Virus Vaccine 10/14/2015, 2018, 2020    Influenza, AFLURIA (age 3 y+), FLUZONE, (age 6 mo+), Quadv MDV, 0.5mL 2018    Influenza, FLUAD, (age 65 y+), IM, Quadv, 0.5mL 2020, 2021, 2023    Influenza,  to 4 LPM via nasal cannula. At the end of the test Brenton Sumner's oxygen saturation remained at 90% on 4 LPM with exertion. He is mobile in the home and requires oxygen as outlined above. Patient ambulated a total of 432 feet with oxygen. Resting Dyspnea/Darcy score was 0 / 10  and 2 / 10  upon completion of the walk. Resting heart rate was  65 bpm and 66 bpm upon completing the walk.    Nasal Oxygen order:    Rest: Yes. 1LPM   Walking: Yes. 4LPM  Sleep: Yes. 1LPM   POC flow: Yes.  Continuous flow: Yes- 3LPM continuous flow, 4LPM POC     DME Medical Necessity Documentation    Brenton SOTOMAYOR was seen in the office on 8/26/2024 for the diagnosis COPD. I am prescribing oxygen because the diagnosis and testing requires the patient to have oxygen in the home. his condition will improve or be benefited by oxygen use. The patient is able to perform good mobility in a home setting and therefore does require the use of a portable oxygen system.        Assessment       ICD-10-CM    1. Chronic respiratory failure with hypoxia (HCC)  J96.11 6 Minute Walk Test      2. COPD, moderate (HCC)  J44.9       3. Former smoker, stopped smoking in distant past  Z87.891             Plan    Brenton SOTOMAYOR was seen today for follow-up.    Diagnoses and all orders for this visit:    Chronic respiratory failure with hypoxia (HCC)- stable   6-minute walk was completed in the office today.  Walk was done on patient's home POC unit to see what liter flow he is needing with a pulsating mode.  His prior walk from 2020 showed the need for 3 L/min continuous flow around-the-clock.  He has shown improvement no longer needing 3 L at rest but only 1 L pulsating at rest.  He requires 4 L/min nasal cannula pulsating with ambulation.  He was instructed to continue the 3 L continuous flow when using home concentrator  -     6 Minute Walk Test    COPD, moderate (HCC)-controlled  Controlled on Trelegy Ellipta.  Continue 1 puff once daily  PFT was reviewed and

## 2024-10-10 ENCOUNTER — TELEPHONE (OUTPATIENT)
Dept: CARDIOLOGY CLINIC | Age: 84
End: 2024-10-10

## 2024-10-10 NOTE — TELEPHONE ENCOUNTER
Please review chart regarding ACE/ARB/ARNI and GDMT BB    Last seen 5/23/24 with Mani    Next appointment 1/15/25 with Judy    4/19/24 PEGGY LVEF 40-45%

## 2024-10-24 NOTE — TELEPHONE ENCOUNTER
Notified grisel (wife)   Verbalized understanding  Med list updated  Rx pended- prescription called into [facility] pharmacy on [address]

## 2024-11-21 NOTE — TELEPHONE ENCOUNTER
Patients wife states that they need the refills of entresto sent to the mail order pharmacy.     [facility] - [address] - P [phone] - F [phone]     DOLV 05/23/2024 DONV 01/15/2025

## 2024-11-25 ENCOUNTER — OFFICE VISIT (OUTPATIENT)
Dept: PULMONOLOGY | Age: 84
End: 2024-11-25
Payer: MEDICARE

## 2024-11-25 VITALS
TEMPERATURE: 97.7 F | HEIGHT: 68 IN | OXYGEN SATURATION: 92 % | BODY MASS INDEX: 26.1 KG/M2 | SYSTOLIC BLOOD PRESSURE: 124 MMHG | DIASTOLIC BLOOD PRESSURE: 60 MMHG | HEART RATE: 60 BPM | WEIGHT: 172.2 LBS

## 2024-11-25 DIAGNOSIS — J44.9 COPD, MODERATE (HCC): ICD-10-CM

## 2024-11-25 DIAGNOSIS — J96.11 CHRONIC RESPIRATORY FAILURE WITH HYPOXIA: Primary | Chronic | ICD-10-CM

## 2024-11-25 DIAGNOSIS — R42 EPISODIC LIGHTHEADEDNESS: ICD-10-CM

## 2024-11-25 DIAGNOSIS — I50.32 CHRONIC DIASTOLIC CHF (CONGESTIVE HEART FAILURE) (HCC): ICD-10-CM

## 2024-11-25 PROCEDURE — 3023F SPIROM DOC REV: CPT | Performed by: NURSE PRACTITIONER

## 2024-11-25 PROCEDURE — 3074F SYST BP LT 130 MM HG: CPT | Performed by: NURSE PRACTITIONER

## 2024-11-25 PROCEDURE — 1160F RVW MEDS BY RX/DR IN RCRD: CPT | Performed by: NURSE PRACTITIONER

## 2024-11-25 PROCEDURE — G8417 CALC BMI ABV UP PARAM F/U: HCPCS | Performed by: NURSE PRACTITIONER

## 2024-11-25 PROCEDURE — 1036F TOBACCO NON-USER: CPT | Performed by: NURSE PRACTITIONER

## 2024-11-25 PROCEDURE — 3078F DIAST BP <80 MM HG: CPT | Performed by: NURSE PRACTITIONER

## 2024-11-25 PROCEDURE — 1159F MED LIST DOCD IN RCRD: CPT | Performed by: NURSE PRACTITIONER

## 2024-11-25 PROCEDURE — 99214 OFFICE O/P EST MOD 30 MIN: CPT | Performed by: NURSE PRACTITIONER

## 2024-11-25 PROCEDURE — G8427 DOCREV CUR MEDS BY ELIG CLIN: HCPCS | Performed by: NURSE PRACTITIONER

## 2024-11-25 PROCEDURE — G8484 FLU IMMUNIZE NO ADMIN: HCPCS | Performed by: NURSE PRACTITIONER

## 2024-11-25 PROCEDURE — 1123F ACP DISCUSS/DSCN MKR DOCD: CPT | Performed by: NURSE PRACTITIONER

## 2024-11-25 ASSESSMENT — ENCOUNTER SYMPTOMS
SHORTNESS OF BREATH: 1
COUGH: 1
BACK PAIN: 1
WHEEZING: 0

## 2024-11-25 NOTE — PROGRESS NOTES
radiographs.    COMPARISON: [facility] chest radiograph 4/16/2024    FINDINGS: An aortic valve stent is again noted. Atherosclerotic calcifications  are present in the thoracic aorta. There are no lung consolidations.  Degenerative changes in the thoracic spine are stable compared to lateral chest  radiograph from 10/16/2019.    Impression  No acute intrathoracic process.    **This report has been created using voice recognition software. It may contain  minor errors which are inherent in voice recognition technology.**    Electronically signed by Dr. Karan Gonzales    CT Chest:   CTA CHEST W WO CONTRAST 04/16/2024    Narrative  PROCEDURE: CTA CHEST W WO CONTRAST    CLINICAL INFORMATION: Shortness of breath    TECHNIQUE: CTA of the chest was performed following administration of 80 mL Isovue-370 intravenous contrast. Axial images as well as coronal and sagittal MIP reconstructions were obtained.    All CT scans at this facility use dose modulation, iterative reconstruction, and/or weight-based dosing when appropriate to reduce radiation dose to as low as reasonably achievable.    COMPARISON: CT chest 11/22/2020    FINDINGS: There are no filling defects or lack of contrast opacification in the visualized pulmonary arteries to suggest thromboembolism.    Cardiac size is normal. Atherosclerotic calcifications are present in the thoracic aorta and coronary arteries. The ascending thoracic aorta is at the upper limits of normal in size. There is no pericardial effusion. There are small bilateral pleural  effusions with adjacent alveolar and reticular opacities. Calcified granulomas are stable. Emphysematous changes are again noted in the bilateral lungs. There is no mediastinal, hilar or axillary lymphadenopathy. Degenerative changes are present in the  thoracic spine without evidence of aggressive osseous lesions.    There is a calcified granuloma in the spleen.    Impression  1. No pulmonary embolism.  2. Small

## 2024-12-16 ENCOUNTER — OFFICE VISIT (OUTPATIENT)
Dept: FAMILY MEDICINE CLINIC | Age: 84
End: 2024-12-16
Payer: MEDICARE

## 2024-12-16 VITALS
OXYGEN SATURATION: 97 % | BODY MASS INDEX: 25.76 KG/M2 | HEIGHT: 68 IN | DIASTOLIC BLOOD PRESSURE: 64 MMHG | TEMPERATURE: 98.7 F | WEIGHT: 170 LBS | SYSTOLIC BLOOD PRESSURE: 116 MMHG | HEART RATE: 76 BPM | RESPIRATION RATE: 16 BRPM

## 2024-12-16 DIAGNOSIS — I10 ESSENTIAL HYPERTENSION: Primary | ICD-10-CM

## 2024-12-16 DIAGNOSIS — R42 EPISODIC LIGHTHEADEDNESS: ICD-10-CM

## 2024-12-16 DIAGNOSIS — I50.32 CHRONIC DIASTOLIC CHF (CONGESTIVE HEART FAILURE) (HCC): ICD-10-CM

## 2024-12-16 DIAGNOSIS — I25.83 CORONARY ARTERY DISEASE DUE TO LIPID RICH PLAQUE: ICD-10-CM

## 2024-12-16 DIAGNOSIS — I25.10 CORONARY ARTERY DISEASE DUE TO LIPID RICH PLAQUE: ICD-10-CM

## 2024-12-16 PROCEDURE — G8427 DOCREV CUR MEDS BY ELIG CLIN: HCPCS | Performed by: FAMILY MEDICINE

## 2024-12-16 PROCEDURE — 1123F ACP DISCUSS/DSCN MKR DOCD: CPT | Performed by: FAMILY MEDICINE

## 2024-12-16 PROCEDURE — 3074F SYST BP LT 130 MM HG: CPT | Performed by: FAMILY MEDICINE

## 2024-12-16 PROCEDURE — 99214 OFFICE O/P EST MOD 30 MIN: CPT | Performed by: FAMILY MEDICINE

## 2024-12-16 PROCEDURE — 1160F RVW MEDS BY RX/DR IN RCRD: CPT | Performed by: FAMILY MEDICINE

## 2024-12-16 PROCEDURE — G8417 CALC BMI ABV UP PARAM F/U: HCPCS | Performed by: FAMILY MEDICINE

## 2024-12-16 PROCEDURE — G8484 FLU IMMUNIZE NO ADMIN: HCPCS | Performed by: FAMILY MEDICINE

## 2024-12-16 PROCEDURE — 3078F DIAST BP <80 MM HG: CPT | Performed by: FAMILY MEDICINE

## 2024-12-16 PROCEDURE — 1159F MED LIST DOCD IN RCRD: CPT | Performed by: FAMILY MEDICINE

## 2024-12-16 PROCEDURE — 1036F TOBACCO NON-USER: CPT | Performed by: FAMILY MEDICINE

## 2024-12-16 ASSESSMENT — ENCOUNTER SYMPTOMS: SHORTNESS OF BREATH: 1

## 2024-12-16 NOTE — PROGRESS NOTES
Mental Status: He is alert. Mental status is at baseline.   Psychiatric:         Mood and Affect: Mood normal.         Behavior: Behavior normal.         Impression/Plan:  1. Essential hypertension  Chronic.  Appears to be overcontrolled.  Stop Norvasc.  Continue Lasix, metoprolol and Entresto  - Comprehensive Metabolic Panel; Future    2. Coronary artery disease due to lipid rich plaque  Chronic.  Medical management.  Continue aspirin, beta-blocker and lovastatin.  Check labs to see cholesterol level and ensure stable kidney function  - Lipid Panel; Future  - Comprehensive Metabolic Panel; Future    3. Episodic lightheadedness  Acute problem.  Unclear etiology.  Possible hypotensive episodes?  Stop Norvasc.    4. Chronic diastolic CHF (congestive heart failure) (HCC)  Chronic.  Medical management.  Appears stable.  Follows with cardiology.  On beta-blocker, Lasix, and Entresto      They voiced understanding.  All questions answered. They agreed with treatment plan.   See patient instructions for any educational materials that may have been given.  Discussed use, benefit, and side effects of prescribed medications.     Reviewed health maintenance.    (Please note that portions of this note may have been completed with a voice recognition program.  Efforts were made to edit the dictation but occasionally words are mis-transcribed.)    Return in about 25 weeks (around 6/9/2025) for medicare wellness.      Electronically signed by Jeffrey Purcell MD on 12/16/2024 at 10:52 AM

## 2024-12-23 ENCOUNTER — HOSPITAL ENCOUNTER (OUTPATIENT)
Age: 84
Discharge: HOME | End: 2024-12-23
Payer: MEDICARE

## 2024-12-23 DIAGNOSIS — I25.83 CORONARY ARTERY DISEASE DUE TO LIPID RICH PLAQUE: ICD-10-CM

## 2024-12-23 DIAGNOSIS — I10 ESSENTIAL HYPERTENSION: ICD-10-CM

## 2024-12-23 DIAGNOSIS — I25.10 CORONARY ARTERY DISEASE DUE TO LIPID RICH PLAQUE: ICD-10-CM

## 2024-12-23 LAB
ALBUMIN SERPL BCG-MCNC: 3.5 G/DL (ref 3.5–5.1)
ALP SERPL-CCNC: 115 U/L (ref 38–126)
ALT SERPL W/O P-5'-P-CCNC: 10 U/L (ref 11–66)
ANION GAP SERPL CALC-SCNC: 13 MEQ/L (ref 8–16)
AST SERPL-CCNC: 17 U/L (ref 5–40)
BILIRUB SERPL-MCNC: 0.7 MG/DL (ref 0.3–1.2)
BUN SERPL-MCNC: 23 MG/DL (ref 7–22)
CALCIUM SERPL-MCNC: 9.4 MG/DL (ref 8.5–10.5)
CHLORIDE SERPL-SCNC: 99 MEQ/L (ref 98–111)
CHOLEST SERPL-MCNC: 142 MG/DL (ref 100–199)
CO2 SERPL-SCNC: 26 MEQ/L (ref 23–33)
CREAT SERPL-MCNC: 1.5 MG/DL (ref 0.4–1.2)
GFR SERPL CREATININE-BSD FRML MDRD: 46 ML/MIN/1.73M2
GLUCOSE SERPL-MCNC: 99 MG/DL (ref 70–108)
HDLC SERPL-MCNC: 38 MG/DL
LDLC SERPL CALC-MCNC: 89 MG/DL
POTASSIUM SERPL-SCNC: 4.8 MEQ/L (ref 3.5–5.2)
PROT SERPL-MCNC: 7.1 G/DL (ref 6.1–8)
SODIUM SERPL-SCNC: 138 MEQ/L (ref 135–145)
TRIGL SERPL-MCNC: 77 MG/DL (ref 0–199)

## 2024-12-23 PROCEDURE — 36415 COLL VENOUS BLD VENIPUNCTURE: CPT

## 2024-12-23 PROCEDURE — 80053 COMPREHEN METABOLIC PANEL: CPT

## 2024-12-23 PROCEDURE — 80061 LIPID PANEL: CPT

## 2024-12-24 ENCOUNTER — TELEPHONE (OUTPATIENT)
Dept: FAMILY MEDICINE CLINIC | Age: 84
End: 2024-12-24

## 2024-12-24 DIAGNOSIS — I50.32 CHRONIC DIASTOLIC CHF (CONGESTIVE HEART FAILURE) (HCC): Primary | ICD-10-CM

## 2024-12-24 NOTE — TELEPHONE ENCOUNTER
----- Message from Dr. Jeffrey Purcell MD sent at 12/24/2024  7:37 AM EST -----  Cholesterol levels are good.  CMP shows slightly decreased kidney function which could be medication related.  Recommend changing Lasix to 10 mg daily and potassium chloride to 10 meq.  New orders will need placed.  Recommend repeat BMP in 1 week    Please advise patient.  Jeffrey Purcell MD

## 2024-12-26 NOTE — TELEPHONE ENCOUNTER
Patient's wife states no medications were sent to the pharmacy    She was instructed to have patient take 1/2 of each tablet x 1 week then have labs repeated    Lab order placed

## 2024-12-26 NOTE — ADDENDUM NOTE
Addended by: RIGO MC on: 12/26/2024 11:21 AM     Modules accepted: Orders     chest wall non-tender, breathing is unlabored without accessory muscle use, normal breath sounds

## 2025-01-07 ENCOUNTER — HOSPITAL ENCOUNTER (OUTPATIENT)
Age: 85
Discharge: HOME | End: 2025-01-07
Payer: MEDICARE

## 2025-01-07 DIAGNOSIS — I50.32 CHRONIC DIASTOLIC CHF (CONGESTIVE HEART FAILURE) (HCC): ICD-10-CM

## 2025-01-07 DIAGNOSIS — D47.2 MGUS (MONOCLONAL GAMMOPATHY OF UNKNOWN SIGNIFICANCE): ICD-10-CM

## 2025-01-07 LAB
ALBUMIN SERPL BCG-MCNC: 3.8 G/DL (ref 3.5–5.1)
ALP SERPL-CCNC: 100 U/L (ref 38–126)
ALT SERPL W/O P-5'-P-CCNC: 7 U/L (ref 11–66)
AST SERPL-CCNC: 17 U/L (ref 5–40)
BASOPHILS ABSOLUTE: 0.1 THOU/MM3 (ref 0–0.1)
BASOPHILS NFR BLD AUTO: 0.9 %
BILIRUB CONJ SERPL-MCNC: 0.2 MG/DL (ref 0.1–13.8)
BILIRUB SERPL-MCNC: 0.5 MG/DL (ref 0.3–1.2)
DEPRECATED RDW RBC AUTO: 45.2 FL (ref 35–45)
EOSINOPHIL NFR BLD AUTO: 7.4 %
EOSINOPHILS ABSOLUTE: 0.7 THOU/MM3 (ref 0–0.4)
ERYTHROCYTE [DISTWIDTH] IN BLOOD BY AUTOMATED COUNT: 13.3 % (ref 11.5–14.5)
HCT VFR BLD AUTO: 45.3 % (ref 42–52)
HGB BLD-MCNC: 14.4 GM/DL (ref 14–18)
IMM GRANULOCYTES # BLD AUTO: 0.02 THOU/MM3 (ref 0–0.07)
IMM GRANULOCYTES NFR BLD AUTO: 0.2 %
LYMPHOCYTES ABSOLUTE: 2.3 THOU/MM3 (ref 1–4.8)
LYMPHOCYTES NFR BLD AUTO: 25.3 %
MCH RBC QN AUTO: 29.3 PG (ref 26–33)
MCHC RBC AUTO-ENTMCNC: 31.8 GM/DL (ref 32.2–35.5)
MCV RBC AUTO: 92.3 FL (ref 80–94)
MONOCYTES ABSOLUTE: 0.6 THOU/MM3 (ref 0.4–1.3)
MONOCYTES NFR BLD AUTO: 7 %
NEUTROPHILS ABSOLUTE: 5.3 THOU/MM3 (ref 1.8–7.7)
NEUTROPHILS NFR BLD AUTO: 59.2 %
NRBC BLD AUTO-RTO: 0 /100 WBC
PLATELET # BLD AUTO: 250 THOU/MM3 (ref 130–400)
PMV BLD AUTO: 10.4 FL (ref 9.4–12.4)
PROT SERPL-MCNC: 7.3 G/DL (ref 6.1–8)
RBC # BLD AUTO: 4.91 MILL/MM3 (ref 4.7–6.1)
WBC # BLD AUTO: 9 THOU/MM3 (ref 4.8–10.8)

## 2025-01-07 PROCEDURE — 80053 COMPREHEN METABOLIC PANEL: CPT

## 2025-01-07 PROCEDURE — 83883 ASSAY NEPHELOMETRY NOT SPEC: CPT

## 2025-01-07 PROCEDURE — 86334 IMMUNOFIX E-PHORESIS SERUM: CPT

## 2025-01-07 PROCEDURE — 85025 COMPLETE CBC W/AUTO DIFF WBC: CPT

## 2025-01-07 PROCEDURE — 36415 COLL VENOUS BLD VENIPUNCTURE: CPT

## 2025-01-07 PROCEDURE — 84165 PROTEIN E-PHORESIS SERUM: CPT

## 2025-01-07 PROCEDURE — 82248 BILIRUBIN DIRECT: CPT

## 2025-01-07 PROCEDURE — 84155 ASSAY OF PROTEIN SERUM: CPT

## 2025-01-08 LAB
ANION GAP SERPL CALC-SCNC: 13 MEQ/L (ref 8–16)
BUN SERPL-MCNC: 16 MG/DL (ref 7–22)
CALCIUM SERPL-MCNC: 9.4 MG/DL (ref 8.5–10.5)
CHLORIDE SERPL-SCNC: 99 MEQ/L (ref 98–111)
CO2 SERPL-SCNC: 28 MEQ/L (ref 23–33)
CREAT SERPL-MCNC: 1.2 MG/DL (ref 0.4–1.2)
FREE KAPPA/LAMBDA RATIO: 1.43 (ref 0.22–1.74)
GFR SERPL CREATININE-BSD FRML MDRD: 60 ML/MIN/1.73M2
GLUCOSE SERPL-MCNC: 119 MG/DL (ref 70–108)
KAPPA LC FREE SER-MCNC: 71 MG/L
LAMBDA LC FREE SERPL-MCNC: 49.5 MG/L (ref 4.2–27.7)
POTASSIUM SERPL-SCNC: 4.5 MEQ/L (ref 3.5–5.2)
SODIUM SERPL-SCNC: 140 MEQ/L (ref 135–145)

## 2025-01-10 PROBLEM — I48.91 ATRIAL FIBRILLATION WITH RVR (HCC): Status: RESOLVED | Noted: 2024-04-16 | Resolved: 2025-01-10

## 2025-01-10 NOTE — PROGRESS NOTES
[facility] [place] [facility]  [facility]  [address]  [address]  [place] [address]  Dept: [phone]  Dept Fax: [phone]  Loc: [phone]    Visit Date: 1/15/2025    Brenton Sumner is a 84 y.o. male who presents todayfor:  Chief Complaint   Patient presents with    1 Year Follow Up     Cardiologist: Kofi     Hx of PAF, HFrEF 40-45%, COPD, s/p TAVR, watchman, CAD/PCI, PAD L SFA  s/p intervention    HPI: 8 month f/u   His symptoms are stable. He did have to go up on his oxygen requirement to 4L now. Gets exhausted with any sort of activity. BP has been stable with entresto, not much improvement either way with entresto. Tolerating lasix. No chest pains or pressures. Some sob when his oxygen isnt on and he forgets to turn on machine.   Past Surgical History:   Procedure Laterality Date    AORTIC VALVE REPAIR  2020    TAVR    BALLOON ANGIOPLASTY, ARTERY  2018    s/p Left CFA, SFA and popliteal intervention    CARDIAC CATHETERIZATION N/A 07/15/2022    CARDIAC VALVE REPLACEMENT      FACIAL COSMETIC SURGERY      32 years old    HAND SURGERY Right     carpal tunnel     OTHER SURGICAL HISTORY  2021    Left Atrial Appenadage closure/watchman    PTCA  2020    stent LAD    TONSILLECTOMY AND ADENOIDECTOMY      TRANSESOPHAGEAL ECHOCARDIOGRAM N/A 01/10/2022    TRANSESOPHAGEAL ECHOCARDIOGRAM performed by Jesse Matthews MD at [facility]     Family History   Problem Relation Age of Onset    Heart Disease Father     Arthritis Maternal Grandmother      Social History     Tobacco Use    Smoking status: Former     Current packs/day: 0.00     Average packs/day: 1.5 packs/day for 40.0 years (60.0 ttl pk-yrs)     Types: Cigarettes     Start date: 10/9/1964     Quit date: 10/9/2004     Years since quittin.2    Smokeless tobacco: Never    Tobacco comments:     Quit smoking    Substance Use Topics    Alcohol use: Yes     Alcohol/week: 2.0 standard drinks of

## 2025-01-12 LAB — IMMUNOFIXATION WITH QUANT: NORMAL

## 2025-01-14 ENCOUNTER — TELEPHONE (OUTPATIENT)
Dept: FAMILY MEDICINE CLINIC | Age: 85
End: 2025-01-14

## 2025-01-14 DIAGNOSIS — I50.32 CHRONIC DIASTOLIC CHF (CONGESTIVE HEART FAILURE) (HCC): ICD-10-CM

## 2025-01-14 DIAGNOSIS — I25.119 ATHEROSCLEROSIS OF NATIVE CORONARY ARTERY OF NATIVE HEART WITH ANGINA PECTORIS (HCC): ICD-10-CM

## 2025-01-14 DIAGNOSIS — I10 ESSENTIAL HYPERTENSION: ICD-10-CM

## 2025-01-14 RX ORDER — POTASSIUM CHLORIDE 750 MG/1
10 TABLET, EXTENDED RELEASE ORAL DAILY
Qty: 90 TABLET | Refills: 3 | Status: SHIPPED | OUTPATIENT
Start: 2025-01-14

## 2025-01-14 RX ORDER — FUROSEMIDE 20 MG/1
10 TABLET ORAL DAILY
Qty: 45 TABLET | Refills: 3 | Status: SHIPPED | OUTPATIENT
Start: 2025-01-14

## 2025-01-14 NOTE — TELEPHONE ENCOUNTER
Spoke with patient's wife grisel and informed her of the recommendations. She verbalized understanding.

## 2025-01-14 NOTE — TELEPHONE ENCOUNTER
Patient was instructed to decrease lasix to 10mg daily and K-Cl to 10meq daily  Had BMP 1/7/25 that was good  Wife asking if he is to continue 10mg or resume 20mg daily?     If so, will need script for lasix 10mg sent to [facility]

## 2025-01-14 NOTE — TELEPHONE ENCOUNTER
Continue Lasix 10 mg and potassium 10 mill equivalents per day.  Prescriptions were sent to the pharmacy.  There is not 10 mg Lasix so they will need to cut the 20 mg pills in half    Please advise patient.  Jeffrey Purcell MD

## 2025-01-15 ENCOUNTER — OFFICE VISIT (OUTPATIENT)
Dept: CARDIOLOGY CLINIC | Age: 85
End: 2025-01-15
Payer: MEDICARE

## 2025-01-15 VITALS
HEIGHT: 68 IN | SYSTOLIC BLOOD PRESSURE: 132 MMHG | HEART RATE: 62 BPM | WEIGHT: 172.8 LBS | DIASTOLIC BLOOD PRESSURE: 63 MMHG | BODY MASS INDEX: 26.19 KG/M2

## 2025-01-15 DIAGNOSIS — Z95.818 PRESENCE OF WATCHMAN LEFT ATRIAL APPENDAGE CLOSURE DEVICE: ICD-10-CM

## 2025-01-15 DIAGNOSIS — I10 ESSENTIAL HYPERTENSION: ICD-10-CM

## 2025-01-15 DIAGNOSIS — I48.0 PAF (PAROXYSMAL ATRIAL FIBRILLATION) (HCC): ICD-10-CM

## 2025-01-15 DIAGNOSIS — I25.83 CORONARY ARTERY DISEASE DUE TO LIPID RICH PLAQUE: ICD-10-CM

## 2025-01-15 DIAGNOSIS — I25.10 CORONARY ARTERY DISEASE DUE TO LIPID RICH PLAQUE: ICD-10-CM

## 2025-01-15 DIAGNOSIS — Z95.2 S/P TAVR (TRANSCATHETER AORTIC VALVE REPLACEMENT): ICD-10-CM

## 2025-01-15 DIAGNOSIS — I50.32 CHRONIC DIASTOLIC CHF (CONGESTIVE HEART FAILURE) (HCC): Primary | ICD-10-CM

## 2025-01-15 DIAGNOSIS — I73.9 PAD (PERIPHERAL ARTERY DISEASE) (HCC): ICD-10-CM

## 2025-01-15 PROCEDURE — 3078F DIAST BP <80 MM HG: CPT | Performed by: STUDENT IN AN ORGANIZED HEALTH CARE EDUCATION/TRAINING PROGRAM

## 2025-01-15 PROCEDURE — G8427 DOCREV CUR MEDS BY ELIG CLIN: HCPCS | Performed by: STUDENT IN AN ORGANIZED HEALTH CARE EDUCATION/TRAINING PROGRAM

## 2025-01-15 PROCEDURE — 1159F MED LIST DOCD IN RCRD: CPT | Performed by: STUDENT IN AN ORGANIZED HEALTH CARE EDUCATION/TRAINING PROGRAM

## 2025-01-15 PROCEDURE — 1123F ACP DISCUSS/DSCN MKR DOCD: CPT | Performed by: STUDENT IN AN ORGANIZED HEALTH CARE EDUCATION/TRAINING PROGRAM

## 2025-01-15 PROCEDURE — 99214 OFFICE O/P EST MOD 30 MIN: CPT | Performed by: STUDENT IN AN ORGANIZED HEALTH CARE EDUCATION/TRAINING PROGRAM

## 2025-01-15 PROCEDURE — G8417 CALC BMI ABV UP PARAM F/U: HCPCS | Performed by: STUDENT IN AN ORGANIZED HEALTH CARE EDUCATION/TRAINING PROGRAM

## 2025-01-15 PROCEDURE — 3075F SYST BP GE 130 - 139MM HG: CPT | Performed by: STUDENT IN AN ORGANIZED HEALTH CARE EDUCATION/TRAINING PROGRAM

## 2025-01-15 PROCEDURE — 1036F TOBACCO NON-USER: CPT | Performed by: STUDENT IN AN ORGANIZED HEALTH CARE EDUCATION/TRAINING PROGRAM

## 2025-01-15 RX ORDER — NITROGLYCERIN 0.4 MG/1
TABLET SUBLINGUAL
Qty: 25 TABLET | Refills: 0 | Status: SHIPPED | OUTPATIENT
Start: 2025-01-15

## 2025-01-15 NOTE — PATIENT INSTRUCTIONS
You may receive a survey regarding the care you received during your visit. We encourage you to complete and return your survey, as your input is valuable to us. We hope you will choose us in the future for your healthcare needs. Thank you!    Your Medical Assistant today: Loi Zhang  Thank you for coming to our office! It was a pleasure to serve you.

## 2025-02-03 ENCOUNTER — OFFICE VISIT (OUTPATIENT)
Dept: ONCOLOGY | Age: 85
End: 2025-02-03
Payer: MEDICARE

## 2025-02-03 ENCOUNTER — HOSPITAL ENCOUNTER (OUTPATIENT)
Dept: INFUSION THERAPY | Age: 85
Discharge: HOME | End: 2025-02-03
Payer: MEDICARE

## 2025-02-03 VITALS
TEMPERATURE: 97.4 F | WEIGHT: 175 LBS | HEART RATE: 87 BPM | HEIGHT: 68 IN | RESPIRATION RATE: 18 BRPM | OXYGEN SATURATION: 96 % | BODY MASS INDEX: 26.52 KG/M2

## 2025-02-03 VITALS
WEIGHT: 175 LBS | HEIGHT: 68 IN | TEMPERATURE: 97.4 F | DIASTOLIC BLOOD PRESSURE: 63 MMHG | SYSTOLIC BLOOD PRESSURE: 135 MMHG | BODY MASS INDEX: 26.52 KG/M2 | RESPIRATION RATE: 185 BRPM | HEART RATE: 87 BPM

## 2025-02-03 DIAGNOSIS — D47.2 MGUS (MONOCLONAL GAMMOPATHY OF UNKNOWN SIGNIFICANCE): Primary | ICD-10-CM

## 2025-02-03 PROCEDURE — 1159F MED LIST DOCD IN RCRD: CPT | Performed by: PHYSICIAN ASSISTANT

## 2025-02-03 PROCEDURE — G8417 CALC BMI ABV UP PARAM F/U: HCPCS | Performed by: PHYSICIAN ASSISTANT

## 2025-02-03 PROCEDURE — 1160F RVW MEDS BY RX/DR IN RCRD: CPT | Performed by: PHYSICIAN ASSISTANT

## 2025-02-03 PROCEDURE — 1126F AMNT PAIN NOTED NONE PRSNT: CPT | Performed by: PHYSICIAN ASSISTANT

## 2025-02-03 PROCEDURE — 3075F SYST BP GE 130 - 139MM HG: CPT | Performed by: PHYSICIAN ASSISTANT

## 2025-02-03 PROCEDURE — 3078F DIAST BP <80 MM HG: CPT | Performed by: PHYSICIAN ASSISTANT

## 2025-02-03 PROCEDURE — 1123F ACP DISCUSS/DSCN MKR DOCD: CPT | Performed by: PHYSICIAN ASSISTANT

## 2025-02-03 PROCEDURE — 1036F TOBACCO NON-USER: CPT | Performed by: PHYSICIAN ASSISTANT

## 2025-02-03 PROCEDURE — G8427 DOCREV CUR MEDS BY ELIG CLIN: HCPCS | Performed by: PHYSICIAN ASSISTANT

## 2025-02-03 PROCEDURE — 99211 OFF/OP EST MAY X REQ PHY/QHP: CPT

## 2025-02-03 PROCEDURE — 99214 OFFICE O/P EST MOD 30 MIN: CPT | Performed by: PHYSICIAN ASSISTANT

## 2025-02-03 NOTE — PROGRESS NOTES
seen. Suggest RAJ to rule out or  define a gammopathy, if clinically indicated.          Assessment and Plan:   MGUS   Noted in January 2024 on lab work completed per Neurology. RAJ on 1/10/2024: restricted band of protein migration in the gamma region which is too small to quantify. RAJ gel shows a faint band in IgG kappa which may be indicative of a specific immune response or early monoclonal protein. He was recommended 6 month follow up.    6 month follow up labs in July 2024 showed-  -SPEP - restricted band of protein migration in the gamma region which is too small to quantify. No monoclonal proteins seen.   -BMP - Cr 1.3 (per review of EMR, baseline Cr 0.9-1.1), calcium 9.4, total protein 7.5. Discussed elevated creatinine and recommendation to increase fluid intake. Patient reports drinking 8-10 oz of water daily otherwise drinks coffee, pop. Encouraged to increase water intake at time of visit.   -CBC - hgb 15.9, Hct 49.9, MCV 90.1. WBC count 8.7, platelet count 258,000 within normal limits.     6 month follow up labs on 1/7/25 showed -   BMP - Total protein 7.3. Cr 1.2 on 1/7/25. Cr overall improved.   CBC on 1/7/2025 hgb 14.4, Hct 45.3, MCV 92.3. WBC count 9.0 and platelet count 250,000.   RAJ on 1/7/25 normal SPEP, RAJ gel shows a normal pattern; no monoclonal proteins seen.    -Will continue to monitor   -Will check labs in 6 months including CBC, BMP, LFT, kappa/lambda and RAJ. Discussed if labs stable in 6 months can be followed annually.     All patient questions answered. Pt voiced understanding. Patient agreed with treatment plan. Follow up as directed. Patient instructed to call for questions or concerns.          Electronically signed by   Chelsey Watson PA-C

## 2025-03-04 ENCOUNTER — OFFICE VISIT (OUTPATIENT)
Dept: NEUROLOGY | Age: 85
End: 2025-03-04
Payer: MEDICARE

## 2025-03-04 VITALS
SYSTOLIC BLOOD PRESSURE: 135 MMHG | DIASTOLIC BLOOD PRESSURE: 56 MMHG | WEIGHT: 170 LBS | HEIGHT: 68 IN | BODY MASS INDEX: 25.76 KG/M2 | HEART RATE: 70 BPM

## 2025-03-04 DIAGNOSIS — I65.23 BILATERAL CAROTID ARTERY STENOSIS: ICD-10-CM

## 2025-03-04 DIAGNOSIS — I65.03 STENOSIS OF BOTH VERTEBRAL ARTERIES: Primary | ICD-10-CM

## 2025-03-04 PROCEDURE — G8417 CALC BMI ABV UP PARAM F/U: HCPCS

## 2025-03-04 PROCEDURE — 3075F SYST BP GE 130 - 139MM HG: CPT

## 2025-03-04 PROCEDURE — 99214 OFFICE O/P EST MOD 30 MIN: CPT

## 2025-03-04 PROCEDURE — 1036F TOBACCO NON-USER: CPT

## 2025-03-04 PROCEDURE — G8427 DOCREV CUR MEDS BY ELIG CLIN: HCPCS

## 2025-03-04 PROCEDURE — 1159F MED LIST DOCD IN RCRD: CPT

## 2025-03-04 PROCEDURE — 3078F DIAST BP <80 MM HG: CPT

## 2025-03-04 PROCEDURE — 1123F ACP DISCUSS/DSCN MKR DOCD: CPT

## 2025-03-04 ASSESSMENT — ENCOUNTER SYMPTOMS
ABDOMINAL PAIN: 0
SORE THROAT: 0
NAUSEA: 0
RHINORRHEA: 0
SHORTNESS OF BREATH: 1
COUGH: 0
VOMITING: 0

## 2025-03-04 NOTE — PROGRESS NOTES
arm pronator drift: absent  Left arm pronator drift: absent  Right leg tone: normal  Left leg tone: normal  BUE - 5/5  BLE - 4/5     Sensory Exam   Light touch normal.     Gait, Coordination, and Reflexes     Coordination   Finger to nose coordination: normal  Heel to shin coordination: normal       Labs/Imaging/Diagnostics       Imaging:  No results found.     Assessment and Plan:          Bilateral vertebral artery stenosis, bilateral carotid artery stenosis:    Diagnostic cerebral angiogram 7/13/2022 - severe flow limiting stenosis at the origin of the left vertebral artery with moderately severe multifocal luminal irregularity spanning throughout the cervical course of the left vertebral artery, mild nonflow limiting stenosis measuring less than 50% visualized within bilateral carotid artery bifurcations.  Diagnostic cerebral angiogram 1/31/2023 - atherosclerotic disease/plaque at the origin of the right vertebral artery that is causing severe stenosis (~70%).  Patient to continue taking aspirin 81 mg daily.  Patient DAPT secondary to GI bleeding and nosebleeding.  Continue following with primary care provider for aggressive risk factor monitoring with treatment as applicable.  Blood pressure goal less than 130/80.  LDL goal 45-70.  A1c goal less than 7.0.  Emergently present to the ED if experiencing any new neurologic/strokelike symptoms.  Patient to get CTA head and neck on Monday. If stable, patient to follow up with Dr. Vera in September. If significant change patient to follow up with Dr. Li. We will call patient with results.  Please call our office with any further questions or concerns.    This patient was seen and evaluated with Dr. Vera and he is in agreement with the assessment and plan.    Electronically signed by MAUDE Petit on 3/4/25 at 7:00 PM EST

## 2025-03-10 ENCOUNTER — HOSPITAL ENCOUNTER (OUTPATIENT)
Dept: CT IMAGING | Age: 85
Discharge: HOME | End: 2025-03-10
Payer: MEDICARE

## 2025-03-10 ENCOUNTER — TELEPHONE (OUTPATIENT)
Dept: NEUROLOGY | Age: 85
End: 2025-03-10

## 2025-03-10 ENCOUNTER — APPOINTMENT (OUTPATIENT)
Dept: CT IMAGING | Age: 85
End: 2025-03-10
Payer: MEDICARE

## 2025-03-10 DIAGNOSIS — I65.23 BILATERAL CAROTID ARTERY STENOSIS: ICD-10-CM

## 2025-03-10 DIAGNOSIS — I65.03 STENOSIS OF BOTH VERTEBRAL ARTERIES: ICD-10-CM

## 2025-03-10 LAB
CREAT BLD-MCNC: 1.5 MG/DL (ref 0.5–1.2)
GFR SERPL CREATININE-BSD FRML MDRD: 46 ML/MIN/1.73M2

## 2025-03-10 PROCEDURE — 70498 CT ANGIOGRAPHY NECK: CPT

## 2025-03-10 PROCEDURE — 70496 CT ANGIOGRAPHY HEAD: CPT

## 2025-03-10 PROCEDURE — 82565 ASSAY OF CREATININE: CPT

## 2025-03-10 PROCEDURE — 6360000004 HC RX CONTRAST MEDICATION

## 2025-03-10 RX ORDER — IOPAMIDOL 755 MG/ML
100 INJECTION, SOLUTION INTRAVASCULAR
Status: COMPLETED | OUTPATIENT
Start: 2025-03-10 | End: 2025-03-10

## 2025-03-10 RX ADMIN — IOPAMIDOL 100 ML: 755 INJECTION, SOLUTION INTRAVENOUS at 13:56

## 2025-03-10 NOTE — TELEPHONE ENCOUNTER
Patients imaging completed today 3/10/25.   Please review and let the office know if patient is okay to follow up in September as scheduled or if patient needs to be seen sooner.    Thank you!  Alexandria

## 2025-03-12 ENCOUNTER — HOSPITAL ENCOUNTER (OUTPATIENT)
Age: 85
Discharge: HOME | End: 2025-03-12
Payer: MEDICARE

## 2025-03-12 LAB
CREAT SERPL-MCNC: 1.4 MG/DL (ref 0.7–1.2)
GFR SERPL CREATININE-BSD FRML MDRD: 50 ML/MIN/1.73M2

## 2025-03-12 PROCEDURE — 82565 ASSAY OF CREATININE: CPT

## 2025-03-12 PROCEDURE — 36415 COLL VENOUS BLD VENIPUNCTURE: CPT

## 2025-05-05 ENCOUNTER — OFFICE VISIT (OUTPATIENT)
Dept: PULMONOLOGY | Age: 85
End: 2025-05-05
Payer: MEDICARE

## 2025-05-05 VITALS
TEMPERATURE: 98.1 F | BODY MASS INDEX: 26.01 KG/M2 | HEIGHT: 69 IN | OXYGEN SATURATION: 93 % | DIASTOLIC BLOOD PRESSURE: 64 MMHG | WEIGHT: 175.6 LBS | SYSTOLIC BLOOD PRESSURE: 136 MMHG

## 2025-05-05 DIAGNOSIS — I50.32 CHRONIC DIASTOLIC CHF (CONGESTIVE HEART FAILURE) (HCC): ICD-10-CM

## 2025-05-05 DIAGNOSIS — G47.34 NOCTURNAL HYPOXIA: ICD-10-CM

## 2025-05-05 DIAGNOSIS — J44.9 COPD, MODERATE (HCC): Primary | ICD-10-CM

## 2025-05-05 DIAGNOSIS — J96.11 CHRONIC RESPIRATORY FAILURE WITH HYPOXIA (HCC): Chronic | ICD-10-CM

## 2025-05-05 DIAGNOSIS — I10 ESSENTIAL HYPERTENSION: ICD-10-CM

## 2025-05-05 DIAGNOSIS — R09.82 PND (POST-NASAL DRIP): ICD-10-CM

## 2025-05-05 PROCEDURE — 1036F TOBACCO NON-USER: CPT | Performed by: NURSE PRACTITIONER

## 2025-05-05 PROCEDURE — 1159F MED LIST DOCD IN RCRD: CPT | Performed by: NURSE PRACTITIONER

## 2025-05-05 PROCEDURE — 1160F RVW MEDS BY RX/DR IN RCRD: CPT | Performed by: NURSE PRACTITIONER

## 2025-05-05 PROCEDURE — G8427 DOCREV CUR MEDS BY ELIG CLIN: HCPCS | Performed by: NURSE PRACTITIONER

## 2025-05-05 PROCEDURE — 3078F DIAST BP <80 MM HG: CPT | Performed by: NURSE PRACTITIONER

## 2025-05-05 PROCEDURE — 3023F SPIROM DOC REV: CPT | Performed by: NURSE PRACTITIONER

## 2025-05-05 PROCEDURE — G8417 CALC BMI ABV UP PARAM F/U: HCPCS | Performed by: NURSE PRACTITIONER

## 2025-05-05 PROCEDURE — 1123F ACP DISCUSS/DSCN MKR DOCD: CPT | Performed by: NURSE PRACTITIONER

## 2025-05-05 PROCEDURE — 99214 OFFICE O/P EST MOD 30 MIN: CPT | Performed by: NURSE PRACTITIONER

## 2025-05-05 PROCEDURE — 3075F SYST BP GE 130 - 139MM HG: CPT | Performed by: NURSE PRACTITIONER

## 2025-05-05 NOTE — PROGRESS NOTES
[facility]     Patient: FUAD BRANNON, 85 y.o.   : 1940  2025    Pt of Dr. Reid     Subjective     Chief Complaint   Patient presents with    Follow-up     Chronic respiratory failure with hypoxia 6 month, no testing        History of Present Illness  The patient presents for COPD follow up . He is accompanied by his wife.    He reports satisfactory respiratory function at present. However, his wife notes a significant decrease in his oxygen saturation levels during ambulation, even with supplemental oxygen. He utilizes a home concentrator set to a continuous flow of 3 liters, which he also uses during sleep at home, however he is using portable concentrator that only delivers pulse dose O2 at 3LPM when away from home.   He has been alternating between Anoro and Trelegy inhalers every other day, with the intention to transition fully to Trelegy once the Anoro supply is exhausted.   He has not experienced any respiratory infections since his last visit in 2024.    He reports an unusual symptom of nasal discharge when bending forward, which was not previously present. This symptom has shown some improvement but persists intermittently.   He also experiences coughing and hacking when lying on his stomach, which resolves upon repositioning to his side.    He reports no edema in his ankles or feet but mentions a history of right ankle injuries, which were not medically evaluated.    MEDICATIONS  Current: Trelegy inhaler, Anoro     Completed pulmonary rehab in  at [facility]      PFT completed 2024 this was reviewed at last appointment.  It was worsening DLCO on a PFT compared to 2022  Last 6 min walk 2024  Patient required 1 L/min POC at rest, 4 L/min nasal cannula pulsating mode on our office POC. He presents today on his own POC. His current POC only goes to 3 L/min.     Immunization History   Administered Date(s) Administered    COVID-19,

## 2025-05-28 RX ORDER — METOPROLOL TARTRATE 50 MG
50 TABLET ORAL 2 TIMES DAILY
Qty: 180 TABLET | Refills: 3 | Status: SHIPPED | OUTPATIENT
Start: 2025-05-28

## 2025-06-09 ENCOUNTER — OFFICE VISIT (OUTPATIENT)
Dept: FAMILY MEDICINE CLINIC | Age: 85
End: 2025-06-09
Payer: MEDICARE

## 2025-06-09 VITALS
HEART RATE: 60 BPM | OXYGEN SATURATION: 91 % | SYSTOLIC BLOOD PRESSURE: 124 MMHG | DIASTOLIC BLOOD PRESSURE: 66 MMHG | BODY MASS INDEX: 26.37 KG/M2 | WEIGHT: 174 LBS | HEIGHT: 68 IN

## 2025-06-09 DIAGNOSIS — I73.9 PAD (PERIPHERAL ARTERY DISEASE): ICD-10-CM

## 2025-06-09 DIAGNOSIS — E78.2 MIXED HYPERLIPIDEMIA: ICD-10-CM

## 2025-06-09 DIAGNOSIS — L29.9 GENERALIZED PRURITUS: ICD-10-CM

## 2025-06-09 DIAGNOSIS — Z00.00 MEDICARE ANNUAL WELLNESS VISIT, SUBSEQUENT: Primary | ICD-10-CM

## 2025-06-09 PROCEDURE — 3074F SYST BP LT 130 MM HG: CPT | Performed by: FAMILY MEDICINE

## 2025-06-09 PROCEDURE — 3078F DIAST BP <80 MM HG: CPT | Performed by: FAMILY MEDICINE

## 2025-06-09 PROCEDURE — 1160F RVW MEDS BY RX/DR IN RCRD: CPT | Performed by: FAMILY MEDICINE

## 2025-06-09 PROCEDURE — 1159F MED LIST DOCD IN RCRD: CPT | Performed by: FAMILY MEDICINE

## 2025-06-09 PROCEDURE — G0439 PPPS, SUBSEQ VISIT: HCPCS | Performed by: FAMILY MEDICINE

## 2025-06-09 PROCEDURE — 1123F ACP DISCUSS/DSCN MKR DOCD: CPT | Performed by: FAMILY MEDICINE

## 2025-06-09 RX ORDER — PANTOPRAZOLE SODIUM 40 MG/1
40 TABLET, DELAYED RELEASE ORAL DAILY
Qty: 90 TABLET | Refills: 3 | Status: SHIPPED | OUTPATIENT
Start: 2025-06-09

## 2025-06-09 RX ORDER — HYDROXYZINE HYDROCHLORIDE 25 MG/1
25 TABLET, FILM COATED ORAL 2 TIMES DAILY PRN
Qty: 180 TABLET | Refills: 3 | Status: SHIPPED | OUTPATIENT
Start: 2025-06-09

## 2025-06-09 RX ORDER — LOVASTATIN 20 MG/1
TABLET ORAL
Qty: 90 TABLET | Refills: 3 | Status: SHIPPED | OUTPATIENT
Start: 2025-06-09

## 2025-06-09 SDOH — ECONOMIC STABILITY: FOOD INSECURITY: WITHIN THE PAST 12 MONTHS, THE FOOD YOU BOUGHT JUST DIDN'T LAST AND YOU DIDN'T HAVE MONEY TO GET MORE.: NEVER TRUE

## 2025-06-09 SDOH — ECONOMIC STABILITY: FOOD INSECURITY: WITHIN THE PAST 12 MONTHS, YOU WORRIED THAT YOUR FOOD WOULD RUN OUT BEFORE YOU GOT MONEY TO BUY MORE.: NEVER TRUE

## 2025-06-09 ASSESSMENT — PATIENT HEALTH QUESTIONNAIRE - PHQ9
1. LITTLE INTEREST OR PLEASURE IN DOING THINGS: NOT AT ALL
SUM OF ALL RESPONSES TO PHQ QUESTIONS 1-9: 0
SUM OF ALL RESPONSES TO PHQ QUESTIONS 1-9: 0
2. FEELING DOWN, DEPRESSED OR HOPELESS: NOT AT ALL
SUM OF ALL RESPONSES TO PHQ QUESTIONS 1-9: 0
SUM OF ALL RESPONSES TO PHQ QUESTIONS 1-9: 0

## 2025-06-09 ASSESSMENT — LIFESTYLE VARIABLES
HOW OFTEN DO YOU HAVE A DRINK CONTAINING ALCOHOL: MONTHLY OR LESS
HOW MANY STANDARD DRINKS CONTAINING ALCOHOL DO YOU HAVE ON A TYPICAL DAY: PATIENT DOES NOT DRINK

## 2025-06-09 NOTE — PROGRESS NOTES
Jeffrey Purcell MD   pantoprazole (PROTONIX) 40 MG tablet TAKE 1 TABLET DAILY Yes Jeffrey Purcell MD   lovastatin (MEVACOR) 20 MG tablet TAKE 1 TABLET DAILY Yes Jeffrey Purcell MD   fluticasone-umeclidin-vilant (TRELEGY ELLIPTA) 100-62.5-25 MCG/ACT AEPB inhaler Inhale 1 puff into the lungs daily Yes Haleigh Lance APRN - CNP   triamcinolone (KENALOG) 0.1 % cream APPLY TO LEFT LEG AS NEEDED  Patient taking differently: Apply 0.1 g topically APPLY TO LEFT LEG AS NEEDED Yes Jeffrey Purcell MD   albuterol sulfate HFA (VENTOLIN HFA) 108 (90 Base) MCG/ACT inhaler Inhale 2 puffs into the lungs 4 times daily as needed for Wheezing Yes Haleigh Lance APRN - CNP   Apoaequorin (PREVAGEN PO) Take 1 tablet by mouth Daily Yes ProviderPatti MD   Ferrous Sulfate (IRON) 325 (65 Fe) MG TABS Take 1 tablet by mouth daily Yes Patti Stephens MD   aspirin 81 MG EC tablet Take 1 tablet by mouth daily Stop 4 weeks post peripheral intervention  9/11/21 Yes Patti Stephens MD   OXYGEN Please provide patient with 3LPM of oxygen at night time for nocturnal hypoxia. Patient to have repeat pulse oximetry testing done on oxygen Yes Haleigh Lance APRN - CNP       CareTeam (Including outside providers/suppliers regularly involved in providing care):   Patient Care Team:  Jeffrey Purcell MD as PCP - General (Family Medicine)  Jeffrey Purcell MD as PCP - [facility]     Recommendations for Preventive Services Due: see orders and patient instructions/AVS.  Recommended screening schedule for the next 5-10 years is provided to the patient in written form: see Patient Instructions/AVS.     Reviewed and updated this visit:  Tobacco  Allergies  Meds  Problems  Med Hx  Surg Hx  Fam Hx  Sexual   Hx

## 2025-06-14 DIAGNOSIS — I73.9 PAD (PERIPHERAL ARTERY DISEASE): ICD-10-CM

## 2025-06-16 RX ORDER — PANTOPRAZOLE SODIUM 40 MG/1
40 TABLET, DELAYED RELEASE ORAL DAILY
Qty: 90 TABLET | Refills: 3 | OUTPATIENT
Start: 2025-06-16

## 2025-06-17 ENCOUNTER — TELEPHONE (OUTPATIENT)
Dept: FAMILY MEDICINE CLINIC | Age: 85
End: 2025-06-17

## 2025-07-04 NOTE — PROGRESS NOTES
Rhythm: Normal rate and regular rhythm.   Pulmonary:      Effort: Pulmonary effort is normal.      Breath sounds: Examination of the right-lower field reveals rales. Examination of the left-lower field reveals rales. Decreased breath sounds (throughout) and rales present. No wheezing.   Neurological:      Mental Status: He is alert. Mental status is at baseline.   Psychiatric:         Mood and Affect: Affect is flat.         Behavior: Behavior normal.         An electronic signature was used to authenticate this note.  --Jeffrey uPrcell MD     no

## 2025-07-23 NOTE — PATIENT INSTRUCTIONS
Your staff today were Eleni   Your provider today was Dr. Kirby  Phone number: [phone]

## 2025-07-24 ENCOUNTER — OFFICE VISIT (OUTPATIENT)
Dept: CARDIOLOGY CLINIC | Age: 85
End: 2025-07-24
Payer: MEDICARE

## 2025-07-24 VITALS
HEIGHT: 66 IN | BODY MASS INDEX: 28 KG/M2 | DIASTOLIC BLOOD PRESSURE: 86 MMHG | SYSTOLIC BLOOD PRESSURE: 142 MMHG | HEART RATE: 61 BPM | WEIGHT: 174.2 LBS

## 2025-07-24 DIAGNOSIS — I25.10 CORONARY ARTERY DISEASE DUE TO LIPID RICH PLAQUE: Primary | ICD-10-CM

## 2025-07-24 DIAGNOSIS — I10 ESSENTIAL HYPERTENSION: ICD-10-CM

## 2025-07-24 DIAGNOSIS — I73.9 PAD (PERIPHERAL ARTERY DISEASE): ICD-10-CM

## 2025-07-24 DIAGNOSIS — R06.02 SOB (SHORTNESS OF BREATH): ICD-10-CM

## 2025-07-24 DIAGNOSIS — I25.83 CORONARY ARTERY DISEASE DUE TO LIPID RICH PLAQUE: Primary | ICD-10-CM

## 2025-07-24 DIAGNOSIS — R06.02 SHORTNESS OF BREATH: ICD-10-CM

## 2025-07-24 PROCEDURE — 1123F ACP DISCUSS/DSCN MKR DOCD: CPT | Performed by: INTERNAL MEDICINE

## 2025-07-24 PROCEDURE — G8427 DOCREV CUR MEDS BY ELIG CLIN: HCPCS | Performed by: INTERNAL MEDICINE

## 2025-07-24 PROCEDURE — 3077F SYST BP >= 140 MM HG: CPT | Performed by: INTERNAL MEDICINE

## 2025-07-24 PROCEDURE — 3079F DIAST BP 80-89 MM HG: CPT | Performed by: INTERNAL MEDICINE

## 2025-07-24 PROCEDURE — G8417 CALC BMI ABV UP PARAM F/U: HCPCS | Performed by: INTERNAL MEDICINE

## 2025-07-24 PROCEDURE — 99214 OFFICE O/P EST MOD 30 MIN: CPT | Performed by: INTERNAL MEDICINE

## 2025-07-24 PROCEDURE — 1036F TOBACCO NON-USER: CPT | Performed by: INTERNAL MEDICINE

## 2025-07-24 PROCEDURE — 1159F MED LIST DOCD IN RCRD: CPT | Performed by: INTERNAL MEDICINE

## 2025-07-24 PROCEDURE — 93000 ELECTROCARDIOGRAM COMPLETE: CPT | Performed by: INTERNAL MEDICINE

## 2025-07-24 RX ORDER — SPIRONOLACTONE 25 MG/1
25 TABLET ORAL DAILY
Qty: 90 TABLET | Refills: 3 | Status: SHIPPED | OUTPATIENT
Start: 2025-07-24

## 2025-07-24 NOTE — PROGRESS NOTES
[facility] [place] [facility]  [facility]  [address]  [address]  [place] [address]  Dept: [phone]  Dept Fax: [phone]  Loc: [phone]    Visit Date: 7/24/2025    Mr. Sumner is a 85 y.o. male  who presented for:  Chief Complaint   Patient presents with    Follow-up    Shortness of Breath       HPI:     History of Present Illness  The patient is an 85-year-old male who presents for evaluation of heart function.    He reports experiencing dizziness and stomach discomfort, which he attributes to his medication. Over the past week, he has had two episodes of tongue tingling, but these were transient. He has a history of stroke and believes the tongue tingling could be related to this. He does not experience any chest pain. He also mentions that he becomes breathless when taking out the garbage, needing to pause and catch his breath before continuing. He is uncertain if this is due to his lungs or another cause. He used to walk a mile daily but is no longer able to do so.    SOCIAL HISTORY  Exercise: Walks a mile every day.         4/2024 PEGGY:    Left Ventricle: Mildly reduced left ventricular systolic function with a visually estimated EF of 40 - 45%. Left ventricle size is normal. Normal wall thickness. Moderate global hypokinesis present.    Aortic Valve: Appropriately positioned transcatheter bioprosthetic valve. Mild to moderate paravalvular regurgitation with an anteromedial eccentrically directed jet and may underestimate severity.    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 Mildly thickened leaflet. Mild to moderate regurgitation.    Left Atrium: Left atrium is mildly dilated. Normal sized appendage. No left atrial appendage thrombus noted. No left atrial appendage mass noted. No thrombus present on the OTIS device. No harvey-device leak of OTIS device.    Interatrial Septum: No interatrial shunt visualized with color Doppler. Agitated saline

## 2025-08-14 DIAGNOSIS — I50.32 CHRONIC DIASTOLIC CHF (CONGESTIVE HEART FAILURE) (HCC): ICD-10-CM

## 2025-08-14 DIAGNOSIS — I25.119 ATHEROSCLEROSIS OF NATIVE CORONARY ARTERY OF NATIVE HEART WITH ANGINA PECTORIS: ICD-10-CM

## 2025-08-14 RX ORDER — FUROSEMIDE 20 MG/1
10 TABLET ORAL DAILY
Qty: 45 TABLET | Refills: 3 | Status: SHIPPED | OUTPATIENT
Start: 2025-08-14

## 2025-08-28 ENCOUNTER — HOSPITAL ENCOUNTER (OUTPATIENT)
Age: 85
Discharge: HOME | End: 2025-08-30
Attending: INTERNAL MEDICINE
Payer: MEDICARE

## 2025-08-28 DIAGNOSIS — R06.02 SHORTNESS OF BREATH: ICD-10-CM

## 2025-08-28 LAB
ECHO AO ASC DIAM: 3.5 CM
ECHO AR MAX VEL PISA: 3.7 M/S
ECHO AV AREA PEAK VELOCITY: 1.9 CM2
ECHO AV AREA VTI: 2.1 CM2
ECHO AV MEAN GRADIENT: 6 MMHG
ECHO AV MEAN VELOCITY: 1.2 M/S
ECHO AV PEAK GRADIENT: 11 MMHG
ECHO AV PEAK VELOCITY: 1.7 M/S
ECHO AV REGURGITANT PHT: 579 MS
ECHO AV VELOCITY RATIO: 0.59
ECHO AV VTI: 36.9 CM
ECHO EST RA PRESSURE: 3 MMHG
ECHO LA AREA 2C: 11.9 CM2
ECHO LA AREA 4C: 12.2 CM2
ECHO LA DIAMETER: 3.9 CM
ECHO LA MAJOR AXIS: 4.3 CM
ECHO LA MINOR AXIS: 4.2 CM
ECHO LA VOL BP: 27 ML (ref 18–58)
ECHO LA VOL MOD A2C: 28 ML (ref 18–58)
ECHO LA VOL MOD A4C: 26 ML (ref 18–58)
ECHO LV E' LATERAL VELOCITY: 5.2 CM/S
ECHO LV E' SEPTAL VELOCITY: 3.6 CM/S
ECHO LV EDV A4C: 130 ML
ECHO LV EF PHYSICIAN: 45 %
ECHO LV EJECTION FRACTION A4C: 46 %
ECHO LV ESV A4C: 70 ML
ECHO LV FRACTIONAL SHORTENING: 23 % (ref 28–44)
ECHO LV INTERNAL DIMENSION DIASTOLIC: 5.2 CM (ref 4.2–5.9)
ECHO LV INTERNAL DIMENSION SYSTOLIC: 4 CM
ECHO LV IVSD: 0.8 CM (ref 0.6–1)
ECHO LV MASS 2D: 156.9 G (ref 88–224)
ECHO LV POSTERIOR WALL DIASTOLIC: 0.9 CM (ref 0.6–1)
ECHO LV RELATIVE WALL THICKNESS RATIO: 0.35
ECHO LVOT AREA: 3.1 CM2
ECHO LVOT AV VTI INDEX: 0.67
ECHO LVOT DIAM: 2 CM
ECHO LVOT MEAN GRADIENT: 3 MMHG
ECHO LVOT PEAK GRADIENT: 4 MMHG
ECHO LVOT PEAK VELOCITY: 1 M/S
ECHO LVOT SV: 77.6 ML
ECHO LVOT VTI: 24.7 CM
ECHO MV A VELOCITY: 0.97 M/S
ECHO MV E DECELERATION TIME (DT): 430 MS
ECHO MV E VELOCITY: 0.56 M/S
ECHO MV E/A RATIO: 0.58
ECHO MV E/E' LATERAL: 10.77
ECHO MV E/E' RATIO (AVERAGED): 13.16
ECHO MV E/E' SEPTAL: 15.56
ECHO MV REGURGITANT PEAK GRADIENT: 92 MMHG
ECHO MV REGURGITANT PEAK VELOCITY: 4.8 M/S
ECHO PV MAX VELOCITY: 0.5 M/S
ECHO PV PEAK GRADIENT: 1 MMHG
ECHO RV INTERNAL DIMENSION: 2.6 CM
ECHO RV TAPSE: 1.8 CM (ref 1.7–?)
ECHO TV E WAVE: 0.4 M/S
ECHO TV REGURGITANT MAX VELOCITY: 3.73 M/S
ECHO TV REGURGITANT MAX VELOCITY: 3.73 M/S
ECHO TV REGURGITANT PEAK GRADIENT: 56 MMHG

## 2025-08-28 PROCEDURE — 93306 TTE W/DOPPLER COMPLETE: CPT
